# Patient Record
Sex: MALE | Race: WHITE | Employment: OTHER | URBAN - METROPOLITAN AREA
[De-identification: names, ages, dates, MRNs, and addresses within clinical notes are randomized per-mention and may not be internally consistent; named-entity substitution may affect disease eponyms.]

---

## 2017-01-03 ENCOUNTER — HOSPITAL ENCOUNTER (OUTPATIENT)
Dept: PHYSICAL THERAPY | Age: 72
Discharge: HOME | End: 2017-01-31
Admitting: FAMILY MEDICINE

## 2017-01-05 ENCOUNTER — OFFICE VISIT (OUTPATIENT)
Dept: FAMILY MEDICINE CLINIC | Age: 72
End: 2017-01-05

## 2017-01-05 VITALS
OXYGEN SATURATION: 98 % | WEIGHT: 278 LBS | BODY MASS INDEX: 39.89 KG/M2 | SYSTOLIC BLOOD PRESSURE: 132 MMHG | TEMPERATURE: 98.6 F | HEART RATE: 78 BPM | DIASTOLIC BLOOD PRESSURE: 74 MMHG

## 2017-01-05 DIAGNOSIS — L03.90 CELLULITIS, UNSPECIFIED CELLULITIS SITE: Primary | ICD-10-CM

## 2017-01-05 PROCEDURE — 99213 OFFICE O/P EST LOW 20 MIN: CPT | Performed by: FAMILY MEDICINE

## 2017-01-05 RX ORDER — CEPHALEXIN 500 MG/1
500 CAPSULE ORAL 4 TIMES DAILY
Qty: 40 CAPSULE | Refills: 0 | Status: SHIPPED | OUTPATIENT
Start: 2017-01-05 | End: 2017-02-07

## 2017-01-05 RX ORDER — AMMONIUM LACTATE 12 G/100G
LOTION TOPICAL
Qty: 500 G | Refills: 0 | Status: SHIPPED | OUTPATIENT
Start: 2017-01-05 | End: 2017-08-21

## 2017-01-06 ENCOUNTER — HOSPITAL ENCOUNTER (OUTPATIENT)
Dept: PHYSICAL THERAPY | Age: 72
Discharge: HOME | End: 2017-01-06
Admitting: FAMILY MEDICINE

## 2017-01-18 ENCOUNTER — HOSPITAL ENCOUNTER (OUTPATIENT)
Dept: PHYSICAL THERAPY | Age: 72
Discharge: HOME | End: 2017-01-18
Admitting: FAMILY MEDICINE

## 2017-01-20 ENCOUNTER — HOSPITAL ENCOUNTER (OUTPATIENT)
Dept: PHYSICAL THERAPY | Age: 72
Discharge: HOME | End: 2017-01-20
Admitting: FAMILY MEDICINE

## 2017-01-23 ENCOUNTER — HOSPITAL ENCOUNTER (OUTPATIENT)
Dept: PHYSICAL THERAPY | Age: 72
Discharge: HOME | End: 2017-01-23
Admitting: FAMILY MEDICINE

## 2017-01-24 ENCOUNTER — TELEPHONE (OUTPATIENT)
Dept: FAMILY MEDICINE CLINIC | Age: 72
End: 2017-01-24

## 2017-01-24 DIAGNOSIS — E34.9 HYPOTESTOSTERONISM: Primary | ICD-10-CM

## 2017-01-24 DIAGNOSIS — E11.9 TYPE 2 DIABETES MELLITUS WITHOUT COMPLICATION, WITHOUT LONG-TERM CURRENT USE OF INSULIN (HCC): ICD-10-CM

## 2017-01-24 DIAGNOSIS — M10.9 GOUT, UNSPECIFIED CAUSE, UNSPECIFIED CHRONICITY, UNSPECIFIED SITE: ICD-10-CM

## 2017-01-24 DIAGNOSIS — I25.10 CORONARY ARTERY DISEASE INVOLVING NATIVE CORONARY ARTERY OF NATIVE HEART WITHOUT ANGINA PECTORIS: ICD-10-CM

## 2017-01-26 ENCOUNTER — HOSPITAL ENCOUNTER (OUTPATIENT)
Dept: PHYSICAL THERAPY | Age: 72
Discharge: HOME | End: 2017-01-26
Admitting: FAMILY MEDICINE

## 2017-02-03 ENCOUNTER — NURSE ONLY (OUTPATIENT)
Dept: FAMILY MEDICINE CLINIC | Age: 72
End: 2017-02-03

## 2017-02-03 DIAGNOSIS — I25.10 CORONARY ARTERY DISEASE INVOLVING NATIVE CORONARY ARTERY OF NATIVE HEART WITHOUT ANGINA PECTORIS: ICD-10-CM

## 2017-02-03 DIAGNOSIS — M10.9 GOUT, UNSPECIFIED CAUSE, UNSPECIFIED CHRONICITY, UNSPECIFIED SITE: ICD-10-CM

## 2017-02-03 DIAGNOSIS — E11.9 TYPE 2 DIABETES MELLITUS WITHOUT COMPLICATION, WITHOUT LONG-TERM CURRENT USE OF INSULIN (HCC): Primary | ICD-10-CM

## 2017-02-03 DIAGNOSIS — E34.9 HYPOTESTOSTERONISM: ICD-10-CM

## 2017-02-03 LAB
A/G RATIO: 1.9 (ref 1.1–2.2)
ALBUMIN SERPL-MCNC: 4.1 G/DL (ref 3.4–5)
ALP BLD-CCNC: 88 U/L (ref 40–129)
ALT SERPL-CCNC: 19 U/L (ref 10–40)
ANION GAP SERPL CALCULATED.3IONS-SCNC: 12 MMOL/L (ref 3–16)
AST SERPL-CCNC: 15 U/L (ref 15–37)
BILIRUB SERPL-MCNC: 0.4 MG/DL (ref 0–1)
BUN BLDV-MCNC: 21 MG/DL (ref 7–20)
CALCIUM SERPL-MCNC: 9.1 MG/DL (ref 8.3–10.6)
CHLORIDE BLD-SCNC: 102 MMOL/L (ref 99–110)
CHOLESTEROL, TOTAL: 157 MG/DL (ref 0–199)
CO2: 27 MMOL/L (ref 21–32)
CREAT SERPL-MCNC: 1 MG/DL (ref 0.8–1.3)
GFR AFRICAN AMERICAN: >60
GFR NON-AFRICAN AMERICAN: >60
GLOBULIN: 2.2 G/DL
GLUCOSE BLD-MCNC: 197 MG/DL (ref 70–99)
HDLC SERPL-MCNC: 38 MG/DL (ref 40–60)
LDL CHOLESTEROL CALCULATED: 78 MG/DL
POTASSIUM SERPL-SCNC: 3.9 MMOL/L (ref 3.5–5.1)
SODIUM BLD-SCNC: 141 MMOL/L (ref 136–145)
TOTAL PROTEIN: 6.3 G/DL (ref 6.4–8.2)
TRIGL SERPL-MCNC: 205 MG/DL (ref 0–150)
URIC ACID, SERUM: 5.9 MG/DL (ref 3.5–7.2)
VLDLC SERPL CALC-MCNC: 41 MG/DL

## 2017-02-03 PROCEDURE — 36415 COLL VENOUS BLD VENIPUNCTURE: CPT | Performed by: FAMILY MEDICINE

## 2017-02-04 LAB
ESTIMATED AVERAGE GLUCOSE: 159.9 MG/DL
HBA1C MFR BLD: 7.2 %

## 2017-02-05 LAB
SEX HORMONE BINDING GLOBULIN: 21 NMOL/L (ref 11–80)
TESTOSTERONE FREE PERCENT: 2.3 % (ref 1.6–2.9)
TESTOSTERONE FREE, CALC: 88 PG/ML (ref 47–244)
TESTOSTERONE TOTAL-MALE: 387 NG/DL (ref 300–720)

## 2017-02-07 ENCOUNTER — OFFICE VISIT (OUTPATIENT)
Dept: FAMILY MEDICINE CLINIC | Age: 72
End: 2017-02-07

## 2017-02-07 VITALS
HEART RATE: 62 BPM | DIASTOLIC BLOOD PRESSURE: 66 MMHG | BODY MASS INDEX: 40.75 KG/M2 | SYSTOLIC BLOOD PRESSURE: 140 MMHG | WEIGHT: 284 LBS | OXYGEN SATURATION: 98 %

## 2017-02-07 DIAGNOSIS — R60.9 PERIPHERAL EDEMA: ICD-10-CM

## 2017-02-07 DIAGNOSIS — E66.01 MORBID OBESITY WITH BMI OF 40.0-44.9, ADULT (HCC): ICD-10-CM

## 2017-02-07 DIAGNOSIS — E11.9 TYPE 2 DIABETES MELLITUS WITHOUT COMPLICATION, WITHOUT LONG-TERM CURRENT USE OF INSULIN (HCC): Primary | ICD-10-CM

## 2017-02-07 DIAGNOSIS — I10 ESSENTIAL HYPERTENSION, BENIGN: ICD-10-CM

## 2017-02-07 DIAGNOSIS — M10.9 GOUT, UNSPECIFIED CAUSE, UNSPECIFIED CHRONICITY, UNSPECIFIED SITE: ICD-10-CM

## 2017-02-07 PROCEDURE — 99214 OFFICE O/P EST MOD 30 MIN: CPT | Performed by: FAMILY MEDICINE

## 2017-02-07 RX ORDER — FUROSEMIDE 40 MG/1
40 TABLET ORAL DAILY
Qty: 30 TABLET | Refills: 3 | Status: SHIPPED | OUTPATIENT
Start: 2017-02-07 | End: 2017-02-27 | Stop reason: DRUGHIGH

## 2017-02-07 RX ORDER — CLOTRIMAZOLE AND BETAMETHASONE DIPROPIONATE 10; .64 MG/G; MG/G
CREAM TOPICAL
Qty: 45 G | Refills: 3 | Status: SHIPPED | OUTPATIENT
Start: 2017-02-07 | End: 2019-10-02 | Stop reason: ALTCHOICE

## 2017-02-15 ENCOUNTER — OFFICE VISIT (OUTPATIENT)
Dept: ENDOCRINOLOGY | Age: 72
End: 2017-02-15

## 2017-02-15 ENCOUNTER — HOSPITAL ENCOUNTER (OUTPATIENT)
Dept: GENERAL RADIOLOGY | Age: 72
Discharge: HOME | End: 2017-02-15
Attending: INTERNAL MEDICINE | Admitting: INTERNAL MEDICINE

## 2017-02-15 VITALS
HEIGHT: 69 IN | DIASTOLIC BLOOD PRESSURE: 70 MMHG | HEART RATE: 70 BPM | WEIGHT: 280.8 LBS | BODY MASS INDEX: 41.59 KG/M2 | TEMPERATURE: 97.9 F | SYSTOLIC BLOOD PRESSURE: 122 MMHG | OXYGEN SATURATION: 95 % | RESPIRATION RATE: 14 BRPM

## 2017-02-15 DIAGNOSIS — E23.7 PITUITARY LESION (HCC): Primary | ICD-10-CM

## 2017-02-15 DIAGNOSIS — E23.7 PITUITARY LESION (HCC): ICD-10-CM

## 2017-02-15 DIAGNOSIS — E34.9 HYPOTESTOSTERONISM: ICD-10-CM

## 2017-02-15 LAB
CORTISOL - AM: 9.2 UG/DL (ref 4.3–22.4)
FOLLICLE STIMULATING HORMONE: <0.1 MIU/ML
LUTEINIZING HORMONE: <0.1 MIU/ML
PROLACTIN: 470 NG/ML
T4 FREE: 1 NG/DL (ref 0.9–1.8)
TSH SERPL DL<=0.05 MIU/L-ACNC: 3.31 UIU/ML (ref 0.27–4.2)

## 2017-02-15 PROCEDURE — 99204 OFFICE O/P NEW MOD 45 MIN: CPT | Performed by: INTERNAL MEDICINE

## 2017-02-16 LAB — ADRENOCORTICOTROPIC HORMONE: 28 PG/ML (ref 7–69)

## 2017-02-17 LAB — IGF-1 (INSULIN-LIKE GROWTH I): 120 NG/ML (ref 36–215)

## 2017-02-22 LAB — MISCELLANEOUS LAB TEST ORDER: NORMAL

## 2017-02-27 ENCOUNTER — OFFICE VISIT (OUTPATIENT)
Dept: ENDOCRINOLOGY | Age: 72
End: 2017-02-27

## 2017-02-27 VITALS
DIASTOLIC BLOOD PRESSURE: 104 MMHG | HEART RATE: 64 BPM | WEIGHT: 288.8 LBS | RESPIRATION RATE: 12 BRPM | OXYGEN SATURATION: 97 % | HEIGHT: 69 IN | SYSTOLIC BLOOD PRESSURE: 167 MMHG | BODY MASS INDEX: 42.78 KG/M2

## 2017-02-27 DIAGNOSIS — D35.2 PITUITARY ADENOMA (HCC): ICD-10-CM

## 2017-02-27 DIAGNOSIS — D35.2 PROLACTINOMA (HCC): Primary | ICD-10-CM

## 2017-02-27 PROCEDURE — 99214 OFFICE O/P EST MOD 30 MIN: CPT | Performed by: INTERNAL MEDICINE

## 2017-02-27 RX ORDER — CABERGOLINE 0.5 MG/1
0.25 TABLET ORAL
Qty: 4 TABLET | Refills: 3 | Status: SHIPPED | OUTPATIENT
Start: 2017-02-27 | End: 2017-04-12 | Stop reason: SDUPTHER

## 2017-02-27 RX ORDER — LISINOPRIL 20 MG/1
20 TABLET ORAL 2 TIMES DAILY
COMMUNITY
End: 2017-06-06 | Stop reason: DRUGHIGH

## 2017-02-27 ASSESSMENT — ENCOUNTER SYMPTOMS
ORTHOPNEA: 0
HEMOPTYSIS: 0
BACK PAIN: 0
COUGH: 0
DOUBLE VISION: 0
PHOTOPHOBIA: 0
BLURRED VISION: 0

## 2017-02-28 ENCOUNTER — TELEPHONE (OUTPATIENT)
Dept: ENDOCRINOLOGY | Age: 72
End: 2017-02-28

## 2017-02-28 ENCOUNTER — HOSPITAL ENCOUNTER (OUTPATIENT)
Dept: GENERAL RADIOLOGY | Age: 72
Discharge: HOME | End: 2017-02-28
Attending: INTERNAL MEDICINE | Admitting: INTERNAL MEDICINE

## 2017-02-28 DIAGNOSIS — D35.2 PROLACTINOMA (HCC): ICD-10-CM

## 2017-02-28 LAB — PROLACTIN: 562.9 NG/ML

## 2017-03-02 ENCOUNTER — HOSPITAL ENCOUNTER (OUTPATIENT)
Dept: OTHER | Age: 72
Discharge: HOME | End: 2017-03-02
Attending: INTERNAL MEDICINE | Admitting: INTERNAL MEDICINE

## 2017-03-02 DIAGNOSIS — D35.2 PITUITARY ADENOMA (HCC): ICD-10-CM

## 2017-03-05 LAB
CORTISOL (UR), FREE: 44.1 UG/D
CORTISOL URINE, FREE (/G CRT): 27.74 UG/G CRT
CORTISOL,F,UG/L,U: 14.7 UG/L
CREATININE 24 HOUR URINE: 1590 MG/D (ref 800–2100)
CREATININE URINE: 53 MG/DL
HOURS COLLECTED: 24 HR
INTERPRETATION: NORMAL
URINE TOTAL VOLUME: 3000 ML

## 2017-03-28 ENCOUNTER — TELEPHONE (OUTPATIENT)
Dept: ENDOCRINOLOGY | Age: 72
End: 2017-03-28

## 2017-03-28 DIAGNOSIS — E34.9 HYPOTESTOSTERONISM: Primary | ICD-10-CM

## 2017-03-28 DIAGNOSIS — E22.1 HYPERPROLACTINEMIA (HCC): ICD-10-CM

## 2017-04-05 ENCOUNTER — TELEPHONE (OUTPATIENT)
Dept: FAMILY MEDICINE CLINIC | Age: 72
End: 2017-04-05

## 2017-04-11 ENCOUNTER — HOSPITAL ENCOUNTER (OUTPATIENT)
Dept: GENERAL RADIOLOGY | Age: 72
Discharge: HOME | End: 2017-04-11
Attending: INTERNAL MEDICINE | Admitting: INTERNAL MEDICINE

## 2017-04-11 DIAGNOSIS — E22.1 HYPERPROLACTINEMIA (HCC): ICD-10-CM

## 2017-04-11 DIAGNOSIS — E34.9 HYPOTESTOSTERONISM: ICD-10-CM

## 2017-04-11 LAB — PROLACTIN: 154.1 NG/ML

## 2017-04-12 ENCOUNTER — OFFICE VISIT (OUTPATIENT)
Dept: ENDOCRINOLOGY | Age: 72
End: 2017-04-12

## 2017-04-12 VITALS
RESPIRATION RATE: 14 BRPM | HEART RATE: 77 BPM | SYSTOLIC BLOOD PRESSURE: 122 MMHG | WEIGHT: 275 LBS | OXYGEN SATURATION: 95 % | BODY MASS INDEX: 40.73 KG/M2 | HEIGHT: 69 IN | DIASTOLIC BLOOD PRESSURE: 61 MMHG

## 2017-04-12 DIAGNOSIS — E22.1 HYPERPROLACTINEMIA (HCC): Primary | ICD-10-CM

## 2017-04-12 DIAGNOSIS — E34.9 HYPOTESTOSTERONISM: ICD-10-CM

## 2017-04-12 LAB
SEX HORMONE BINDING GLOBULIN: 21 NMOL/L (ref 11–80)
TESTOSTERONE FREE PERCENT: 2.3 % (ref 1.6–2.9)
TESTOSTERONE FREE, CALC: 94 PG/ML (ref 47–244)
TESTOSTERONE TOTAL-MALE: 413 NG/DL (ref 300–720)

## 2017-04-12 PROCEDURE — 99214 OFFICE O/P EST MOD 30 MIN: CPT | Performed by: INTERNAL MEDICINE

## 2017-04-12 RX ORDER — CABERGOLINE 0.5 MG/1
0.5 TABLET ORAL
Qty: 8 TABLET | Refills: 3 | Status: SHIPPED | OUTPATIENT
Start: 2017-04-13 | End: 2017-08-21 | Stop reason: SDUPTHER

## 2017-05-25 ENCOUNTER — TELEPHONE (OUTPATIENT)
Dept: FAMILY MEDICINE CLINIC | Age: 72
End: 2017-05-25

## 2017-05-25 DIAGNOSIS — E11.9 TYPE 2 DIABETES MELLITUS WITHOUT COMPLICATION, WITHOUT LONG-TERM CURRENT USE OF INSULIN (HCC): Primary | ICD-10-CM

## 2017-06-02 ENCOUNTER — TELEPHONE (OUTPATIENT)
Dept: FAMILY MEDICINE CLINIC | Age: 72
End: 2017-06-02

## 2017-06-02 ENCOUNTER — NURSE ONLY (OUTPATIENT)
Dept: FAMILY MEDICINE CLINIC | Age: 72
End: 2017-06-02

## 2017-06-02 DIAGNOSIS — E11.9 TYPE 2 DIABETES MELLITUS WITHOUT COMPLICATION, WITHOUT LONG-TERM CURRENT USE OF INSULIN (HCC): ICD-10-CM

## 2017-06-02 DIAGNOSIS — E11.9 TYPE 2 DIABETES MELLITUS WITHOUT COMPLICATION, WITHOUT LONG-TERM CURRENT USE OF INSULIN (HCC): Primary | ICD-10-CM

## 2017-06-02 DIAGNOSIS — E22.1 HYPERPROLACTINEMIA (HCC): ICD-10-CM

## 2017-06-02 LAB
A/G RATIO: 1.8 (ref 1.1–2.2)
ALBUMIN SERPL-MCNC: 4.2 G/DL (ref 3.4–5)
ALP BLD-CCNC: 87 U/L (ref 40–129)
ALT SERPL-CCNC: 22 U/L (ref 10–40)
ANION GAP SERPL CALCULATED.3IONS-SCNC: 17 MMOL/L (ref 3–16)
AST SERPL-CCNC: 15 U/L (ref 15–37)
BILIRUB SERPL-MCNC: 0.5 MG/DL (ref 0–1)
BUN BLDV-MCNC: 23 MG/DL (ref 7–20)
CALCIUM SERPL-MCNC: 9.3 MG/DL (ref 8.3–10.6)
CHLORIDE BLD-SCNC: 101 MMOL/L (ref 99–110)
CHOLESTEROL, TOTAL: 156 MG/DL (ref 0–199)
CO2: 25 MMOL/L (ref 21–32)
CREAT SERPL-MCNC: 0.9 MG/DL (ref 0.8–1.3)
CREATININE URINE: 100.7 MG/DL (ref 39–259)
GFR AFRICAN AMERICAN: >60
GFR NON-AFRICAN AMERICAN: >60
GLOBULIN: 2.3 G/DL
GLUCOSE BLD-MCNC: 179 MG/DL (ref 70–99)
HDLC SERPL-MCNC: 35 MG/DL (ref 40–60)
LDL CHOLESTEROL CALCULATED: 78 MG/DL
MICROALBUMIN UR-MCNC: 1.6 MG/DL
MICROALBUMIN/CREAT UR-RTO: 15.9 MG/G (ref 0–30)
POTASSIUM SERPL-SCNC: 4.1 MMOL/L (ref 3.5–5.1)
SODIUM BLD-SCNC: 143 MMOL/L (ref 136–145)
TOTAL PROTEIN: 6.5 G/DL (ref 6.4–8.2)
TRIGL SERPL-MCNC: 213 MG/DL (ref 0–150)
VLDLC SERPL CALC-MCNC: 43 MG/DL

## 2017-06-02 PROCEDURE — 36415 COLL VENOUS BLD VENIPUNCTURE: CPT | Performed by: FAMILY MEDICINE

## 2017-06-03 LAB
ESTIMATED AVERAGE GLUCOSE: 180 MG/DL
HBA1C MFR BLD: 7.9 %

## 2017-06-06 ENCOUNTER — OFFICE VISIT (OUTPATIENT)
Dept: FAMILY MEDICINE CLINIC | Age: 72
End: 2017-06-06

## 2017-06-06 VITALS
SYSTOLIC BLOOD PRESSURE: 140 MMHG | WEIGHT: 276.6 LBS | BODY MASS INDEX: 40.74 KG/M2 | DIASTOLIC BLOOD PRESSURE: 68 MMHG | HEART RATE: 58 BPM | OXYGEN SATURATION: 98 %

## 2017-06-06 DIAGNOSIS — I10 ESSENTIAL HYPERTENSION, BENIGN: ICD-10-CM

## 2017-06-06 DIAGNOSIS — E11.9 TYPE 2 DIABETES MELLITUS WITHOUT COMPLICATION, WITHOUT LONG-TERM CURRENT USE OF INSULIN (HCC): ICD-10-CM

## 2017-06-06 LAB — PROLACTIN: 63.8 NG/ML

## 2017-06-06 PROCEDURE — 99213 OFFICE O/P EST LOW 20 MIN: CPT | Performed by: FAMILY MEDICINE

## 2017-06-06 RX ORDER — HYDROCHLOROTHIAZIDE 25 MG/1
25 TABLET ORAL
COMMUNITY
End: 2017-10-24 | Stop reason: SDUPTHER

## 2017-06-06 RX ORDER — CARVEDILOL 25 MG/1
25 TABLET ORAL 2 TIMES DAILY
Qty: 180 TABLET | Refills: 3 | Status: SHIPPED | OUTPATIENT
Start: 2017-06-06 | End: 2017-10-24 | Stop reason: SDUPTHER

## 2017-06-06 RX ORDER — FUROSEMIDE 40 MG/1
TABLET ORAL
COMMUNITY
Start: 2017-03-02 | End: 2017-08-21 | Stop reason: ALTCHOICE

## 2017-06-06 RX ORDER — METFORMIN HYDROCHLORIDE 750 MG/1
TABLET, EXTENDED RELEASE ORAL
Qty: 180 TABLET | Refills: 3 | Status: SHIPPED | OUTPATIENT
Start: 2017-06-06 | End: 2017-10-24 | Stop reason: SDUPTHER

## 2017-06-06 ASSESSMENT — PATIENT HEALTH QUESTIONNAIRE - PHQ9
SUM OF ALL RESPONSES TO PHQ9 QUESTIONS 1 & 2: 0
1. LITTLE INTEREST OR PLEASURE IN DOING THINGS: 0
SUM OF ALL RESPONSES TO PHQ QUESTIONS 1-9: 0
2. FEELING DOWN, DEPRESSED OR HOPELESS: 0

## 2017-08-15 ENCOUNTER — TELEPHONE (OUTPATIENT)
Dept: ENDOCRINOLOGY | Age: 72
End: 2017-08-15

## 2017-08-15 ENCOUNTER — HOSPITAL ENCOUNTER (OUTPATIENT)
Dept: GENERAL RADIOLOGY | Age: 72
Discharge: HOME | End: 2017-08-15
Attending: INTERNAL MEDICINE | Admitting: INTERNAL MEDICINE

## 2017-08-15 DIAGNOSIS — E22.1 HYPERPROLACTINEMIA (HCC): Primary | ICD-10-CM

## 2017-08-15 DIAGNOSIS — E34.9 HYPOTESTOSTERONISM: ICD-10-CM

## 2017-08-15 DIAGNOSIS — G47.30 SLEEP APNEA, UNSPECIFIED TYPE: ICD-10-CM

## 2017-08-15 DIAGNOSIS — E22.1 HYPERPROLACTINEMIA (HCC): ICD-10-CM

## 2017-08-15 LAB
HCT VFR BLD CALC: 42.9 % (ref 40.5–52.5)
HEMOGLOBIN: 14.4 G/DL (ref 13.5–17.5)
MCH RBC QN AUTO: 29.3 PG (ref 26–34)
MCHC RBC AUTO-ENTMCNC: 33.6 G/DL (ref 31–36)
MCV RBC AUTO: 87.3 FL (ref 80–100)
PDW BLD-RTO: 15.7 % (ref 12.4–15.4)
PLATELET # BLD: 151 K/UL (ref 135–450)
PMV BLD AUTO: 9.9 FL (ref 5–10.5)
PROLACTIN: 47.6 NG/ML
RBC # BLD: 4.92 M/UL (ref 4.2–5.9)
WBC # BLD: 7.3 K/UL (ref 4–11)

## 2017-08-18 LAB
SEX HORMONE BINDING GLOBULIN: 24 NMOL/L (ref 11–80)
TESTOSTERONE FREE-NONMALE: 15.6 PG/ML (ref 47–244)
TESTOSTERONE TOTAL: 72 NG/DL (ref 220–1000)

## 2017-08-21 ENCOUNTER — OFFICE VISIT (OUTPATIENT)
Dept: ENDOCRINOLOGY | Age: 72
End: 2017-08-21

## 2017-08-21 VITALS
WEIGHT: 269 LBS | HEIGHT: 69 IN | RESPIRATION RATE: 12 BRPM | SYSTOLIC BLOOD PRESSURE: 113 MMHG | HEART RATE: 63 BPM | DIASTOLIC BLOOD PRESSURE: 63 MMHG | OXYGEN SATURATION: 95 % | BODY MASS INDEX: 39.84 KG/M2

## 2017-08-21 DIAGNOSIS — E22.1 HYPERPROLACTINEMIA (HCC): Primary | ICD-10-CM

## 2017-08-21 DIAGNOSIS — E34.9 HYPOTESTOSTERONISM: ICD-10-CM

## 2017-08-21 DIAGNOSIS — D49.7 PITUITARY TUMOR: ICD-10-CM

## 2017-08-21 PROCEDURE — 99214 OFFICE O/P EST MOD 30 MIN: CPT | Performed by: INTERNAL MEDICINE

## 2017-08-21 RX ORDER — CABERGOLINE 0.5 MG/1
TABLET ORAL
Qty: 12 TABLET | Refills: 3 | Status: SHIPPED | OUTPATIENT
Start: 2017-08-21 | End: 2018-01-01 | Stop reason: SDUPTHER

## 2017-08-23 ENCOUNTER — TELEPHONE (OUTPATIENT)
Dept: FAMILY MEDICINE CLINIC | Age: 72
End: 2017-08-23

## 2017-08-23 DIAGNOSIS — M10.9 GOUT, UNSPECIFIED CAUSE, UNSPECIFIED CHRONICITY, UNSPECIFIED SITE: ICD-10-CM

## 2017-08-23 DIAGNOSIS — I10 ESSENTIAL HYPERTENSION, BENIGN: ICD-10-CM

## 2017-08-23 DIAGNOSIS — E11.9 TYPE 2 DIABETES MELLITUS WITHOUT COMPLICATION, WITHOUT LONG-TERM CURRENT USE OF INSULIN (HCC): Primary | ICD-10-CM

## 2017-09-29 ENCOUNTER — TELEPHONE (OUTPATIENT)
Dept: FAMILY MEDICINE CLINIC | Age: 72
End: 2017-09-29

## 2017-10-16 ENCOUNTER — TELEPHONE (OUTPATIENT)
Dept: FAMILY MEDICINE CLINIC | Age: 72
End: 2017-10-16

## 2017-10-16 DIAGNOSIS — I10 ESSENTIAL HYPERTENSION, BENIGN: ICD-10-CM

## 2017-10-16 DIAGNOSIS — E11.9 TYPE 2 DIABETES MELLITUS WITHOUT COMPLICATION, WITHOUT LONG-TERM CURRENT USE OF INSULIN (HCC): Primary | ICD-10-CM

## 2017-10-17 RX ORDER — HYDROCHLOROTHIAZIDE 25 MG/1
TABLET ORAL
Qty: 90 TABLET | Refills: 2 | Status: SHIPPED | OUTPATIENT
Start: 2017-10-17 | End: 2018-01-02 | Stop reason: ALTCHOICE

## 2017-10-20 ENCOUNTER — NURSE ONLY (OUTPATIENT)
Dept: FAMILY MEDICINE CLINIC | Age: 72
End: 2017-10-20

## 2017-10-20 DIAGNOSIS — E11.9 TYPE 2 DIABETES MELLITUS WITHOUT COMPLICATION, WITHOUT LONG-TERM CURRENT USE OF INSULIN (HCC): Primary | ICD-10-CM

## 2017-10-20 DIAGNOSIS — E11.9 TYPE 2 DIABETES MELLITUS WITHOUT COMPLICATION, WITHOUT LONG-TERM CURRENT USE OF INSULIN (HCC): ICD-10-CM

## 2017-10-20 DIAGNOSIS — M10.9 GOUT, UNSPECIFIED CAUSE, UNSPECIFIED CHRONICITY, UNSPECIFIED SITE: ICD-10-CM

## 2017-10-20 LAB
A/G RATIO: 2 (ref 1.1–2.2)
ALBUMIN SERPL-MCNC: 4.2 G/DL (ref 3.4–5)
ALP BLD-CCNC: 97 U/L (ref 40–129)
ALT SERPL-CCNC: 18 U/L (ref 10–40)
ANION GAP SERPL CALCULATED.3IONS-SCNC: 15 MMOL/L (ref 3–16)
AST SERPL-CCNC: 12 U/L (ref 15–37)
BILIRUB SERPL-MCNC: 0.5 MG/DL (ref 0–1)
BUN BLDV-MCNC: 22 MG/DL (ref 7–20)
CALCIUM SERPL-MCNC: 9.4 MG/DL (ref 8.3–10.6)
CHLORIDE BLD-SCNC: 100 MMOL/L (ref 99–110)
CO2: 25 MMOL/L (ref 21–32)
CREAT SERPL-MCNC: 1 MG/DL (ref 0.8–1.3)
GFR AFRICAN AMERICAN: >60
GFR NON-AFRICAN AMERICAN: >60
GLOBULIN: 2.1 G/DL
GLUCOSE BLD-MCNC: 230 MG/DL (ref 70–99)
POTASSIUM SERPL-SCNC: 4.2 MMOL/L (ref 3.5–5.1)
SODIUM BLD-SCNC: 140 MMOL/L (ref 136–145)
TOTAL PROTEIN: 6.3 G/DL (ref 6.4–8.2)
URIC ACID, SERUM: 5.1 MG/DL (ref 3.5–7.2)

## 2017-10-20 PROCEDURE — 36415 COLL VENOUS BLD VENIPUNCTURE: CPT | Performed by: FAMILY MEDICINE

## 2017-10-20 NOTE — PROGRESS NOTES
Patient came into the office per physician's request for the following blood test(s): Hemoglobin A1C, CMP, and Uric Acid.       Blood drawn in office by [facility]    # of tubes sent: 1 SST, 1 Lav

## 2017-10-21 LAB
ESTIMATED AVERAGE GLUCOSE: 194.4 MG/DL
HBA1C MFR BLD: 8.4 %

## 2017-10-24 ENCOUNTER — OFFICE VISIT (OUTPATIENT)
Dept: FAMILY MEDICINE CLINIC | Age: 72
End: 2017-10-24

## 2017-10-24 ENCOUNTER — TELEPHONE (OUTPATIENT)
Dept: FAMILY MEDICINE CLINIC | Age: 72
End: 2017-10-24

## 2017-10-24 VITALS
WEIGHT: 270 LBS | TEMPERATURE: 98.2 F | DIASTOLIC BLOOD PRESSURE: 76 MMHG | HEART RATE: 76 BPM | BODY MASS INDEX: 39.77 KG/M2 | SYSTOLIC BLOOD PRESSURE: 132 MMHG

## 2017-10-24 DIAGNOSIS — M10.9 GOUT, UNSPECIFIED CAUSE, UNSPECIFIED CHRONICITY, UNSPECIFIED SITE: ICD-10-CM

## 2017-10-24 DIAGNOSIS — E78.5 HYPERLIPIDEMIA, UNSPECIFIED HYPERLIPIDEMIA TYPE: ICD-10-CM

## 2017-10-24 DIAGNOSIS — E11.9 TYPE 2 DIABETES MELLITUS WITHOUT COMPLICATION, WITHOUT LONG-TERM CURRENT USE OF INSULIN (HCC): ICD-10-CM

## 2017-10-24 DIAGNOSIS — I10 ESSENTIAL HYPERTENSION, BENIGN: ICD-10-CM

## 2017-10-24 DIAGNOSIS — S76.112S STRAIN OF LEFT QUADRICEPS, SEQUELA: Primary | ICD-10-CM

## 2017-10-24 DIAGNOSIS — T56.0X1A LEAD-INDUCED ACUTE GOUT, UNSPECIFIED SITE, INITIAL ENCOUNTER: ICD-10-CM

## 2017-10-24 DIAGNOSIS — M10.10 LEAD-INDUCED ACUTE GOUT, UNSPECIFIED SITE, INITIAL ENCOUNTER: ICD-10-CM

## 2017-10-24 DIAGNOSIS — E11.9 TYPE 2 DIABETES MELLITUS WITHOUT COMPLICATION, WITHOUT LONG-TERM CURRENT USE OF INSULIN (HCC): Primary | ICD-10-CM

## 2017-10-24 PROCEDURE — 99214 OFFICE O/P EST MOD 30 MIN: CPT | Performed by: FAMILY MEDICINE

## 2017-10-24 RX ORDER — ATORVASTATIN CALCIUM 80 MG/1
80 TABLET, FILM COATED ORAL DAILY
Qty: 90 TABLET | Refills: 3 | Status: SHIPPED | OUTPATIENT
Start: 2017-10-24 | End: 2018-11-16 | Stop reason: SDUPTHER

## 2017-10-24 RX ORDER — ALLOPURINOL 300 MG/1
TABLET ORAL
Qty: 90 TABLET | Refills: 3 | Status: SHIPPED | OUTPATIENT
Start: 2017-10-24 | End: 2018-09-04 | Stop reason: SDUPTHER

## 2017-10-24 RX ORDER — METFORMIN HYDROCHLORIDE 750 MG/1
TABLET, EXTENDED RELEASE ORAL
Qty: 180 TABLET | Refills: 3 | Status: SHIPPED | OUTPATIENT
Start: 2017-10-24 | End: 2018-06-07 | Stop reason: SDUPTHER

## 2017-10-24 RX ORDER — CARVEDILOL 25 MG/1
25 TABLET ORAL 2 TIMES DAILY
Qty: 180 TABLET | Refills: 3 | Status: SHIPPED | OUTPATIENT
Start: 2017-10-24 | End: 2018-12-09 | Stop reason: SDUPTHER

## 2017-10-24 RX ORDER — LANCETS
EACH MISCELLANEOUS
Qty: 100 EACH | Refills: 3 | Status: SHIPPED | OUTPATIENT
Start: 2017-10-24

## 2017-10-24 RX ORDER — ATORVASTATIN CALCIUM 80 MG/1
TABLET, FILM COATED ORAL
COMMUNITY
Start: 2017-09-20 | End: 2017-10-24 | Stop reason: SDUPTHER

## 2017-10-24 RX ORDER — LISINOPRIL 40 MG/1
40 TABLET ORAL DAILY
Qty: 90 TABLET | Refills: 3 | Status: SHIPPED | OUTPATIENT
Start: 2017-10-24 | End: 2017-11-15 | Stop reason: SDUPTHER

## 2017-10-24 NOTE — TELEPHONE ENCOUNTER
SHANELLE  I put pt in for bw on 1/18/18 - on lab schedule. Pt will need order put in. Pt will be coming in week before his 3 month f/u apt.

## 2017-10-24 NOTE — TELEPHONE ENCOUNTER
There is a A1c and CMP that is in there to be collected is there anything else that you would want him to have done?

## 2017-11-15 DIAGNOSIS — I10 ESSENTIAL HYPERTENSION, BENIGN: ICD-10-CM

## 2017-11-15 RX ORDER — LISINOPRIL 40 MG/1
40 TABLET ORAL DAILY
Qty: 90 TABLET | Refills: 3 | Status: SHIPPED | OUTPATIENT
Start: 2017-11-15 | End: 2018-11-15 | Stop reason: SDUPTHER

## 2017-11-15 NOTE — TELEPHONE ENCOUNTER
Patient called and is requesting a refill on his prescription for Lisinopril 40 mgs. Patient states that [address] [place] [address] in [facility] was supposed to request it but it does not look like we received the request.    Please call patient when rx is sent to [address] [place] [address] in [facility].     Thanks

## 2017-12-11 ENCOUNTER — TELEPHONE (OUTPATIENT)
Dept: FAMILY MEDICINE CLINIC | Age: 72
End: 2017-12-11

## 2017-12-18 PROBLEM — A41.9 SEPSIS (HCC): Status: ACTIVE | Noted: 2017-12-18

## 2017-12-19 PROBLEM — L03.90 CELLULITIS: Status: ACTIVE | Noted: 2017-12-19

## 2017-12-19 PROBLEM — R65.20 SEVERE SEPSIS (HCC): Status: ACTIVE | Noted: 2017-12-18

## 2017-12-20 PROBLEM — I50.33 ACUTE ON CHRONIC DIASTOLIC CHF (CONGESTIVE HEART FAILURE) (HCC): Status: ACTIVE | Noted: 2017-12-20

## 2018-01-02 ENCOUNTER — OFFICE VISIT (OUTPATIENT)
Dept: FAMILY MEDICINE CLINIC | Age: 73
End: 2018-01-02

## 2018-01-02 VITALS
BODY MASS INDEX: 37.59 KG/M2 | DIASTOLIC BLOOD PRESSURE: 62 MMHG | TEMPERATURE: 98.1 F | WEIGHT: 262 LBS | HEART RATE: 74 BPM | OXYGEN SATURATION: 95 % | SYSTOLIC BLOOD PRESSURE: 126 MMHG

## 2018-01-02 DIAGNOSIS — I87.2 VENOUS INSUFFICIENCY: Primary | ICD-10-CM

## 2018-01-02 DIAGNOSIS — J10.1 INFLUENZA A: ICD-10-CM

## 2018-01-02 LAB
INFLUENZA A ANTIBODY: ABNORMAL
INFLUENZA B ANTIBODY: ABNORMAL

## 2018-01-02 PROCEDURE — 87804 INFLUENZA ASSAY W/OPTIC: CPT | Performed by: FAMILY MEDICINE

## 2018-01-02 PROCEDURE — 1111F DSCHRG MED/CURRENT MED MERGE: CPT | Performed by: FAMILY MEDICINE

## 2018-01-02 PROCEDURE — 99214 OFFICE O/P EST MOD 30 MIN: CPT | Performed by: FAMILY MEDICINE

## 2018-01-02 RX ORDER — CABERGOLINE 0.5 MG/1
TABLET ORAL
Qty: 8 TABLET | Refills: 2 | Status: SHIPPED | OUTPATIENT
Start: 2018-01-02 | End: 2018-03-04 | Stop reason: SDUPTHER

## 2018-01-02 RX ORDER — OSELTAMIVIR PHOSPHATE 75 MG/1
75 CAPSULE ORAL 2 TIMES DAILY
Qty: 10 CAPSULE | Refills: 0 | Status: SHIPPED | OUTPATIENT
Start: 2018-01-02 | End: 2018-01-07

## 2018-01-02 RX ORDER — AMMONIUM LACTATE 12 G/100G
LOTION TOPICAL
Qty: 500 G | Refills: 0 | Status: SHIPPED | OUTPATIENT
Start: 2018-01-02 | End: 2018-05-02 | Stop reason: SDUPTHER

## 2018-01-02 RX ORDER — TORSEMIDE 10 MG/1
10 TABLET ORAL DAILY
COMMUNITY
End: 2018-06-07 | Stop reason: SDUPTHER

## 2018-01-02 RX ORDER — HYDROCODONE POLISTIREX AND CHLORPHENIRAMINE POLISTIREX 10; 8 MG/5ML; MG/5ML
5 SUSPENSION, EXTENDED RELEASE ORAL EVERY 12 HOURS PRN
Qty: 120 ML | Refills: 0 | Status: SHIPPED | OUTPATIENT
Start: 2018-01-02 | End: 2018-01-09

## 2018-01-02 NOTE — PROGRESS NOTES
MG/5ML SUER; Take 5 mLs by mouth every 12 hours as needed (cough) for up to 7 days.  -     POCT Influenza A/B    Other orders  -     ammonium lactate (LAC-HYDRIN) 12 % lotion; Apply topically daily.           Medical Decision Making: moderate complexity

## 2018-01-05 ENCOUNTER — HOSPITAL ENCOUNTER (OUTPATIENT)
Dept: GENERAL RADIOLOGY | Age: 73
Discharge: HOME | End: 2018-01-05
Attending: INTERNAL MEDICINE | Admitting: INTERNAL MEDICINE

## 2018-01-05 ENCOUNTER — PATIENT MESSAGE (OUTPATIENT)
Dept: FAMILY MEDICINE CLINIC | Age: 73
End: 2018-01-05

## 2018-01-05 ENCOUNTER — OFFICE VISIT (OUTPATIENT)
Dept: ENDOCRINOLOGY | Age: 73
End: 2018-01-05

## 2018-01-05 VITALS
WEIGHT: 259.8 LBS | HEART RATE: 59 BPM | RESPIRATION RATE: 12 BRPM | DIASTOLIC BLOOD PRESSURE: 52 MMHG | OXYGEN SATURATION: 96 % | HEIGHT: 70 IN | BODY MASS INDEX: 37.19 KG/M2 | SYSTOLIC BLOOD PRESSURE: 94 MMHG

## 2018-01-05 DIAGNOSIS — E11.9 TYPE 2 DIABETES MELLITUS WITHOUT COMPLICATION, WITHOUT LONG-TERM CURRENT USE OF INSULIN (HCC): ICD-10-CM

## 2018-01-05 DIAGNOSIS — E22.1 HYPERPROLACTINEMIA (HCC): Primary | ICD-10-CM

## 2018-01-05 DIAGNOSIS — E22.1 HYPERPROLACTINEMIA (HCC): ICD-10-CM

## 2018-01-05 DIAGNOSIS — E34.9 HYPOTESTOSTERONISM: ICD-10-CM

## 2018-01-05 LAB — PROLACTIN: 28.3 NG/ML

## 2018-01-05 PROCEDURE — 99214 OFFICE O/P EST MOD 30 MIN: CPT | Performed by: INTERNAL MEDICINE

## 2018-01-05 NOTE — PROGRESS NOTES
Endocrinology    Rocío Thompson M.D. Phone: [phone]   FAX: [phone]       Angelina Bosworth   YOB: 1945    Date of Visit:  1/5/2018    No Known Allergies  Outpatient Prescriptions Marked as Taking for the 1/5/18 encounter (Office Visit) with Alli Dan MD   Medication Sig Dispense Refill    cabergoline (DOSTINEX) 0.5 MG tablet TAKE ONE TABLET BY MOUTH THREE TIMES A WEEK 8 tablet 2    torsemide (DEMADEX) 10 MG tablet Take 10 mg by mouth daily      oseltamivir (TAMIFLU) 75 MG capsule Take 1 capsule by mouth 2 times daily for 5 days 10 capsule 0    ammonium lactate (LAC-HYDRIN) 12 % lotion Apply topically daily. 500 g 0    lisinopril (PRINIVIL;ZESTRIL) 40 MG tablet Take 1 tablet by mouth daily 90 tablet 3    allopurinol (ZYLOPRIM) 300 MG tablet TAKE ONE TABLET BY MOUTH EVERY DAY 90 tablet 3    Accu-Chek Multiclix Lancets MISC Test once daily  DX  250.00 100 each 3    glucose blood VI test strips (ACCU-CHEK EVERTON PLUS) strip Test once daily  .00 100 each 3    atorvastatin (LIPITOR) 80 MG tablet Take 1 tablet by mouth daily 90 tablet 3    metFORMIN (GLUCOPHAGE-XR) 750 MG extended release tablet 1 po bid 180 tablet 3    carvedilol (COREG) 25 MG tablet Take 1 tablet by mouth 2 times daily 180 tablet 3    Omega-3 Fatty Acids (FISH OIL) 1200 MG CAPS Take 2 capsules by mouth daily      Cholecalciferol (VITAMIN D) 2000 UNITS CAPS capsule Take 1 capsule by mouth daily      testosterone cypionate (DEPOTESTOTERONE CYPIONATE) 200 MG/ML injection Inject 200 mg into the muscle Every two weeks.  aspirin 81 MG chewable tablet Take 81 mg by mouth daily. Vitals:    01/05/18 0921   BP: (!) 94/52   Site: Right Arm   Position: Sitting   Cuff Size: Large Adult   Pulse: 59   Resp: 12   SpO2: 96%   Weight: 259 lb 12.8 oz (117.8 kg)   Height: 5' 10\" (1.778 m)     Body mass index is 37.28 kg/m².      Wt Readings from Last 3 Encounters:   01/05/18 259 lb 12.8 oz (117.8 kg)   01/02/18 262 lb (118.8 kg)   12/21/17 272 lb 4.8 oz (123.5 kg)     BP Readings from Last 3 Encounters:   01/05/18 (!) 94/52   01/02/18 126/62   12/21/17 (!) 159/77        Past Medical History:   Diagnosis Date    CAD (coronary artery disease)     DJD (degenerative joint disease)     History of PTCA     Dr. Anjana Gipson Hyperlipidemia     Hypertension     Hypotestosteronism     Dr. Roland Hobbs, urology    IGT (impaired glucose tolerance)     Sleep apnea     cpap     Past Surgical History:   Procedure Laterality Date    CARDIAC SURGERY      stents    COLONOSCOPY  2001    COLONOSCOPY  12/9/11    diverticulosis    CORONARY ANGIOPLASTY WITH STENT PLACEMENT  2003    KNEE ARTHROSCOPY      KNEE SURGERY       Family History   Problem Relation Age of Onset    Heart Disease Brother     High Blood Pressure Brother     Dementia Father      History   Smoking Status    Former Smoker    Packs/day: 1.00    Years: 15.00    Types: Cigarettes    Quit date: 12/9/1982   Smokeless Tobacco    Never Used      History   Alcohol Use No       HPI      This is a 66 yrs old male who is here for management of hyperprolactinemia, pituitary adenoma. PCP Heike Christensen MD  Urologist : Dr. Roland Hobbs. Patient has a PMH of type 2 DM, HTN, Hyperlipidemia, Sleep apnea, CAD, hypogonadism    Interim hitsory; Was admitted with cellulitis and sepsis in 12/17 . Currently has flu. He was admitted to [facility] with fevers, chills and was found to have bacteremia. Was treated with antibiotics. Managed by Dr. WELLSTAR [facility]. MRI of the head was done which showed 1.4 x 1.3 cm pituitary lesion. Denies any headaches, visual problems. No previous MRI to compare. Labs showed high prolactin . On cabergoline since 02/17  Current 0.5 mg twice a week since 04/17. Tolerating well. Has h/o hypogonadism diagnosed 8-10 yrs back. On testosterone injections. Managed by urology. No significant weight changes. normal   limits for patient's age. Hinkle Ports is no midline shift or hydrocephalus. Basal   cisterns are patent.       There is no extra-axial fluid collection. Scattered, small foci of T2/FLAIR   hyperintense signal in the periventricular and subcortical white matter are   nonspecific, but may reflect mild chronic microvascular disease.  No   restricted diffusion is identified to suggest acute ischemia or infarct. There is no abnormal parenchymal gradient susceptibility.       Flow voids of the proximal intracranial arteries and dural sinuses are   unremarkable.  There is a 1.4 x 1.3 cm T2 hypointense lesion in the left   aspect of the sella.  The lesion does not demonstrate an associated flow   void, and is therefore unlikely to reflect an aneurysm.       ORBITS: The visualized portion of the orbits demonstrate no acute abnormality.       SINUSES: Mild circumferential mucosal thickening of the maxillary, sphenoid,   and ethmoid sinuses.  No fluid is seen in the mastoid air cells.       BONES/SOFT TISSUES: The bone marrow signal intensity appears normal. The   craniocervical junction is normal in appearance.           Impression   1.  No acute intracranial abnormality.       2.  Mild generalized volume loss and mild chronic microvascular ischemic   disease.       3.  Soft tissue lesion in the left aspect of the sella may reflect a   pituitary adenoma.         Assessment/Plan        1. Pituitary lesion    This 66 yrs old male was found to have a 1.4  X 1.3 cm left sellar lesion consistent with pituitary adenoma. He denies headache or visual symptoms. Lab work showed a high prolactin of 470 and 562 consistent with prolactinoma. On cabergoline since 02/17  Current dose 0.5 mg three a week since 08/17    Prolactin 562---->154---> 63.8---> 47.6    Rest of pituitary labs normal.    Will repeat MRI in 6-12 months. visual field exam normal in 02/17. Will increase dose of cabergoline to 0.5 mg 3 times a week. Repeat labs today. 2. Hypogonadism. On testosterone 200 mg IM every 2 weeks. Not taking it regularly. Levels were checked by urologist recently. Will get results. 3. DM/HTN/HLP/Obesity. As per PCP        Results via my chart.

## 2018-01-08 ENCOUNTER — PATIENT MESSAGE (OUTPATIENT)
Dept: FAMILY MEDICINE CLINIC | Age: 73
End: 2018-01-08

## 2018-01-08 DIAGNOSIS — E11.9 TYPE 2 DIABETES MELLITUS WITHOUT COMPLICATION, WITHOUT LONG-TERM CURRENT USE OF INSULIN (HCC): Primary | ICD-10-CM

## 2018-01-09 ENCOUNTER — TELEPHONE (OUTPATIENT)
Dept: INFECTIOUS DISEASES | Age: 73
End: 2018-01-09

## 2018-01-09 NOTE — TELEPHONE ENCOUNTER
Returned the call   R leg \"practically normal\"  Using the compression stockings all the time     Continue BID pcn for ppx cellulitis  RTC 3-4 months  All questions answered

## 2018-01-18 ENCOUNTER — NURSE ONLY (OUTPATIENT)
Dept: FAMILY MEDICINE CLINIC | Age: 73
End: 2018-01-18

## 2018-01-18 DIAGNOSIS — M10.10 LEAD-INDUCED ACUTE GOUT, UNSPECIFIED SITE, INITIAL ENCOUNTER: ICD-10-CM

## 2018-01-18 DIAGNOSIS — T56.0X1A LEAD-INDUCED ACUTE GOUT, UNSPECIFIED SITE, INITIAL ENCOUNTER: ICD-10-CM

## 2018-01-18 DIAGNOSIS — E11.9 TYPE 2 DIABETES MELLITUS WITHOUT COMPLICATION, WITHOUT LONG-TERM CURRENT USE OF INSULIN (HCC): ICD-10-CM

## 2018-01-18 DIAGNOSIS — M10.9 GOUT, UNSPECIFIED CAUSE, UNSPECIFIED CHRONICITY, UNSPECIFIED SITE: Primary | ICD-10-CM

## 2018-01-18 LAB
A/G RATIO: 2 (ref 1.1–2.2)
ALBUMIN SERPL-MCNC: 4.1 G/DL (ref 3.4–5)
ALP BLD-CCNC: 75 U/L (ref 40–129)
ALT SERPL-CCNC: 17 U/L (ref 10–40)
ANION GAP SERPL CALCULATED.3IONS-SCNC: 12 MMOL/L (ref 3–16)
AST SERPL-CCNC: 15 U/L (ref 15–37)
BILIRUB SERPL-MCNC: 0.3 MG/DL (ref 0–1)
BUN BLDV-MCNC: 24 MG/DL (ref 7–20)
CALCIUM SERPL-MCNC: 9.2 MG/DL (ref 8.3–10.6)
CHLORIDE BLD-SCNC: 105 MMOL/L (ref 99–110)
CHOLESTEROL, TOTAL: 148 MG/DL (ref 0–199)
CO2: 26 MMOL/L (ref 21–32)
CREAT SERPL-MCNC: 1.1 MG/DL (ref 0.8–1.3)
GFR AFRICAN AMERICAN: >60
GFR NON-AFRICAN AMERICAN: >60
GLOBULIN: 2.1 G/DL
GLUCOSE BLD-MCNC: 180 MG/DL (ref 70–99)
HDLC SERPL-MCNC: 30 MG/DL (ref 40–60)
LDL CHOLESTEROL CALCULATED: ABNORMAL MG/DL
LDL CHOLESTEROL DIRECT: 73 MG/DL
POTASSIUM SERPL-SCNC: 4.4 MMOL/L (ref 3.5–5.1)
SODIUM BLD-SCNC: 143 MMOL/L (ref 136–145)
TOTAL PROTEIN: 6.2 G/DL (ref 6.4–8.2)
TRIGL SERPL-MCNC: 309 MG/DL (ref 0–150)
URIC ACID, SERUM: 5.4 MG/DL (ref 3.5–7.2)
VLDLC SERPL CALC-MCNC: ABNORMAL MG/DL

## 2018-01-18 PROCEDURE — 36415 COLL VENOUS BLD VENIPUNCTURE: CPT | Performed by: FAMILY MEDICINE

## 2018-01-18 NOTE — PROGRESS NOTES
Patient came into the office per physician's request for the following blood test(s):Cmp, a1c, uric acid, lipid    Blood drawn in office by Shirley Mcdonough    # of tubes sent: 2 SST, 1 LAV

## 2018-01-19 LAB
ESTIMATED AVERAGE GLUCOSE: 182.9 MG/DL
HBA1C MFR BLD: 8 %

## 2018-01-23 ENCOUNTER — OFFICE VISIT (OUTPATIENT)
Dept: FAMILY MEDICINE CLINIC | Age: 73
End: 2018-01-23

## 2018-01-23 ENCOUNTER — HOSPITAL ENCOUNTER (OUTPATIENT)
Dept: DIABETES SERVICES | Age: 73
Discharge: HOME | End: 2018-01-31
Admitting: FAMILY MEDICINE

## 2018-01-23 VITALS
BODY MASS INDEX: 37.74 KG/M2 | OXYGEN SATURATION: 98 % | DIASTOLIC BLOOD PRESSURE: 60 MMHG | HEART RATE: 58 BPM | WEIGHT: 263 LBS | SYSTOLIC BLOOD PRESSURE: 124 MMHG

## 2018-01-23 DIAGNOSIS — E11.9 TYPE 2 DIABETES MELLITUS WITHOUT COMPLICATION, WITHOUT LONG-TERM CURRENT USE OF INSULIN (HCC): Primary | ICD-10-CM

## 2018-01-23 DIAGNOSIS — E11.9 TYPE 2 DIABETES MELLITUS WITHOUT COMPLICATIONS (HCC): ICD-10-CM

## 2018-01-23 PROCEDURE — 99213 OFFICE O/P EST LOW 20 MIN: CPT | Performed by: FAMILY MEDICINE

## 2018-01-23 RX ORDER — CLOTRIMAZOLE AND BETAMETHASONE DIPROPIONATE 10; .64 MG/G; MG/G
CREAM TOPICAL
Qty: 45 G | Refills: 0 | Status: SHIPPED | OUTPATIENT
Start: 2018-01-23 | End: 2018-04-11

## 2018-01-23 ASSESSMENT — PATIENT HEALTH QUESTIONNAIRE - PHQ9
1. LITTLE INTEREST OR PLEASURE IN DOING THINGS: 0
SUM OF ALL RESPONSES TO PHQ QUESTIONS 1-9: 0
SUM OF ALL RESPONSES TO PHQ9 QUESTIONS 1 & 2: 0
2. FEELING DOWN, DEPRESSED OR HOPELESS: 0

## 2018-01-23 NOTE — LETTER
Diabetes Education  [address]: Dr. Sharan Correa    RE: Americo VUONG.: 1945    An initial assessment to determine diabetes education needs was completed on 2018. Education included:      carb counting    label reading     plate guide for consistent carb intake    meter start      monitoring frequency      hypoglycemia prevention/treatment    activity/exercise      PHQ2 Depression Screen; patient scored 0. Recommend further evaluation if       score >3   other: An ongoing plan was created to include:    group classes                    follow up:        declined further education    Thank you for the opportunity to provide Diabetes Self Management Education to your patient.     Leoncio Lopez, MS, RD, LD, CDE  Dietitian, Certified Diabetes Educator

## 2018-01-23 NOTE — PROGRESS NOTES
Individual Comprehensive DSMT Assessment:  Health Literacy:   [x] adequate   [] limited  Pre-Test Score: 6/8  Sleep Screen: has HERMELINDA, wears Bi-pap  PHQ9: 0      Initial instruction included:  [x] carb counting  [x] activity/exercise  [x] label reading  [x] monitoring   [] medications  [] hypoglycemia prevention/treatment  [] insulin management  [] other:    Education and Support Plan: Patient and his wife plan to attend comp. Diabetes Education classes.     Referring Provider: Clara Mcpherson MD

## 2018-01-25 ENCOUNTER — TELEPHONE (OUTPATIENT)
Dept: INFECTIOUS DISEASES | Age: 73
End: 2018-01-25

## 2018-01-30 ENCOUNTER — OFFICE VISIT (OUTPATIENT)
Dept: INFECTIOUS DISEASES | Age: 73
End: 2018-01-30

## 2018-01-30 VITALS
DIASTOLIC BLOOD PRESSURE: 56 MMHG | BODY MASS INDEX: 37.74 KG/M2 | TEMPERATURE: 97.8 F | SYSTOLIC BLOOD PRESSURE: 120 MMHG | WEIGHT: 263 LBS

## 2018-01-30 DIAGNOSIS — L03.115 CELLULITIS OF RIGHT LOWER EXTREMITY: Primary | ICD-10-CM

## 2018-01-30 PROCEDURE — 99213 OFFICE O/P EST LOW 20 MIN: CPT | Performed by: INTERNAL MEDICINE

## 2018-01-30 NOTE — PROGRESS NOTES
Department of Internal Medicine  Infectious Diseases      Patient Name: Byron Nash      Date of visit: 1/30/2018  SUBJECTIVE:  Trista Ashley  is a 67 y.o. male who returns today for hospital follow-up. He has history of venous stasis and recurrent cellulitis  Admission in 12/2016 and again in 12/2017 with cellulitis (LLE 2016, RLE 2017), both associated with secondary bacteremia with strep species  He improved with IV abx and was discharged to complete a course of oral abx  He is now taking daily pcn for ppx of cellulitis  He has intentionally lost some weight  Genrally, he is feeling well    I received a call from his wife last week, concerns about patchy rash, for which he is seen today  He has a focal area of erythema on the lower right leg at the ankle which has persisted for several weeks  Patchy erythema on the trunk as well which they think is psoriasis   No fever, chills, night sweats        REVIEW OF SYSTEMS:    CONSTITUTIONAL:  negative for fevers, chills, diaphoresis, activity change, appetite change, fatigue, night sweats and unexpected weight change.    EYES:  negative for blurred vision, eye discharge, visual disturbance and icterus  HEENT:  negative for hearing loss, tinnitus, ear drainage, sinus pressure, nasal congestion, epistaxis and snoring  RESPIRATORY:  No cough or hemoptysis   CARDIOVASCULAR:  negative for chest pain, palpitations, exertional chest pressure/discomfort, edema, syncope  GASTROINTESTINAL:  negative for nausea, vomiting, diarrhea, constipation, blood in stool and abdominal pain  GENITOURINARY:  negative for frequency, dysuria, urinary incontinence, decreased urine volume, and hematuria  HEMATOLOGIC/LYMPHATIC:  negative for easy bruising, bleeding and lymphadenopathy  ALLERGIC/IMMUNOLOGIC:  negative for angioedema, anaphylaxis and drug reactions  ENDOCRINE:  negative for weight changes and diabetic symptoms including polyuria, polydipsia and polyphagia  MUSCULOSKELETAL:  negative for acute joint pain, joint swelling, decreased range of motion and muscle weakness  NEUROLOGICAL:  negative for headaches, slurred speech, unilateral weakness  PSYCHIATRIC/BEHAVIORAL: negative for hallucinations, behavioral problems, confusion and agitation. Medications:    Current Outpatient Prescriptions   Medication Sig Dispense Refill    clotrimazole-betamethasone (LOTRISONE) 1-0.05 % cream Apply topically 2 times daily. 45 g 0    glucose blood VI test strips (ACCU-CHEK EVERTON PLUS) strip Test once daily  .00 100 each 3    cabergoline (DOSTINEX) 0.5 MG tablet TAKE ONE TABLET BY MOUTH THREE TIMES A WEEK 8 tablet 2    torsemide (DEMADEX) 10 MG tablet Take 10 mg by mouth daily      ammonium lactate (LAC-HYDRIN) 12 % lotion Apply topically daily. 500 g 0    hydrALAZINE (APRESOLINE) 25 MG tablet Take 1 tablet by mouth 3 times daily for 7 days 21 tablet 1    lisinopril (PRINIVIL;ZESTRIL) 40 MG tablet Take 1 tablet by mouth daily 90 tablet 3    allopurinol (ZYLOPRIM) 300 MG tablet TAKE ONE TABLET BY MOUTH EVERY DAY 90 tablet 3    Accu-Chek Multiclix Lancets MISC Test once daily  DX  250.00 100 each 3    atorvastatin (LIPITOR) 80 MG tablet Take 1 tablet by mouth daily 90 tablet 3    metFORMIN (GLUCOPHAGE-XR) 750 MG extended release tablet 1 po bid 180 tablet 3    carvedilol (COREG) 25 MG tablet Take 1 tablet by mouth 2 times daily 180 tablet 3    clotrimazole-betamethasone (LOTRISONE) 1-0.05 % cream Apply topically 2 times daily. 45 g 3    Omega-3 Fatty Acids (FISH OIL) 1200 MG CAPS Take 2 capsules by mouth daily      Cholecalciferol (VITAMIN D) 2000 UNITS CAPS capsule Take 1 capsule by mouth daily      testosterone cypionate (DEPOTESTOTERONE CYPIONATE) 200 MG/ML injection Inject 200 mg into the muscle Every two weeks.  aspirin 81 MG chewable tablet Take 81 mg by mouth daily. No current facility-administered medications for this visit.

## 2018-02-01 ENCOUNTER — HOSPITAL ENCOUNTER (OUTPATIENT)
Dept: OTHER | Age: 73
Discharge: HOME | End: 2018-02-28
Attending: FAMILY MEDICINE | Admitting: FAMILY MEDICINE

## 2018-02-05 ENCOUNTER — HOSPITAL ENCOUNTER (OUTPATIENT)
Dept: DIABETES SERVICES | Age: 73
Discharge: HOME | End: 2018-02-06
Admitting: FAMILY MEDICINE

## 2018-02-05 DIAGNOSIS — E11.9 TYPE 2 DIABETES MELLITUS WITHOUT COMPLICATIONS (HCC): ICD-10-CM

## 2018-02-05 NOTE — PROGRESS NOTES
Patient attended Dietitian 2 Group Class  Reviewed and patient demonstrated understanding of the following:  Nutrition guidelines to maintain cardiovascular health  Strategies to deal with social events, restaurant meals, and emotion-based eating  Goal Setting    REFERRING PROVIDER: Thena Moritz, MD      Total participants in Group:2

## 2018-02-15 ENCOUNTER — OFFICE VISIT (OUTPATIENT)
Dept: VASCULAR SURGERY | Age: 73
End: 2018-02-15

## 2018-02-15 VITALS
WEIGHT: 264 LBS | DIASTOLIC BLOOD PRESSURE: 60 MMHG | HEIGHT: 70 IN | SYSTOLIC BLOOD PRESSURE: 140 MMHG | BODY MASS INDEX: 37.8 KG/M2

## 2018-02-15 DIAGNOSIS — R60.0 BILATERAL LEG EDEMA: Primary | ICD-10-CM

## 2018-02-15 PROCEDURE — 99203 OFFICE O/P NEW LOW 30 MIN: CPT | Performed by: SURGERY

## 2018-02-15 NOTE — PROGRESS NOTES
Subjective:      Patient ID: Holley Rice is a 67 y.o. male. HPI Referred by Filiberto Hood MD for evaluation of R leg edema that has worsened since being admitted for his second bout of cellulitis prior to [facility] 2017. Fitted in stockings by PCP but pt has not noted any great improvement. Wife reports that she believes he has had R leg edema for \"some time\" as well as some L foot swelling. Improved somewhat overnight. No h/o SVT/DVT. No ulcerations but now has scattered areas of scaly skin being treated by dermatologist with steroid cream. Wearing nonprescription TruForm stockings without benefit. Past Medical History:   Diagnosis Date    CAD (coronary artery disease)     DJD (degenerative joint disease)     History of PTCA     Dr. Encarnacion [facility] Hyperlipidemia     Hypertension     Hypotestosteronism     Dr. Talia Mckeon, urology    IGT (impaired glucose tolerance)     Sleep apnea     cpap     No Known Allergies  Current Outpatient Prescriptions   Medication Sig Dispense Refill    clotrimazole-betamethasone (LOTRISONE) 1-0.05 % cream Apply topically 2 times daily. 45 g 0    glucose blood VI test strips (ACCU-CHEK EVERTON PLUS) strip Test once daily  .00 100 each 3    cabergoline (DOSTINEX) 0.5 MG tablet TAKE ONE TABLET BY MOUTH THREE TIMES A WEEK 8 tablet 2    torsemide (DEMADEX) 10 MG tablet Take 10 mg by mouth daily      ammonium lactate (LAC-HYDRIN) 12 % lotion Apply topically daily.  500 g 0    lisinopril (PRINIVIL;ZESTRIL) 40 MG tablet Take 1 tablet by mouth daily 90 tablet 3    allopurinol (ZYLOPRIM) 300 MG tablet TAKE ONE TABLET BY MOUTH EVERY DAY 90 tablet 3    Accu-Chek Multiclix Lancets MISC Test once daily  DX  250.00 100 each 3    atorvastatin (LIPITOR) 80 MG tablet Take 1 tablet by mouth daily 90 tablet 3    metFORMIN (GLUCOPHAGE-XR) 750 MG extended release tablet 1 po bid 180 tablet 3    carvedilol (COREG) 25 MG tablet Take 1 tablet by mouth 2 times daily 180 tablet 3    Musculoskeletal: He exhibits edema (R>L (2+, 1+)). He exhibits no deformity. Lymphadenopathy:     He has no cervical adenopathy. Neurological: He is alert and oriented to person, place, and time. No cranial nerve deficit. He exhibits normal muscle tone. Coordination normal.   Skin: Skin is warm and dry. No ulceration; B dime sized skin rash   Psychiatric: He has a normal mood and affect. His behavior is normal. Judgment and thought content normal.   Nursing note and vitals reviewed. Pulses:   R bruit  L bruit   2   carotid 2    2   brachial 2    2   radial 2    2   femoral 2    2   popliteal 2    2   posterior tibial 2    2   dorsalis pedis 2    na   bypass graft 2        Assessment:      Edema B legs (R>L) - multifactorial (obesity, CVI, previous cellulitis)      Plan:      Begin compression wraps (B Unna boots) for reduction and control. Elevate. CT abd/pelvis to R/O abd/pelvic mass etc as etiology even though unlikely. F/U one week. Once edema is resolved will need compression stockings - quality 20/30 KH to replace self prescribed TruForms. Pt and wife reluctant but will agree to proceed. RTO if wraps slip.

## 2018-02-16 DIAGNOSIS — Z01.818 PRE-OP TESTING: Primary | ICD-10-CM

## 2018-02-19 ENCOUNTER — TELEPHONE (OUTPATIENT)
Dept: CARDIOTHORACIC SURGERY | Age: 73
End: 2018-02-19

## 2018-02-19 ENCOUNTER — HOSPITAL ENCOUNTER (OUTPATIENT)
Dept: DIABETES SERVICES | Age: 73
Discharge: HOME | End: 2018-02-20
Admitting: FAMILY MEDICINE

## 2018-02-19 DIAGNOSIS — E11.9 TYPE 2 DIABETES MELLITUS WITHOUT COMPLICATION, WITHOUT LONG-TERM CURRENT USE OF INSULIN (HCC): Primary | ICD-10-CM

## 2018-02-22 ENCOUNTER — OFFICE VISIT (OUTPATIENT)
Dept: VASCULAR SURGERY | Age: 73
End: 2018-02-22

## 2018-02-22 ENCOUNTER — HOSPITAL ENCOUNTER (OUTPATIENT)
Dept: CT IMAGING | Age: 73
Discharge: HOME | End: 2018-02-22
Attending: SURGERY | Admitting: SURGERY

## 2018-02-22 VITALS
HEIGHT: 70 IN | BODY MASS INDEX: 37.8 KG/M2 | SYSTOLIC BLOOD PRESSURE: 150 MMHG | WEIGHT: 264 LBS | DIASTOLIC BLOOD PRESSURE: 60 MMHG

## 2018-02-22 DIAGNOSIS — Z01.818 PRE-OP TESTING: ICD-10-CM

## 2018-02-22 DIAGNOSIS — R60.0 BILATERAL LEG EDEMA: Primary | ICD-10-CM

## 2018-02-22 DIAGNOSIS — R60.0 BILATERAL LEG EDEMA: ICD-10-CM

## 2018-02-22 DIAGNOSIS — E11.9 TYPE 2 DIABETES MELLITUS WITHOUT COMPLICATIONS (HCC): ICD-10-CM

## 2018-02-22 LAB
ANION GAP SERPL CALCULATED.3IONS-SCNC: 12 MMOL/L (ref 3–16)
BUN BLDV-MCNC: 23 MG/DL (ref 7–20)
CALCIUM SERPL-MCNC: 9.4 MG/DL (ref 8.3–10.6)
CHLORIDE BLD-SCNC: 102 MMOL/L (ref 99–110)
CO2: 28 MMOL/L (ref 21–32)
CREAT SERPL-MCNC: 1.1 MG/DL (ref 0.8–1.3)
GFR AFRICAN AMERICAN: >60
GFR NON-AFRICAN AMERICAN: >60
GLUCOSE BLD-MCNC: 150 MG/DL (ref 70–99)
POTASSIUM SERPL-SCNC: 4.5 MMOL/L (ref 3.5–5.1)
SODIUM BLD-SCNC: 142 MMOL/L (ref 136–145)

## 2018-02-22 PROCEDURE — 99212 OFFICE O/P EST SF 10 MIN: CPT | Performed by: SURGERY

## 2018-02-26 ENCOUNTER — TELEPHONE (OUTPATIENT)
Dept: FAMILY MEDICINE CLINIC | Age: 73
End: 2018-02-26

## 2018-03-01 ENCOUNTER — HOSPITAL ENCOUNTER (OUTPATIENT)
Dept: OTHER | Age: 73
Discharge: HOME | End: 2018-03-31
Attending: FAMILY MEDICINE | Admitting: FAMILY MEDICINE

## 2018-03-05 RX ORDER — CABERGOLINE 0.5 MG/1
TABLET ORAL
Qty: 8 TABLET | Refills: 1 | Status: SHIPPED | OUTPATIENT
Start: 2018-03-05 | End: 2018-04-07 | Stop reason: SDUPTHER

## 2018-03-06 ENCOUNTER — OFFICE VISIT (OUTPATIENT)
Dept: FAMILY MEDICINE CLINIC | Age: 73
End: 2018-03-06

## 2018-03-06 VITALS
SYSTOLIC BLOOD PRESSURE: 122 MMHG | BODY MASS INDEX: 37.74 KG/M2 | HEART RATE: 57 BPM | WEIGHT: 263 LBS | DIASTOLIC BLOOD PRESSURE: 62 MMHG | OXYGEN SATURATION: 98 %

## 2018-03-06 DIAGNOSIS — E11.9 TYPE 2 DIABETES MELLITUS WITHOUT COMPLICATION, WITHOUT LONG-TERM CURRENT USE OF INSULIN (HCC): Primary | ICD-10-CM

## 2018-03-06 DIAGNOSIS — I50.42 HEART FAILURE, SYSTOLIC AND DIASTOLIC, CHRONIC (HCC): ICD-10-CM

## 2018-03-06 LAB — HBA1C MFR BLD: 7 %

## 2018-03-06 PROCEDURE — 99213 OFFICE O/P EST LOW 20 MIN: CPT | Performed by: FAMILY MEDICINE

## 2018-03-06 PROCEDURE — 83036 HEMOGLOBIN GLYCOSYLATED A1C: CPT | Performed by: FAMILY MEDICINE

## 2018-03-06 NOTE — PROGRESS NOTES
reviewed. Assessment:      Encounter Diagnosis   Name Primary?  Type 2 diabetes mellitus without complication, without long-term current use of insulin ([facility] [address] Yes           Plan:      1. Type 2 diabetes mellitus without complication, without long-term current use of insulin ([address]  Fairlywell controlled at this time. Patient will increase dietary compliance and we will change his medications.     Lab Results   Component Value Date    LABA1C 7.0 03/06/2018     Lab Results   Component Value Date    .9 01/18/2018

## 2018-03-19 ENCOUNTER — HOSPITAL ENCOUNTER (OUTPATIENT)
Dept: DIABETES SERVICES | Age: 73
Discharge: HOME | End: 2018-03-20
Admitting: FAMILY MEDICINE

## 2018-03-19 DIAGNOSIS — E11.9 TYPE 2 DIABETES MELLITUS WITHOUT COMPLICATION, WITHOUT LONG-TERM CURRENT USE OF INSULIN (HCC): Primary | ICD-10-CM

## 2018-03-27 ENCOUNTER — TELEPHONE (OUTPATIENT)
Dept: ENDOCRINOLOGY | Age: 73
End: 2018-03-27

## 2018-04-01 ENCOUNTER — HOSPITAL ENCOUNTER (OUTPATIENT)
Dept: OTHER | Age: 73
Discharge: HOME | End: 2018-04-30
Attending: FAMILY MEDICINE | Admitting: FAMILY MEDICINE

## 2018-04-03 ENCOUNTER — TELEPHONE (OUTPATIENT)
Dept: ENDOCRINOLOGY | Age: 73
End: 2018-04-03

## 2018-04-03 ENCOUNTER — HOSPITAL ENCOUNTER (OUTPATIENT)
Dept: GENERAL RADIOLOGY | Age: 73
Discharge: HOME | End: 2018-04-03
Attending: INTERNAL MEDICINE | Admitting: INTERNAL MEDICINE

## 2018-04-03 DIAGNOSIS — E34.9 HYPOTESTOSTERONISM: ICD-10-CM

## 2018-04-03 DIAGNOSIS — E22.1 HYPERPROLACTINEMIA (HCC): ICD-10-CM

## 2018-04-03 DIAGNOSIS — E22.1 HYPERPROLACTINEMIA (HCC): Primary | ICD-10-CM

## 2018-04-03 LAB — PROLACTIN: 20.1 NG/ML

## 2018-04-05 LAB
SEX HORMONE BINDING GLOBULIN: 29 NMOL/L (ref 11–80)
TESTOSTERONE FREE-NONMALE: 3.6 PG/ML (ref 47–244)
TESTOSTERONE TOTAL: 19 NG/DL (ref 220–1000)

## 2018-04-09 RX ORDER — CABERGOLINE 0.5 MG/1
TABLET ORAL
Qty: 8 TABLET | Refills: 3 | Status: SHIPPED | OUTPATIENT
Start: 2018-04-09 | End: 2018-04-11 | Stop reason: SDUPTHER

## 2018-04-10 ENCOUNTER — HOSPITAL ENCOUNTER (OUTPATIENT)
Dept: DIABETES SERVICES | Age: 73
Discharge: HOME | End: 2018-04-11
Admitting: FAMILY MEDICINE

## 2018-04-10 DIAGNOSIS — E11.9 TYPE 2 DIABETES MELLITUS WITHOUT COMPLICATIONS (HCC): ICD-10-CM

## 2018-04-11 ENCOUNTER — TELEPHONE (OUTPATIENT)
Dept: ENDOCRINOLOGY | Age: 73
End: 2018-04-11

## 2018-04-11 ENCOUNTER — OFFICE VISIT (OUTPATIENT)
Dept: ENDOCRINOLOGY | Age: 73
End: 2018-04-11

## 2018-04-11 VITALS
BODY MASS INDEX: 37.05 KG/M2 | DIASTOLIC BLOOD PRESSURE: 65 MMHG | OXYGEN SATURATION: 98 % | WEIGHT: 258.8 LBS | RESPIRATION RATE: 12 BRPM | SYSTOLIC BLOOD PRESSURE: 139 MMHG | HEIGHT: 70 IN | HEART RATE: 58 BPM

## 2018-04-11 DIAGNOSIS — D49.7 PITUITARY TUMOR: ICD-10-CM

## 2018-04-11 DIAGNOSIS — E22.1 HYPERPROLACTINEMIA (HCC): Primary | ICD-10-CM

## 2018-04-11 DIAGNOSIS — E34.9 HYPOTESTOSTERONISM: ICD-10-CM

## 2018-04-11 PROCEDURE — 99214 OFFICE O/P EST MOD 30 MIN: CPT | Performed by: INTERNAL MEDICINE

## 2018-04-11 RX ORDER — CABERGOLINE 0.5 MG/1
TABLET ORAL
Qty: 12 TABLET | Refills: 3 | Status: SHIPPED | OUTPATIENT
Start: 2018-04-11 | End: 2018-08-27 | Stop reason: SDUPTHER

## 2018-04-24 ENCOUNTER — OFFICE VISIT (OUTPATIENT)
Dept: FAMILY MEDICINE CLINIC | Age: 73
End: 2018-04-24

## 2018-04-24 VITALS
BODY MASS INDEX: 37.02 KG/M2 | DIASTOLIC BLOOD PRESSURE: 56 MMHG | HEART RATE: 65 BPM | OXYGEN SATURATION: 98 % | SYSTOLIC BLOOD PRESSURE: 120 MMHG | WEIGHT: 258 LBS

## 2018-04-24 DIAGNOSIS — M79.604 RIGHT LEG PAIN: Primary | ICD-10-CM

## 2018-04-24 PROCEDURE — 99214 OFFICE O/P EST MOD 30 MIN: CPT | Performed by: FAMILY MEDICINE

## 2018-04-24 RX ORDER — MELOXICAM 15 MG/1
15 TABLET ORAL DAILY
Qty: 30 TABLET | Refills: 3 | Status: ON HOLD | OUTPATIENT
Start: 2018-04-24 | End: 2018-09-15 | Stop reason: HOSPADM

## 2018-04-30 ENCOUNTER — HOSPITAL ENCOUNTER (OUTPATIENT)
Dept: DIABETES SERVICES | Age: 73
Discharge: HOME | End: 2018-05-01
Admitting: FAMILY MEDICINE

## 2018-04-30 DIAGNOSIS — E11.9 TYPE 2 DIABETES MELLITUS WITHOUT COMPLICATIONS (HCC): ICD-10-CM

## 2018-05-01 ENCOUNTER — HOSPITAL ENCOUNTER (OUTPATIENT)
Dept: VASCULAR LAB | Age: 73
Discharge: HOME | End: 2018-05-01
Attending: FAMILY MEDICINE | Admitting: FAMILY MEDICINE

## 2018-05-01 ENCOUNTER — HOSPITAL ENCOUNTER (OUTPATIENT)
Dept: OTHER | Age: 73
Discharge: HOME | End: 2018-05-31
Attending: FAMILY MEDICINE | Admitting: FAMILY MEDICINE

## 2018-05-01 DIAGNOSIS — M79.604 PAIN OF RIGHT LEG: ICD-10-CM

## 2018-05-03 RX ORDER — AMMONIUM LACTATE 12 G/100G
LOTION TOPICAL
Qty: 400 G | Refills: 0 | Status: SHIPPED | OUTPATIENT
Start: 2018-05-03 | End: 2019-07-03 | Stop reason: SDUPTHER

## 2018-05-04 ENCOUNTER — OFFICE VISIT (OUTPATIENT)
Dept: ORTHOPEDIC SURGERY | Age: 73
End: 2018-05-04

## 2018-05-04 VITALS
HEART RATE: 65 BPM | BODY MASS INDEX: 36.65 KG/M2 | HEIGHT: 70 IN | SYSTOLIC BLOOD PRESSURE: 138 MMHG | DIASTOLIC BLOOD PRESSURE: 59 MMHG | WEIGHT: 256 LBS

## 2018-05-04 DIAGNOSIS — R52 PAIN: Primary | ICD-10-CM

## 2018-05-04 DIAGNOSIS — M54.16 LUMBAR RADICULITIS: ICD-10-CM

## 2018-05-04 PROCEDURE — 99203 OFFICE O/P NEW LOW 30 MIN: CPT | Performed by: ORTHOPAEDIC SURGERY

## 2018-05-04 RX ORDER — DICLOFENAC SODIUM 75 MG/1
75 TABLET, DELAYED RELEASE ORAL 2 TIMES DAILY
Qty: 60 TABLET | Refills: 1 | Status: SHIPPED | OUTPATIENT
Start: 2018-05-04 | End: 2019-01-16

## 2018-05-04 RX ORDER — TIZANIDINE 2 MG/1
2 TABLET ORAL NIGHTLY PRN
Qty: 30 TABLET | Refills: 0 | Status: SHIPPED | OUTPATIENT
Start: 2018-05-04 | End: 2018-06-07

## 2018-05-08 ENCOUNTER — OFFICE VISIT (OUTPATIENT)
Dept: INFECTIOUS DISEASES | Age: 73
End: 2018-05-08

## 2018-05-08 VITALS
HEIGHT: 70 IN | WEIGHT: 263 LBS | BODY MASS INDEX: 37.65 KG/M2 | SYSTOLIC BLOOD PRESSURE: 112 MMHG | DIASTOLIC BLOOD PRESSURE: 60 MMHG | TEMPERATURE: 98.6 F

## 2018-05-08 DIAGNOSIS — L03.116 CELLULITIS OF LEFT LOWER EXTREMITY: Primary | ICD-10-CM

## 2018-05-08 DIAGNOSIS — R78.81 BACTEREMIA DUE TO GROUP B STREPTOCOCCUS: ICD-10-CM

## 2018-05-08 DIAGNOSIS — B95.1 BACTEREMIA DUE TO GROUP B STREPTOCOCCUS: ICD-10-CM

## 2018-05-08 PROCEDURE — 99213 OFFICE O/P EST LOW 20 MIN: CPT | Performed by: INTERNAL MEDICINE

## 2018-05-08 RX ORDER — PENICILLIN V POTASSIUM 500 MG/1
500 TABLET ORAL 2 TIMES DAILY
Qty: 60 TABLET | Refills: 3 | Status: SHIPPED | OUTPATIENT
Start: 2018-05-08 | End: 2018-06-07

## 2018-05-09 ENCOUNTER — HOSPITAL ENCOUNTER (OUTPATIENT)
Dept: PHYSICAL THERAPY | Age: 73
Discharge: HOME | End: 2018-05-31
Admitting: ORTHOPAEDIC SURGERY

## 2018-05-14 ENCOUNTER — HOSPITAL ENCOUNTER (OUTPATIENT)
Dept: PHYSICAL THERAPY | Age: 73
Discharge: HOME | End: 2018-05-15
Admitting: ORTHOPAEDIC SURGERY

## 2018-05-17 ENCOUNTER — HOSPITAL ENCOUNTER (OUTPATIENT)
Dept: PHYSICAL THERAPY | Age: 73
Discharge: HOME | End: 2018-05-18
Admitting: ORTHOPAEDIC SURGERY

## 2018-05-22 ENCOUNTER — HOSPITAL ENCOUNTER (OUTPATIENT)
Dept: PHYSICAL THERAPY | Age: 73
Discharge: HOME | End: 2018-05-23
Admitting: ORTHOPAEDIC SURGERY

## 2018-05-24 ENCOUNTER — HOSPITAL ENCOUNTER (OUTPATIENT)
Dept: PHYSICAL THERAPY | Age: 73
Discharge: HOME | End: 2018-05-25
Admitting: ORTHOPAEDIC SURGERY

## 2018-06-01 ENCOUNTER — HOSPITAL ENCOUNTER (OUTPATIENT)
Dept: PHYSICAL THERAPY | Age: 73
Discharge: HOME | End: 2018-06-30
Attending: ORTHOPAEDIC SURGERY | Admitting: ORTHOPAEDIC SURGERY

## 2018-06-07 ENCOUNTER — OFFICE VISIT (OUTPATIENT)
Dept: FAMILY MEDICINE CLINIC | Age: 73
End: 2018-06-07

## 2018-06-07 VITALS
OXYGEN SATURATION: 98 % | SYSTOLIC BLOOD PRESSURE: 114 MMHG | DIASTOLIC BLOOD PRESSURE: 60 MMHG | WEIGHT: 257 LBS | BODY MASS INDEX: 36.88 KG/M2 | HEART RATE: 64 BPM

## 2018-06-07 DIAGNOSIS — E11.9 TYPE 2 DIABETES MELLITUS WITHOUT COMPLICATION, WITHOUT LONG-TERM CURRENT USE OF INSULIN (HCC): ICD-10-CM

## 2018-06-07 LAB
CREATININE URINE: 37.5 MG/DL (ref 39–259)
HBA1C MFR BLD: 6.8 %
MICROALBUMIN UR-MCNC: <1.2 MG/DL
MICROALBUMIN/CREAT UR-RTO: ABNORMAL MG/G (ref 0–30)

## 2018-06-07 PROCEDURE — 99213 OFFICE O/P EST LOW 20 MIN: CPT | Performed by: FAMILY MEDICINE

## 2018-06-07 PROCEDURE — 83036 HEMOGLOBIN GLYCOSYLATED A1C: CPT | Performed by: FAMILY MEDICINE

## 2018-06-07 RX ORDER — TORSEMIDE 10 MG/1
10 TABLET ORAL DAILY
Qty: 30 TABLET | Refills: 3 | Status: SHIPPED | OUTPATIENT
Start: 2018-06-07 | End: 2018-11-06

## 2018-06-07 RX ORDER — METFORMIN HYDROCHLORIDE 750 MG/1
TABLET, EXTENDED RELEASE ORAL
Qty: 180 TABLET | Refills: 3 | Status: SHIPPED | OUTPATIENT
Start: 2018-06-07 | End: 2019-07-22 | Stop reason: SDUPTHER

## 2018-08-28 RX ORDER — CABERGOLINE 0.5 MG/1
TABLET ORAL
Qty: 16 TABLET | Refills: 2 | Status: SHIPPED | OUTPATIENT
Start: 2018-08-28 | End: 2018-11-16 | Stop reason: SDUPTHER

## 2018-09-05 ENCOUNTER — TELEPHONE (OUTPATIENT)
Dept: FAMILY MEDICINE CLINIC | Age: 73
End: 2018-09-05

## 2018-09-14 ENCOUNTER — APPOINTMENT (OUTPATIENT)
Dept: CT IMAGING | Age: 73
DRG: 202 | End: 2018-09-14
Payer: MEDICARE

## 2018-09-14 ENCOUNTER — APPOINTMENT (OUTPATIENT)
Dept: GENERAL RADIOLOGY | Age: 73
DRG: 202 | End: 2018-09-14
Payer: MEDICARE

## 2018-09-14 ENCOUNTER — HOSPITAL ENCOUNTER (INPATIENT)
Age: 73
LOS: 1 days | Discharge: HOME | DRG: 202 | End: 2018-09-15
Attending: EMERGENCY MEDICINE | Admitting: INTERNAL MEDICINE
Payer: MEDICARE

## 2018-09-14 DIAGNOSIS — R41.0 ACUTE CONFUSION: ICD-10-CM

## 2018-09-14 DIAGNOSIS — R91.1 PULMONARY NODULE: ICD-10-CM

## 2018-09-14 DIAGNOSIS — J18.9 PNEUMONIA DUE TO ORGANISM: Primary | ICD-10-CM

## 2018-09-14 DIAGNOSIS — E11.9 TYPE 2 DIABETES MELLITUS WITHOUT COMPLICATION, WITHOUT LONG-TERM CURRENT USE OF INSULIN (HCC): ICD-10-CM

## 2018-09-14 PROBLEM — J16.0 CAP (COMMUNITY ACQUIRED PNEUMONIA) DUE TO CHLAMYDIA SPECIES: Status: ACTIVE | Noted: 2018-09-14

## 2018-09-14 LAB
A/G RATIO: 1.9 (ref 1.1–2.2)
ALBUMIN SERPL-MCNC: 4 G/DL (ref 3.4–5)
ALP BLD-CCNC: 73 U/L (ref 40–129)
ALT SERPL-CCNC: 15 U/L (ref 10–40)
ANION GAP SERPL CALCULATED.3IONS-SCNC: 14 MMOL/L (ref 3–16)
AST SERPL-CCNC: 12 U/L (ref 15–37)
BASOPHILS ABSOLUTE: 0.1 K/UL (ref 0–0.2)
BASOPHILS RELATIVE PERCENT: 0.8 %
BILIRUB SERPL-MCNC: 0.5 MG/DL (ref 0–1)
BILIRUBIN URINE: NEGATIVE
BLOOD, URINE: NEGATIVE
BUN BLDV-MCNC: 27 MG/DL (ref 7–20)
CALCIUM SERPL-MCNC: 9.2 MG/DL (ref 8.3–10.6)
CHLORIDE BLD-SCNC: 103 MMOL/L (ref 99–110)
CLARITY: CLEAR
CO2: 24 MMOL/L (ref 21–32)
COLOR: YELLOW
CREAT SERPL-MCNC: 1.1 MG/DL (ref 0.8–1.3)
EOSINOPHILS ABSOLUTE: 0.1 K/UL (ref 0–0.6)
EOSINOPHILS RELATIVE PERCENT: 0.9 %
GFR AFRICAN AMERICAN: >60
GFR NON-AFRICAN AMERICAN: >60
GLOBULIN: 2.1 G/DL
GLUCOSE BLD-MCNC: 134 MG/DL (ref 70–99)
GLUCOSE BLD-MCNC: 170 MG/DL (ref 70–99)
GLUCOSE URINE: NEGATIVE MG/DL
HCT VFR BLD CALC: 33.5 % (ref 40.5–52.5)
HEMOGLOBIN: 11.4 G/DL (ref 13.5–17.5)
KETONES, URINE: NEGATIVE MG/DL
LACTIC ACID: 0.9 MMOL/L (ref 0.4–2)
LEUKOCYTE ESTERASE, URINE: NEGATIVE
LYMPHOCYTES ABSOLUTE: 0.9 K/UL (ref 1–5.1)
LYMPHOCYTES RELATIVE PERCENT: 9.2 %
MCH RBC QN AUTO: 30.2 PG (ref 26–34)
MCHC RBC AUTO-ENTMCNC: 34.2 G/DL (ref 31–36)
MCV RBC AUTO: 88.2 FL (ref 80–100)
MICROSCOPIC EXAMINATION: NORMAL
MONOCYTES ABSOLUTE: 0.8 K/UL (ref 0–1.3)
MONOCYTES RELATIVE PERCENT: 8 %
NEUTROPHILS ABSOLUTE: 8.2 K/UL (ref 1.7–7.7)
NEUTROPHILS RELATIVE PERCENT: 81.1 %
NITRITE, URINE: NEGATIVE
PDW BLD-RTO: 14.2 % (ref 12.4–15.4)
PERFORMED ON: ABNORMAL
PH UA: 6
PLATELET # BLD: 120 K/UL (ref 135–450)
PMV BLD AUTO: 8.3 FL (ref 5–10.5)
POTASSIUM SERPL-SCNC: 3.8 MMOL/L (ref 3.5–5.1)
PROTEIN UA: NEGATIVE MG/DL
RAPID INFLUENZA  B AGN: NEGATIVE
RAPID INFLUENZA A AGN: NEGATIVE
RBC # BLD: 3.79 M/UL (ref 4.2–5.9)
SODIUM BLD-SCNC: 141 MMOL/L (ref 136–145)
SPECIFIC GRAVITY UA: 1.02
TOTAL PROTEIN: 6.1 G/DL (ref 6.4–8.2)
URINE TYPE: NORMAL
UROBILINOGEN, URINE: 0.2 E.U./DL
WBC # BLD: 10.2 K/UL (ref 4–11)

## 2018-09-14 PROCEDURE — 83605 ASSAY OF LACTIC ACID: CPT

## 2018-09-14 PROCEDURE — 93005 ELECTROCARDIOGRAM TRACING: CPT | Performed by: EMERGENCY MEDICINE

## 2018-09-14 PROCEDURE — 81003 URINALYSIS AUTO W/O SCOPE: CPT

## 2018-09-14 PROCEDURE — 87040 BLOOD CULTURE FOR BACTERIA: CPT

## 2018-09-14 PROCEDURE — 71046 X-RAY EXAM CHEST 2 VIEWS: CPT

## 2018-09-14 PROCEDURE — 71260 CT THORAX DX C+: CPT

## 2018-09-14 PROCEDURE — 2580000003 HC RX 258: Performed by: NURSE PRACTITIONER

## 2018-09-14 PROCEDURE — 96372 THER/PROPH/DIAG INJ SC/IM: CPT

## 2018-09-14 PROCEDURE — 6370000000 HC RX 637 (ALT 250 FOR IP): Performed by: NURSE PRACTITIONER

## 2018-09-14 PROCEDURE — 6360000002 HC RX W HCPCS: Performed by: INTERNAL MEDICINE

## 2018-09-14 PROCEDURE — 94150 VITAL CAPACITY TEST: CPT

## 2018-09-14 PROCEDURE — 99285 EMERGENCY DEPT VISIT HI MDM: CPT

## 2018-09-14 PROCEDURE — 80053 COMPREHEN METABOLIC PANEL: CPT

## 2018-09-14 PROCEDURE — 6360000004 HC RX CONTRAST MEDICATION: Performed by: EMERGENCY MEDICINE

## 2018-09-14 PROCEDURE — 6360000002 HC RX W HCPCS: Performed by: NURSE PRACTITIONER

## 2018-09-14 PROCEDURE — 85025 COMPLETE CBC W/AUTO DIFF WBC: CPT

## 2018-09-14 PROCEDURE — 94664 DEMO&/EVAL PT USE INHALER: CPT

## 2018-09-14 PROCEDURE — 6370000000 HC RX 637 (ALT 250 FOR IP): Performed by: INTERNAL MEDICINE

## 2018-09-14 PROCEDURE — 70450 CT HEAD/BRAIN W/O DYE: CPT

## 2018-09-14 PROCEDURE — 2580000003 HC RX 258: Performed by: INTERNAL MEDICINE

## 2018-09-14 PROCEDURE — 87804 INFLUENZA ASSAY W/OPTIC: CPT

## 2018-09-14 PROCEDURE — 1200000000 HC SEMI PRIVATE

## 2018-09-14 RX ORDER — HYDRALAZINE HYDROCHLORIDE 25 MG/1
25 TABLET, FILM COATED ORAL 3 TIMES DAILY
Status: DISCONTINUED | OUTPATIENT
Start: 2018-09-14 | End: 2018-09-15 | Stop reason: HOSPADM

## 2018-09-14 RX ORDER — ALBUTEROL SULFATE 2.5 MG/3ML
2.5 SOLUTION RESPIRATORY (INHALATION) EVERY 4 HOURS PRN
Status: DISCONTINUED | OUTPATIENT
Start: 2018-09-14 | End: 2018-09-15 | Stop reason: HOSPADM

## 2018-09-14 RX ORDER — ACETAMINOPHEN 500 MG
1000 TABLET ORAL ONCE
Status: COMPLETED | OUTPATIENT
Start: 2018-09-14 | End: 2018-09-14

## 2018-09-14 RX ORDER — ASPIRIN 81 MG/1
81 TABLET, CHEWABLE ORAL DAILY
Status: DISCONTINUED | OUTPATIENT
Start: 2018-09-14 | End: 2018-09-15 | Stop reason: HOSPADM

## 2018-09-14 RX ORDER — ONDANSETRON 2 MG/ML
4 INJECTION INTRAMUSCULAR; INTRAVENOUS EVERY 6 HOURS PRN
Status: DISCONTINUED | OUTPATIENT
Start: 2018-09-14 | End: 2018-09-15 | Stop reason: HOSPADM

## 2018-09-14 RX ORDER — DEXTROSE MONOHYDRATE 25 G/50ML
12.5 INJECTION, SOLUTION INTRAVENOUS PRN
Status: DISCONTINUED | OUTPATIENT
Start: 2018-09-14 | End: 2018-09-15 | Stop reason: HOSPADM

## 2018-09-14 RX ORDER — ATORVASTATIN CALCIUM 80 MG/1
80 TABLET, FILM COATED ORAL NIGHTLY
Status: DISCONTINUED | OUTPATIENT
Start: 2018-09-14 | End: 2018-09-15 | Stop reason: HOSPADM

## 2018-09-14 RX ORDER — LEVOFLOXACIN 5 MG/ML
500 INJECTION, SOLUTION INTRAVENOUS ONCE
Status: COMPLETED | OUTPATIENT
Start: 2018-09-14 | End: 2018-09-14

## 2018-09-14 RX ORDER — UREA 10 %
3 LOTION (ML) TOPICAL NIGHTLY PRN
Status: DISCONTINUED | OUTPATIENT
Start: 2018-09-15 | End: 2018-09-14

## 2018-09-14 RX ORDER — DEXTROSE MONOHYDRATE 50 MG/ML
100 INJECTION, SOLUTION INTRAVENOUS PRN
Status: DISCONTINUED | OUTPATIENT
Start: 2018-09-14 | End: 2018-09-15 | Stop reason: HOSPADM

## 2018-09-14 RX ORDER — ALLOPURINOL 300 MG/1
300 TABLET ORAL DAILY
Status: DISCONTINUED | OUTPATIENT
Start: 2018-09-14 | End: 2018-09-15 | Stop reason: HOSPADM

## 2018-09-14 RX ORDER — SODIUM CHLORIDE 0.9 % (FLUSH) 0.9 %
10 SYRINGE (ML) INJECTION EVERY 12 HOURS SCHEDULED
Status: DISCONTINUED | OUTPATIENT
Start: 2018-09-14 | End: 2018-09-15 | Stop reason: HOSPADM

## 2018-09-14 RX ORDER — PENICILLIN V POTASSIUM 250 MG/1
500 TABLET ORAL 2 TIMES DAILY
Status: DISCONTINUED | OUTPATIENT
Start: 2018-09-15 | End: 2018-09-15 | Stop reason: HOSPADM

## 2018-09-14 RX ORDER — TORSEMIDE 20 MG/1
10 TABLET ORAL DAILY
Status: DISCONTINUED | OUTPATIENT
Start: 2018-09-14 | End: 2018-09-15 | Stop reason: HOSPADM

## 2018-09-14 RX ORDER — 0.9 % SODIUM CHLORIDE 0.9 %
1000 INTRAVENOUS SOLUTION INTRAVENOUS ONCE
Status: COMPLETED | OUTPATIENT
Start: 2018-09-14 | End: 2018-09-14

## 2018-09-14 RX ORDER — CARVEDILOL 25 MG/1
25 TABLET ORAL 2 TIMES DAILY
Status: DISCONTINUED | OUTPATIENT
Start: 2018-09-14 | End: 2018-09-15 | Stop reason: HOSPADM

## 2018-09-14 RX ORDER — UREA 10 %
3 LOTION (ML) TOPICAL NIGHTLY PRN
Status: DISCONTINUED | OUTPATIENT
Start: 2018-09-14 | End: 2018-09-15 | Stop reason: HOSPADM

## 2018-09-14 RX ORDER — SODIUM CHLORIDE 0.9 % (FLUSH) 0.9 %
10 SYRINGE (ML) INJECTION PRN
Status: DISCONTINUED | OUTPATIENT
Start: 2018-09-14 | End: 2018-09-15 | Stop reason: HOSPADM

## 2018-09-14 RX ORDER — NICOTINE POLACRILEX 4 MG
15 LOZENGE BUCCAL PRN
Status: DISCONTINUED | OUTPATIENT
Start: 2018-09-14 | End: 2018-09-15 | Stop reason: HOSPADM

## 2018-09-14 RX ORDER — LEVOFLOXACIN 5 MG/ML
750 INJECTION, SOLUTION INTRAVENOUS EVERY 24 HOURS
Status: DISCONTINUED | OUTPATIENT
Start: 2018-09-15 | End: 2018-09-15 | Stop reason: HOSPADM

## 2018-09-14 RX ORDER — LISINOPRIL 20 MG/1
40 TABLET ORAL DAILY
Status: DISCONTINUED | OUTPATIENT
Start: 2018-09-14 | End: 2018-09-15 | Stop reason: HOSPADM

## 2018-09-14 RX ADMIN — PENICILLIN V POTASIUM 500 MG: 250 TABLET ORAL at 23:47

## 2018-09-14 RX ADMIN — INSULIN LISPRO 1 UNITS: 100 INJECTION, SOLUTION INTRAVENOUS; SUBCUTANEOUS at 21:38

## 2018-09-14 RX ADMIN — ATORVASTATIN CALCIUM 80 MG: 80 TABLET, FILM COATED ORAL at 21:37

## 2018-09-14 RX ADMIN — LEVOFLOXACIN 500 MG: 5 INJECTION, SOLUTION INTRAVENOUS at 18:29

## 2018-09-14 RX ADMIN — CARVEDILOL 25 MG: 25 TABLET, FILM COATED ORAL at 21:37

## 2018-09-14 RX ADMIN — HYDRALAZINE HYDROCHLORIDE 25 MG: 25 TABLET, FILM COATED ORAL at 21:37

## 2018-09-14 RX ADMIN — SODIUM CHLORIDE, PRESERVATIVE FREE 10 ML: 5 INJECTION INTRAVENOUS at 21:38

## 2018-09-14 RX ADMIN — ENOXAPARIN SODIUM 40 MG: 40 INJECTION SUBCUTANEOUS at 21:36

## 2018-09-14 RX ADMIN — Medication 3 MG: at 23:47

## 2018-09-14 RX ADMIN — IOPAMIDOL 75 ML: 755 INJECTION, SOLUTION INTRAVENOUS at 17:28

## 2018-09-14 RX ADMIN — SODIUM CHLORIDE 1000 ML: 9 INJECTION, SOLUTION INTRAVENOUS at 18:29

## 2018-09-14 RX ADMIN — ACETAMINOPHEN 1000 MG: 500 TABLET ORAL at 18:28

## 2018-09-14 ASSESSMENT — ENCOUNTER SYMPTOMS
COUGH: 1
BACK PAIN: 0
ALLERGIC/IMMUNOLOGIC NEGATIVE: 1
VOMITING: 0
SHORTNESS OF BREATH: 0
WHEEZING: 0
CONSTIPATION: 0
DIARRHEA: 0
ABDOMINAL PAIN: 0
EYES NEGATIVE: 1
ABDOMINAL DISTENTION: 0
NAUSEA: 0

## 2018-09-14 ASSESSMENT — PAIN SCALES - GENERAL: PAINLEVEL_OUTOF10: 0

## 2018-09-14 NOTE — ED PROVIDER NOTES
swelling. Gastrointestinal: Negative for abdominal distention, abdominal pain, constipation, diarrhea, nausea and vomiting. Endocrine: Negative. Genitourinary: Negative for dysuria, flank pain, genital sores and hematuria. Musculoskeletal: Negative for back pain, myalgias, neck pain and neck stiffness. Skin: Negative. Allergic/Immunologic: Negative. Neurological: Positive for weakness. Negative for dizziness, seizures, syncope, speech difficulty, light-headedness, numbness and headaches. Hematological: Negative. Psychiatric/Behavioral: Positive for confusion. Positives and Pertinent negatives as per HPI. Except as noted above in the ROS, all other systems were reviewed and negative. PAST MEDICAL HISTORY     Past Medical History:   Diagnosis Date    CAD (coronary artery disease)     DJD (degenerative joint disease)     History of PTCA     Dr. Clare Saeed Hyperlipidemia     Hypertension     Hypotestosteronism     Dr. Larissa Alanis, urology    IGT (impaired glucose tolerance)     Sleep apnea     cpap         SURGICAL HISTORY       Past Surgical History:   Procedure Laterality Date    CARDIAC SURGERY      stents    COLONOSCOPY  2001    COLONOSCOPY  12/9/11    diverticulosis    CORONARY ANGIOPLASTY WITH STENT PLACEMENT  2003    KNEE ARTHROSCOPY      KNEE SURGERY           CURRENT MEDICATIONS       Previous Medications    ACCU-CHEK MULTICLIX LANCETS MISC    Test once daily  DX  250.00    ALLOPURINOL (ZYLOPRIM) 300 MG TABLET    TAKE ONE TABLET BY MOUTH DAILY    AMMONIUM LACTATE (LAC-HYDRIN) 12 % LOTION    APPLY TOPICALLY DAILY    ASPIRIN 81 MG CHEWABLE TABLET    Take 81 mg by mouth daily.       ATORVASTATIN (LIPITOR) 80 MG TABLET    Take 1 tablet by mouth daily    CABERGOLINE (DOSTINEX) 0.5 MG TABLET    TAKE ONE TABLET BY MOUTH THREE TIMES A WEEK    CARVEDILOL (COREG) 25 MG TABLET    Take 1 tablet by mouth 2 times daily    CHOLECALCIFEROL (VITAMIN D) 2000 UNITS CAPS CAPSULE and moist. No oropharyngeal exudate. Eyes: Pupils are equal, round, and reactive to light. Conjunctivae and EOM are normal. Right eye exhibits no discharge. Left eye exhibits no discharge. No scleral icterus. Neck: Normal range of motion. Cardiovascular: Normal rate, regular rhythm, normal heart sounds and intact distal pulses. Exam reveals no gallop and no friction rub. No murmur heard. Pulmonary/Chest: Effort normal and breath sounds normal. No respiratory distress. He has no wheezes. He has no rales. He exhibits no tenderness. Abdominal: Soft. He exhibits no mass. There is no tenderness. There is no rebound and no guarding. Musculoskeletal: Normal range of motion. He exhibits no edema, tenderness or deformity. Lymphadenopathy:     He has no cervical adenopathy. Neurological: He is alert and oriented to person, place, and time. He has normal reflexes. Skin: Skin is warm and dry. He is not diaphoretic. Psychiatric: He has a normal mood and affect.  His behavior is normal. Judgment and thought content normal.       DIAGNOSTIC RESULTS   LABS:    Labs Reviewed   COMPREHENSIVE METABOLIC PANEL - Abnormal; Notable for the following:        Result Value    Glucose 134 (*)     BUN 27 (*)     Total Protein 6.1 (*)     AST 12 (*)     All other components within normal limits    Narrative:     Performed at:  [place] [address] [employer]   Phone [phone]   CBC WITH AUTO DIFFERENTIAL - Abnormal; Notable for the following:     RBC 3.79 (*)     Hemoglobin 11.4 (*)     Hematocrit 33.5 (*)     Platelets 767 (*)     Neutrophils # 8.2 (*)     Lymphocytes # 0.9 (*)     All other components within normal limits    Narrative:     Performed at:  [facility]) [place] [address] [employer]   Phone [phone]   RAPID INFLUENZA A/B ANTIGENS    Narrative:     Performed at:  [facility]

## 2018-09-15 VITALS
HEART RATE: 66 BPM | HEIGHT: 70 IN | OXYGEN SATURATION: 97 % | RESPIRATION RATE: 14 BRPM | SYSTOLIC BLOOD PRESSURE: 147 MMHG | TEMPERATURE: 98.4 F | WEIGHT: 265.5 LBS | BODY MASS INDEX: 38.01 KG/M2 | DIASTOLIC BLOOD PRESSURE: 58 MMHG

## 2018-09-15 PROBLEM — J18.9 COMMUNITY ACQUIRED PNEUMONIA: Status: ACTIVE | Noted: 2018-09-15

## 2018-09-15 PROBLEM — J21.9 BRONCHIOLITIS: Status: ACTIVE | Noted: 2018-09-15

## 2018-09-15 LAB
ANION GAP SERPL CALCULATED.3IONS-SCNC: 11 MMOL/L (ref 3–16)
BASOPHILS ABSOLUTE: 0 K/UL (ref 0–0.2)
BASOPHILS RELATIVE PERCENT: 0.3 %
BUN BLDV-MCNC: 26 MG/DL (ref 7–20)
CALCIUM SERPL-MCNC: 8.8 MG/DL (ref 8.3–10.6)
CHLORIDE BLD-SCNC: 106 MMOL/L (ref 99–110)
CO2: 25 MMOL/L (ref 21–32)
CREAT SERPL-MCNC: 1.1 MG/DL (ref 0.8–1.3)
EOSINOPHILS ABSOLUTE: 0.1 K/UL (ref 0–0.6)
EOSINOPHILS RELATIVE PERCENT: 1.5 %
FOLATE: 9.23 NG/ML (ref 4.78–24.2)
GFR AFRICAN AMERICAN: >60
GFR NON-AFRICAN AMERICAN: >60
GLUCOSE BLD-MCNC: 108 MG/DL (ref 70–99)
GLUCOSE BLD-MCNC: 143 MG/DL (ref 70–99)
GLUCOSE BLD-MCNC: 156 MG/DL (ref 70–99)
HCT VFR BLD CALC: 31 % (ref 40.5–52.5)
HEMOGLOBIN: 10.6 G/DL (ref 13.5–17.5)
IRON: 39 UG/DL (ref 59–158)
LYMPHOCYTES ABSOLUTE: 1.4 K/UL (ref 1–5.1)
LYMPHOCYTES RELATIVE PERCENT: 16.6 %
MCH RBC QN AUTO: 30.4 PG (ref 26–34)
MCHC RBC AUTO-ENTMCNC: 34.2 G/DL (ref 31–36)
MCV RBC AUTO: 88.8 FL (ref 80–100)
MONOCYTES ABSOLUTE: 0.8 K/UL (ref 0–1.3)
MONOCYTES RELATIVE PERCENT: 10.1 %
NEUTROPHILS ABSOLUTE: 6 K/UL (ref 1.7–7.7)
NEUTROPHILS RELATIVE PERCENT: 71.5 %
PDW BLD-RTO: 14.1 % (ref 12.4–15.4)
PERFORMED ON: ABNORMAL
PERFORMED ON: ABNORMAL
PLATELET # BLD: 104 K/UL (ref 135–450)
PMV BLD AUTO: 9 FL (ref 5–10.5)
POTASSIUM REFLEX MAGNESIUM: 3.9 MMOL/L (ref 3.5–5.1)
RBC # BLD: 3.49 M/UL (ref 4.2–5.9)
SODIUM BLD-SCNC: 142 MMOL/L (ref 136–145)
VITAMIN B-12: 338 PG/ML (ref 211–911)
WBC # BLD: 8.4 K/UL (ref 4–11)

## 2018-09-15 PROCEDURE — 2580000003 HC RX 258: Performed by: INTERNAL MEDICINE

## 2018-09-15 PROCEDURE — 82607 VITAMIN B-12: CPT

## 2018-09-15 PROCEDURE — 85025 COMPLETE CBC W/AUTO DIFF WBC: CPT

## 2018-09-15 PROCEDURE — 96372 THER/PROPH/DIAG INJ SC/IM: CPT

## 2018-09-15 PROCEDURE — 83036 HEMOGLOBIN GLYCOSYLATED A1C: CPT

## 2018-09-15 PROCEDURE — 6370000000 HC RX 637 (ALT 250 FOR IP): Performed by: INTERNAL MEDICINE

## 2018-09-15 PROCEDURE — 6370000000 HC RX 637 (ALT 250 FOR IP): Performed by: NURSE PRACTITIONER

## 2018-09-15 PROCEDURE — 99221 1ST HOSP IP/OBS SF/LOW 40: CPT | Performed by: INTERNAL MEDICINE

## 2018-09-15 PROCEDURE — 93010 ELECTROCARDIOGRAM REPORT: CPT | Performed by: INTERNAL MEDICINE

## 2018-09-15 PROCEDURE — 6360000002 HC RX W HCPCS: Performed by: INTERNAL MEDICINE

## 2018-09-15 PROCEDURE — 36415 COLL VENOUS BLD VENIPUNCTURE: CPT

## 2018-09-15 PROCEDURE — 80048 BASIC METABOLIC PNL TOTAL CA: CPT

## 2018-09-15 PROCEDURE — 83540 ASSAY OF IRON: CPT

## 2018-09-15 PROCEDURE — 82746 ASSAY OF FOLIC ACID SERUM: CPT

## 2018-09-15 RX ORDER — LEVOFLOXACIN 750 MG/1
750 TABLET ORAL DAILY
Qty: 4 TABLET | Refills: 0 | Status: SHIPPED | OUTPATIENT
Start: 2018-09-15 | End: 2018-09-19

## 2018-09-15 RX ORDER — PENICILLIN V POTASSIUM 500 MG/1
1000 TABLET ORAL 2 TIMES DAILY
Qty: 120 TABLET | Refills: 2 | Status: SHIPPED | OUTPATIENT
Start: 2018-09-15 | End: 2019-01-07 | Stop reason: SDUPTHER

## 2018-09-15 RX ORDER — SODIUM CHLORIDE 9 MG/ML
INJECTION, SOLUTION INTRAVENOUS
Status: DISCONTINUED
Start: 2018-09-15 | End: 2018-09-15 | Stop reason: HOSPADM

## 2018-09-15 RX ORDER — GUAIFENESIN 600 MG/1
600 TABLET, EXTENDED RELEASE ORAL 2 TIMES DAILY
Status: DISCONTINUED | OUTPATIENT
Start: 2018-09-15 | End: 2018-09-15 | Stop reason: HOSPADM

## 2018-09-15 RX ADMIN — GUAIFENESIN 600 MG: 600 TABLET, EXTENDED RELEASE ORAL at 09:13

## 2018-09-15 RX ADMIN — SODIUM CHLORIDE, PRESERVATIVE FREE 10 ML: 5 INJECTION INTRAVENOUS at 09:13

## 2018-09-15 RX ADMIN — ALLOPURINOL 300 MG: 300 TABLET ORAL at 09:13

## 2018-09-15 RX ADMIN — HYDRALAZINE HYDROCHLORIDE 25 MG: 25 TABLET, FILM COATED ORAL at 09:13

## 2018-09-15 RX ADMIN — LEVOFLOXACIN 750 MG: 5 INJECTION, SOLUTION INTRAVENOUS at 09:13

## 2018-09-15 RX ADMIN — CARVEDILOL 25 MG: 25 TABLET, FILM COATED ORAL at 09:13

## 2018-09-15 RX ADMIN — ASPIRIN 81 MG 81 MG: 81 TABLET ORAL at 09:13

## 2018-09-15 RX ADMIN — PENICILLIN V POTASIUM 500 MG: 250 TABLET ORAL at 09:20

## 2018-09-15 RX ADMIN — INSULIN LISPRO 1 UNITS: 100 INJECTION, SOLUTION INTRAVENOUS; SUBCUTANEOUS at 09:20

## 2018-09-15 RX ADMIN — TORSEMIDE 10 MG: 20 TABLET ORAL at 09:13

## 2018-09-15 RX ADMIN — ENOXAPARIN SODIUM 40 MG: 40 INJECTION SUBCUTANEOUS at 09:13

## 2018-09-15 RX ADMIN — LISINOPRIL 40 MG: 20 TABLET ORAL at 09:13

## 2018-09-15 NOTE — CONSULTS
Infectious Diseases   Consult Note      Reason for Consult: acute febrile illness   Requesting Physician: Kalyn Wong MD      Date of Admission: 9/14/2018  Subjective:   CHIEF COMPLAINT:  None given       HPI:    Barbra Patel is a 66yoM well known to me. He has history of recurrent lower extremity cellulitis and secondary bacteremia with beta hemolytic streptococci. He takes daily pcn for ppx. He developed URI symptoms on 9/13. The next day, developed fever, generalized weakness, confusion, similar to prior episodes of cellulitis. Chest CT with bronchiolitis. He is not coughing much, no SOB. Today afebrile. BC collected on admission are negative to date. He denies pain in the legs. Current abx:  levaquin 750 IV q24  penVK 500 po BID        Past Surgical History:       Diagnosis Date    CAD (coronary artery disease)     DJD (degenerative joint disease)     History of PTCA     Dr. Patti Salazar Hyperlipidemia     Hypertension     Hypotestosteronism     Dr. Gabriella Kay, urology    IGT (impaired glucose tolerance)     Sleep apnea     cpap         Procedure Laterality Date    CARDIAC SURGERY      stents    COLONOSCOPY  2001    COLONOSCOPY  12/9/11    diverticulosis    CORONARY ANGIOPLASTY WITH STENT PLACEMENT  2003    KNEE ARTHROSCOPY      KNEE SURGERY         Social History:    TOBACCO:   reports that he quit smoking about 35 years ago. His smoking use included Cigarettes. He has a 15.00 pack-year smoking history. He has never used smokeless tobacco.  ETOH:   reports that he does not drink alcohol. There is no history of illicit drug use or other significant epidemiologic exposures.     Family History:   Family History   Problem Relation Age of Onset    Heart Disease Brother     High Blood Pressure Brother     Dementia Father        Current Medications:    Current Facility-Administered Medications: guaiFENesin (MUCINEX) extended release tablet 600 mg, 600 mg, Oral,

## 2018-09-15 NOTE — H&P
Taking? Authorizing Provider   allopurinol (ZYLOPRIM) 300 MG tablet TAKE ONE TABLET BY MOUTH DAILY 9/4/18  Yes Leila Thapa MD   cabergoline (DOSTINEX) 0.5 MG tablet TAKE ONE TABLET BY MOUTH THREE TIMES A WEEK 8/28/18  Yes Bartolome Epstein MD   metFORMIN (GLUCOPHAGE-XR) 750 MG extended release tablet 1 po bid  Patient taking differently: Take 750 mg by mouth 2 times daily  6/7/18  Yes Leila Thapa MD   torsemide (DEMADEX) 10 MG tablet Take 1 tablet by mouth daily 6/7/18  Yes Leila Thapa MD   diclofenac (VOLTAREN) 75 MG EC tablet Take 1 tablet by mouth 2 times daily  Patient taking differently: Take 75 mg by mouth 2 times daily as needed  5/4/18  Yes Oren Raza MD   ammonium lactate (LAC-HYDRIN) 12 % lotion APPLY TOPICALLY DAILY 5/3/18  Yes DEONNA Mcdonald - CNP   meloxicam (MOBIC) 15 MG tablet Take 1 tablet by mouth daily  Patient taking differently: Take 15 mg by mouth daily as needed  4/24/18  Yes Leila Thapa MD   hydrALAZINE (APRESOLINE) 25 MG tablet Take 1 tablet by mouth 3 times daily for 7 days 12/21/17 9/14/18 Yes Tang Fisher MD   lisinopril (PRINIVIL;ZESTRIL) 40 MG tablet Take 1 tablet by mouth daily 11/15/17  Yes DEONNA Mcdonald CNP   atorvastatin (LIPITOR) 80 MG tablet Take 1 tablet by mouth daily 10/24/17  Yes Leila Thapa MD   carvedilol (COREG) 25 MG tablet Take 1 tablet by mouth 2 times daily 10/24/17  Yes Leila Thapa MD   clotrimazole-betamethasone (LOTRISONE) 1-0.05 % cream Apply topically 2 times daily. 2/7/17  Yes Leila Thapa MD   Omega-3 Fatty Acids (FISH OIL) 1200 MG CAPS Take 3 capsules by mouth daily    Yes Historical Provider, MD   Cholecalciferol (VITAMIN D) 2000 UNITS CAPS capsule Take 1 capsule by mouth daily   Yes Historical Provider, MD   aspirin 81 MG chewable tablet Take 81 mg by mouth daily.      Yes Historical Provider, MD   glucose blood VI test strips (ACCU-CHEK EVERTON PLUS) strip Test once daily  .00 1/8/18   Leila Thapa MD

## 2018-09-15 NOTE — PROGRESS NOTES
[facility] hosp @ 1825  RE: admit, pneumonia, acute confusion per Ruby President, NP. Dr. Antonio Aguila returned call @ 3159, spoke to Huan Cole
Verbal and written discharge instructions given. IV and telemetry monitor removed. Prescriptions called in to patient's pharmacy. Patient accompanied to car via wheelchair. Patient in stable condition, discharged home with wife.
Vitals:    09/15/18 0810   BP: (!) 166/72   Pulse: 71   Resp: 16   Temp: 98.906 °F (37.2 °C)   SpO2: 95%     Patient resting quietly in bed, alert and oriented X4. Patient denies pain or discomfort at this time. Bed locked in lowest position, call light within reach, bedside table within reach. Will continue to monitor.
Cough: Strong, productive = 1    Vital Signs   BP (!) 144/65   Pulse 81   Temp 98.2 °F (36.8 °C) (Oral)   Resp 18   Ht 5' 10\" (1.778 m)   Wt 265 lb (120.2 kg)   SpO2 97%   BMI 38.02 kg/m²   SPO2 (COPD values may differ): Greater than or equal to 92% on room air = 0    Peak Flow (asthma only): not applicable = 0    RSI: 0-4 = See once and convert to home regimen or discontinue        Plan       Goals: medication delivery    Patient/caregiver was educated on the proper method of use for Respiratory Care Devices:  Yes      Level of patient/caregiver understanding able to:   [] Verbalize understanding   [] Demonstrate understanding       [] Teach back        [] Needs reinforcement       []  No available caregiver               []  Other:     Response to education:  Excellent     Is patient being placed on Home Treatment Regimen? No     Does the patient have everything they need prior to discharge? NA     Comments: pt seen and chart reviewed    Plan of Care: albuterol prn    Electronically signed by Sofia Dumont RCP on 9/14/2018 at 9:39 PM    Respiratory Protocol Guidelines     1. Assessment and treatment by Respiratory Therapy will be initiated for medication and therapeutic interventions upon initiation of aerosolized medication. 2. Physician will be contacted for respiratory rate (RR) greater than 35 breaths per minute. Therapy will be held for heart rate (HR) greater than 140 beats per minute, pending direction from physician. 3. Bronchodilators will be administered via Metered Dose Inhaler (MDI) with spacer when the following criteria are met:  a. Alert and cooperative     b. HR < 140 bpm  c. RR < 30 bpm                d. Can demonstrate a 23 second inspiratory hold  4. Bronchodilators will be administered via Hand Held Nebulizer [facility] to patients when ANY of the following criteria are met  a. Incognizant or uncooperative          b. Patients treated with HHN at Home        c.  Unable to
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       DVT Prophylaxis: ordered  Diet: DIET CARDIAC;  Code Status: Full Code    PT/OT Eval Status: not ordered    Dispo - 2 days    Jagjit Mercedes MD

## 2018-09-15 NOTE — DISCHARGE SUMMARY
Hospital Medicine Discharge Summary    Patient ID: Nitesh Forte      Patient's PCP: Tamiko Huerta MD    Admit Date: 9/14/2018     Discharge Date:   9/15/18    Admitting Physician: Felisha Moreau MD     Discharge Physician: Karlie Crowder MD     Discharge Diagnoses: Active Hospital Problems    Diagnosis Date Noted    Bronchiolitis [J21.9] 09/15/2018    Acute febrile illness [R50.9]     Tinea pedis of right foot [B35.3]     Obesity due to excess calories with serious comorbidity [E66.09]     Essential hypertension, benign [I10] 08/28/2013    Sleep apnea [G47.30]     DM (diabetes mellitus) ([facility] [address] [E11.9]        The patient was seen and examined on day of discharge and this discharge summary is in conjunction with any daily progress note from day of discharge. Hospital Course:   Bronchiolitis - per CTPA report. Patient with h/o cough for 2 days. Non smoker  Treated with Levaquin      on top of sleep apnea  Home CPAP machine     DM (Diabetes Mellitus) - Type 2  Controlled  Last HbA1C -    %  - holding Metformin as he received contrast.   - add SSI with accu checks tidac  - Carb control diet   - HbA1C ordered, pending     Resolved cellulitis, treated by ID, with  prophylactic daily penicillin  Consulted ID. Compression stockings     Leg weakness, fever - This is the 3rd episode. Seen by Dr. Catia Johnson in the past for Sciatica. Will get MRI LS spine as out patient  Had ECHO in the past.      Anemia - Colonoscopy few yrs ago Normal. 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   Suspect that the ppx PCN he was taking has attenuated the course of this illness  Tinea pedis contributing to his susceptibility      He has defervesced and seems to be improved  [facility] are negative to date      Recs:  OK for DC from ID standpoint  Would give few more days of po levaquin and then resume ppx pcn at higher dose  Treat tinea with OTC cream  Continue to use compression stockings daily     Rx e-scribed     He will follow-up with me as scheduled     Physical Exam Performed:     BP (!) 147/58   Pulse 66   Temp 98.402 °F (36.9 °C) (Oral)   Resp 14   Ht 5' 10\" (1.778 m)   Wt 265 lb 8 oz (120.4 kg)   SpO2 97%   BMI 38.10 kg/m²       General appearance:  No apparent distress, appears stated age and cooperative. HEENT:  Normal cephalic, atraumatic without obvious deformity. Pupils equal, round, and reactive to light. Extra ocular muscles intact. Conjunctivae/corneas clear. Neck: Supple, with full range of motion. No jugular venous distention. Trachea midline. Respiratory:  Normal respiratory effort. Clear to auscultation, bilaterally without Rales/Wheezes/Rhonchi. Cardiovascular:  Regular rate and rhythm with normal S1/S2 without murmurs, rubs or gallops. Abdomen: Soft, non-tender, non-distended with normal bowel sounds. Musculoskeletal:  No clubbing, cyanosis or edema bilaterally. Compression stockings. Skin: Skin color, texture, turgor normal.  No rashes or lesions. Neurologic:  Neurovascularly intact without any focal sensory/motor deficits. Cranial nerves: II-XII intact, grossly non-focal.  Psychiatric:  Alert and oriented, thought content appropriate, normal insight  Capillary Refill: Brisk,< 3 seconds   Peripheral Pulses: +2 palpable, equal bilaterally       Labs:  For convenience and continuity at follow-up the following most recent labs are provided:      CBC:    Lab Results   Component Value Date    WBC 8.4 09/15/2018    HGB 10.6 09/15/2018    HCT 31.0 09/15/2018     09/15/2018       Renal:    Lab Results   Component Value Date     09/15/2018    K 3.9 09/15/2018     09/15/2018    CO2 25 09/15/2018 complication, without long-term current use of insulin ([facility])      torsemide (DEMADEX) 10 MG tablet Take 1 tablet by mouth daily  Qty: 30 tablet, Refills: 3      diclofenac (VOLTAREN) 75 MG EC tablet Take 1 tablet by mouth 2 times daily  Qty: 60 tablet, Refills: 1    Associated Diagnoses: Lumbar radiculitis      ammonium lactate (LAC-HYDRIN) 12 % lotion APPLY TOPICALLY DAILY  Qty: 400 g, Refills: 0      hydrALAZINE (APRESOLINE) 25 MG tablet Take 1 tablet by mouth 3 times daily for 7 days  Qty: 21 tablet, Refills: 1      lisinopril (PRINIVIL;ZESTRIL) 40 MG tablet Take 1 tablet by mouth daily  Qty: 90 tablet, Refills: 3    Associated Diagnoses: Essential hypertension, benign      atorvastatin (LIPITOR) 80 MG tablet Take 1 tablet by mouth daily  Qty: 90 tablet, Refills: 3      carvedilol (COREG) 25 MG tablet Take 1 tablet by mouth 2 times daily  Qty: 180 tablet, Refills: 3    Associated Diagnoses: Essential hypertension, benign      clotrimazole-betamethasone (LOTRISONE) 1-0.05 % cream Apply topically 2 times daily. Qty: 45 g, Refills: 3      Omega-3 Fatty Acids (FISH OIL) 1200 MG CAPS Take 3 capsules by mouth daily       Cholecalciferol (VITAMIN D) 2000 UNITS CAPS capsule Take 1 capsule by mouth daily      aspirin 81 MG chewable tablet Take 81 mg by mouth daily. glucose blood VI test strips (ACCU-CHEK EVERTON PLUS) strip Test once daily  .00  Qty: 100 each, Refills: 3    Associated Diagnoses: Type 2 diabetes mellitus without complication, without long-term current use of insulin ([facility])      Accu-Chek Multiclix Lancets MISC Test once daily  DX  250.00  Qty: 100 each, Refills: 3    Associated Diagnoses: Type 2 diabetes mellitus without complication, without long-term current use of insulin [facility]             Time Spent on discharge is more than 30 minutes in the examination, evaluation, counseling and review of medications and discharge plan.       Signed:    Damir Torres MD   9/15/2018      Thank you Marcy Huff MD for the opportunity to be involved in this patient's care. If you have any questions or concerns please feel free to contact me at [phone].

## 2018-09-16 ENCOUNTER — CARE COORDINATION (OUTPATIENT)
Dept: CASE MANAGEMENT | Age: 73
End: 2018-09-16

## 2018-09-16 DIAGNOSIS — R50.9 ACUTE FEBRILE ILLNESS: Primary | ICD-10-CM

## 2018-09-16 LAB
ESTIMATED AVERAGE GLUCOSE: 128.4 MG/DL
HBA1C MFR BLD: 6.1 %

## 2018-09-16 NOTE — CARE COORDINATION
[facility] Transitions Initial Follow Up Call    Call within 2 business days of discharge: Yes    Patient: Suleiman Jorge Patient : 89   MRN: [id number]  Reason for Admission: PNA  Discharge Date: 9/15/18 RARS: Readmission Risk Score: 11     Spoke with: patient [id number]: Francisco Espinosa    Non-face-to-face services provided:  Obtained and reviewed discharge summary and/or continuity of care documents  med review, 1111f    Care Transitions 24 Hour Call    Do you have any ongoing symptoms?:  Yes  Patient-reported symptoms:  Cough  Do you have a copy of your discharge instructions?:  Yes  Do you have all of your prescriptions and are they filled?:  (Comment: picking up today)  Have you been contacted by a [facility] Pharmacist?:  No  Have you scheduled your follow up appointment?:  No  Were you discharged with any Home Care or Post Acute Services:  No  Care Transitions Interventions     Patient reports that he is much improved. Says he's getting his strength back and he is mentally back to normal.  Still has occ dry cough, no fever. Says not quite back to 100% but close. Reviewed all meds, no concerns. 1111f entered. Is concerned about results from blood cx's-told him prelim results show no growth, explained it takes 48-72hrs for final results, he would like to be called in a couple of days with results, told him we would f/u with him with that information. Denied any other needs. Agreeable to f/u calls. Advised him to call PCP office tomorrow to schedule f/u appt for this week. Reminded patient that if they have any questions/concerns at anytime, they can always utilize CTC or call PCP/specialist as they have an MD on call .      Follow Up  Future Appointments  Date Time Provider Niko Linn   10/4/2018 9:00 AM MD leah Momin [facility]   2018 9:00 AM Roseanna Alvarado MD AND INFCT [facility]   2018 8:20 AM Andres Daley MD AND [facility] ANMOL Lang, RN

## 2018-09-18 ENCOUNTER — TELEPHONE (OUTPATIENT)
Dept: OTHER | Facility: CLINIC | Age: 73
End: 2018-09-18

## 2018-09-18 ENCOUNTER — HOSPITAL ENCOUNTER (EMERGENCY)
Age: 73
Discharge: HOME | End: 2018-09-18
Attending: EMERGENCY MEDICINE
Payer: MEDICARE

## 2018-09-18 ENCOUNTER — CARE COORDINATION (OUTPATIENT)
Dept: CASE MANAGEMENT | Age: 73
End: 2018-09-18

## 2018-09-18 VITALS
HEART RATE: 54 BPM | HEIGHT: 70 IN | DIASTOLIC BLOOD PRESSURE: 53 MMHG | OXYGEN SATURATION: 96 % | BODY MASS INDEX: 36.94 KG/M2 | RESPIRATION RATE: 14 BRPM | WEIGHT: 258 LBS | SYSTOLIC BLOOD PRESSURE: 143 MMHG | TEMPERATURE: 98.2 F

## 2018-09-18 DIAGNOSIS — R55 SYNCOPE AND COLLAPSE: Primary | ICD-10-CM

## 2018-09-18 LAB
A/G RATIO: 1.4 (ref 1.1–2.2)
ALBUMIN SERPL-MCNC: 3.7 G/DL (ref 3.4–5)
ALP BLD-CCNC: 71 U/L (ref 40–129)
ALT SERPL-CCNC: 14 U/L (ref 10–40)
ANION GAP SERPL CALCULATED.3IONS-SCNC: 12 MMOL/L (ref 3–16)
AST SERPL-CCNC: 11 U/L (ref 15–37)
BASOPHILS ABSOLUTE: 0 K/UL (ref 0–0.2)
BASOPHILS RELATIVE PERCENT: 0.4 %
BILIRUB SERPL-MCNC: 0.4 MG/DL (ref 0–1)
BILIRUBIN URINE: NEGATIVE
BLOOD, URINE: NEGATIVE
BUN BLDV-MCNC: 22 MG/DL (ref 7–20)
CALCIUM SERPL-MCNC: 8.9 MG/DL (ref 8.3–10.6)
CHLORIDE BLD-SCNC: 102 MMOL/L (ref 99–110)
CLARITY: CLEAR
CO2: 24 MMOL/L (ref 21–32)
COLOR: NORMAL
CREAT SERPL-MCNC: 1.2 MG/DL (ref 0.8–1.3)
EKG ATRIAL RATE: 79 BPM
EKG DIAGNOSIS: NORMAL
EKG P AXIS: 69 DEGREES
EKG P-R INTERVAL: 162 MS
EKG Q-T INTERVAL: 410 MS
EKG QRS DURATION: 106 MS
EKG QTC CALCULATION (BAZETT): 470 MS
EKG R AXIS: -43 DEGREES
EKG T AXIS: 34 DEGREES
EKG VENTRICULAR RATE: 79 BPM
EOSINOPHILS ABSOLUTE: 0.2 K/UL (ref 0–0.6)
EOSINOPHILS RELATIVE PERCENT: 2.9 %
GFR AFRICAN AMERICAN: >60
GFR NON-AFRICAN AMERICAN: 59
GLOBULIN: 2.7 G/DL
GLUCOSE BLD-MCNC: 168 MG/DL (ref 70–99)
GLUCOSE URINE: NEGATIVE MG/DL
HCT VFR BLD CALC: 33.2 % (ref 40.5–52.5)
HEMOGLOBIN: 11.5 G/DL (ref 13.5–17.5)
KETONES, URINE: NEGATIVE MG/DL
LACTIC ACID: 0.9 MMOL/L (ref 0.4–2)
LEUKOCYTE ESTERASE, URINE: NEGATIVE
LYMPHOCYTES ABSOLUTE: 1.3 K/UL (ref 1–5.1)
LYMPHOCYTES RELATIVE PERCENT: 18.3 %
MCH RBC QN AUTO: 30.2 PG (ref 26–34)
MCHC RBC AUTO-ENTMCNC: 34.7 G/DL (ref 31–36)
MCV RBC AUTO: 87 FL (ref 80–100)
MICROSCOPIC EXAMINATION: NORMAL
MONOCYTES ABSOLUTE: 0.7 K/UL (ref 0–1.3)
MONOCYTES RELATIVE PERCENT: 9.4 %
NEUTROPHILS ABSOLUTE: 4.8 K/UL (ref 1.7–7.7)
NEUTROPHILS RELATIVE PERCENT: 69 %
NITRITE, URINE: NEGATIVE
PDW BLD-RTO: 14 % (ref 12.4–15.4)
PH UA: 6
PLATELET # BLD: 147 K/UL (ref 135–450)
PMV BLD AUTO: 8.5 FL (ref 5–10.5)
POTASSIUM SERPL-SCNC: 4.1 MMOL/L (ref 3.5–5.1)
PROTEIN UA: NEGATIVE MG/DL
RBC # BLD: 3.81 M/UL (ref 4.2–5.9)
SODIUM BLD-SCNC: 138 MMOL/L (ref 136–145)
SPECIFIC GRAVITY UA: 1.01
TOTAL PROTEIN: 6.4 G/DL (ref 6.4–8.2)
TROPONIN: <0.01 NG/ML
URINE REFLEX TO CULTURE: NORMAL
URINE TYPE: NORMAL
UROBILINOGEN, URINE: 0.2 E.U./DL
WBC # BLD: 7 K/UL (ref 4–11)

## 2018-09-18 PROCEDURE — 80053 COMPREHEN METABOLIC PANEL: CPT

## 2018-09-18 PROCEDURE — 81003 URINALYSIS AUTO W/O SCOPE: CPT

## 2018-09-18 PROCEDURE — 83605 ASSAY OF LACTIC ACID: CPT

## 2018-09-18 PROCEDURE — 2580000003 HC RX 258: Performed by: EMERGENCY MEDICINE

## 2018-09-18 PROCEDURE — 93005 ELECTROCARDIOGRAM TRACING: CPT | Performed by: EMERGENCY MEDICINE

## 2018-09-18 PROCEDURE — 96361 HYDRATE IV INFUSION ADD-ON: CPT

## 2018-09-18 PROCEDURE — 85025 COMPLETE CBC W/AUTO DIFF WBC: CPT

## 2018-09-18 PROCEDURE — 84484 ASSAY OF TROPONIN QUANT: CPT

## 2018-09-18 PROCEDURE — 96360 HYDRATION IV INFUSION INIT: CPT

## 2018-09-18 PROCEDURE — 99284 EMERGENCY DEPT VISIT MOD MDM: CPT

## 2018-09-18 RX ORDER — 0.9 % SODIUM CHLORIDE 0.9 %
1000 INTRAVENOUS SOLUTION INTRAVENOUS ONCE
Status: COMPLETED | OUTPATIENT
Start: 2018-09-18 | End: 2018-09-18

## 2018-09-18 RX ADMIN — SODIUM CHLORIDE 1000 ML: 9 INJECTION, SOLUTION INTRAVENOUS at 13:52

## 2018-09-18 NOTE — TELEPHONE ENCOUNTER
Called Mr. Christianson Amaury and spoke with wife. Notified of ED f/u appointment tomorrow 1/19 at 1:10 pm at the [facility] office with Noelle Lane NP. Verbalized understanding.

## 2018-09-18 NOTE — CARE COORDINATION
[facility] Transitions Follow Up Call    2018    Patient: Yaya Peng  Patient : [birth year]   MRN: [id number]  Reason for Admission: PNA   Discharge Date: 9/15/18 RARS: Readmission Risk Score: 11/CMRS 6       Spoke with: [address] Transitions Subsequent and Final Call    Subsequent and Final Calls  Do you have any ongoing symptoms?:  No  Have your medications changed?:  No  Do you have any questions related to your medications?:  No  Do you currently have any active services?:  No  Do you have any needs or concerns that I can assist you with?:  No  Identified Barriers:  None  Care Transitions Interventions  Other Interventions:        Spoke with patient who states he is doing well and denied any fever/chills and stated just occasional cough. Patient reported he is regaining his strength and has apt with Dr. Shade Crisostomo for follow up on 18. Patient interested if Blood Culture results were back and informed patient that still showing preliminary results of no growth to date. Patient denied any further needs at this time and agreed to follow up calls. Reminded patient that if they have any questions/concerns at anytime, they can always utilize CTC or call PCP/specialist as they have an MD on call .        Follow Up  Future Appointments  Date Time Provider Niko Linn   2018 11:30 AM Martha Torrez MD [facility]   10/4/2018 9:00 AM Martha Torrez MD [facility]   2018 9:00 AM Mendoza You MD AND INFCT DI [facility]   2018 8:20 AM Sree Shankar MD AND ENDO [facility]       Jennifer Guzman RN

## 2018-09-18 NOTE — ED NOTES
Bed: 23  Expected date:   Expected time:   Means of arrival:   Comments:  UTP Syncope      Brittney Root RN  09/18/18 3364

## 2018-09-18 NOTE — ED PROVIDER NOTES
[facility]  ED  eMERGENCY dEPARTMENT eNCOUnter        Pt Name: Sydnie Rodas  MRN: [id number]  Erwingfkavon [birth year]  Date of evaluation: 9/18/2018  Provider: Seun Davison MD  PCP: Hiren Simpson MD      CHIEF COMPLAINT       Chief Complaint   Patient presents with    Loss of Consciousness     syncopal episode while at [address] Lead, bradycardia. Currenly on Levaquin for possible cellulitis right leg. HISTORY OF PRESENT ILLNESS   (Location/Symptom, Timing/Onset, Context/Setting, Quality, Duration, Modifying Factors, Severity)  Note limiting factors. Sydnie Rodas is a 75 y.o. male  presents to the emergency department with complaint of Syncope. Patient was at [address] and was checking out and had a syncopal episode. Patient is currently on Levaquin for cellulitis of the leg. Patient was admitted last week for cellulitis of the right leg. Patient was seen by infectious disease. Patient at the time of his ER presentation and had a CAT scan of the brain as well as a CAT scan of the chest for pulmonary embolus which showed no acute process. Patient denies any persistent leg pain. Patient denies any leg swelling. Patient denies any chest pain. Patient denies any shortness of breath. Patient has any cough. Patient feels he is significantly thirsty. Patient does have diabetes. Patient denies any symptoms this morning with regard to dizziness or lightheadedness. Patient did not drink a lot of fluids this morning however. Patient states when he was walking up to the catheter he became sweaty and nauseous and very lightheaded and awoke on the floor. Patient has no injury from the syncopal episode that he is aware of. Nursing Notes were all reviewed and agreed with or any disagreements were addressed  in the HPI.     REVIEW OF SYSTEMS    (2-9 systems for level 4, 10 or more for level 5)     Review of Systems  REVIEW OF SYSTEMS    Constitutional:  Denies 2000 UNITS CAPS CAPSULE    Take 1 capsule by mouth daily    CLOTRIMAZOLE-BETAMETHASONE (LOTRISONE) 1-0.05 % CREAM    Apply topically 2 times daily. DICLOFENAC (VOLTAREN) 75 MG EC TABLET    Take 1 tablet by mouth 2 times daily    LEVOFLOXACIN (LEVAQUIN) 750 MG TABLET    Take 1 tablet by mouth daily for 4 days    LISINOPRIL (PRINIVIL;ZESTRIL) 40 MG TABLET    Take 1 tablet by mouth daily    METFORMIN (GLUCOPHAGE-XR) 750 MG EXTENDED RELEASE TABLET    1 po bid    OMEGA-3 FATTY ACIDS (FISH OIL) 1200 MG CAPS    Take 3 capsules by mouth daily     PENICILLIN V POTASSIUM (VEETID) 500 MG TABLET    Take 2 tablets by mouth 2 times daily    TORSEMIDE (DEMADEX) 10 MG TABLET    Take 1 tablet by mouth daily       ALLERGIES     Patient has no known allergies.     FAMILY HISTORY       Family History   Problem Relation Age of Onset    Heart Disease Brother     High Blood Pressure Brother     Dementia Father           SOCIAL HISTORY       Social History     Social History    Marital status:      Spouse name: N/A    Number of children: N/A    Years of education: N/A     Social History Main Topics    Smoking status: Former Smoker     Packs/day: 1.00     Years: 15.00     Types: Cigarettes     Quit date: 12/9/1982    Smokeless tobacco: Never Used    Alcohol use No    Drug use: No    Sexual activity: Not Asked     Other Topics Concern    None     Social History Narrative    None       SCREENINGS    Coward Coma Scale  Eye Opening: Spontaneous  Best Verbal Response: Oriented  Best Motor Response: Obeys commands  Khadijah Coma Scale Score: 15        PHYSICAL EXAM    (up to 7 for level 4, 8 or more for level 5)     ED Triage Vitals [09/18/18 1251]   BP Temp Temp Source Pulse Resp SpO2 Height Weight   (!) 119/57 98.2 °F (36.8 °C) Oral 53 14 95 % 5' 10\" (1.778 m) 258 lb (117 kg)           PHYSICAL EXAM    VITAL SIGNS: BP (!) 143/53   Pulse 54   Temp 98.2 °F (36.8 °C) (Oral)   Resp 14   Ht 5' 10\" (1.778 m)   Wt 258 lb of a cardiologist.    My interpretation: Sinus bradycardia rate 52, left axis deviation, incomplete right bundle branch block, no ST elevations or depressions    RADIOLOGY:   Non-plain film images such as CT, Ultrasound and MRI are read by the radiologist. Plain radiographic images are visualized and preliminarily interpreted by the  ED Provider with the below findings:      Interpretation per the Radiologist below, if available at the time of this note:    No orders to display       Xr Chest Standard (2 Vw)    Result Date: 9/14/2018  EXAMINATION: TWO VIEWS OF THE CHEST 9/14/2018 3:36 pm COMPARISON: Chest x-ray December 20, 2017 HISTORY: ORDERING SYSTEM PROVIDED HISTORY: cough TECHNOLOGIST PROVIDED HISTORY: Reason for exam:->cough Ordering Physician Provided Reason for Exam: sob Acuity: Acute Type of Exam: Initial FINDINGS: No evidence of pneumonia, edema or other acute pulmonary process. No evidence of acute process of the cardiac or mediastinal structures. No evidence of pneumothorax or pleural effusion. Unchanged borderline mild cardiac enlargement. No evidence of acute cardiopulmonary disease. Ct Head Wo Contrast    Result Date: 9/14/2018  EXAMINATION: CT OF THE HEAD WITHOUT CONTRAST  9/14/2018 5:17 pm TECHNIQUE: CT of the head was performed without the administration of intravenous contrast. Dose modulation, iterative reconstruction, and/or weight based adjustment of the mA/kV was utilized to reduce the radiation dose to as low as reasonably achievable. COMPARISON: 12/03/2016 HISTORY: ORDERING SYSTEM PROVIDED HISTORY: CONFUSION/DELIRIUM, ALTERED LOC, UNEXPLAINED TECHNOLOGIST PROVIDED HISTORY: Has a \"code stroke\" or \"stroke alert\" been called? ->No Ordering Physician Provided Reason for Exam: confusion, fatigue, ams Acuity: Acute Type of Exam: Initial FINDINGS: BRAIN/VENTRICLES: There is no acute intracerebral hemorrhage or extra-axial fluid collection. There is mild cerebral atrophy with mild

## 2018-09-19 ENCOUNTER — OFFICE VISIT (OUTPATIENT)
Dept: FAMILY MEDICINE CLINIC | Age: 73
End: 2018-09-19

## 2018-09-19 VITALS
OXYGEN SATURATION: 98 % | HEART RATE: 57 BPM | BODY MASS INDEX: 37.74 KG/M2 | SYSTOLIC BLOOD PRESSURE: 130 MMHG | DIASTOLIC BLOOD PRESSURE: 70 MMHG | WEIGHT: 263 LBS

## 2018-09-19 DIAGNOSIS — Z09 FOLLOW-UP EXAMINATION: Primary | ICD-10-CM

## 2018-09-19 LAB
BLOOD CULTURE, ROUTINE: NORMAL
CULTURE, BLOOD 2: NORMAL
EKG ATRIAL RATE: 52 BPM
EKG DIAGNOSIS: NORMAL
EKG P AXIS: 58 DEGREES
EKG P-R INTERVAL: 152 MS
EKG Q-T INTERVAL: 460 MS
EKG QRS DURATION: 106 MS
EKG QTC CALCULATION (BAZETT): 427 MS
EKG R AXIS: -29 DEGREES
EKG T AXIS: 17 DEGREES
EKG VENTRICULAR RATE: 52 BPM

## 2018-09-19 PROCEDURE — 93010 ELECTROCARDIOGRAM REPORT: CPT | Performed by: INTERNAL MEDICINE

## 2018-09-19 PROCEDURE — 99496 TRANSJ CARE MGMT HIGH F2F 7D: CPT | Performed by: NURSE PRACTITIONER

## 2018-09-19 PROCEDURE — 1111F DSCHRG MED/CURRENT MED MERGE: CPT | Performed by: NURSE PRACTITIONER

## 2018-09-19 RX ORDER — HYDRALAZINE HYDROCHLORIDE 25 MG/1
25 TABLET, FILM COATED ORAL
Qty: 90 TABLET | Refills: 3 | Status: SHIPPED | OUTPATIENT
Start: 2018-09-19 | End: 2018-11-06

## 2018-09-19 ASSESSMENT — ENCOUNTER SYMPTOMS
WHEEZING: 0
BACK PAIN: 1
COUGH: 1
GASTROINTESTINAL NEGATIVE: 1
SHORTNESS OF BREATH: 0

## 2018-09-19 NOTE — PROGRESS NOTES
WEEK 16 tablet 2    metFORMIN (GLUCOPHAGE-XR) 750 MG extended release tablet 1 po bid (Patient taking differently: Take 750 mg by mouth 2 times daily ) 180 tablet 3    torsemide (DEMADEX) 10 MG tablet Take 1 tablet by mouth daily 30 tablet 3    diclofenac (VOLTAREN) 75 MG EC tablet Take 1 tablet by mouth 2 times daily (Patient taking differently: Take 75 mg by mouth 2 times daily as needed ) 60 tablet 1    ammonium lactate (LAC-HYDRIN) 12 % lotion APPLY TOPICALLY DAILY 400 g 0    lisinopril (PRINIVIL;ZESTRIL) 40 MG tablet Take 1 tablet by mouth daily 90 tablet 3    Accu-Chek Multiclix Lancets MISC Test once daily  DX  250.00 100 each 3    atorvastatin (LIPITOR) 80 MG tablet Take 1 tablet by mouth daily 90 tablet 3    carvedilol (COREG) 25 MG tablet Take 1 tablet by mouth 2 times daily 180 tablet 3    clotrimazole-betamethasone (LOTRISONE) 1-0.05 % cream Apply topically 2 times daily. 45 g 3    Omega-3 Fatty Acids (FISH OIL) 1200 MG CAPS Take 3 capsules by mouth daily       Cholecalciferol (VITAMIN D) 2000 UNITS CAPS capsule Take 1 capsule by mouth daily      aspirin 81 MG chewable tablet Take 81 mg by mouth daily. Medications patient taking as of now reconciled against medications ordered at time of hospital discharge: Yes    Chief Complaint   Patient presents with    Loss of Consciousness     patient is here forE/R follow up from passing out at [facility] yesterday       HPI    Inpatient course: Discharge summary reviewed- see chart. Interval history/Current status:  Patient at [facility] yesterday and became light headed and nausea. May have had a brief period of consciousness. He remembers part of it, but not all of it. They stated he was dehydrated, provided fluids and discharge on 9/18/18. On 9/14 patient was admitted for cellulitis of he right leg. He was disoriented. He was admitted and given IV antibiotics and continued PO after discharge.    History of diabetes, blood sugar was between 107-160. Was told to stop hydralazine for now, will continue to monitor blood pressure at home. Would like an A1C today; however it was taken 6.1 on 9/15/18. Review of Systems   Constitutional: Positive for activity change. Negative for chills, diaphoresis and fatigue. HENT: Negative. Respiratory: Positive for cough. Negative for shortness of breath and wheezing. Cardiovascular: Negative for chest pain, palpitations and leg swelling. Gastrointestinal: Negative. Genitourinary: Negative. Musculoskeletal: Positive for back pain (sciatica). Skin: Negative. Psychiatric/Behavioral: Negative for confusion. All other systems reviewed and are negative. Vitals:    09/19/18 1304   BP: 130/70   Pulse: 57   SpO2: 98%   Weight: 263 lb (119.3 kg)     Body mass index is 37.74 kg/m². Wt Readings from Last 3 Encounters:   09/19/18 263 lb (119.3 kg)   09/18/18 258 lb (117 kg)   09/15/18 265 lb 8 oz (120.4 kg)     BP Readings from Last 3 Encounters:   09/19/18 130/70   09/18/18 (!) 143/53   09/15/18 (!) 147/58       Physical Exam   Constitutional: He is oriented to person, place, and time. He appears well-developed and well-nourished. HENT:   Head: Normocephalic. Eyes: Pupils are equal, round, and reactive to light. Neck: Normal range of motion. Cardiovascular: Normal rate and regular rhythm. Pulmonary/Chest: Effort normal and breath sounds normal. He has no decreased breath sounds. He has no wheezes. He has no rhonchi. He has no rales. He exhibits no tenderness. Abdominal: Soft. Bowel sounds are normal.   Musculoskeletal: Normal range of motion. He exhibits no edema, tenderness or deformity. Neurological: He is alert and oriented to person, place, and time. Skin: Skin is warm and dry. No rash noted. No erythema. No pallor. Psychiatric: He has a normal mood and affect. His behavior is normal. Judgment and thought content normal.   Vitals reviewed. Assessment/Plan:  1.

## 2018-09-20 ENCOUNTER — CARE COORDINATION (OUTPATIENT)
Dept: CASE MANAGEMENT | Age: 73
End: 2018-09-20

## 2018-09-20 NOTE — CARE COORDINATION
[facility] Transitions Follow Up Call    2018    Patient: Becca Eisenberg  Patient : [id number]   MRN: [id number]  Reason for Admission: PNA  Discharge Date: 18 RARS: Readmission Risk Score: 0     Attempted to reach patient via phone for care transition, no answer. VM left on home and mobile phones stating purpose of call along with my contact information requesting a return call.         Follow Up  Future Appointments  Date Time Provider Niko Linn   2018 9:00 AM Alvaro Silva MD AND INFCT [facility] C[facility]A   2018 8:20 AM Javad Sheridan MD AND ENDO ANMOL Haas RN

## 2018-09-21 NOTE — CARE COORDINATION
[facility] Transitions Follow Up Call    2018    Patient: Leyda Godoy  Patient :    MRN: [id number]  Reason for Admission: PNA   Discharge Date: 18 RARS: Readmission Risk Score: 0/CMRS 6       2nd attempt made to reach patient for transition call, no answer. VM left stating purpose of call along with my contact information requesting a return call on home number, no answer/VM available on mobile.              Follow Up  Future Appointments  Date Time Provider Niko Linn   2018 9:00 AM Jeremy Love MD AND INFCT [facility]   2018 8:20 AM Isabelle Judd MD AND [facility]       Shani Lopez RN

## 2018-09-25 ENCOUNTER — OFFICE VISIT (OUTPATIENT)
Dept: FAMILY MEDICINE CLINIC | Age: 73
End: 2018-09-25
Payer: MEDICARE

## 2018-09-25 VITALS
BODY MASS INDEX: 37.45 KG/M2 | WEIGHT: 261 LBS | DIASTOLIC BLOOD PRESSURE: 44 MMHG | TEMPERATURE: 98.5 F | HEART RATE: 64 BPM | SYSTOLIC BLOOD PRESSURE: 90 MMHG

## 2018-09-25 DIAGNOSIS — D64.9 ANEMIA, UNSPECIFIED TYPE: Primary | ICD-10-CM

## 2018-09-25 DIAGNOSIS — R53.83 FATIGUE, UNSPECIFIED TYPE: ICD-10-CM

## 2018-09-25 PROCEDURE — 99214 OFFICE O/P EST MOD 30 MIN: CPT | Performed by: FAMILY MEDICINE

## 2018-09-26 ENCOUNTER — HOSPITAL ENCOUNTER (OUTPATIENT)
Age: 73
Discharge: HOME | End: 2018-09-26
Payer: MEDICARE

## 2018-09-26 DIAGNOSIS — D64.9 ANEMIA, UNSPECIFIED TYPE: ICD-10-CM

## 2018-09-26 DIAGNOSIS — E34.9 HYPOTESTOSTERONISM: ICD-10-CM

## 2018-09-26 DIAGNOSIS — D49.7 PITUITARY TUMOR: ICD-10-CM

## 2018-09-26 DIAGNOSIS — R53.83 FATIGUE, UNSPECIFIED TYPE: ICD-10-CM

## 2018-09-26 DIAGNOSIS — E22.1 HYPERPROLACTINEMIA (HCC): ICD-10-CM

## 2018-09-26 LAB
A/G RATIO: 1.6 (ref 1.1–2.2)
ALBUMIN SERPL-MCNC: 4.1 G/DL (ref 3.4–5)
ALP BLD-CCNC: 87 U/L (ref 40–129)
ALT SERPL-CCNC: 13 U/L (ref 10–40)
ANION GAP SERPL CALCULATED.3IONS-SCNC: 13 MMOL/L (ref 3–16)
AST SERPL-CCNC: 13 U/L (ref 15–37)
BASOPHILS ABSOLUTE: 0 K/UL (ref 0–0.2)
BASOPHILS RELATIVE PERCENT: 0.4 %
BILIRUB SERPL-MCNC: 0.5 MG/DL (ref 0–1)
BUN BLDV-MCNC: 21 MG/DL (ref 7–20)
CALCIUM SERPL-MCNC: 9.5 MG/DL (ref 8.3–10.6)
CHLORIDE BLD-SCNC: 104 MMOL/L (ref 99–110)
CO2: 25 MMOL/L (ref 21–32)
CREAT SERPL-MCNC: 1.1 MG/DL (ref 0.8–1.3)
EOSINOPHILS ABSOLUTE: 0.2 K/UL (ref 0–0.6)
EOSINOPHILS RELATIVE PERCENT: 2.9 %
FERRITIN: 127 NG/ML (ref 30–400)
GFR AFRICAN AMERICAN: >60
GFR NON-AFRICAN AMERICAN: >60
GLOBULIN: 2.6 G/DL
GLUCOSE BLD-MCNC: 146 MG/DL (ref 70–99)
HCT VFR BLD CALC: 36 % (ref 40.5–52.5)
HEMOGLOBIN: 12 G/DL (ref 13.5–17.5)
IMMATURE RETIC FRACT: 0.45 (ref 0.21–0.37)
IRON SATURATION: 30 % (ref 20–50)
IRON: 86 UG/DL (ref 59–158)
LYMPHOCYTES ABSOLUTE: 1.4 K/UL (ref 1–5.1)
LYMPHOCYTES RELATIVE PERCENT: 17.5 %
MCH RBC QN AUTO: 29.6 PG (ref 26–34)
MCHC RBC AUTO-ENTMCNC: 33.3 G/DL (ref 31–36)
MCV RBC AUTO: 88.7 FL (ref 80–100)
MONOCYTES ABSOLUTE: 0.5 K/UL (ref 0–1.3)
MONOCYTES RELATIVE PERCENT: 6.7 %
NEUTROPHILS ABSOLUTE: 5.7 K/UL (ref 1.7–7.7)
NEUTROPHILS RELATIVE PERCENT: 72.5 %
PDW BLD-RTO: 14.2 % (ref 12.4–15.4)
PLATELET # BLD: 159 K/UL (ref 135–450)
PMV BLD AUTO: 9 FL (ref 5–10.5)
POTASSIUM SERPL-SCNC: 4.8 MMOL/L (ref 3.5–5.1)
PROLACTIN: 11.6 NG/ML
RBC # BLD: 4.06 M/UL (ref 4.2–5.9)
RETICULOCYTE ABSOLUTE COUNT: 0.11 M/UL
RETICULOCYTE COUNT PCT: 2.76 % (ref 0.5–2.18)
SODIUM BLD-SCNC: 142 MMOL/L (ref 136–145)
TOTAL IRON BINDING CAPACITY: 282 UG/DL (ref 260–445)
TOTAL PROTEIN: 6.7 G/DL (ref 6.4–8.2)
TSH REFLEX: 3.22 UIU/ML (ref 0.27–4.2)
WBC # BLD: 7.9 K/UL (ref 4–11)

## 2018-09-26 PROCEDURE — 84443 ASSAY THYROID STIM HORMONE: CPT

## 2018-09-26 PROCEDURE — 84270 ASSAY OF SEX HORMONE GLOBUL: CPT

## 2018-09-26 PROCEDURE — 85025 COMPLETE CBC W/AUTO DIFF WBC: CPT

## 2018-09-26 PROCEDURE — 83540 ASSAY OF IRON: CPT

## 2018-09-26 PROCEDURE — 84403 ASSAY OF TOTAL TESTOSTERONE: CPT

## 2018-09-26 PROCEDURE — 83550 IRON BINDING TEST: CPT

## 2018-09-26 PROCEDURE — 36415 COLL VENOUS BLD VENIPUNCTURE: CPT

## 2018-09-26 PROCEDURE — 84146 ASSAY OF PROLACTIN: CPT

## 2018-09-26 PROCEDURE — 80053 COMPREHEN METABOLIC PANEL: CPT

## 2018-09-26 PROCEDURE — 82728 ASSAY OF FERRITIN: CPT

## 2018-09-26 PROCEDURE — 85045 AUTOMATED RETICULOCYTE COUNT: CPT

## 2018-09-28 ENCOUNTER — OFFICE VISIT (OUTPATIENT)
Dept: ENDOCRINOLOGY | Age: 73
End: 2018-09-28
Payer: MEDICARE

## 2018-09-28 ENCOUNTER — CARE COORDINATION (OUTPATIENT)
Dept: CASE MANAGEMENT | Age: 73
End: 2018-09-28

## 2018-09-28 VITALS
WEIGHT: 260.6 LBS | RESPIRATION RATE: 12 BRPM | DIASTOLIC BLOOD PRESSURE: 57 MMHG | HEIGHT: 70 IN | OXYGEN SATURATION: 96 % | HEART RATE: 57 BPM | BODY MASS INDEX: 37.31 KG/M2 | SYSTOLIC BLOOD PRESSURE: 96 MMHG

## 2018-09-28 DIAGNOSIS — E22.1 HYPERPROLACTINEMIA (HCC): Primary | ICD-10-CM

## 2018-09-28 DIAGNOSIS — D35.2 PITUITARY MACROADENOMA (HCC): ICD-10-CM

## 2018-09-28 DIAGNOSIS — E34.9 HYPOTESTOSTERONISM: ICD-10-CM

## 2018-09-28 LAB
SEX HORMONE BINDING GLOBULIN: 22 NMOL/L (ref 11–80)
TESTOSTERONE FREE-NONMALE: 8.3 PG/ML (ref 47–244)
TESTOSTERONE TOTAL: 37 NG/DL (ref 220–1000)

## 2018-09-28 PROCEDURE — 99214 OFFICE O/P EST MOD 30 MIN: CPT | Performed by: INTERNAL MEDICINE

## 2018-09-28 NOTE — PROGRESS NOTES
Endocrinology    Tj Fair M.D. Phone: [phone]   FAX: [phone]       Denzel You   YOB: 1945    Date of Visit:  9/28/2018    No Known Allergies  Outpatient Prescriptions Marked as Taking for the 9/28/18 encounter (Office Visit) with Patrick Ocasio MD   Medication Sig Dispense Refill    penicillin v potassium (VEETID) 500 MG tablet Take 2 tablets by mouth 2 times daily 120 tablet 2    allopurinol (ZYLOPRIM) 300 MG tablet TAKE ONE TABLET BY MOUTH DAILY 90 tablet 5    cabergoline (DOSTINEX) 0.5 MG tablet TAKE ONE TABLET BY MOUTH THREE TIMES A WEEK 16 tablet 2    metFORMIN (GLUCOPHAGE-XR) 750 MG extended release tablet 1 po bid (Patient taking differently: Take 750 mg by mouth 2 times daily ) 180 tablet 3    ammonium lactate (LAC-HYDRIN) 12 % lotion APPLY TOPICALLY DAILY 400 g 0    lisinopril (PRINIVIL;ZESTRIL) 40 MG tablet Take 1 tablet by mouth daily 90 tablet 3    Accu-Chek Multiclix Lancets MISC Test once daily  DX  250.00 100 each 3    atorvastatin (LIPITOR) 80 MG tablet Take 1 tablet by mouth daily 90 tablet 3    carvedilol (COREG) 25 MG tablet Take 1 tablet by mouth 2 times daily 180 tablet 3    clotrimazole-betamethasone (LOTRISONE) 1-0.05 % cream Apply topically 2 times daily. 45 g 3    Omega-3 Fatty Acids (FISH OIL) 1200 MG CAPS Take 3 capsules by mouth daily       Cholecalciferol (VITAMIN D) 2000 UNITS CAPS capsule Take 1 capsule by mouth daily      aspirin 81 MG chewable tablet Take 81 mg by mouth daily. Vitals:    09/28/18 1041   BP: (!) 96/57   Site: Right Upper Arm   Position: Sitting   Cuff Size: Large Adult   Pulse: 57   Resp: 12   SpO2: 96%   Weight: 260 lb 9.6 oz (118.2 kg)   Height: 5' 10\" (1.778 m)     Body mass index is 37.39 kg/m².      Wt Readings from Last 3 Encounters:   09/28/18 260 lb 9.6 oz (118.2 kg)   09/25/18 261 lb (118.4 kg)   09/19/18 263 lb (119.3 kg)     BP Readings from Last 3 Encounters:   09/28/18 (!) 96/57 malaise/fatigue. Negative for chills, diaphoresis, fever and weight loss. Eyes: Negative for blurred vision, double vision and photophobia. Respiratory: Negative for cough and hemoptysis. Cardiovascular: Negative for chest pain, palpitations and orthopnea. Genitourinary: Negative for dysuria, flank pain, frequency, hematuria and urgency. Musculoskeletal: Positive for myalgias. Negative for back pain, falls, joint pain and neck pain. Skin: Negative for itching and rash. Neurological: Positive for headaches. Negative for dizziness, tingling, tremors, sensory change, speech change, focal weakness, seizures and loss of consciousness. Endo/Heme/Allergies: Negative for environmental allergies and polydipsia. Does not bruise/bleed easily. Psychiatric/Behavioral: Positive for depression. Negative for hallucinations, memory loss, substance abuse and suicidal ideas. The patient is not nervous/anxious and does not have insomnia. Physical Exam   Constitutional: He is oriented to person, place, and time. He appears well-developed. No distress. Satish Mohr Psychiatric: His behavior is normal. Thought content normal.     EXAMINATION:   MRI OF THE BRAIN WITHOUT CONTRAST  12/4/2016 12:41 pm       TECHNIQUE:   Multiplanar multisequence MRI of the brain was performed without the   administration of intravenous contrast.       COMPARISON:   Noncontrast CT head 12/03/2016       HISTORY:   ORDERING SYSTEM PROVIDED HISTORY: NEURO DEFICIT, ACUTE SINGLE, COMPLETE   RESOLUTION   TECHNOLOGIST PROVIDED HISTORY:   Ordering Physician Provided Reason for Exam: Neuro deficit   Acuity: Acute   Type of Exam: Initial   Additional signs and symptoms: HA, memory loss   Relevant Medical/Surgical History: Diabetic, HBP, heart problems       FINDINGS:   INTRACRANIAL STRUCTURES/VENTRICLES:  Ventricles and sulci within normal   limits for patient's age. [place] is no midline shift or hydrocephalus.  Basal   cisterns are patent.       There is no extra-axial fluid collection. Scattered, small foci of T2/FLAIR   hyperintense signal in the periventricular and subcortical white matter are   nonspecific, but may reflect mild chronic microvascular disease.  No   restricted diffusion is identified to suggest acute ischemia or infarct. There is no abnormal parenchymal gradient susceptibility.       Flow voids of the proximal intracranial arteries and dural sinuses are   unremarkable.  There is a 1.4 x 1.3 cm T2 hypointense lesion in the left   aspect of the sella.  The lesion does not demonstrate an associated flow   void, and is therefore unlikely to reflect an aneurysm.       ORBITS: The visualized portion of the orbits demonstrate no acute abnormality.       SINUSES: Mild circumferential mucosal thickening of the maxillary, sphenoid,   and ethmoid sinuses.  No fluid is seen in the mastoid air cells.       BONES/SOFT TISSUES: The bone marrow signal intensity appears normal. The   craniocervical junction is normal in appearance.           Impression   1.  No acute intracranial abnormality.       2.  Mild generalized volume loss and mild chronic microvascular ischemic   disease.       3.  Soft tissue lesion in the left aspect of the sella may reflect a   pituitary adenoma.                  Assessment/Plan        1. Pituitary lesion    This 67 yrs old male was found to have a 1.4  X 1.3 cm left sellar lesion consistent with pituitary adenoma. He denies headache or visual symptoms. Lab work showed a high prolactin of 470 and 562 consistent with prolactinoma. On cabergoline since 02/17  Current dose 0.5 mg three a week since 08/17    Prolactin 562---->154---> 63.8---> 47.6---> 20.1--->  11.6     Rest of pituitary labs normal.     visual field exam normal in 02/17. Will order  repeat MRI     If tumor has reduced in size, will consider reducing dose of cabergoline to twice a week. 2. Hypogonadism. Off testosterone     Testosterone low.

## 2018-10-01 ENCOUNTER — HOSPITAL ENCOUNTER (EMERGENCY)
Age: 73
Discharge: LEFT BEFORE DISCHARGE | End: 2018-10-02
Payer: MEDICARE

## 2018-10-01 PROCEDURE — 4500000002 HC ER NO CHARGE

## 2018-10-02 ENCOUNTER — APPOINTMENT (OUTPATIENT)
Dept: GENERAL RADIOLOGY | Age: 73
End: 2018-10-02
Payer: MEDICARE

## 2018-10-02 VITALS
BODY MASS INDEX: 36.94 KG/M2 | WEIGHT: 258 LBS | SYSTOLIC BLOOD PRESSURE: 144 MMHG | HEART RATE: 60 BPM | OXYGEN SATURATION: 99 % | DIASTOLIC BLOOD PRESSURE: 67 MMHG | RESPIRATION RATE: 15 BRPM | HEIGHT: 70 IN | TEMPERATURE: 98.3 F

## 2018-10-04 ENCOUNTER — HOSPITAL ENCOUNTER (OUTPATIENT)
Age: 73
Discharge: HOME | End: 2018-10-04
Payer: MEDICARE

## 2018-10-04 ENCOUNTER — HOSPITAL ENCOUNTER (OUTPATIENT)
Dept: MRI IMAGING | Age: 73
Discharge: HOME | End: 2018-10-04
Payer: MEDICARE

## 2018-10-04 DIAGNOSIS — E34.9 HYPOTESTOSTERONISM: ICD-10-CM

## 2018-10-04 DIAGNOSIS — D35.2 PITUITARY MACROADENOMA (HCC): ICD-10-CM

## 2018-10-04 DIAGNOSIS — E22.1 HYPERPROLACTINEMIA (HCC): ICD-10-CM

## 2018-10-04 LAB — PROLACTIN: 12.5 NG/ML

## 2018-10-04 PROCEDURE — 84403 ASSAY OF TOTAL TESTOSTERONE: CPT

## 2018-10-04 PROCEDURE — 36415 COLL VENOUS BLD VENIPUNCTURE: CPT

## 2018-10-04 PROCEDURE — 6360000004 HC RX CONTRAST MEDICATION: Performed by: INTERNAL MEDICINE

## 2018-10-04 PROCEDURE — A9579 GAD-BASE MR CONTRAST NOS,1ML: HCPCS | Performed by: INTERNAL MEDICINE

## 2018-10-04 PROCEDURE — 84270 ASSAY OF SEX HORMONE GLOBUL: CPT

## 2018-10-04 PROCEDURE — 70553 MRI BRAIN STEM W/O & W/DYE: CPT

## 2018-10-04 PROCEDURE — 84146 ASSAY OF PROLACTIN: CPT

## 2018-10-04 RX ADMIN — GADOTERIDOL 23 ML: 279.3 INJECTION, SOLUTION INTRAVENOUS at 10:30

## 2018-10-06 LAB
SEX HORMONE BINDING GLOBULIN: 32 NMOL/L (ref 11–80)
TESTOSTERONE FREE-NONMALE: 5.7 PG/ML (ref 47–244)
TESTOSTERONE TOTAL: 31 NG/DL (ref 220–1000)

## 2018-10-07 ASSESSMENT — ENCOUNTER SYMPTOMS
WHEEZING: 0
GASTROINTESTINAL NEGATIVE: 1
BACK PAIN: 1
COUGH: 1
SHORTNESS OF BREATH: 0

## 2018-10-16 PROBLEM — I50.42 CHRONIC COMBINED SYSTOLIC AND DIASTOLIC HEART FAILURE (HCC): Status: ACTIVE | Noted: 2018-10-16

## 2018-10-16 PROBLEM — E11.9 TYPE 2 DIABETES MELLITUS WITHOUT COMPLICATION, WITHOUT LONG-TERM CURRENT USE OF INSULIN (HCC): Status: ACTIVE | Noted: 2018-10-16

## 2018-10-16 PROBLEM — I50.33 ACUTE ON CHRONIC DIASTOLIC CHF (CONGESTIVE HEART FAILURE) (HCC): Status: RESOLVED | Noted: 2017-12-20 | Resolved: 2018-10-16

## 2018-10-31 ENCOUNTER — TELEPHONE (OUTPATIENT)
Dept: FAMILY MEDICINE CLINIC | Age: 73
End: 2018-10-31

## 2018-10-31 RX ORDER — SPIRONOLACTONE 25 MG/1
25 TABLET ORAL PRN
COMMUNITY
End: 2019-10-08

## 2018-10-31 NOTE — TELEPHONE ENCOUNTER
Dr. Candido Zhou:    Notes: This patient has an upcoming appointment with you for Diabetes. In planning for that visit I have completed the following pre-visit planning:     Pre-Visit Planning Checklist:  Patient contacted: yes  Verified patient by name and date of birth: yes    Health Maintenance items reviewed:    No pre-visit planning health maintenance topics to review at this time    Labs and procedures pended: None  Labs and procedures discussed with patient: no  Reminded patient to check with their insurance company about coverage for lab tests and lab location: no    Preliminary Medication Reconciliation: was performed. Reminded patient to arrive early: yes    Please complete the med-reconciliation and sign the appropriate labs as soon as possible.       Tyson Mehta MA  Pre-Services Specialist

## 2018-11-06 ENCOUNTER — OFFICE VISIT (OUTPATIENT)
Dept: FAMILY MEDICINE CLINIC | Age: 73
End: 2018-11-06
Payer: MEDICARE

## 2018-11-06 VITALS
OXYGEN SATURATION: 98 % | DIASTOLIC BLOOD PRESSURE: 60 MMHG | SYSTOLIC BLOOD PRESSURE: 112 MMHG | HEART RATE: 56 BPM | BODY MASS INDEX: 38.17 KG/M2 | WEIGHT: 266 LBS

## 2018-11-06 DIAGNOSIS — E34.9 HYPOTESTOSTERONISM: ICD-10-CM

## 2018-11-06 DIAGNOSIS — E11.9 TYPE 2 DIABETES MELLITUS WITHOUT COMPLICATION, WITHOUT LONG-TERM CURRENT USE OF INSULIN (HCC): Primary | ICD-10-CM

## 2018-11-06 DIAGNOSIS — E22.1 HYPERPROLACTINEMIA (HCC): ICD-10-CM

## 2018-11-06 PROCEDURE — 99214 OFFICE O/P EST MOD 30 MIN: CPT | Performed by: FAMILY MEDICINE

## 2018-11-09 ENCOUNTER — TELEPHONE (OUTPATIENT)
Dept: ENDOCRINOLOGY | Age: 73
End: 2018-11-09

## 2018-11-12 ENCOUNTER — TELEPHONE (OUTPATIENT)
Dept: ENDOCRINOLOGY | Age: 73
End: 2018-11-12

## 2018-11-13 ENCOUNTER — OFFICE VISIT (OUTPATIENT)
Dept: INFECTIOUS DISEASES | Age: 73
End: 2018-11-13
Payer: MEDICARE

## 2018-11-13 VITALS
DIASTOLIC BLOOD PRESSURE: 62 MMHG | BODY MASS INDEX: 38.37 KG/M2 | HEIGHT: 70 IN | WEIGHT: 268 LBS | TEMPERATURE: 98.4 F | SYSTOLIC BLOOD PRESSURE: 114 MMHG

## 2018-11-13 DIAGNOSIS — L03.116 CELLULITIS OF LEFT LOWER EXTREMITY: Primary | ICD-10-CM

## 2018-11-13 PROCEDURE — 99213 OFFICE O/P EST LOW 20 MIN: CPT | Performed by: INTERNAL MEDICINE

## 2018-11-13 RX ORDER — AMLODIPINE BESYLATE 10 MG/1
1 TABLET ORAL DAILY
Status: ON HOLD | COMMUNITY
Start: 2018-11-07 | End: 2019-10-31 | Stop reason: HOSPADM

## 2018-11-13 NOTE — PROGRESS NOTES
Department of Internal Medicine  Infectious Diseases      Patient Name: Benedict Iniguez      Date of visit: 11/13/2018  SUBJECTIVE:  Keith Bruce  is a 75 y.o. male who returns today for follow-up of recurrent LE cellulitis     He was admitted briefly in September 2018. He developed URI symptoms on 9/13. The next day, developed fever, generalized weakness, confusion, similar to prior episodes of cellulitis. Chest CT with bronchiolitis. Appearance of the leg consistent with mild cellulitis. He was discharged on levofloxacin. He has been stable since then  He is taking ppx pcn 1g BID. Drug is well tolerated without significant side effects.     He feels well today without complaints     History as previously documented,  He has history of venous stasis and recurrent cellulitis  Admission in 12/2016 and again in 12/2017 with cellulitis (LLE 2016, RLE 2017), both associated with secondary bacteremia with strep species  He is now taking daily pcn for ppx of cellulitis  He has intentionally lost some weight      REVIEW OF SYSTEMS:    CONSTITUTIONAL:  negative for fevers, chills, diaphoresis, activity change, appetite change, fatigue, night sweats   EYES:  negative for blurred vision, eye discharge, visual disturbance and icterus  HEENT:  negative for hearing loss, tinnitus, ear drainage, sinus pressure, nasal congestion, epistaxis and snoring  RESPIRATORY:  No cough or hemoptysis   CARDIOVASCULAR:  negative for chest pain, palpitations, exertional chest pressure/discomfort, edema, syncope  GASTROINTESTINAL:  negative for nausea, vomiting, diarrhea, constipation, blood in stool and abdominal pain  GENITOURINARY:  negative for frequency, dysuria, urinary incontinence, decreased urine volume, and hematuria  HEMATOLOGIC/LYMPHATIC:  negative for easy bruising, bleeding and lymphadenopathy  ALLERGIC/IMMUNOLOGIC:  negative for angioedema, anaphylaxis and drug reactions  ENDOCRINE:  negative for weight changes

## 2018-11-15 DIAGNOSIS — I10 ESSENTIAL HYPERTENSION, BENIGN: ICD-10-CM

## 2018-11-15 RX ORDER — LISINOPRIL 40 MG/1
TABLET ORAL
Qty: 90 TABLET | Refills: 1 | Status: SHIPPED | OUTPATIENT
Start: 2018-11-15 | End: 2019-06-03 | Stop reason: SDUPTHER

## 2018-11-16 RX ORDER — ATORVASTATIN CALCIUM 80 MG/1
TABLET, FILM COATED ORAL
Qty: 90 TABLET | Refills: 2 | Status: SHIPPED | OUTPATIENT
Start: 2018-11-16 | End: 2019-07-22 | Stop reason: SDUPTHER

## 2018-11-19 RX ORDER — CABERGOLINE 0.5 MG/1
TABLET ORAL
Qty: 16 TABLET | Refills: 1 | Status: SHIPPED | OUTPATIENT
Start: 2018-11-19 | End: 2019-03-29 | Stop reason: SDUPTHER

## 2018-12-30 DIAGNOSIS — E11.9 TYPE 2 DIABETES MELLITUS WITHOUT COMPLICATION, WITHOUT LONG-TERM CURRENT USE OF INSULIN (HCC): ICD-10-CM

## 2018-12-31 RX ORDER — BLOOD SUGAR DIAGNOSTIC
STRIP MISCELLANEOUS
Qty: 50 STRIP | Refills: 2 | Status: SHIPPED | OUTPATIENT
Start: 2018-12-31 | End: 2019-05-28 | Stop reason: SDUPTHER

## 2019-01-07 RX ORDER — PENICILLIN V POTASSIUM 500 MG/1
TABLET ORAL
Qty: 120 TABLET | Refills: 1 | Status: SHIPPED | OUTPATIENT
Start: 2019-01-07 | End: 2019-03-17 | Stop reason: SDUPTHER

## 2019-01-16 ENCOUNTER — OFFICE VISIT (OUTPATIENT)
Dept: ORTHOPEDIC SURGERY | Age: 74
End: 2019-01-16
Payer: MEDICARE

## 2019-01-16 VITALS
DIASTOLIC BLOOD PRESSURE: 80 MMHG | WEIGHT: 268.08 LBS | HEIGHT: 70 IN | BODY MASS INDEX: 38.38 KG/M2 | HEART RATE: 67 BPM | SYSTOLIC BLOOD PRESSURE: 114 MMHG

## 2019-01-16 DIAGNOSIS — M25.561 PAIN IN BOTH KNEES, UNSPECIFIED CHRONICITY: Primary | ICD-10-CM

## 2019-01-16 DIAGNOSIS — M16.12 PRIMARY OSTEOARTHRITIS OF LEFT HIP: ICD-10-CM

## 2019-01-16 DIAGNOSIS — M25.562 PAIN IN BOTH KNEES, UNSPECIFIED CHRONICITY: Primary | ICD-10-CM

## 2019-01-16 DIAGNOSIS — M25.552 LEFT HIP PAIN: ICD-10-CM

## 2019-01-16 PROCEDURE — 99214 OFFICE O/P EST MOD 30 MIN: CPT | Performed by: ORTHOPAEDIC SURGERY

## 2019-01-16 RX ORDER — DICLOFENAC SODIUM 75 MG/1
75 TABLET, DELAYED RELEASE ORAL 2 TIMES DAILY
Qty: 60 TABLET | Refills: 3 | Status: SHIPPED | OUTPATIENT
Start: 2019-01-16 | End: 2019-09-12 | Stop reason: SDUPTHER

## 2019-01-24 ENCOUNTER — HOSPITAL ENCOUNTER (OUTPATIENT)
Dept: PHYSICAL THERAPY | Age: 74
Setting detail: THERAPIES SERIES
Discharge: HOME | End: 2019-01-24
Payer: MEDICARE

## 2019-01-28 ENCOUNTER — HOSPITAL ENCOUNTER (OUTPATIENT)
Dept: PHYSICAL THERAPY | Age: 74
Setting detail: THERAPIES SERIES
Discharge: HOME | End: 2019-01-28
Payer: MEDICARE

## 2019-01-28 PROCEDURE — G8979 MOBILITY GOAL STATUS: HCPCS

## 2019-01-28 PROCEDURE — 97110 THERAPEUTIC EXERCISES: CPT

## 2019-01-28 PROCEDURE — 97161 PT EVAL LOW COMPLEX 20 MIN: CPT

## 2019-01-28 PROCEDURE — G8978 MOBILITY CURRENT STATUS: HCPCS

## 2019-01-31 ENCOUNTER — HOSPITAL ENCOUNTER (OUTPATIENT)
Dept: PHYSICAL THERAPY | Age: 74
Setting detail: THERAPIES SERIES
Discharge: HOME | End: 2019-01-31
Payer: MEDICARE

## 2019-01-31 PROCEDURE — 97140 MANUAL THERAPY 1/> REGIONS: CPT

## 2019-01-31 PROCEDURE — 97110 THERAPEUTIC EXERCISES: CPT

## 2019-02-01 ENCOUNTER — TELEPHONE (OUTPATIENT)
Dept: FAMILY MEDICINE CLINIC | Age: 74
End: 2019-02-01

## 2019-02-04 ENCOUNTER — HOSPITAL ENCOUNTER (OUTPATIENT)
Dept: PHYSICAL THERAPY | Age: 74
Setting detail: THERAPIES SERIES
End: 2019-02-04
Payer: MEDICARE

## 2019-02-07 ENCOUNTER — HOSPITAL ENCOUNTER (OUTPATIENT)
Dept: PHYSICAL THERAPY | Age: 74
Setting detail: THERAPIES SERIES
Discharge: HOME | End: 2019-02-07
Payer: MEDICARE

## 2019-02-07 PROCEDURE — 97110 THERAPEUTIC EXERCISES: CPT | Performed by: PHYSICAL THERAPIST

## 2019-02-07 PROCEDURE — 97140 MANUAL THERAPY 1/> REGIONS: CPT | Performed by: PHYSICAL THERAPIST

## 2019-02-08 ENCOUNTER — OFFICE VISIT (OUTPATIENT)
Dept: FAMILY MEDICINE CLINIC | Age: 74
End: 2019-02-08
Payer: MEDICARE

## 2019-02-08 VITALS
DIASTOLIC BLOOD PRESSURE: 58 MMHG | OXYGEN SATURATION: 98 % | BODY MASS INDEX: 38.31 KG/M2 | SYSTOLIC BLOOD PRESSURE: 126 MMHG | HEART RATE: 55 BPM | WEIGHT: 267 LBS

## 2019-02-08 DIAGNOSIS — E11.9 TYPE 2 DIABETES MELLITUS WITHOUT COMPLICATION, WITHOUT LONG-TERM CURRENT USE OF INSULIN (HCC): Primary | ICD-10-CM

## 2019-02-08 LAB — HBA1C MFR BLD: 6.8 %

## 2019-02-08 PROCEDURE — 83036 HEMOGLOBIN GLYCOSYLATED A1C: CPT | Performed by: FAMILY MEDICINE

## 2019-02-08 PROCEDURE — G8510 SCR DEP NEG, NO PLAN REQD: HCPCS | Performed by: FAMILY MEDICINE

## 2019-02-08 PROCEDURE — 99213 OFFICE O/P EST LOW 20 MIN: CPT | Performed by: FAMILY MEDICINE

## 2019-02-08 ASSESSMENT — PATIENT HEALTH QUESTIONNAIRE - PHQ9
1. LITTLE INTEREST OR PLEASURE IN DOING THINGS: 1
SUM OF ALL RESPONSES TO PHQ QUESTIONS 1-9: 1
SUM OF ALL RESPONSES TO PHQ QUESTIONS 1-9: 1
2. FEELING DOWN, DEPRESSED OR HOPELESS: 0
SUM OF ALL RESPONSES TO PHQ9 QUESTIONS 1 & 2: 1

## 2019-02-21 ENCOUNTER — HOSPITAL ENCOUNTER (OUTPATIENT)
Dept: PHYSICAL THERAPY | Age: 74
Setting detail: THERAPIES SERIES
Discharge: HOME | End: 2019-02-21
Payer: MEDICARE

## 2019-02-21 PROCEDURE — G8980 MOBILITY D/C STATUS: HCPCS

## 2019-02-21 PROCEDURE — G8978 MOBILITY CURRENT STATUS: HCPCS

## 2019-02-21 PROCEDURE — 97110 THERAPEUTIC EXERCISES: CPT

## 2019-02-21 PROCEDURE — 97112 NEUROMUSCULAR REEDUCATION: CPT

## 2019-02-21 PROCEDURE — G8979 MOBILITY GOAL STATUS: HCPCS

## 2019-02-25 ENCOUNTER — NURSE ONLY (OUTPATIENT)
Dept: FAMILY MEDICINE CLINIC | Age: 74
End: 2019-02-25
Payer: MEDICARE

## 2019-02-25 DIAGNOSIS — E11.9 TYPE 2 DIABETES MELLITUS WITHOUT COMPLICATION, WITHOUT LONG-TERM CURRENT USE OF INSULIN (HCC): Primary | ICD-10-CM

## 2019-02-25 LAB — GLUCOSE BLD-MCNC: 182 MG/DL

## 2019-02-25 PROCEDURE — 82962 GLUCOSE BLOOD TEST: CPT | Performed by: FAMILY MEDICINE

## 2019-03-12 LAB
CATARACTS: POSITIVE
DIABETIC RETINOPATHY: NEGATIVE
GLAUCOMA: NEGATIVE
INTRAOCULAR PRESSURE EYE: NORMAL
VISUAL ACUITY DISTANCE LEFT EYE: NORMAL
VISUAL ACUITY DISTANCE RIGHT EYE: NORMAL

## 2019-03-20 ENCOUNTER — TELEPHONE (OUTPATIENT)
Dept: ENDOCRINOLOGY | Age: 74
End: 2019-03-20

## 2019-03-20 DIAGNOSIS — D35.2 PITUITARY MACROADENOMA (HCC): ICD-10-CM

## 2019-03-20 DIAGNOSIS — E22.1 HYPERPROLACTINEMIA (HCC): Primary | ICD-10-CM

## 2019-03-20 DIAGNOSIS — E34.9 HYPOTESTOSTERONISM: ICD-10-CM

## 2019-03-25 ENCOUNTER — HOSPITAL ENCOUNTER (OUTPATIENT)
Age: 74
Discharge: HOME | End: 2019-03-25
Payer: MEDICARE

## 2019-03-25 DIAGNOSIS — E22.1 HYPERPROLACTINEMIA (HCC): ICD-10-CM

## 2019-03-25 DIAGNOSIS — E34.9 HYPOTESTOSTERONISM: ICD-10-CM

## 2019-03-25 DIAGNOSIS — D35.2 PITUITARY MACROADENOMA (HCC): ICD-10-CM

## 2019-03-25 LAB — PROLACTIN: 13.8 NG/ML

## 2019-03-25 PROCEDURE — 36415 COLL VENOUS BLD VENIPUNCTURE: CPT

## 2019-03-25 PROCEDURE — 84403 ASSAY OF TOTAL TESTOSTERONE: CPT

## 2019-03-25 PROCEDURE — 84270 ASSAY OF SEX HORMONE GLOBUL: CPT

## 2019-03-25 PROCEDURE — 84146 ASSAY OF PROLACTIN: CPT

## 2019-03-28 LAB
SEX HORMONE BINDING GLOBULIN: 30 NMOL/L (ref 11–80)
TESTOSTERONE FREE-NONMALE: 10.5 PG/ML (ref 47–244)
TESTOSTERONE TOTAL: 55 NG/DL (ref 220–1000)

## 2019-03-29 ENCOUNTER — OFFICE VISIT (OUTPATIENT)
Dept: ENDOCRINOLOGY | Age: 74
End: 2019-03-29
Payer: MEDICARE

## 2019-03-29 VITALS
HEART RATE: 61 BPM | OXYGEN SATURATION: 99 % | BODY MASS INDEX: 38.31 KG/M2 | DIASTOLIC BLOOD PRESSURE: 55 MMHG | HEIGHT: 70 IN | SYSTOLIC BLOOD PRESSURE: 105 MMHG

## 2019-03-29 DIAGNOSIS — E34.9 HYPOTESTOSTERONISM: ICD-10-CM

## 2019-03-29 DIAGNOSIS — D35.2 PITUITARY MACROADENOMA (HCC): ICD-10-CM

## 2019-03-29 DIAGNOSIS — E22.1 HYPERPROLACTINEMIA (HCC): Primary | ICD-10-CM

## 2019-03-29 PROCEDURE — 99214 OFFICE O/P EST MOD 30 MIN: CPT | Performed by: INTERNAL MEDICINE

## 2019-03-29 RX ORDER — CABERGOLINE 0.5 MG/1
TABLET ORAL
Qty: 8 TABLET | Refills: 5 | Status: SHIPPED | OUTPATIENT
Start: 2019-03-29 | End: 2019-10-02 | Stop reason: SDUPTHER

## 2019-04-25 ENCOUNTER — OFFICE VISIT (OUTPATIENT)
Dept: FAMILY MEDICINE CLINIC | Age: 74
End: 2019-04-25
Payer: MEDICARE

## 2019-04-25 VITALS
DIASTOLIC BLOOD PRESSURE: 50 MMHG | SYSTOLIC BLOOD PRESSURE: 124 MMHG | OXYGEN SATURATION: 99 % | BODY MASS INDEX: 39.17 KG/M2 | HEART RATE: 59 BPM | WEIGHT: 273 LBS

## 2019-04-25 DIAGNOSIS — Z01.818 PREOP EXAMINATION: Primary | ICD-10-CM

## 2019-04-25 PROCEDURE — 99213 OFFICE O/P EST LOW 20 MIN: CPT | Performed by: FAMILY MEDICINE

## 2019-04-25 NOTE — PROGRESS NOTES
Take 750 mg by mouth 2 times daily ) 180 tablet 3    ammonium lactate (LAC-HYDRIN) 12 % lotion APPLY TOPICALLY DAILY 400 g 0    Accu-Chek Multiclix Lancets MISC Test once daily  DX  250.00 100 each 3    clotrimazole-betamethasone (LOTRISONE) 1-0.05 % cream Apply topically 2 times daily. 45 g 3    Omega-3 Fatty Acids (FISH OIL) 1200 MG CAPS Take 2 capsules by mouth daily       Cholecalciferol (VITAMIN D) 2000 UNITS CAPS capsule Take 1 capsule by mouth daily      aspirin 81 MG chewable tablet Take 81 mg by mouth daily. No current facility-administered medications for this visit. Allergies:  Patient has no known allergies. History of allergic reaction to anesthesia:  No     Social History     Tobacco Use   Smoking Status Former Smoker    Packs/day: 1.00    Years: 15.00    Pack years: 15.00    Types: Cigarettes    Last attempt to quit: 1982    Years since quittin.4   Smokeless Tobacco Never Used     The patient states he drinks very little per week.     Family History   Problem Relation Age of Onset    Heart Disease Brother     High Blood Pressure Brother     Dementia Father        REVIEW OF SYSTEMS:    CONSTITUTIONAL:  negative  EYES:  negative  HEENT:  negative  RESPIRATORY:  negative  CARDIOVASCULAR:  negative  GASTROINTESTINAL:  negative  GENITOURINARY:  negative  INTEGUMENT/BREAST:  negative  HEMATOLOGIC/LYMPHATIC:  negative  ALLERGIC/IMMUNOLOGIC:  negative  ENDOCRINE:  negative  MUSCULOSKELETAL:  negative  NEUROLOGICAL:  negative    PHYSICAL EXAM:      BP (!) 124/50   Pulse 59   Wt 273 lb (123.8 kg)   SpO2 99%   BMI 39.17 kg/m²     CONSTITUTIONAL:  awake, alert, cooperative, no apparent distress, and appears stated age    Eyes:  Lids and lashes normal, pupils equal, round and reactive to light, extra ocular muscles intact, sclera clear, conjunctiva normal    Head/ENT:  Normocephalic, without obvious abnormality, atramatic, sinuses nontender on palpation, external ears without lesions, oral pharynx with moist mucus membranes, tonsils without erythema or exudates, gums normal and good dentition. Neck:  Supple, symmetrical, trachea midline, no adenopathy, thyroid symmetric, not enlarged and no tenderness, skin normal, No carotid bruit    Heart:  Normal apical impulse, regular rate and rhythm, normal S1 and S2, no S3 or S4, and no murmur noted    Lungs:  No increased work of breathing, good air exchange, clear to auscultation bilaterally, no crackles or wheezing    Abdomen:  No scars, normal bowel sounds, soft, non-distended, non-tender, no masses palpated, no hepatosplenomegally    Extremities:  No clubbing, cyanosis, or edema    NEUROLOGIC:  Awake, alert, oriented to name, place and time. Cranial nerves II-XII are grossly intact. Motor is 5 out of 5 bilaterally. ASSESSMENT AND PLAN:    1. Patient is a 75 y.o. male with above specified procedure planned on 4/29/19 with Dr. Treasure Emerson at [facility].     2. Cleared for surgery    Elias Serrano M.D    [address] [place] [address],  [address] [place] [address], [address] [place] [address]  [phone]

## 2019-05-14 ENCOUNTER — OFFICE VISIT (OUTPATIENT)
Dept: INFECTIOUS DISEASES | Age: 74
End: 2019-05-14
Payer: MEDICARE

## 2019-05-14 VITALS
WEIGHT: 270 LBS | DIASTOLIC BLOOD PRESSURE: 60 MMHG | TEMPERATURE: 98.4 F | SYSTOLIC BLOOD PRESSURE: 112 MMHG | BODY MASS INDEX: 38.74 KG/M2

## 2019-05-14 DIAGNOSIS — L03.116 CELLULITIS OF LEFT LOWER EXTREMITY: Primary | ICD-10-CM

## 2019-05-14 PROCEDURE — 99213 OFFICE O/P EST LOW 20 MIN: CPT | Performed by: INTERNAL MEDICINE

## 2019-05-14 NOTE — PROGRESS NOTES
Department of Internal Medicine  Infectious Diseases      Patient Name: Priya Inman      Date of visit: 5/14/2019  SUBJECTIVE:  Marcia Cheng  is a 75 y.o. male who returns today for follow-up of recurrent LE cellulitis   He was last seen in this office for the same in 11/2018. Pertinent history,   He has history of venous stasis and recurrent cellulitis  Admission in 12/2016 and again in 12/2017 with cellulitis (LLE 2016, RLE 2017), both associated with secondary bacteremia with strep species  He is taking daily pcn for ppx of cellulitis (1g po BID)  He was admitted 9/2018 with suspected LE cellulitis, milder case than previous episodes    In the 6 month interval since last office visit, he has been well. Using compression stockings regularly. Applies amlactin to dry areas on feet. No episodes of cellulitis.            REVIEW OF SYSTEMS:    CONSTITUTIONAL:  negative for fevers, chills, diaphoresis, activity change, appetite change, fatigue, night sweats   EYES:  negative for blurred vision, eye discharge, visual disturbance and icterus  HEENT:  negative for hearing loss, tinnitus, ear drainage, sinus pressure, nasal congestion, epistaxis and snoring  RESPIRATORY:  No cough or hemoptysis   CARDIOVASCULAR:  negative for chest pain, palpitations, exertional chest pressure/discomfort, edema, syncope  GASTROINTESTINAL:  negative for nausea, vomiting, diarrhea, constipation, blood in stool and abdominal pain  GENITOURINARY:  negative for frequency, dysuria, urinary incontinence, decreased urine volume, and hematuria  HEMATOLOGIC/LYMPHATIC:  negative for easy bruising, bleeding and lymphadenopathy  ALLERGIC/IMMUNOLOGIC:  negative for angioedema, anaphylaxis and drug reactions  ENDOCRINE:  negative for weight changes and diabetic symptoms including polyuria, polydipsia and polyphagia  MUSCULOSKELETAL:  negative for acute joint pain, joint swelling, decreased range of motion and muscle weakness  NEUROLOGICAL:  negative for headaches, slurred speech, unilateral weakness  PSYCHIATRIC/BEHAVIORAL: negative for hallucinations, behavioral problems, confusion and agitation. Medications:    Current Outpatient Medications   Medication Sig Dispense Refill    cabergoline (DOSTINEX) 0.5 MG tablet TAKE half tablet twice a week. 8 tablet 5    penicillin v potassium (VEETID) 500 MG tablet TAKE TWO TABLETS BY MOUTH TWICE A  tablet 3    diclofenac (VOLTAREN) 75 MG EC tablet Take 1 tablet by mouth 2 times daily 60 tablet 3    ONETOUCH VERIO strip USE TO TEST ONCE DAILY 50 strip 2    carvedilol (COREG) 25 MG tablet TAKE ONE TABLET BY MOUTH TWICE A  tablet 1    atorvastatin (LIPITOR) 80 MG tablet TAKE ONE TABLET BY MOUTH DAILY 90 tablet 2    lisinopril (PRINIVIL;ZESTRIL) 40 MG tablet TAKE ONE TABLET BY MOUTH DAILY 90 tablet 1    amLODIPine (NORVASC) 10 MG tablet Take 1 tablet by mouth daily      spironolactone (ALDACTONE) 25 MG tablet Take 25 mg by mouth daily      allopurinol (ZYLOPRIM) 300 MG tablet TAKE ONE TABLET BY MOUTH DAILY 90 tablet 5    metFORMIN (GLUCOPHAGE-XR) 750 MG extended release tablet 1 po bid (Patient taking differently: Take 750 mg by mouth 2 times daily ) 180 tablet 3    ammonium lactate (LAC-HYDRIN) 12 % lotion APPLY TOPICALLY DAILY 400 g 0    Accu-Chek Multiclix Lancets MISC Test once daily  DX  250.00 100 each 3    clotrimazole-betamethasone (LOTRISONE) 1-0.05 % cream Apply topically 2 times daily. 45 g 3    Omega-3 Fatty Acids (FISH OIL) 1200 MG CAPS Take 2 capsules by mouth daily       Cholecalciferol (VITAMIN D) 2000 UNITS CAPS capsule Take 1 capsule by mouth daily      aspirin 81 MG chewable tablet Take 81 mg by mouth daily. No current facility-administered medications for this visit. Physical:  VITALS:  There were no vitals taken for this visit.     He is well appearing, fully oriented, NAD  Skin wrm, dry, no rash  1+ pitting edema jose luis lower legs  Changes of stasis dermatitis lower right leg  +Onychomycosis jose luis  Feet  No tinea pedis       Assessment / Plan:      Recurrent streptococcal cellulitis with secondary bacteremia, in the context of obesity, venous stasis dermatitis, onychomycosis   Quiescent    Continue ppx pcn as ordered for at least the next 6 months   Weight loss encouraged  Use compression to help with chronic LE edema    Previous attempts at treatment of onychomycosis with both systemic and topical therapies was not successful.   No plans to reattempt treatment at this point         RTC 6 months  Call with any concerns        Abeba Jean-Baptiste MD

## 2019-05-28 DIAGNOSIS — E11.9 TYPE 2 DIABETES MELLITUS WITHOUT COMPLICATION, WITHOUT LONG-TERM CURRENT USE OF INSULIN (HCC): ICD-10-CM

## 2019-05-28 RX ORDER — BLOOD SUGAR DIAGNOSTIC
STRIP MISCELLANEOUS
Qty: 50 STRIP | Refills: 1 | Status: SHIPPED | OUTPATIENT
Start: 2019-05-28 | End: 2019-08-14 | Stop reason: SDUPTHER

## 2019-06-06 ENCOUNTER — OFFICE VISIT (OUTPATIENT)
Dept: FAMILY MEDICINE CLINIC | Age: 74
End: 2019-06-06
Payer: MEDICARE

## 2019-06-06 VITALS
HEART RATE: 57 BPM | WEIGHT: 271 LBS | SYSTOLIC BLOOD PRESSURE: 124 MMHG | BODY MASS INDEX: 38.88 KG/M2 | DIASTOLIC BLOOD PRESSURE: 50 MMHG | OXYGEN SATURATION: 98 %

## 2019-06-06 DIAGNOSIS — I50.42 CHRONIC COMBINED SYSTOLIC AND DIASTOLIC HEART FAILURE (HCC): ICD-10-CM

## 2019-06-06 DIAGNOSIS — E11.9 TYPE 2 DIABETES MELLITUS WITHOUT COMPLICATION, WITHOUT LONG-TERM CURRENT USE OF INSULIN (HCC): Primary | ICD-10-CM

## 2019-06-06 LAB — HBA1C MFR BLD: 6.5 %

## 2019-06-06 PROCEDURE — 99213 OFFICE O/P EST LOW 20 MIN: CPT | Performed by: FAMILY MEDICINE

## 2019-06-06 PROCEDURE — 83036 HEMOGLOBIN GLYCOSYLATED A1C: CPT | Performed by: FAMILY MEDICINE

## 2019-06-16 NOTE — PROGRESS NOTES
Subjective:      Patient ID: Kymberly Alston is a 75 y.o. male. Kymberly Alston is a 67 y.o. male. Patient presents with:  Diabetes: needs foot exam         The patients PMH, surgical history, family history, medications, allergies were all reviewed and updated as appropriate today. Diabetes   He presents for his follow-up diabetic visit. He has type 2 diabetes mellitus. His disease course has been stable. There are no hypoglycemic associated symptoms. There are no diabetic associated symptoms. There are no hypoglycemic complications. Symptoms are stable. There are no diabetic complications. Risk factors for coronary artery disease include diabetes mellitus, hypertension and male sex. When asked about current treatments, none were reported. He is compliant with treatment all of the time. His weight is fluctuating minimally. He is following a diabetic diet. When asked about meal planning, he reported none. He has not had a previous visit with a dietitian. He rarely participates in exercise. His home blood glucose trend is fluctuating minimally. An ACE inhibitor/angiotensin II receptor blocker is being taken. He does not see a podiatrist.Eye exam is current. Review of Systems    Objective:   Physical Exam   Constitutional: He is oriented to person, place, and time. He appears well-developed and well-nourished. HENT:   Head: Normocephalic and atraumatic. Right Ear: External ear normal.   Left Ear: External ear normal.   Nose: Nose normal.   Mouth/Throat: Oropharynx is clear and moist.   Eyes: Pupils are equal, round, and reactive to light. Conjunctivae and EOM are normal.   Neck: Normal range of motion. Neck supple. Cardiovascular: Normal rate, regular rhythm, normal heart sounds and intact distal pulses. Exam reveals no gallop and no friction rub. No murmur heard. Pulmonary/Chest: Effort normal and breath sounds normal. No respiratory distress. He has no wheezes. Abdominal: Soft.  Bowel sounds are normal. He exhibits no distension. There is no tenderness. Musculoskeletal: Normal range of motion. He exhibits no edema or tenderness. Neurological: He is alert and oriented to person, place, and time. He has normal reflexes. Skin: Skin is warm and dry. Psychiatric: He has a normal mood and affect. His behavior is normal. Judgment and thought content normal.   Nursing note and vitals reviewed. Monofilament testing normal.  Assessment:       Diagnosis Orders   1. Type 2 diabetes mellitus without complication, without long-term current use of insulin ([facility])  POCT glycosylated hemoglobin (Hb A1C)   2. Chronic combined systolic and diastolic heart failure ([address]            Plan:      1. Type 2 diabetes mellitus without complication, without long-term current use of insulin ([facility])    - POCT glycosylated hemoglobin (Hb A1C)    2. Chronic combined systolic and diastolic heart failure (HCC)  Stable, continue current meds.

## 2019-07-03 RX ORDER — AMMONIUM LACTATE 12 G/100G
LOTION TOPICAL
Qty: 400 G | Refills: 0 | Status: SHIPPED | OUTPATIENT
Start: 2019-07-03 | End: 2020-06-05

## 2019-07-22 DIAGNOSIS — E11.9 TYPE 2 DIABETES MELLITUS WITHOUT COMPLICATION, WITHOUT LONG-TERM CURRENT USE OF INSULIN (HCC): ICD-10-CM

## 2019-07-22 RX ORDER — ATORVASTATIN CALCIUM 80 MG/1
TABLET, FILM COATED ORAL
Qty: 90 TABLET | Refills: 2 | Status: SHIPPED | OUTPATIENT
Start: 2019-07-22 | End: 2020-04-29 | Stop reason: SDUPTHER

## 2019-07-22 RX ORDER — METFORMIN HYDROCHLORIDE 750 MG/1
750 TABLET, EXTENDED RELEASE ORAL 2 TIMES DAILY
Qty: 180 TABLET | Refills: 2 | Status: SHIPPED | OUTPATIENT
Start: 2019-07-22 | End: 2020-01-21 | Stop reason: SDUPTHER

## 2019-07-22 NOTE — TELEPHONE ENCOUNTER
Identified care gap per [employer]: Metformin Tab 750mg ER, Atorvastatin Tab 80mg   Per records, appears 90-day supply last filled 2019     Per Reg[facility]: [facility]: [phone]    Medication: Metformin Tab 750mg ER  Last 3 fill dates: 10/19/2018, 2019, 2019  Day supply: 90, 90, 90  Refills remainin  Directions: Take 750 mg by mouth 2 times daily   Insurance billed: [employer]  Prescribing Provider: Kacie Guillermo MD  Pharmacy: [facility] [facility] [phone] [facility] [phone]    Medication: Atorvastatin Tab 80mg  Last 3 fill dates: 2019, 2019, 2019  Day supply: 90, 90, 90  Refills remainin  Directions: TAKE ONE TABLET BY MOUTH DAILY   Insurance billed: [facility]  Prescribing Provider: Kacie Guillermo MD  Pharmacy: [facility], [address] [place], [place] [phone] - F [phone]    No patient outreach planned at this time.   Patient does not have any refills remaining for both medications    [facility], toll free: [phone], option 7

## 2019-07-29 RX ORDER — PENICILLIN V POTASSIUM 500 MG/1
TABLET ORAL
Qty: 120 TABLET | Refills: 2 | Status: SHIPPED | OUTPATIENT
Start: 2019-07-29 | End: 2019-10-28 | Stop reason: ALTCHOICE

## 2019-08-14 DIAGNOSIS — E11.9 TYPE 2 DIABETES MELLITUS WITHOUT COMPLICATION, WITHOUT LONG-TERM CURRENT USE OF INSULIN (HCC): ICD-10-CM

## 2019-08-26 ENCOUNTER — OFFICE VISIT (OUTPATIENT)
Dept: ORTHOPEDIC SURGERY | Age: 74
End: 2019-08-26
Payer: MEDICARE

## 2019-08-26 VITALS — HEIGHT: 70 IN | BODY MASS INDEX: 39.46 KG/M2 | WEIGHT: 275.6 LBS

## 2019-08-26 DIAGNOSIS — M17.11 PRIMARY OSTEOARTHRITIS OF RIGHT KNEE: Primary | ICD-10-CM

## 2019-08-26 PROCEDURE — 99214 OFFICE O/P EST MOD 30 MIN: CPT | Performed by: PHYSICIAN ASSISTANT

## 2019-08-26 NOTE — PROGRESS NOTES
Vish Fransico  [facility]#-[id number]  LOT#- [id number]  EXP:04/2023    4C LIDOCAINE  NDC#-4966-9065-52  LOT#[id number]  EXP: 11/2020    2C DEPO  NDC#-[id number]  ADEEL#[id number]  EXP: 02/2021    SITE: RIGHT KNEE
Procedures    US ARTHR/ASP/INJ MAJOR JNT/BURSA RIGHT     Order Specific Question:   Reason for exam:     Answer:   PAIN    NH METHYLPREDNISOLONE 40 MG INJ         Assessment / Treatment Plan:     1. Right knee osteoarthritis, severe    I discussed with the patient the nature of osteoarthritis of the knee. We talked about treatment of arthritis and the various options that are involved with this. The patient understands that the treatments can vary from essentially doing nothing to a total joint replacement arthroplasty for arthritis. I then went on to describe the utilization of glucosamine and chondroitin sulfate as a joint nutrition product. We talked about the fact that this is essentially a joint vitamin with typically minimal side effects. We also talked about utilization of prescription over-the-counter anti-inflammatory medications as the next option. We also discussed the possibility of brace wear or orthotic wear if the patient has significant varus alignment. We then went on to discuss the possibility of Visco supplementation with hyaluronate products. We talked about the typical course of this type of treatment and the fact that often times in the treatment for significant arthritis, this is successful less than half the time. We also talked about the corticosteroid injections and the fact that this can give a brief window of relief, but does not cure the problem; in fact, the pain often has a rebound effect in 6-10 weeks after the steroid has worn off. We also discussed arthroscopy surgery in attempts to debride the joint, but the fact that this is relatively unreliable treatment in the face of significant arthritis. It can occasionally be used, particularly if there is significant meniscus pathology. Lastly we discussed total joint replacement arthroplasty as the final and definitive step in treatment of arthritis.  Patient realizes the magnitude of this type of treatment as well as having voiced a

## 2019-09-05 DIAGNOSIS — M10.9 GOUT, UNSPECIFIED CAUSE, UNSPECIFIED CHRONICITY, UNSPECIFIED SITE: ICD-10-CM

## 2019-09-05 RX ORDER — ALLOPURINOL 300 MG/1
TABLET ORAL
Qty: 90 TABLET | Refills: 3 | Status: SHIPPED | OUTPATIENT
Start: 2019-09-05 | End: 2020-06-29 | Stop reason: SDUPTHER

## 2019-09-12 DIAGNOSIS — M16.12 PRIMARY OSTEOARTHRITIS OF LEFT HIP: ICD-10-CM

## 2019-09-13 RX ORDER — DICLOFENAC SODIUM 75 MG/1
TABLET, DELAYED RELEASE ORAL
Qty: 60 TABLET | Refills: 2 | Status: SHIPPED | OUTPATIENT
Start: 2019-09-13 | End: 2019-09-16

## 2019-09-16 DIAGNOSIS — M16.12 PRIMARY OSTEOARTHRITIS OF LEFT HIP: ICD-10-CM

## 2019-09-16 RX ORDER — DICLOFENAC SODIUM 75 MG/1
TABLET, DELAYED RELEASE ORAL
Qty: 60 TABLET | Refills: 2 | Status: ON HOLD | OUTPATIENT
Start: 2019-09-16 | End: 2019-10-31 | Stop reason: HOSPADM

## 2019-09-25 ENCOUNTER — HOSPITAL ENCOUNTER (OUTPATIENT)
Age: 74
Discharge: HOME | End: 2019-09-25
Payer: MEDICARE

## 2019-09-25 DIAGNOSIS — E34.9 HYPOTESTOSTERONISM: ICD-10-CM

## 2019-09-25 DIAGNOSIS — D35.2 PITUITARY MACROADENOMA (HCC): ICD-10-CM

## 2019-09-25 DIAGNOSIS — E22.1 HYPERPROLACTINEMIA (HCC): ICD-10-CM

## 2019-09-25 LAB
PROLACTIN: 12.1 NG/ML
T4 FREE: 1 NG/DL (ref 0.9–1.8)
TSH SERPL DL<=0.05 MIU/L-ACNC: 4 UIU/ML (ref 0.27–4.2)

## 2019-09-25 PROCEDURE — 84403 ASSAY OF TOTAL TESTOSTERONE: CPT

## 2019-09-25 PROCEDURE — 84146 ASSAY OF PROLACTIN: CPT

## 2019-09-25 PROCEDURE — 84439 ASSAY OF FREE THYROXINE: CPT

## 2019-09-25 PROCEDURE — 36415 COLL VENOUS BLD VENIPUNCTURE: CPT

## 2019-09-25 PROCEDURE — 84443 ASSAY THYROID STIM HORMONE: CPT

## 2019-09-25 PROCEDURE — 84270 ASSAY OF SEX HORMONE GLOBUL: CPT

## 2019-09-27 LAB
SEX HORMONE BINDING GLOBULIN: 25 NMOL/L (ref 11–80)
TESTOSTERONE FREE-NONMALE: 14 PG/ML (ref 47–244)
TESTOSTERONE TOTAL: 66 NG/DL (ref 220–1000)

## 2019-10-02 ENCOUNTER — OFFICE VISIT (OUTPATIENT)
Dept: ENDOCRINOLOGY | Age: 74
End: 2019-10-02
Payer: MEDICARE

## 2019-10-02 VITALS
HEIGHT: 70 IN | WEIGHT: 271.2 LBS | OXYGEN SATURATION: 98 % | HEART RATE: 61 BPM | SYSTOLIC BLOOD PRESSURE: 101 MMHG | BODY MASS INDEX: 38.82 KG/M2 | DIASTOLIC BLOOD PRESSURE: 51 MMHG

## 2019-10-02 DIAGNOSIS — D35.2 PITUITARY MACROADENOMA (HCC): Primary | ICD-10-CM

## 2019-10-02 DIAGNOSIS — E22.1 HYPERPROLACTINEMIA (HCC): ICD-10-CM

## 2019-10-02 DIAGNOSIS — E34.9 HYPOTESTOSTERONISM: ICD-10-CM

## 2019-10-02 PROCEDURE — 99213 OFFICE O/P EST LOW 20 MIN: CPT | Performed by: INTERNAL MEDICINE

## 2019-10-02 RX ORDER — CABERGOLINE 0.5 MG/1
TABLET ORAL
Qty: 8 TABLET | Refills: 2 | Status: SHIPPED | OUTPATIENT
Start: 2019-10-02 | End: 2020-09-25

## 2019-10-08 ENCOUNTER — OFFICE VISIT (OUTPATIENT)
Dept: FAMILY MEDICINE CLINIC | Age: 74
End: 2019-10-08
Payer: MEDICARE

## 2019-10-08 VITALS
SYSTOLIC BLOOD PRESSURE: 96 MMHG | BODY MASS INDEX: 39.03 KG/M2 | DIASTOLIC BLOOD PRESSURE: 52 MMHG | HEART RATE: 55 BPM | OXYGEN SATURATION: 96 % | WEIGHT: 272 LBS

## 2019-10-08 DIAGNOSIS — Z23 NEED FOR PROPHYLACTIC VACCINATION AND INOCULATION AGAINST INFLUENZA: ICD-10-CM

## 2019-10-08 DIAGNOSIS — E11.9 TYPE 2 DIABETES MELLITUS WITHOUT COMPLICATION, WITHOUT LONG-TERM CURRENT USE OF INSULIN (HCC): Primary | ICD-10-CM

## 2019-10-08 DIAGNOSIS — I10 ESSENTIAL HYPERTENSION, BENIGN: ICD-10-CM

## 2019-10-08 DIAGNOSIS — I50.42 CHRONIC COMBINED SYSTOLIC AND DIASTOLIC HEART FAILURE (HCC): ICD-10-CM

## 2019-10-08 LAB — HBA1C MFR BLD: 6.5 %

## 2019-10-08 PROCEDURE — 99214 OFFICE O/P EST MOD 30 MIN: CPT | Performed by: FAMILY MEDICINE

## 2019-10-08 PROCEDURE — 83036 HEMOGLOBIN GLYCOSYLATED A1C: CPT | Performed by: FAMILY MEDICINE

## 2019-10-08 PROCEDURE — 90653 IIV ADJUVANT VACCINE IM: CPT | Performed by: FAMILY MEDICINE

## 2019-10-08 PROCEDURE — G0008 ADMIN INFLUENZA VIRUS VAC: HCPCS | Performed by: FAMILY MEDICINE

## 2019-10-08 ASSESSMENT — PATIENT HEALTH QUESTIONNAIRE - PHQ9
SUM OF ALL RESPONSES TO PHQ QUESTIONS 1-9: 0
SUM OF ALL RESPONSES TO PHQ QUESTIONS 1-9: 0
2. FEELING DOWN, DEPRESSED OR HOPELESS: 0
1. LITTLE INTEREST OR PLEASURE IN DOING THINGS: 0
SUM OF ALL RESPONSES TO PHQ9 QUESTIONS 1 & 2: 0

## 2019-10-11 ENCOUNTER — TELEPHONE (OUTPATIENT)
Dept: FAMILY MEDICINE CLINIC | Age: 74
End: 2019-10-11

## 2019-10-11 NOTE — TELEPHONE ENCOUNTER
Pt said that [facility] did not receive his fax to get c pap supplies. Pt was in 10/08 for ov and thought the c pap supplies was going to be faxed to them after visit. Please advise.

## 2019-10-24 ENCOUNTER — TELEPHONE (OUTPATIENT)
Dept: FAMILY MEDICINE CLINIC | Age: 74
End: 2019-10-24

## 2019-10-25 ENCOUNTER — OFFICE VISIT (OUTPATIENT)
Dept: FAMILY MEDICINE CLINIC | Age: 74
End: 2019-10-25
Payer: MEDICARE

## 2019-10-25 VITALS
BODY MASS INDEX: 40.32 KG/M2 | OXYGEN SATURATION: 100 % | SYSTOLIC BLOOD PRESSURE: 130 MMHG | WEIGHT: 281 LBS | HEART RATE: 61 BPM | DIASTOLIC BLOOD PRESSURE: 56 MMHG

## 2019-10-25 DIAGNOSIS — L03.90 CELLULITIS, UNSPECIFIED CELLULITIS SITE: Primary | ICD-10-CM

## 2019-10-25 DIAGNOSIS — E66.01 MORBID OBESITY WITH BMI OF 40.0-44.9, ADULT (HCC): ICD-10-CM

## 2019-10-25 PROCEDURE — 99214 OFFICE O/P EST MOD 30 MIN: CPT | Performed by: FAMILY MEDICINE

## 2019-10-25 RX ORDER — CEPHALEXIN 500 MG/1
500 CAPSULE ORAL 3 TIMES DAILY
Qty: 30 CAPSULE | Refills: 0 | Status: ON HOLD | OUTPATIENT
Start: 2019-10-25 | End: 2019-10-29 | Stop reason: ALTCHOICE

## 2019-10-25 RX ORDER — FUROSEMIDE 20 MG/1
20-40 TABLET ORAL DAILY
Qty: 40 TABLET | Refills: 0 | Status: ON HOLD | OUTPATIENT
Start: 2019-10-25 | End: 2019-10-29 | Stop reason: ALTCHOICE

## 2019-10-25 RX ORDER — SULFAMETHOXAZOLE AND TRIMETHOPRIM 800; 160 MG/1; MG/1
1 TABLET ORAL 2 TIMES DAILY
Qty: 20 TABLET | Refills: 0 | Status: ON HOLD | OUTPATIENT
Start: 2019-10-25 | End: 2019-10-29 | Stop reason: ALTCHOICE

## 2019-10-28 ENCOUNTER — OFFICE VISIT (OUTPATIENT)
Dept: FAMILY MEDICINE CLINIC | Age: 74
End: 2019-10-28
Payer: MEDICARE

## 2019-10-28 ENCOUNTER — HOSPITAL ENCOUNTER (INPATIENT)
Age: 74
LOS: 2 days | Discharge: HOME | DRG: 603 | End: 2019-10-31
Attending: EMERGENCY MEDICINE | Admitting: INTERNAL MEDICINE
Payer: MEDICARE

## 2019-10-28 VITALS
HEART RATE: 71 BPM | BODY MASS INDEX: 40.46 KG/M2 | OXYGEN SATURATION: 99 % | DIASTOLIC BLOOD PRESSURE: 50 MMHG | WEIGHT: 282 LBS | SYSTOLIC BLOOD PRESSURE: 130 MMHG

## 2019-10-28 DIAGNOSIS — Z78.9 FAILURE OF OUTPATIENT TREATMENT: ICD-10-CM

## 2019-10-28 DIAGNOSIS — M79.89 RIGHT LEG SWELLING: ICD-10-CM

## 2019-10-28 DIAGNOSIS — L03.90 CELLULITIS, UNSPECIFIED CELLULITIS SITE: Primary | ICD-10-CM

## 2019-10-28 DIAGNOSIS — L03.115 CELLULITIS OF RIGHT LEG: Primary | ICD-10-CM

## 2019-10-28 LAB
A/G RATIO: 1.6 (ref 1.1–2.2)
ALBUMIN SERPL-MCNC: 4.2 G/DL (ref 3.4–5)
ALP BLD-CCNC: 113 U/L (ref 40–129)
ALT SERPL-CCNC: 14 U/L (ref 10–40)
ANION GAP SERPL CALCULATED.3IONS-SCNC: 15 MMOL/L (ref 3–16)
AST SERPL-CCNC: 13 U/L (ref 15–37)
BASOPHILS ABSOLUTE: 0.1 K/UL (ref 0–0.2)
BASOPHILS RELATIVE PERCENT: 0.8 %
BILIRUB SERPL-MCNC: 0.3 MG/DL (ref 0–1)
BUN BLDV-MCNC: 34 MG/DL (ref 7–20)
CALCIUM SERPL-MCNC: 9 MG/DL (ref 8.3–10.6)
CHLORIDE BLD-SCNC: 103 MMOL/L (ref 99–110)
CO2: 24 MMOL/L (ref 21–32)
CREAT SERPL-MCNC: 1.9 MG/DL (ref 0.8–1.3)
EOSINOPHILS ABSOLUTE: 0.3 K/UL (ref 0–0.6)
EOSINOPHILS RELATIVE PERCENT: 3.3 %
GFR AFRICAN AMERICAN: 42
GFR NON-AFRICAN AMERICAN: 35
GLOBULIN: 2.7 G/DL
GLUCOSE BLD-MCNC: 152 MG/DL (ref 70–99)
HCT VFR BLD CALC: 29.6 % (ref 40.5–52.5)
HEMOGLOBIN: 10.1 G/DL (ref 13.5–17.5)
LACTIC ACID: 1 MMOL/L (ref 0.4–2)
LYMPHOCYTES ABSOLUTE: 1.7 K/UL (ref 1–5.1)
LYMPHOCYTES RELATIVE PERCENT: 22.3 %
MCH RBC QN AUTO: 31.2 PG (ref 26–34)
MCHC RBC AUTO-ENTMCNC: 34.1 G/DL (ref 31–36)
MCV RBC AUTO: 91.6 FL (ref 80–100)
MONOCYTES ABSOLUTE: 0.7 K/UL (ref 0–1.3)
MONOCYTES RELATIVE PERCENT: 9.6 %
NEUTROPHILS ABSOLUTE: 4.8 K/UL (ref 1.7–7.7)
NEUTROPHILS RELATIVE PERCENT: 64 %
PDW BLD-RTO: 14.9 % (ref 12.4–15.4)
PLATELET # BLD: 157 K/UL (ref 135–450)
PMV BLD AUTO: 8.3 FL (ref 5–10.5)
POTASSIUM SERPL-SCNC: 4.3 MMOL/L (ref 3.5–5.1)
PRO-BNP: 207 PG/ML (ref 0–449)
RBC # BLD: 3.23 M/UL (ref 4.2–5.9)
SODIUM BLD-SCNC: 142 MMOL/L (ref 136–145)
TOTAL PROTEIN: 6.9 G/DL (ref 6.4–8.2)
WBC # BLD: 7.5 K/UL (ref 4–11)

## 2019-10-28 PROCEDURE — 85025 COMPLETE CBC W/AUTO DIFF WBC: CPT

## 2019-10-28 PROCEDURE — 99214 OFFICE O/P EST MOD 30 MIN: CPT | Performed by: FAMILY MEDICINE

## 2019-10-28 PROCEDURE — 83880 ASSAY OF NATRIURETIC PEPTIDE: CPT

## 2019-10-28 PROCEDURE — 80053 COMPREHEN METABOLIC PANEL: CPT

## 2019-10-28 PROCEDURE — 83605 ASSAY OF LACTIC ACID: CPT

## 2019-10-28 PROCEDURE — 99284 EMERGENCY DEPT VISIT MOD MDM: CPT

## 2019-10-28 RX ORDER — 0.9 % SODIUM CHLORIDE 0.9 %
500 INTRAVENOUS SOLUTION INTRAVENOUS ONCE
Status: COMPLETED | OUTPATIENT
Start: 2019-10-29 | End: 2019-10-29

## 2019-10-28 ASSESSMENT — PAIN SCALES - GENERAL: PAINLEVEL_OUTOF10: 4

## 2019-10-29 ENCOUNTER — APPOINTMENT (OUTPATIENT)
Dept: GENERAL RADIOLOGY | Age: 74
DRG: 603 | End: 2019-10-29
Payer: MEDICARE

## 2019-10-29 PROBLEM — N17.9 ARF (ACUTE RENAL FAILURE) (HCC): Status: ACTIVE | Noted: 2019-10-29

## 2019-10-29 PROBLEM — R60.0 LEG EDEMA: Status: ACTIVE | Noted: 2019-10-29

## 2019-10-29 LAB
ANION GAP SERPL CALCULATED.3IONS-SCNC: 13 MMOL/L (ref 3–16)
BILIRUBIN URINE: NEGATIVE
BLOOD, URINE: NEGATIVE
BUN BLDV-MCNC: 31 MG/DL (ref 7–20)
CALCIUM SERPL-MCNC: 8.7 MG/DL (ref 8.3–10.6)
CHLORIDE BLD-SCNC: 105 MMOL/L (ref 99–110)
CLARITY: CLEAR
CO2: 23 MMOL/L (ref 21–32)
COLOR: YELLOW
CREAT SERPL-MCNC: 1.8 MG/DL (ref 0.8–1.3)
CREATININE URINE: 54 MG/DL (ref 39–259)
EKG ATRIAL RATE: 60 BPM
EKG DIAGNOSIS: NORMAL
EKG P AXIS: 50 DEGREES
EKG P-R INTERVAL: 162 MS
EKG Q-T INTERVAL: 442 MS
EKG QRS DURATION: 112 MS
EKG QTC CALCULATION (BAZETT): 442 MS
EKG R AXIS: -32 DEGREES
EKG T AXIS: 17 DEGREES
EKG VENTRICULAR RATE: 60 BPM
EOSINOPHIL,URINE: NORMAL
FERRITIN: 105 NG/ML (ref 30–400)
GFR AFRICAN AMERICAN: 45
GFR NON-AFRICAN AMERICAN: 37
GLUCOSE BLD-MCNC: 116 MG/DL (ref 70–99)
GLUCOSE BLD-MCNC: 123 MG/DL (ref 70–99)
GLUCOSE BLD-MCNC: 141 MG/DL (ref 70–99)
GLUCOSE BLD-MCNC: 143 MG/DL (ref 70–99)
GLUCOSE BLD-MCNC: 195 MG/DL (ref 70–99)
GLUCOSE BLD-MCNC: 227 MG/DL (ref 70–99)
GLUCOSE URINE: NEGATIVE MG/DL
HCT VFR BLD CALC: 27.2 % (ref 40.5–52.5)
HEMOGLOBIN: 9.1 G/DL (ref 13.5–17.5)
IMMATURE RETIC FRACT: 0.38 (ref 0.21–0.37)
IRON SATURATION: 18 % (ref 20–50)
IRON: 46 UG/DL (ref 59–158)
KETONES, URINE: NEGATIVE MG/DL
LEUKOCYTE ESTERASE, URINE: NEGATIVE
LV EF: 58 %
LVEF MODALITY: NORMAL
MCH RBC QN AUTO: 30.7 PG (ref 26–34)
MCHC RBC AUTO-ENTMCNC: 33.5 G/DL (ref 31–36)
MCV RBC AUTO: 91.7 FL (ref 80–100)
MICROSCOPIC EXAMINATION: NORMAL
MRSA SCREEN RT-PCR: NORMAL
NITRITE, URINE: NEGATIVE
PDW BLD-RTO: 15.1 % (ref 12.4–15.4)
PERFORMED ON: ABNORMAL
PH UA: 6 (ref 5–8)
PLATELET # BLD: 131 K/UL (ref 135–450)
PMV BLD AUTO: 8.9 FL (ref 5–10.5)
POTASSIUM SERPL-SCNC: 4.2 MMOL/L (ref 3.5–5.1)
PROTEIN PROTEIN: <4 MG/DL
PROTEIN UA: NEGATIVE MG/DL
PROTEIN/CREAT RATIO: NORMAL MG/DL
RBC # BLD: 2.97 M/UL (ref 4.2–5.9)
RETICULOCYTE ABSOLUTE COUNT: 0.06 M/UL
RETICULOCYTE COUNT PCT: 2.06 % (ref 0.5–2.18)
SODIUM BLD-SCNC: 141 MMOL/L (ref 136–145)
SODIUM URINE: 128 MMOL/L
SODIUM URINE: 69 MMOL/L
SPECIFIC GRAVITY UA: 1.02 (ref 1–1.03)
TOTAL IRON BINDING CAPACITY: 254 UG/DL (ref 260–445)
URIC ACID, SERUM: 6.4 MG/DL (ref 3.5–7.2)
URINE TYPE: NORMAL
UROBILINOGEN, URINE: 0.2 E.U./DL
WBC # BLD: 6.2 K/UL (ref 4–11)

## 2019-10-29 PROCEDURE — 83550 IRON BINDING TEST: CPT

## 2019-10-29 PROCEDURE — 93005 ELECTROCARDIOGRAM TRACING: CPT | Performed by: INTERNAL MEDICINE

## 2019-10-29 PROCEDURE — 83036 HEMOGLOBIN GLYCOSYLATED A1C: CPT

## 2019-10-29 PROCEDURE — 80048 BASIC METABOLIC PNL TOTAL CA: CPT

## 2019-10-29 PROCEDURE — 84300 ASSAY OF URINE SODIUM: CPT

## 2019-10-29 PROCEDURE — 1200000000 HC SEMI PRIVATE

## 2019-10-29 PROCEDURE — 6370000000 HC RX 637 (ALT 250 FOR IP): Performed by: INTERNAL MEDICINE

## 2019-10-29 PROCEDURE — 36415 COLL VENOUS BLD VENIPUNCTURE: CPT

## 2019-10-29 PROCEDURE — 2580000003 HC RX 258: Performed by: EMERGENCY MEDICINE

## 2019-10-29 PROCEDURE — 93306 TTE W/DOPPLER COMPLETE: CPT

## 2019-10-29 PROCEDURE — 85045 AUTOMATED RETICULOCYTE COUNT: CPT

## 2019-10-29 PROCEDURE — 6360000002 HC RX W HCPCS: Performed by: INTERNAL MEDICINE

## 2019-10-29 PROCEDURE — 82728 ASSAY OF FERRITIN: CPT

## 2019-10-29 PROCEDURE — 87205 SMEAR GRAM STAIN: CPT

## 2019-10-29 PROCEDURE — 84156 ASSAY OF PROTEIN URINE: CPT

## 2019-10-29 PROCEDURE — 81003 URINALYSIS AUTO W/O SCOPE: CPT

## 2019-10-29 PROCEDURE — 82570 ASSAY OF URINE CREATININE: CPT

## 2019-10-29 PROCEDURE — 93970 EXTREMITY STUDY: CPT

## 2019-10-29 PROCEDURE — 99222 1ST HOSP IP/OBS MODERATE 55: CPT | Performed by: INTERNAL MEDICINE

## 2019-10-29 PROCEDURE — 71045 X-RAY EXAM CHEST 1 VIEW: CPT

## 2019-10-29 PROCEDURE — 2580000003 HC RX 258: Performed by: INTERNAL MEDICINE

## 2019-10-29 PROCEDURE — 99221 1ST HOSP IP/OBS SF/LOW 40: CPT | Performed by: INTERNAL MEDICINE

## 2019-10-29 PROCEDURE — 85027 COMPLETE CBC AUTOMATED: CPT

## 2019-10-29 PROCEDURE — 83540 ASSAY OF IRON: CPT

## 2019-10-29 PROCEDURE — 87641 MR-STAPH DNA AMP PROBE: CPT

## 2019-10-29 PROCEDURE — 6370000000 HC RX 637 (ALT 250 FOR IP): Performed by: NURSE PRACTITIONER

## 2019-10-29 PROCEDURE — 84550 ASSAY OF BLOOD/URIC ACID: CPT

## 2019-10-29 PROCEDURE — 93010 ELECTROCARDIOGRAM REPORT: CPT | Performed by: INTERNAL MEDICINE

## 2019-10-29 PROCEDURE — 2580000003 HC RX 258

## 2019-10-29 PROCEDURE — 2500000003 HC RX 250 WO HCPCS: Performed by: EMERGENCY MEDICINE

## 2019-10-29 RX ORDER — ASPIRIN 81 MG/1
81 TABLET, CHEWABLE ORAL DAILY
Status: DISCONTINUED | OUTPATIENT
Start: 2019-10-29 | End: 2019-10-31 | Stop reason: HOSPADM

## 2019-10-29 RX ORDER — DEXTROSE MONOHYDRATE 25 G/50ML
12.5 INJECTION, SOLUTION INTRAVENOUS PRN
Status: DISCONTINUED | OUTPATIENT
Start: 2019-10-29 | End: 2019-10-31 | Stop reason: HOSPADM

## 2019-10-29 RX ORDER — DIPHENHYDRAMINE HCL 25 MG
50 TABLET ORAL NIGHTLY PRN
Status: DISCONTINUED | OUTPATIENT
Start: 2019-10-29 | End: 2019-10-31

## 2019-10-29 RX ORDER — NICOTINE POLACRILEX 4 MG
15 LOZENGE BUCCAL PRN
Status: DISCONTINUED | OUTPATIENT
Start: 2019-10-29 | End: 2019-10-31 | Stop reason: HOSPADM

## 2019-10-29 RX ORDER — LISINOPRIL 20 MG/1
40 TABLET ORAL DAILY
Status: DISCONTINUED | OUTPATIENT
Start: 2019-10-29 | End: 2019-10-29

## 2019-10-29 RX ORDER — ATORVASTATIN CALCIUM 80 MG/1
80 TABLET, FILM COATED ORAL DAILY
Status: DISCONTINUED | OUTPATIENT
Start: 2019-10-29 | End: 2019-10-31 | Stop reason: HOSPADM

## 2019-10-29 RX ORDER — SODIUM CHLORIDE 9 MG/ML
INJECTION, SOLUTION INTRAVENOUS
Status: COMPLETED
Start: 2019-10-29 | End: 2019-10-29

## 2019-10-29 RX ORDER — SODIUM CHLORIDE 0.9 % (FLUSH) 0.9 %
10 SYRINGE (ML) INJECTION PRN
Status: DISCONTINUED | OUTPATIENT
Start: 2019-10-29 | End: 2019-10-31 | Stop reason: HOSPADM

## 2019-10-29 RX ORDER — ONDANSETRON 2 MG/ML
4 INJECTION INTRAMUSCULAR; INTRAVENOUS EVERY 6 HOURS PRN
Status: DISCONTINUED | OUTPATIENT
Start: 2019-10-29 | End: 2019-10-31 | Stop reason: HOSPADM

## 2019-10-29 RX ORDER — CARVEDILOL 25 MG/1
25 TABLET ORAL 2 TIMES DAILY
Status: DISCONTINUED | OUTPATIENT
Start: 2019-10-29 | End: 2019-10-31 | Stop reason: HOSPADM

## 2019-10-29 RX ORDER — SODIUM CHLORIDE 0.9 % (FLUSH) 0.9 %
10 SYRINGE (ML) INJECTION EVERY 12 HOURS SCHEDULED
Status: DISCONTINUED | OUTPATIENT
Start: 2019-10-29 | End: 2019-10-31 | Stop reason: HOSPADM

## 2019-10-29 RX ORDER — FUROSEMIDE 10 MG/ML
40 INJECTION INTRAMUSCULAR; INTRAVENOUS 2 TIMES DAILY
Status: CANCELLED | OUTPATIENT
Start: 2019-10-29

## 2019-10-29 RX ORDER — ALLOPURINOL 300 MG/1
300 TABLET ORAL DAILY
Status: DISCONTINUED | OUTPATIENT
Start: 2019-10-29 | End: 2019-10-31 | Stop reason: HOSPADM

## 2019-10-29 RX ORDER — AMLODIPINE BESYLATE 5 MG/1
10 TABLET ORAL DAILY
Status: DISCONTINUED | OUTPATIENT
Start: 2019-10-29 | End: 2019-10-30

## 2019-10-29 RX ORDER — FUROSEMIDE 40 MG/1
40 TABLET ORAL ONCE
Status: COMPLETED | OUTPATIENT
Start: 2019-10-29 | End: 2019-10-29

## 2019-10-29 RX ORDER — DEXTROSE MONOHYDRATE 50 MG/ML
100 INJECTION, SOLUTION INTRAVENOUS PRN
Status: DISCONTINUED | OUTPATIENT
Start: 2019-10-29 | End: 2019-10-31 | Stop reason: HOSPADM

## 2019-10-29 RX ADMIN — Medication 1.5 G: at 03:01

## 2019-10-29 RX ADMIN — DEXTROSE MONOHYDRATE 600 MG: 50 INJECTION, SOLUTION INTRAVENOUS at 00:37

## 2019-10-29 RX ADMIN — FUROSEMIDE 40 MG: 40 TABLET ORAL at 16:19

## 2019-10-29 RX ADMIN — SODIUM CHLORIDE: 900 INJECTION, SOLUTION INTRAVENOUS at 05:08

## 2019-10-29 RX ADMIN — DIPHENHYDRAMINE HCL 50 MG: 25 TABLET ORAL at 21:19

## 2019-10-29 RX ADMIN — ALLOPURINOL 300 MG: 300 TABLET ORAL at 10:11

## 2019-10-29 RX ADMIN — AMLODIPINE BESYLATE 10 MG: 5 TABLET ORAL at 10:10

## 2019-10-29 RX ADMIN — INSULIN LISPRO 1 UNITS: 100 INJECTION, SOLUTION INTRAVENOUS; SUBCUTANEOUS at 11:48

## 2019-10-29 RX ADMIN — CARVEDILOL 25 MG: 25 TABLET, FILM COATED ORAL at 21:19

## 2019-10-29 RX ADMIN — SODIUM CHLORIDE 500 ML: 9 INJECTION, SOLUTION INTRAVENOUS at 00:37

## 2019-10-29 RX ADMIN — ATORVASTATIN CALCIUM 80 MG: 80 TABLET, FILM COATED ORAL at 21:19

## 2019-10-29 RX ADMIN — ENOXAPARIN SODIUM 30 MG: 30 INJECTION SUBCUTANEOUS at 10:12

## 2019-10-29 RX ADMIN — CEFAZOLIN SODIUM 3 G: 10 INJECTION, POWDER, FOR SOLUTION INTRAVENOUS at 18:53

## 2019-10-29 RX ADMIN — ASPIRIN 81 MG 81 MG: 81 TABLET ORAL at 10:10

## 2019-10-29 RX ADMIN — Medication 10 ML: at 10:12

## 2019-10-29 RX ADMIN — CARVEDILOL 25 MG: 25 TABLET, FILM COATED ORAL at 10:11

## 2019-10-29 RX ADMIN — Medication 10 ML: at 21:19

## 2019-10-29 ASSESSMENT — ENCOUNTER SYMPTOMS
SHORTNESS OF BREATH: 0
BACK PAIN: 0
ABDOMINAL PAIN: 0
CHEST TIGHTNESS: 0
RESPIRATORY NEGATIVE: 1
CONSTIPATION: 0
COLOR CHANGE: 1
VOMITING: 0
DIARRHEA: 0
NAUSEA: 0

## 2019-10-29 ASSESSMENT — PAIN SCALES - GENERAL: PAINLEVEL_OUTOF10: 0

## 2019-10-30 ENCOUNTER — APPOINTMENT (OUTPATIENT)
Dept: GENERAL RADIOLOGY | Age: 74
DRG: 603 | End: 2019-10-30
Payer: MEDICARE

## 2019-10-30 ENCOUNTER — APPOINTMENT (OUTPATIENT)
Dept: ULTRASOUND IMAGING | Age: 74
DRG: 603 | End: 2019-10-30
Payer: MEDICARE

## 2019-10-30 LAB
ANION GAP SERPL CALCULATED.3IONS-SCNC: 12 MMOL/L (ref 3–16)
BUN BLDV-MCNC: 26 MG/DL (ref 7–20)
CALCIUM SERPL-MCNC: 9.4 MG/DL (ref 8.3–10.6)
CHLORIDE BLD-SCNC: 105 MMOL/L (ref 99–110)
CO2: 24 MMOL/L (ref 21–32)
CREAT SERPL-MCNC: 1.4 MG/DL (ref 0.8–1.3)
ESTIMATED AVERAGE GLUCOSE: 139.9 MG/DL
GFR AFRICAN AMERICAN: 60
GFR NON-AFRICAN AMERICAN: 49
GLUCOSE BLD-MCNC: 114 MG/DL (ref 70–99)
GLUCOSE BLD-MCNC: 121 MG/DL (ref 70–99)
GLUCOSE BLD-MCNC: 143 MG/DL (ref 70–99)
GLUCOSE BLD-MCNC: 147 MG/DL (ref 70–99)
GLUCOSE BLD-MCNC: 197 MG/DL (ref 70–99)
HBA1C MFR BLD: 6.5 %
HCT VFR BLD CALC: 28.3 % (ref 40.5–52.5)
HEMOGLOBIN: 9.6 G/DL (ref 13.5–17.5)
MCH RBC QN AUTO: 30.8 PG (ref 26–34)
MCHC RBC AUTO-ENTMCNC: 33.8 G/DL (ref 31–36)
MCV RBC AUTO: 91.2 FL (ref 80–100)
PDW BLD-RTO: 14.8 % (ref 12.4–15.4)
PERFORMED ON: ABNORMAL
PLATELET # BLD: 131 K/UL (ref 135–450)
PMV BLD AUTO: 8.5 FL (ref 5–10.5)
POTASSIUM REFLEX MAGNESIUM: 4.3 MMOL/L (ref 3.5–5.1)
RBC # BLD: 3.1 M/UL (ref 4.2–5.9)
SODIUM BLD-SCNC: 141 MMOL/L (ref 136–145)
WBC # BLD: 5.3 K/UL (ref 4–11)

## 2019-10-30 PROCEDURE — 6370000000 HC RX 637 (ALT 250 FOR IP): Performed by: INTERNAL MEDICINE

## 2019-10-30 PROCEDURE — 71046 X-RAY EXAM CHEST 2 VIEWS: CPT

## 2019-10-30 PROCEDURE — 85027 COMPLETE CBC AUTOMATED: CPT

## 2019-10-30 PROCEDURE — 2580000003 HC RX 258: Performed by: INTERNAL MEDICINE

## 2019-10-30 PROCEDURE — 80048 BASIC METABOLIC PNL TOTAL CA: CPT

## 2019-10-30 PROCEDURE — 99232 SBSQ HOSP IP/OBS MODERATE 35: CPT | Performed by: INTERNAL MEDICINE

## 2019-10-30 PROCEDURE — 6370000000 HC RX 637 (ALT 250 FOR IP): Performed by: NURSE PRACTITIONER

## 2019-10-30 PROCEDURE — 1200000000 HC SEMI PRIVATE

## 2019-10-30 PROCEDURE — 76770 US EXAM ABDO BACK WALL COMP: CPT

## 2019-10-30 PROCEDURE — 36415 COLL VENOUS BLD VENIPUNCTURE: CPT

## 2019-10-30 PROCEDURE — 6360000002 HC RX W HCPCS: Performed by: INTERNAL MEDICINE

## 2019-10-30 PROCEDURE — 86063 ANTISTREPTOLYSIN O SCREEN: CPT

## 2019-10-30 PROCEDURE — 99233 SBSQ HOSP IP/OBS HIGH 50: CPT | Performed by: INTERNAL MEDICINE

## 2019-10-30 PROCEDURE — 86060 ANTISTREPTOLYSIN O TITER: CPT

## 2019-10-30 RX ORDER — FERROUS SULFATE 325(65) MG
325 TABLET ORAL
Status: DISCONTINUED | OUTPATIENT
Start: 2019-10-31 | End: 2019-10-31 | Stop reason: HOSPADM

## 2019-10-30 RX ORDER — HYDRALAZINE HYDROCHLORIDE 25 MG/1
25 TABLET, FILM COATED ORAL EVERY 8 HOURS SCHEDULED
Status: DISCONTINUED | OUTPATIENT
Start: 2019-10-30 | End: 2019-10-31 | Stop reason: HOSPADM

## 2019-10-30 RX ADMIN — CEFAZOLIN SODIUM 3 G: 10 INJECTION, POWDER, FOR SOLUTION INTRAVENOUS at 06:34

## 2019-10-30 RX ADMIN — Medication 10 ML: at 21:19

## 2019-10-30 RX ADMIN — ENOXAPARIN SODIUM 30 MG: 30 INJECTION SUBCUTANEOUS at 09:07

## 2019-10-30 RX ADMIN — Medication 3 G: at 17:06

## 2019-10-30 RX ADMIN — ASPIRIN 81 MG 81 MG: 81 TABLET ORAL at 09:07

## 2019-10-30 RX ADMIN — CARVEDILOL 25 MG: 25 TABLET, FILM COATED ORAL at 21:18

## 2019-10-30 RX ADMIN — DIPHENHYDRAMINE HCL 50 MG: 25 TABLET ORAL at 22:17

## 2019-10-30 RX ADMIN — HYDRALAZINE HYDROCHLORIDE 25 MG: 25 TABLET, FILM COATED ORAL at 15:54

## 2019-10-30 RX ADMIN — INSULIN LISPRO 1 UNITS: 100 INJECTION, SOLUTION INTRAVENOUS; SUBCUTANEOUS at 21:18

## 2019-10-30 RX ADMIN — ALLOPURINOL 300 MG: 300 TABLET ORAL at 09:07

## 2019-10-30 RX ADMIN — HYDRALAZINE HYDROCHLORIDE 25 MG: 25 TABLET, FILM COATED ORAL at 22:17

## 2019-10-30 RX ADMIN — CARVEDILOL 25 MG: 25 TABLET, FILM COATED ORAL at 09:07

## 2019-10-30 RX ADMIN — Medication 3 G: at 22:17

## 2019-10-30 RX ADMIN — INSULIN LISPRO 1 UNITS: 100 INJECTION, SOLUTION INTRAVENOUS; SUBCUTANEOUS at 09:09

## 2019-10-30 RX ADMIN — Medication 10 ML: at 09:07

## 2019-10-30 RX ADMIN — ATORVASTATIN CALCIUM 80 MG: 80 TABLET, FILM COATED ORAL at 09:07

## 2019-10-30 RX ADMIN — MAGNESIUM HYDROXIDE 30 ML: 400 SUSPENSION ORAL at 17:06

## 2019-10-31 VITALS
BODY MASS INDEX: 40.87 KG/M2 | OXYGEN SATURATION: 96 % | DIASTOLIC BLOOD PRESSURE: 65 MMHG | WEIGHT: 285.5 LBS | TEMPERATURE: 98.7 F | RESPIRATION RATE: 16 BRPM | HEART RATE: 69 BPM | HEIGHT: 70 IN | SYSTOLIC BLOOD PRESSURE: 127 MMHG

## 2019-10-31 LAB
ANION GAP SERPL CALCULATED.3IONS-SCNC: 12 MMOL/L (ref 3–16)
BUN BLDV-MCNC: 26 MG/DL (ref 7–20)
CALCIUM SERPL-MCNC: 9.1 MG/DL (ref 8.3–10.6)
CHLORIDE BLD-SCNC: 105 MMOL/L (ref 99–110)
CO2: 24 MMOL/L (ref 21–32)
CREAT SERPL-MCNC: 1.1 MG/DL (ref 0.8–1.3)
GFR AFRICAN AMERICAN: >60
GFR NON-AFRICAN AMERICAN: >60
GLUCOSE BLD-MCNC: 139 MG/DL (ref 70–99)
GLUCOSE BLD-MCNC: 149 MG/DL (ref 70–99)
GLUCOSE BLD-MCNC: 155 MG/DL (ref 70–99)
HCT VFR BLD CALC: 29.3 % (ref 40.5–52.5)
HEMOGLOBIN: 10.1 G/DL (ref 13.5–17.5)
MCH RBC QN AUTO: 31.1 PG (ref 26–34)
MCHC RBC AUTO-ENTMCNC: 34.4 G/DL (ref 31–36)
MCV RBC AUTO: 90.5 FL (ref 80–100)
PDW BLD-RTO: 15.1 % (ref 12.4–15.4)
PERFORMED ON: ABNORMAL
PERFORMED ON: ABNORMAL
PLATELET # BLD: 137 K/UL (ref 135–450)
PMV BLD AUTO: 8.7 FL (ref 5–10.5)
POTASSIUM REFLEX MAGNESIUM: 4 MMOL/L (ref 3.5–5.1)
RBC # BLD: 3.24 M/UL (ref 4.2–5.9)
SODIUM BLD-SCNC: 141 MMOL/L (ref 136–145)
WBC # BLD: 5.6 K/UL (ref 4–11)

## 2019-10-31 PROCEDURE — 6360000002 HC RX W HCPCS: Performed by: INTERNAL MEDICINE

## 2019-10-31 PROCEDURE — 6370000000 HC RX 637 (ALT 250 FOR IP): Performed by: INTERNAL MEDICINE

## 2019-10-31 PROCEDURE — 85027 COMPLETE CBC AUTOMATED: CPT

## 2019-10-31 PROCEDURE — 36415 COLL VENOUS BLD VENIPUNCTURE: CPT

## 2019-10-31 PROCEDURE — 99232 SBSQ HOSP IP/OBS MODERATE 35: CPT | Performed by: INTERNAL MEDICINE

## 2019-10-31 PROCEDURE — 80048 BASIC METABOLIC PNL TOTAL CA: CPT

## 2019-10-31 PROCEDURE — 2580000003 HC RX 258: Performed by: INTERNAL MEDICINE

## 2019-10-31 RX ORDER — FERROUS SULFATE 325(65) MG
325 TABLET ORAL
Qty: 30 TABLET | Refills: 3 | Status: SHIPPED | OUTPATIENT
Start: 2019-11-01

## 2019-10-31 RX ORDER — DIPHENHYDRAMINE HCL 25 MG
50 TABLET ORAL EVERY 6 HOURS PRN
Status: DISCONTINUED | OUTPATIENT
Start: 2019-10-31 | End: 2019-10-31 | Stop reason: HOSPADM

## 2019-10-31 RX ORDER — HYDRALAZINE HYDROCHLORIDE 25 MG/1
25 TABLET, FILM COATED ORAL EVERY 8 HOURS SCHEDULED
Qty: 90 TABLET | Refills: 3 | Status: SHIPPED | OUTPATIENT
Start: 2019-10-31 | End: 2020-04-13 | Stop reason: SDUPTHER

## 2019-10-31 RX ORDER — CEPHALEXIN 500 MG/1
1000 CAPSULE ORAL 3 TIMES DAILY
Qty: 42 CAPSULE | Refills: 0 | Status: SHIPPED | OUTPATIENT
Start: 2019-10-31 | End: 2019-11-07

## 2019-10-31 RX ADMIN — ATORVASTATIN CALCIUM 80 MG: 80 TABLET, FILM COATED ORAL at 09:29

## 2019-10-31 RX ADMIN — Medication 3 G: at 06:18

## 2019-10-31 RX ADMIN — DIPHENHYDRAMINE HCL 50 MG: 25 TABLET ORAL at 11:45

## 2019-10-31 RX ADMIN — ENOXAPARIN SODIUM 30 MG: 30 INJECTION SUBCUTANEOUS at 09:29

## 2019-10-31 RX ADMIN — FERROUS SULFATE TAB 325 MG (65 MG ELEMENTAL FE) 325 MG: 325 (65 FE) TAB at 09:29

## 2019-10-31 RX ADMIN — INSULIN LISPRO 1 UNITS: 100 INJECTION, SOLUTION INTRAVENOUS; SUBCUTANEOUS at 09:32

## 2019-10-31 RX ADMIN — HYDRALAZINE HYDROCHLORIDE 25 MG: 25 TABLET, FILM COATED ORAL at 06:18

## 2019-10-31 RX ADMIN — CARVEDILOL 25 MG: 25 TABLET, FILM COATED ORAL at 09:29

## 2019-10-31 RX ADMIN — ASPIRIN 81 MG 81 MG: 81 TABLET ORAL at 09:29

## 2019-10-31 RX ADMIN — ALLOPURINOL 300 MG: 300 TABLET ORAL at 09:29

## 2019-10-31 RX ADMIN — Medication 10 ML: at 11:00

## 2019-10-31 ASSESSMENT — PAIN SCALES - GENERAL: PAINLEVEL_OUTOF10: 0

## 2019-11-01 ENCOUNTER — CARE COORDINATION (OUTPATIENT)
Dept: CASE MANAGEMENT | Age: 74
End: 2019-11-01

## 2019-11-01 DIAGNOSIS — L03.116 CELLULITIS OF LEFT LOWER EXTREMITY: Primary | ICD-10-CM

## 2019-11-01 PROCEDURE — 1111F DSCHRG MED/CURRENT MED MERGE: CPT | Performed by: FAMILY MEDICINE

## 2019-11-05 ENCOUNTER — CARE COORDINATION (OUTPATIENT)
Dept: CASE MANAGEMENT | Age: 74
End: 2019-11-05

## 2019-11-05 LAB
ANTISTREPTOLYSIN-O: 34 IU/ML (ref 0–200)
STREPTOZYME: NEGATIVE

## 2019-11-07 ENCOUNTER — OFFICE VISIT (OUTPATIENT)
Dept: FAMILY MEDICINE CLINIC | Age: 74
End: 2019-11-07
Payer: MEDICARE

## 2019-11-07 VITALS
DIASTOLIC BLOOD PRESSURE: 60 MMHG | BODY MASS INDEX: 39.75 KG/M2 | OXYGEN SATURATION: 99 % | WEIGHT: 277 LBS | HEART RATE: 69 BPM | SYSTOLIC BLOOD PRESSURE: 140 MMHG

## 2019-11-07 DIAGNOSIS — I10 HYPERTENSION, UNSPECIFIED TYPE: ICD-10-CM

## 2019-11-07 DIAGNOSIS — L03.90 CELLULITIS, UNSPECIFIED CELLULITIS SITE: ICD-10-CM

## 2019-11-07 PROCEDURE — 99495 TRANSJ CARE MGMT MOD F2F 14D: CPT | Performed by: FAMILY MEDICINE

## 2019-11-07 PROCEDURE — 1111F DSCHRG MED/CURRENT MED MERGE: CPT | Performed by: FAMILY MEDICINE

## 2019-11-08 ENCOUNTER — CARE COORDINATION (OUTPATIENT)
Dept: CASE MANAGEMENT | Age: 74
End: 2019-11-08

## 2019-11-12 ENCOUNTER — NURSE ONLY (OUTPATIENT)
Dept: FAMILY MEDICINE CLINIC | Age: 74
End: 2019-11-12
Payer: MEDICARE

## 2019-11-12 DIAGNOSIS — I10 HYPERTENSION, UNSPECIFIED TYPE: ICD-10-CM

## 2019-11-12 LAB
ANION GAP SERPL CALCULATED.3IONS-SCNC: 16 MMOL/L (ref 3–16)
BUN BLDV-MCNC: 37 MG/DL (ref 7–20)
CALCIUM SERPL-MCNC: 9.5 MG/DL (ref 8.3–10.6)
CHLORIDE BLD-SCNC: 108 MMOL/L (ref 99–110)
CO2: 20 MMOL/L (ref 21–32)
CREAT SERPL-MCNC: 1.3 MG/DL (ref 0.8–1.3)
GFR AFRICAN AMERICAN: >60
GFR NON-AFRICAN AMERICAN: 54
GLUCOSE BLD-MCNC: 117 MG/DL (ref 70–99)
POTASSIUM SERPL-SCNC: 4.6 MMOL/L (ref 3.5–5.1)
SODIUM BLD-SCNC: 144 MMOL/L (ref 136–145)

## 2019-11-12 PROCEDURE — 36415 COLL VENOUS BLD VENIPUNCTURE: CPT | Performed by: FAMILY MEDICINE

## 2019-11-13 ENCOUNTER — CARE COORDINATION (OUTPATIENT)
Dept: CASE MANAGEMENT | Age: 74
End: 2019-11-13

## 2019-11-21 ENCOUNTER — OFFICE VISIT (OUTPATIENT)
Dept: ORTHOPEDIC SURGERY | Age: 74
End: 2019-11-21
Payer: MEDICARE

## 2019-11-21 VITALS — HEIGHT: 70 IN | WEIGHT: 276.9 LBS | BODY MASS INDEX: 39.64 KG/M2

## 2019-11-21 DIAGNOSIS — M25.561 RIGHT KNEE PAIN, UNSPECIFIED CHRONICITY: ICD-10-CM

## 2019-11-21 DIAGNOSIS — M17.11 PRIMARY OSTEOARTHRITIS OF RIGHT KNEE: Primary | ICD-10-CM

## 2019-11-21 PROCEDURE — L1830 KO IMMOB CANVAS LONG PRE OTS: HCPCS | Performed by: ORTHOPAEDIC SURGERY

## 2019-11-21 PROCEDURE — 99214 OFFICE O/P EST MOD 30 MIN: CPT | Performed by: ORTHOPAEDIC SURGERY

## 2019-11-25 RX ORDER — PENICILLIN V POTASSIUM 500 MG/1
TABLET ORAL
Qty: 120 TABLET | Refills: 1 | Status: SHIPPED | OUTPATIENT
Start: 2019-11-25 | End: 2020-02-11

## 2019-12-02 ENCOUNTER — HOSPITAL ENCOUNTER (OUTPATIENT)
Dept: PHYSICAL THERAPY | Age: 74
Setting detail: THERAPIES SERIES
Discharge: HOME | End: 2019-12-02
Payer: MEDICARE

## 2019-12-02 PROCEDURE — 97161 PT EVAL LOW COMPLEX 20 MIN: CPT | Performed by: PHYSICAL THERAPIST

## 2019-12-02 PROCEDURE — 97110 THERAPEUTIC EXERCISES: CPT | Performed by: PHYSICAL THERAPIST

## 2019-12-02 PROCEDURE — 97112 NEUROMUSCULAR REEDUCATION: CPT | Performed by: PHYSICAL THERAPIST

## 2019-12-05 ENCOUNTER — TELEPHONE (OUTPATIENT)
Dept: ORTHOPEDIC SURGERY | Age: 74
End: 2019-12-05

## 2019-12-10 ENCOUNTER — OFFICE VISIT (OUTPATIENT)
Dept: FAMILY MEDICINE CLINIC | Age: 74
End: 2019-12-10
Payer: MEDICARE

## 2019-12-10 VITALS
HEART RATE: 65 BPM | BODY MASS INDEX: 37.45 KG/M2 | DIASTOLIC BLOOD PRESSURE: 50 MMHG | WEIGHT: 261 LBS | OXYGEN SATURATION: 98 % | SYSTOLIC BLOOD PRESSURE: 132 MMHG

## 2019-12-10 DIAGNOSIS — J40 BRONCHITIS: ICD-10-CM

## 2019-12-10 DIAGNOSIS — E11.9 TYPE 2 DIABETES MELLITUS WITHOUT COMPLICATION, WITHOUT LONG-TERM CURRENT USE OF INSULIN (HCC): Primary | ICD-10-CM

## 2019-12-10 LAB — HBA1C MFR BLD: 6.2 %

## 2019-12-10 PROCEDURE — 83036 HEMOGLOBIN GLYCOSYLATED A1C: CPT | Performed by: FAMILY MEDICINE

## 2019-12-10 PROCEDURE — 99214 OFFICE O/P EST MOD 30 MIN: CPT | Performed by: FAMILY MEDICINE

## 2019-12-10 RX ORDER — HYDROCODONE POLISTIREX AND CHLORPHENIRAMINE POLISTIREX 10; 8 MG/5ML; MG/5ML
5 SUSPENSION, EXTENDED RELEASE ORAL EVERY 12 HOURS PRN
Qty: 180 ML | Refills: 0 | Status: SHIPPED | OUTPATIENT
Start: 2019-12-10 | End: 2019-12-17

## 2019-12-10 RX ORDER — AZITHROMYCIN 250 MG/1
250 TABLET, FILM COATED ORAL SEE ADMIN INSTRUCTIONS
Qty: 6 TABLET | Refills: 0 | Status: SHIPPED | OUTPATIENT
Start: 2019-12-10 | End: 2019-12-15

## 2019-12-10 ASSESSMENT — ENCOUNTER SYMPTOMS
SHORTNESS OF BREATH: 1
WHEEZING: 1
COUGH: 1

## 2020-01-09 ENCOUNTER — OFFICE VISIT (OUTPATIENT)
Dept: FAMILY MEDICINE CLINIC | Age: 75
End: 2020-01-09
Payer: MEDICARE

## 2020-01-09 VITALS
SYSTOLIC BLOOD PRESSURE: 104 MMHG | BODY MASS INDEX: 38.17 KG/M2 | DIASTOLIC BLOOD PRESSURE: 56 MMHG | OXYGEN SATURATION: 97 % | WEIGHT: 266 LBS | HEART RATE: 63 BPM

## 2020-01-09 PROCEDURE — 99213 OFFICE O/P EST LOW 20 MIN: CPT | Performed by: FAMILY MEDICINE

## 2020-01-09 ASSESSMENT — PATIENT HEALTH QUESTIONNAIRE - PHQ9
SUM OF ALL RESPONSES TO PHQ QUESTIONS 1-9: 0
1. LITTLE INTEREST OR PLEASURE IN DOING THINGS: 0
SUM OF ALL RESPONSES TO PHQ QUESTIONS 1-9: 0
SUM OF ALL RESPONSES TO PHQ9 QUESTIONS 1 & 2: 0
2. FEELING DOWN, DEPRESSED OR HOPELESS: 0

## 2020-01-09 NOTE — PATIENT INSTRUCTIONS
No Aspirin or NSAIDS for 1 week before surgery. Stop metformin day before surgery. Take morning BP pills with sip of water on day of surgery. Also Penicillin. Nothing to eat past midnight.

## 2020-01-09 NOTE — PROGRESS NOTES
metFORMIN (GLUCOPHAGE-XR) 750 MG extended release tablet Take 1 tablet by mouth 2 times daily 180 tablet 2    ammonium lactate (LAC-HYDRIN) 12 % lotion APPLY TOPICALLY DAILY 400 g 0    lisinopril (PRINIVIL;ZESTRIL) 40 MG tablet TAKE ONE TABLET BY MOUTH DAILY (Patient taking differently: Take 20 mg by mouth 2 times daily ) 90 tablet 3    carvedilol (COREG) 25 MG tablet TAKE ONE TABLET BY MOUTH TWICE A  tablet 3    Accu-Chek Multiclix Lancets MISC Test once daily  DX  250.00 100 each 3    Omega-3 Fatty Acids (FISH OIL) 1200 MG CAPS Take 2 capsules by mouth daily       Cholecalciferol (VITAMIN D) 2000 UNITS CAPS capsule Take 1 capsule by mouth daily      aspirin 81 MG chewable tablet Take 81 mg by mouth daily. No current facility-administered medications for this visit. Allergies:  Bactrim [sulfamethoxazole-trimethoprim]  History of allergic reaction to anesthesia:  No     Social History     Tobacco Use   Smoking Status Former Smoker    Packs/day: 1.00    Years: 15.00    Pack years: 15.00    Types: Cigarettes    Last attempt to quit: 1982    Years since quittin.1   Smokeless Tobacco Never Used     The patient states he drinks zero per week.     Family History   Problem Relation Age of Onset    Heart Disease Brother     High Blood Pressure Brother     Dementia Mother         alzheimers       REVIEW OF SYSTEMS:    CONSTITUTIONAL:  negative  EYES:  negative  HEENT:  negative  RESPIRATORY:  negative  CARDIOVASCULAR:  negative  GASTROINTESTINAL:  negative  GENITOURINARY:  negative  INTEGUMENT/BREAST:  negative  HEMATOLOGIC/LYMPHATIC:  negative  ALLERGIC/IMMUNOLOGIC:  negative  ENDOCRINE:  negative  MUSCULOSKELETAL:  negative  NEUROLOGICAL:  negative    PHYSICAL EXAM:      BP (!) 104/56   Pulse 63   Wt 266 lb (120.7 kg)   SpO2 97%   BMI 38.17 kg/m²     CONSTITUTIONAL:  awake, alert, cooperative, no apparent distress, and appears stated age    Eyes:  Lids and lashes normal, pupils equal, round and reactive to light, extra ocular muscles intact, sclera clear, conjunctiva normal    Head/ENT:  Normocephalic, without obvious abnormality, atramatic, sinuses nontender on palpation, external ears without lesions, oral pharynx with moist mucus membranes, tonsils without erythema or exudates, gums normal and good dentition. Neck:  Supple, symmetrical, trachea midline, no adenopathy, thyroid symmetric, not enlarged and no tenderness, skin normal, No carotid bruit    Heart:  Normal apical impulse, regular rate and rhythm, normal S1 and S2, no S3 or S4, and no murmur noted    Lungs:  No increased work of breathing, good air exchange, clear to auscultation bilaterally, no crackles or wheezing    Abdomen:  No scars, normal bowel sounds, soft, non-distended, non-tender, no masses palpated, no hepatosplenomegally    Extremities:  No clubbing, cyanosis, or edema    NEUROLOGIC:  Awake, alert, oriented to name, place and time. Cranial nerves II-XII are grossly intact. Motor is 5 out of 5 bilaterally. DATA:  EKG:  Date:  10/28/20  I have reviewed EKG with the following interpretation:  Impression:  Normal    Lab Results   Component Value Date    LABA1C 6.2 12/10/2019     Lab Results   Component Value Date    .9 10/29/2019             ASSESSMENT AND PLAN:    1. Patient is a 75 y.o. male with above specified procedure planned on 1/23/20 with Dr. Oscar Campbell at [facility]. 2. Stop ASA/NSAIDS medications 7-10 days prior to procedure. 3.  Cleared for surgery. 4.  On day of surgery take morning PCN prophylaxis for cellulitis and morning bp meds  5.   Copy of not being sent to Dr. Camilla Pastor M.D    [address] [place] [address], [address], [address] [place] [address]  [phone]

## 2020-01-10 ENCOUNTER — HOSPITAL ENCOUNTER (OUTPATIENT)
Age: 75
Discharge: HOME | End: 2020-01-10
Payer: MEDICARE

## 2020-01-10 LAB
ABO/RH: NORMAL
ALBUMIN SERPL-MCNC: 4.2 G/DL (ref 3.4–5)
ANION GAP SERPL CALCULATED.3IONS-SCNC: 14 MMOL/L (ref 3–16)
ANTIBODY SCREEN: NORMAL
APTT: 31.7 SEC (ref 24.2–36.2)
BASOPHILS ABSOLUTE: 0.1 K/UL (ref 0–0.2)
BASOPHILS RELATIVE PERCENT: 0.8 %
BILIRUBIN URINE: NEGATIVE
BLOOD, URINE: NEGATIVE
BUN BLDV-MCNC: 39 MG/DL (ref 7–20)
CALCIUM SERPL-MCNC: 9.6 MG/DL (ref 8.3–10.6)
CHLORIDE BLD-SCNC: 104 MMOL/L (ref 99–110)
CLARITY: CLEAR
CO2: 24 MMOL/L (ref 21–32)
COLOR: YELLOW
CREAT SERPL-MCNC: 1.4 MG/DL (ref 0.8–1.3)
EOSINOPHILS ABSOLUTE: 0.2 K/UL (ref 0–0.6)
EOSINOPHILS RELATIVE PERCENT: 2.4 %
GFR AFRICAN AMERICAN: 60
GFR NON-AFRICAN AMERICAN: 49
GLUCOSE BLD-MCNC: 117 MG/DL (ref 70–99)
GLUCOSE URINE: NEGATIVE MG/DL
HCT VFR BLD CALC: 35 % (ref 40.5–52.5)
HEMOGLOBIN: 11.5 G/DL (ref 13.5–17.5)
INR BLD: 0.95 (ref 0.86–1.14)
KETONES, URINE: NEGATIVE MG/DL
LEUKOCYTE ESTERASE, URINE: NEGATIVE
LYMPHOCYTES ABSOLUTE: 1.7 K/UL (ref 1–5.1)
LYMPHOCYTES RELATIVE PERCENT: 23.8 %
MCH RBC QN AUTO: 29 PG (ref 26–34)
MCHC RBC AUTO-ENTMCNC: 32.8 G/DL (ref 31–36)
MCV RBC AUTO: 88.3 FL (ref 80–100)
MICROSCOPIC EXAMINATION: NORMAL
MONOCYTES ABSOLUTE: 0.6 K/UL (ref 0–1.3)
MONOCYTES RELATIVE PERCENT: 8.4 %
NEUTROPHILS ABSOLUTE: 4.6 K/UL (ref 1.7–7.7)
NEUTROPHILS RELATIVE PERCENT: 64.6 %
NITRITE, URINE: NEGATIVE
PDW BLD-RTO: 16.5 % (ref 12.4–15.4)
PH UA: 5.5 (ref 5–8)
PLATELET # BLD: 155 K/UL (ref 135–450)
PMV BLD AUTO: 9.8 FL (ref 5–10.5)
POTASSIUM SERPL-SCNC: 4.5 MMOL/L (ref 3.5–5.1)
PROTEIN UA: NEGATIVE MG/DL
PROTHROMBIN TIME: 11 SEC (ref 10–13.2)
RBC # BLD: 3.96 M/UL (ref 4.2–5.9)
SODIUM BLD-SCNC: 142 MMOL/L (ref 136–145)
SPECIFIC GRAVITY UA: 1.02 (ref 1–1.03)
URINE TYPE: NORMAL
UROBILINOGEN, URINE: 0.2 E.U./DL
WBC # BLD: 7.2 K/UL (ref 4–11)

## 2020-01-10 PROCEDURE — 83036 HEMOGLOBIN GLYCOSYLATED A1C: CPT

## 2020-01-10 PROCEDURE — 86900 BLOOD TYPING SEROLOGIC ABO: CPT

## 2020-01-10 PROCEDURE — 84466 ASSAY OF TRANSFERRIN: CPT

## 2020-01-10 PROCEDURE — 81003 URINALYSIS AUTO W/O SCOPE: CPT

## 2020-01-10 PROCEDURE — 87086 URINE CULTURE/COLONY COUNT: CPT

## 2020-01-10 PROCEDURE — 80048 BASIC METABOLIC PNL TOTAL CA: CPT

## 2020-01-10 PROCEDURE — 85025 COMPLETE CBC W/AUTO DIFF WBC: CPT

## 2020-01-10 PROCEDURE — 86850 RBC ANTIBODY SCREEN: CPT

## 2020-01-10 PROCEDURE — 82040 ASSAY OF SERUM ALBUMIN: CPT

## 2020-01-10 PROCEDURE — 85730 THROMBOPLASTIN TIME PARTIAL: CPT

## 2020-01-10 PROCEDURE — 86901 BLOOD TYPING SEROLOGIC RH(D): CPT

## 2020-01-10 PROCEDURE — 36415 COLL VENOUS BLD VENIPUNCTURE: CPT

## 2020-01-10 PROCEDURE — 85610 PROTHROMBIN TIME: CPT

## 2020-01-11 LAB
ESTIMATED AVERAGE GLUCOSE: 128.4 MG/DL
HBA1C MFR BLD: 6.1 %
TRANSFERRIN: 244 MG/DL (ref 200–360)

## 2020-01-12 LAB — URINE CULTURE, ROUTINE: NORMAL

## 2020-01-13 ENCOUNTER — TELEPHONE (OUTPATIENT)
Dept: ORTHOPEDIC SURGERY | Age: 75
End: 2020-01-13

## 2020-01-13 NOTE — TELEPHONE ENCOUNTER
DENIED  Date: 01/23/20  Type of SX: ip  Location: [facility]  CPT: 32023  SX area: R KNEE  REASON: NO CONSERVATIVE [address] 3 MONTH  Peer to peer: [phone] OPTION 4  Reference # [id number]

## 2020-01-14 ENCOUNTER — OFFICE VISIT (OUTPATIENT)
Dept: INFECTIOUS DISEASES | Age: 75
End: 2020-01-14
Payer: MEDICARE

## 2020-01-14 VITALS
WEIGHT: 268 LBS | SYSTOLIC BLOOD PRESSURE: 118 MMHG | DIASTOLIC BLOOD PRESSURE: 54 MMHG | TEMPERATURE: 98 F | BODY MASS INDEX: 38.45 KG/M2

## 2020-01-14 PROCEDURE — 99213 OFFICE O/P EST LOW 20 MIN: CPT | Performed by: INTERNAL MEDICINE

## 2020-01-15 ENCOUNTER — TELEPHONE (OUTPATIENT)
Dept: ORTHOPEDIC SURGERY | Age: 75
End: 2020-01-15

## 2020-01-15 ENCOUNTER — OFFICE VISIT (OUTPATIENT)
Dept: CARDIOLOGY CLINIC | Age: 75
End: 2020-01-15
Payer: MEDICARE

## 2020-01-15 VITALS
HEIGHT: 70 IN | SYSTOLIC BLOOD PRESSURE: 120 MMHG | HEART RATE: 71 BPM | WEIGHT: 277.4 LBS | OXYGEN SATURATION: 95 % | BODY MASS INDEX: 39.71 KG/M2 | DIASTOLIC BLOOD PRESSURE: 60 MMHG

## 2020-01-15 PROBLEM — Z01.810 PRE-OPERATIVE CARDIOVASCULAR EXAMINATION: Status: ACTIVE | Noted: 2020-01-15

## 2020-01-15 PROCEDURE — 99214 OFFICE O/P EST MOD 30 MIN: CPT | Performed by: INTERNAL MEDICINE

## 2020-01-15 NOTE — LETTER
[address] Cardiology - [address] [facility] [address] [facility]  Phone: [phone]  Fax: [phone]    Roselind Hamman, MD        January 15, 2020     Rocío Simons  [facility]  [place] [address]      Dr. Ranjit Phillips 0/96/0036 The patient's cardiac condition is not prohibitory to undergo surgery. The patient's cardiac risk is moderate based upon my evaluation and testing. Stable to proceed. If you have any questions or concerns, please don't hesitate to call.     Sincerely,        Roselind Hamman, MD

## 2020-01-15 NOTE — PATIENT INSTRUCTIONS
Plan:  1. Based on how you are feeling well and based on your recently echocardiogram and stress test from 2018, I feel that you are safe to proceed with orthopedic surgery without further testing. 2. I recommend that the patient continue their currently prescribed medications. Their drug modifiable risk factors appear to be well controlled. I will continue to address the need/dosing of medications in future visits. 3. Follow up with me in 6 months.

## 2020-01-15 NOTE — PROGRESS NOTES
[address] [facility]   Cardiovascular Evaluation    PATIENT: Rocío Simons  DATE: 1/15/2020  MRN: [id number]  CSN: [id number]  : 1945    Primary Care Doctor/Referring provider: Raul Coello MD    Reason for evaluation/Chief complaint:   Follow-up; Cardiac Clearance; and Coronary Artery Disease      Subjective:    History of present illness on initial date of evaluation:   Rocío Simons is a 75 y.o. patient who presents for pre-operative risk assessment for a knee replacement. He has a history of coronary artery disease with stent placement in  with drug eluting stents to his right coronary artery and left anterior descending artery. His most recent echocardiogram from 10/2019 showed an ejection fraction of 55-60%. Today he reports that he has been feeling well from a cardiac standpoint. He plans to undergo right total knee replacement on 2020. He does not feel that he has any cardiac limitations with his daily activities. He has been taking his medications as prescribed and is tolerating them well. He plans to increase his activity level once he has recovered from his knee surgery.        Patient Active Problem List   Diagnosis    Hypotestosteronism    CAD (coronary artery disease) PCI to RCA and LAD     History of PTCA    Sleep apnea    DJD (degenerative joint disease)    Essential hypertension, benign    Gout    Skin cancer    Anal fissure    Vitamin D deficiency    SIRS (systemic inflammatory response syndrome) (HCC)    Viral syndrome    Non-intractable vomiting without nausea    Syncope    Bacteremia due to group B Streptococcus    Cellulitis of left lower extremity    Hyperprolactinemia (HCC)    Severe sepsis (HCC)    Cellulitis of right leg    Group B streptococcal bacteriuria    Venous insufficiency of both lower extremities    Obesity due to excess calories with serious comorbidity    Cellulitis    Community acquired pneumonia    Bronchiolitis    Acute febrile illness    Tinea pedis of right foot    Chronic combined systolic and diastolic heart failure (HCC)    Type 2 diabetes mellitus without complication, without long-term current use of insulin (HCC)    Leg edema    ARF (acute renal failure) ([facility])    Pre-operative cardiovascular examination         Cardiac Testing: I have reviewed the findings below. EKG:  ECHO:   STRESS TEST:  CATH:  BYPASS:  VASCULAR:    Past Medical History:   has a past medical history of CAD (coronary artery disease), Cataract, Diabetes mellitus ([facility] DJD (degenerative joint disease), History of PTCA, Hyperlipidemia, Hypertension, Hypotestosteronism, IGT (impaired glucose tolerance), Pituitary abnormality ([address] and Sleep apnea. Surgical History:   has a past surgical history that includes Cardiac surgery; Coronary angioplasty with stent (2003); Knee arthroscopy; knee surgery; Colonoscopy (2001); Colonoscopy (12/9/11); and eye surgery. Social History:   reports that he quit smoking about 37 years ago. His smoking use included cigarettes. He has a 15.00 pack-year smoking history. He has never used smokeless tobacco. He reports that he does not drink alcohol or use drugs. Family History:  No evidence for sudden cardiac death or premature CAD    Medications:  Reviewed and are listed in nursing record. and/or listed below  Outpatient Medications:  Prior to Admission medications    Medication Sig Start Date End Date Taking?  Authorizing Provider   penicillin v potassium (VEETID) 500 MG tablet TAKE TWO TABLETS BY MOUTH TWICE A DAY 11/25/19  Yes Sergey Jordan MD   blood glucose test strips (ONETOUCH VERIO) strip USE TO TEST BLOOD SUGAR ONCE DAILY 11/11/19  Yes Muriel Driver MD   hydrALAZINE (APRESOLINE) 25 MG tablet Take 1 tablet by mouth every 8 hours 10/31/19  Yes Ace Lane MD   ferrous sulfate 325 (65 Fe) MG tablet Take 1 tablet by mouth daily (with breakfast) 11/1/19  Yes Ace Lane MD Probiotic Product (PROBIOTIC DAILY PO) Take by mouth   Yes Historical Provider, MD   cabergoline (DOSTINEX) 0.5 MG tablet TAKE half tablet once a week. 10/2/19  Yes Anita Orona MD   allopurinol (ZYLOPRIM) 300 MG tablet TAKE ONE TABLET BY MOUTH DAILY 9/5/19  Yes Radha Adame MD   atorvastatin (LIPITOR) 80 MG tablet TAKE ONE TABLET BY MOUTH DAILY 7/22/19  Yes Radha Adame MD   metFORMIN (GLUCOPHAGE-XR) 750 MG extended release tablet Take 1 tablet by mouth 2 times daily 7/22/19  Yes Radha Adame MD   ammonium lactate (LAC-HYDRIN) 12 % lotion APPLY TOPICALLY DAILY 7/3/19  Yes DEONNA Ramirez CNP   lisinopril (PRINIVIL;ZESTRIL) 40 MG tablet TAKE ONE TABLET BY MOUTH DAILY  Patient taking differently: Take 20 mg by mouth 2 times daily  6/3/19  Yes Radha Adame MD   carvedilol (COREG) 25 MG tablet TAKE ONE TABLET BY MOUTH TWICE A DAY 5/23/19  Yes Radha Adame MD   Accu-Chek Multiclix Lancets MISC Test once daily  DX  250.00 10/24/17  Yes Radha Adame MD   Cholecalciferol (VITAMIN D) 2000 UNITS CAPS capsule Take 1 capsule by mouth daily   Yes Historical Provider, MD   aspirin 81 MG chewable tablet Take 81 mg by mouth daily. Yes Historical Provider, MD   Omega-3 Fatty Acids (FISH OIL) 1200 MG CAPS Take 2 capsules by mouth daily     Historical Provider, MD       In-patient schedule medications:        Infusion Medications: Allergies:  Bactrim [sulfamethoxazole-trimethoprim]     Review of Systems:   All 14 point review of symptoms completed. Pertinent positives identified in the HPI, all other review of symptoms findings as below.      Review of Systems - History obtained from the patient  General ROS: negative for - chills, fever or night sweats  Psychological ROS: negative for - disorientation or hallucinations  Ophthalmic ROS: negative for - dry eyes, eye pain or loss of vision  ENT ROS: negative for - nasal discharge or sore throat  Allergy and Immunology ROS: negative for - hives or itchy/watery eyes  Hematological and Lymphatic ROS: negative for - jaundice or night sweats  Endocrine ROS: negative for - mood swings or temperature intolerance  Breast ROS: deferred  Respiratory ROS: negative for - hemoptysis or stridor  Gastrointestinal ROS: no abdominal pain, change in bowel habits, or black or bloody stools  Genito-Urinary ROS: no dysuria, trouble voiding, or hematuria  Musculoskeletal ROS: negative for - gait disturbance, joint pain or joint stiffness  Neurological ROS: negative for - seizures or speech problems  Dermatological ROS: negative for - rash or skin lesion changes      Physical Examination:    /60   Pulse 71   Ht 5' 10\" (1.778 m)   Wt 277 lb 6.4 oz (125.8 kg)   SpO2 95%   BMI 39.80 kg/m²   /60   Pulse 71   Ht 5' 10\" (1.778 m)   Wt 277 lb 6.4 oz (125.8 kg)   SpO2 95%   BMI 39.80 kg/m²    Weight: 277 lb 6.4 oz (125.8 kg)     Wt Readings from Last 3 Encounters:   01/15/20 277 lb 6.4 oz (125.8 kg)   01/14/20 268 lb (121.6 kg)   01/09/20 266 lb (120.7 kg)     No intake or output data in the 24 hours ending 01/15/20 1532    General Appearance:  Alert, cooperative, no distress, appears stated age   Head:  Normocephalic, without obvious abnormality, atraumatic   Eyes:  PERRL, conjunctiva/corneas clear       Nose: Nares normal, no drainage or sinus tenderness   Throat: Lips, mucosa, and tongue normal   Neck: Supple, symmetrical, trachea midline, no adenopathy, thyroid: not enlarged, symmetric, no tenderness/mass/nodules, no carotid bruit or JVD       Lungs:   Clear to auscultation bilaterally, respirations unlabored   Chest Wall:  No tenderness or deformity   Heart:  Regular rhythm and normal rate; S1, S2 are normal; no murmur noted; no rub or gallop   Abdomen:   Soft, non-tender, bowel sounds active all four quadrants,  no masses, no organomegaly           Extremities: Extremities normal, atraumatic, no cyanosis or edema   Pulses: 2+ and symmetric   Skin: Skin color,

## 2020-01-18 NOTE — PROGRESS NOTES
Department of Internal Medicine  Infectious Diseases      Patient Name: Brian Bustamante      Date of visit: 1/17/2020  SUBJECTIVE:  Kathe Mckeon  is a 75 y.o. male who returns today for follow-up of recurrent LE cellulitis   He was last seen in this office for the same in 9/2019     He is to have R knee arthroplasty next week and wants to discuss ID plan re recurrent cellulitis and ppx abx. Pertinent history,   He has history of venous stasis and recurrent cellulitis  Admission in 12/2016 and again in 12/2017 with cellulitis (LLE 2016, RLE 2017), both associated with secondary bacteremia with strep species  He is taking daily pcn for ppx of cellulitis (1g po BID) since 12/2017  He was admitted 9/2018 with suspected LE cellulitis, milder case than previous episodes  Another admission 10/2019 with mild cellulitis RLE     He is still taking pcn 1g po BID  It is well tolerated    He has had intentional weight loss.   He faithfully uses compression stockings        REVIEW OF SYSTEMS:    CONSTITUTIONAL:  negative for fevers, chills, diaphoresis, activity change, appetite change, fatigue, night sweats   EYES:  negative for blurred vision, eye discharge, visual disturbance and icterus  HEENT:  negative for hearing loss, tinnitus, ear drainage, sinus pressure, nasal congestion, epistaxis and snoring  RESPIRATORY:  No cough or hemoptysis   CARDIOVASCULAR:  negative for chest pain, palpitations, exertional chest pressure/discomfort, edema, syncope  GASTROINTESTINAL:  negative for nausea, vomiting, diarrhea, constipation, blood in stool and abdominal pain  GENITOURINARY:  negative for frequency, dysuria, urinary incontinence, decreased urine volume, and hematuria  HEMATOLOGIC/LYMPHATIC:  negative for easy bruising, bleeding and lymphadenopathy  ALLERGIC/IMMUNOLOGIC:  negative for angioedema, anaphylaxis and drug reactions  ENDOCRINE:  negative for weight changes and diabetic symptoms including polyuria, polydipsia medications for this visit. Physical:  VITALS:  BP (!) 118/54   Temp 98 °F (36.7 °C) (Oral)   Wt 268 lb (121.6 kg)   BMI 38.45 kg/m²     He is well appearing, fully oriented, NAD  Abd soft, NT, +BS   Skin warm, dry, no rash  Changes of stasis dermatitis lower right leg  Trace LE edema jose luis   No tinea pedis       Assessment / Plan:      Recurrent streptococcal cellulitis with secondary bacteremia, in the context of obesity, venous stasis dermatitis, onychomycosis   Quiescent  He has been taking ppx pcn for about 2 years now  The frequency and severity of these episodes have been positively impacted with the chronic pcn    Plan for R TKA next week     His risk of late infection of the R TKA relative to general population is probably higher given his history of recurrent SSTI that has been associated with bacteremia    Since starting ppx pcn, he has not had demonstrated bacteremia with these episodes and the local inflammatory changes of legs have been milder. Therefore, it will appropriate to continue long term pcn ppx both now and post-TKA. I think his operative infection risk is acceptably low.   He will complete routine pre-op decolonization with CHG wash and bactroban nares      Continued weight loss encouraged  Use compression to help with chronic LE edema      RTC 6 months  Call with any concerns        Tammy Mason MD

## 2020-01-21 RX ORDER — METFORMIN HYDROCHLORIDE 750 MG/1
750 TABLET, EXTENDED RELEASE ORAL 2 TIMES DAILY
Qty: 180 TABLET | Refills: 2 | Status: SHIPPED | OUTPATIENT
Start: 2020-01-21 | End: 2020-09-28 | Stop reason: SDUPTHER

## 2020-01-23 ENCOUNTER — ANESTHESIA EVENT (OUTPATIENT)
Dept: OPERATING ROOM | Age: 75
DRG: 470 | End: 2020-01-23
Payer: MEDICARE

## 2020-01-23 ENCOUNTER — APPOINTMENT (OUTPATIENT)
Dept: GENERAL RADIOLOGY | Age: 75
DRG: 470 | End: 2020-01-23
Attending: ORTHOPAEDIC SURGERY
Payer: MEDICARE

## 2020-01-23 ENCOUNTER — HOSPITAL ENCOUNTER (INPATIENT)
Age: 75
LOS: 5 days | Discharge: HOME HEALTH | DRG: 470 | End: 2020-01-28
Attending: ORTHOPAEDIC SURGERY | Admitting: ORTHOPAEDIC SURGERY
Payer: MEDICARE

## 2020-01-23 ENCOUNTER — ANESTHESIA (OUTPATIENT)
Dept: OPERATING ROOM | Age: 75
DRG: 470 | End: 2020-01-23
Payer: MEDICARE

## 2020-01-23 VITALS
DIASTOLIC BLOOD PRESSURE: 52 MMHG | RESPIRATION RATE: 15 BRPM | OXYGEN SATURATION: 95 % | SYSTOLIC BLOOD PRESSURE: 97 MMHG

## 2020-01-23 PROBLEM — Z96.651 STATUS POST TOTAL KNEE REPLACEMENT, RIGHT: Status: ACTIVE | Noted: 2020-01-23

## 2020-01-23 PROBLEM — Z96.651 STATUS POST TOTAL RIGHT KNEE REPLACEMENT: Status: ACTIVE | Noted: 2020-01-23

## 2020-01-23 LAB
ABO/RH: NORMAL
ANTIBODY SCREEN: NORMAL
GLUCOSE BLD-MCNC: 124 MG/DL (ref 70–99)
GLUCOSE BLD-MCNC: 152 MG/DL (ref 70–99)
GLUCOSE BLD-MCNC: 156 MG/DL (ref 70–99)
PERFORMED ON: ABNORMAL

## 2020-01-23 PROCEDURE — C1776 JOINT DEVICE (IMPLANTABLE): HCPCS | Performed by: ORTHOPAEDIC SURGERY

## 2020-01-23 PROCEDURE — 76942 ECHO GUIDE FOR BIOPSY: CPT | Performed by: ANESTHESIOLOGY

## 2020-01-23 PROCEDURE — 73560 X-RAY EXAM OF KNEE 1 OR 2: CPT

## 2020-01-23 PROCEDURE — 36415 COLL VENOUS BLD VENIPUNCTURE: CPT

## 2020-01-23 PROCEDURE — 2580000003 HC RX 258: Performed by: ORTHOPAEDIC SURGERY

## 2020-01-23 PROCEDURE — 2580000003 HC RX 258: Performed by: PHYSICIAN ASSISTANT

## 2020-01-23 PROCEDURE — 2500000003 HC RX 250 WO HCPCS: Performed by: ANESTHESIOLOGY

## 2020-01-23 PROCEDURE — 64447 NJX AA&/STRD FEMORAL NRV IMG: CPT | Performed by: ANESTHESIOLOGY

## 2020-01-23 PROCEDURE — 2500000003 HC RX 250 WO HCPCS: Performed by: ORTHOPAEDIC SURGERY

## 2020-01-23 PROCEDURE — 86850 RBC ANTIBODY SCREEN: CPT

## 2020-01-23 PROCEDURE — C1713 ANCHOR/SCREW BN/BN,TIS/BN: HCPCS | Performed by: ORTHOPAEDIC SURGERY

## 2020-01-23 PROCEDURE — 6360000002 HC RX W HCPCS: Performed by: ORTHOPAEDIC SURGERY

## 2020-01-23 PROCEDURE — 3600000015 HC SURGERY LEVEL 5 ADDTL 15MIN: Performed by: ORTHOPAEDIC SURGERY

## 2020-01-23 PROCEDURE — 88305 TISSUE EXAM BY PATHOLOGIST: CPT

## 2020-01-23 PROCEDURE — 7100000000 HC PACU RECOVERY - FIRST 15 MIN: Performed by: ORTHOPAEDIC SURGERY

## 2020-01-23 PROCEDURE — 3600000005 HC SURGERY LEVEL 5 BASE: Performed by: ORTHOPAEDIC SURGERY

## 2020-01-23 PROCEDURE — 2500000003 HC RX 250 WO HCPCS: Performed by: NURSE ANESTHETIST, CERTIFIED REGISTERED

## 2020-01-23 PROCEDURE — 6360000002 HC RX W HCPCS: Performed by: NURSE ANESTHETIST, CERTIFIED REGISTERED

## 2020-01-23 PROCEDURE — 6370000000 HC RX 637 (ALT 250 FOR IP): Performed by: PHYSICIAN ASSISTANT

## 2020-01-23 PROCEDURE — 88311 DECALCIFY TISSUE: CPT

## 2020-01-23 PROCEDURE — 3700000001 HC ADD 15 MINUTES (ANESTHESIA): Performed by: ORTHOPAEDIC SURGERY

## 2020-01-23 PROCEDURE — 3700000000 HC ANESTHESIA ATTENDED CARE: Performed by: ORTHOPAEDIC SURGERY

## 2020-01-23 PROCEDURE — 2709999900 HC NON-CHARGEABLE SUPPLY: Performed by: ORTHOPAEDIC SURGERY

## 2020-01-23 PROCEDURE — C9290 INJ, BUPIVACAINE LIPOSOME: HCPCS | Performed by: ORTHOPAEDIC SURGERY

## 2020-01-23 PROCEDURE — 2700000000 HC OXYGEN THERAPY PER DAY

## 2020-01-23 PROCEDURE — 1200000000 HC SEMI PRIVATE

## 2020-01-23 PROCEDURE — 6360000002 HC RX W HCPCS: Performed by: ANESTHESIOLOGY

## 2020-01-23 PROCEDURE — 2580000003 HC RX 258: Performed by: NURSE ANESTHETIST, CERTIFIED REGISTERED

## 2020-01-23 PROCEDURE — 94761 N-INVAS EAR/PLS OXIMETRY MLT: CPT

## 2020-01-23 PROCEDURE — 86900 BLOOD TYPING SEROLOGIC ABO: CPT

## 2020-01-23 PROCEDURE — 86901 BLOOD TYPING SEROLOGIC RH(D): CPT

## 2020-01-23 PROCEDURE — 7100000001 HC PACU RECOVERY - ADDTL 15 MIN: Performed by: ORTHOPAEDIC SURGERY

## 2020-01-23 PROCEDURE — 6370000000 HC RX 637 (ALT 250 FOR IP): Performed by: ANESTHESIOLOGY

## 2020-01-23 DEVICE — LEGION POSTERIOR STABILIZED                                    NONPOROUS FEMORAL SIZE 7 RIGHT
Type: IMPLANTABLE DEVICE | Site: KNEE | Status: FUNCTIONAL
Brand: LEGION

## 2020-01-23 DEVICE — GENESIS II POSTERIOR STABILIZED                                    HIGH FLEXION INSERT SIZE 7-8 9MM
Type: IMPLANTABLE DEVICE | Site: KNEE | Status: FUNCTIONAL
Brand: GENESIS II

## 2020-01-23 DEVICE — GENESIS II RESURFACING PATELLAR                                    PROSTHESIS  32MM
Type: IMPLANTABLE DEVICE | Site: KNEE | Status: FUNCTIONAL
Brand: GENESIS II

## 2020-01-23 DEVICE — RALLY HV BONE CEMENT 40 GRAMS
Type: IMPLANTABLE DEVICE | Site: KNEE | Status: FUNCTIONAL
Brand: RALLY

## 2020-01-23 DEVICE — GENESIS II NON-POROUS TIBIAL                                    BASEPLATE SIZE 7 RIGHT
Type: IMPLANTABLE DEVICE | Site: KNEE | Status: FUNCTIONAL
Brand: GENESIS II

## 2020-01-23 RX ORDER — HYDROCHLOROTHIAZIDE 25 MG/1
25 TABLET ORAL DAILY
Status: DISCONTINUED | OUTPATIENT
Start: 2020-01-23 | End: 2020-01-28 | Stop reason: HOSPADM

## 2020-01-23 RX ORDER — ONDANSETRON 2 MG/ML
INJECTION INTRAMUSCULAR; INTRAVENOUS PRN
Status: DISCONTINUED | OUTPATIENT
Start: 2020-01-23 | End: 2020-01-23 | Stop reason: SDUPTHER

## 2020-01-23 RX ORDER — DEXTROSE MONOHYDRATE 25 G/50ML
12.5 INJECTION, SOLUTION INTRAVENOUS PRN
Status: DISCONTINUED | OUTPATIENT
Start: 2020-01-23 | End: 2020-01-28 | Stop reason: HOSPADM

## 2020-01-23 RX ORDER — ALLOPURINOL 300 MG/1
300 TABLET ORAL DAILY
Status: DISCONTINUED | OUTPATIENT
Start: 2020-01-23 | End: 2020-01-28 | Stop reason: HOSPADM

## 2020-01-23 RX ORDER — PROPOFOL 10 MG/ML
INJECTION, EMULSION INTRAVENOUS CONTINUOUS PRN
Status: DISCONTINUED | OUTPATIENT
Start: 2020-01-23 | End: 2020-01-23 | Stop reason: SDUPTHER

## 2020-01-23 RX ORDER — OXYCODONE HYDROCHLORIDE AND ACETAMINOPHEN 5; 325 MG/1; MG/1
2 TABLET ORAL PRN
Status: DISCONTINUED | OUTPATIENT
Start: 2020-01-23 | End: 2020-01-23 | Stop reason: HOSPADM

## 2020-01-23 RX ORDER — ACETAMINOPHEN 325 MG/1
650 TABLET ORAL EVERY 6 HOURS
Status: DISCONTINUED | OUTPATIENT
Start: 2020-01-23 | End: 2020-01-25

## 2020-01-23 RX ORDER — DEXAMETHASONE SODIUM PHOSPHATE 4 MG/ML
INJECTION, SOLUTION INTRA-ARTICULAR; INTRALESIONAL; INTRAMUSCULAR; INTRAVENOUS; SOFT TISSUE PRN
Status: DISCONTINUED | OUTPATIENT
Start: 2020-01-23 | End: 2020-01-23 | Stop reason: SDUPTHER

## 2020-01-23 RX ORDER — ACETAMINOPHEN 500 MG
1000 TABLET ORAL ONCE
Status: COMPLETED | OUTPATIENT
Start: 2020-01-23 | End: 2020-01-23

## 2020-01-23 RX ORDER — ATORVASTATIN CALCIUM 80 MG/1
80 TABLET, FILM COATED ORAL NIGHTLY
Status: DISCONTINUED | OUTPATIENT
Start: 2020-01-23 | End: 2020-01-28 | Stop reason: HOSPADM

## 2020-01-23 RX ORDER — HYDRALAZINE HYDROCHLORIDE 20 MG/ML
5 INJECTION INTRAMUSCULAR; INTRAVENOUS EVERY 10 MIN PRN
Status: DISCONTINUED | OUTPATIENT
Start: 2020-01-23 | End: 2020-01-23 | Stop reason: HOSPADM

## 2020-01-23 RX ORDER — CELECOXIB 100 MG/1
100 CAPSULE ORAL ONCE
Status: COMPLETED | OUTPATIENT
Start: 2020-01-23 | End: 2020-01-23

## 2020-01-23 RX ORDER — FENTANYL CITRATE 50 UG/ML
INJECTION, SOLUTION INTRAMUSCULAR; INTRAVENOUS PRN
Status: DISCONTINUED | OUTPATIENT
Start: 2020-01-23 | End: 2020-01-23 | Stop reason: SDUPTHER

## 2020-01-23 RX ORDER — BUPIVACAINE HYDROCHLORIDE 7.5 MG/ML
INJECTION, SOLUTION INTRASPINAL PRN
Status: DISCONTINUED | OUTPATIENT
Start: 2020-01-23 | End: 2020-01-23 | Stop reason: SDUPTHER

## 2020-01-23 RX ORDER — DOCUSATE SODIUM 100 MG/1
100 CAPSULE, LIQUID FILLED ORAL 2 TIMES DAILY
Status: DISCONTINUED | OUTPATIENT
Start: 2020-01-23 | End: 2020-01-28 | Stop reason: HOSPADM

## 2020-01-23 RX ORDER — GLYCOPYRROLATE 0.2 MG/ML
0.2 INJECTION INTRAMUSCULAR; INTRAVENOUS ONCE
Status: COMPLETED | OUTPATIENT
Start: 2020-01-23 | End: 2020-01-23

## 2020-01-23 RX ORDER — ONDANSETRON 2 MG/ML
4 INJECTION INTRAMUSCULAR; INTRAVENOUS
Status: DISCONTINUED | OUTPATIENT
Start: 2020-01-23 | End: 2020-01-23 | Stop reason: HOSPADM

## 2020-01-23 RX ORDER — OXYCODONE HYDROCHLORIDE 5 MG/1
5 TABLET ORAL EVERY 4 HOURS PRN
Status: DISCONTINUED | OUTPATIENT
Start: 2020-01-23 | End: 2020-01-25

## 2020-01-23 RX ORDER — OXYCODONE HYDROCHLORIDE 5 MG/1
10 TABLET ORAL EVERY 4 HOURS PRN
Status: DISCONTINUED | OUTPATIENT
Start: 2020-01-23 | End: 2020-01-25

## 2020-01-23 RX ORDER — SODIUM CHLORIDE, SODIUM LACTATE, POTASSIUM CHLORIDE, CALCIUM CHLORIDE 600; 310; 30; 20 MG/100ML; MG/100ML; MG/100ML; MG/100ML
INJECTION, SOLUTION INTRAVENOUS CONTINUOUS PRN
Status: DISCONTINUED | OUTPATIENT
Start: 2020-01-23 | End: 2020-01-23 | Stop reason: SDUPTHER

## 2020-01-23 RX ORDER — MIDAZOLAM HYDROCHLORIDE 1 MG/ML
INJECTION INTRAMUSCULAR; INTRAVENOUS PRN
Status: DISCONTINUED | OUTPATIENT
Start: 2020-01-23 | End: 2020-01-23 | Stop reason: SDUPTHER

## 2020-01-23 RX ORDER — SODIUM CHLORIDE 0.9 % (FLUSH) 0.9 %
10 SYRINGE (ML) INJECTION EVERY 12 HOURS SCHEDULED
Status: DISCONTINUED | OUTPATIENT
Start: 2020-01-23 | End: 2020-01-28 | Stop reason: HOSPADM

## 2020-01-23 RX ORDER — GABAPENTIN 300 MG/1
300 CAPSULE ORAL ONCE
Status: COMPLETED | OUTPATIENT
Start: 2020-01-23 | End: 2020-01-23

## 2020-01-23 RX ORDER — PENICILLIN V POTASSIUM 250 MG/1
1000 TABLET ORAL 2 TIMES DAILY
Status: DISCONTINUED | OUTPATIENT
Start: 2020-01-23 | End: 2020-01-28 | Stop reason: HOSPADM

## 2020-01-23 RX ORDER — ONDANSETRON 2 MG/ML
4 INJECTION INTRAMUSCULAR; INTRAVENOUS EVERY 6 HOURS PRN
Status: DISCONTINUED | OUTPATIENT
Start: 2020-01-23 | End: 2020-01-28 | Stop reason: HOSPADM

## 2020-01-23 RX ORDER — LISINOPRIL 20 MG/1
20 TABLET ORAL 2 TIMES DAILY
Status: DISCONTINUED | OUTPATIENT
Start: 2020-01-23 | End: 2020-01-28 | Stop reason: HOSPADM

## 2020-01-23 RX ORDER — FENTANYL CITRATE 50 UG/ML
25 INJECTION, SOLUTION INTRAMUSCULAR; INTRAVENOUS EVERY 5 MIN PRN
Status: DISCONTINUED | OUTPATIENT
Start: 2020-01-23 | End: 2020-01-23 | Stop reason: HOSPADM

## 2020-01-23 RX ORDER — MORPHINE SULFATE 4 MG/ML
4 INJECTION, SOLUTION INTRAMUSCULAR; INTRAVENOUS
Status: DISCONTINUED | OUTPATIENT
Start: 2020-01-23 | End: 2020-01-25

## 2020-01-23 RX ORDER — NICOTINE POLACRILEX 4 MG
15 LOZENGE BUCCAL PRN
Status: DISCONTINUED | OUTPATIENT
Start: 2020-01-23 | End: 2020-01-28 | Stop reason: HOSPADM

## 2020-01-23 RX ORDER — BUPIVACAINE HYDROCHLORIDE AND EPINEPHRINE 2.5; 5 MG/ML; UG/ML
INJECTION, SOLUTION EPIDURAL; INFILTRATION; INTRACAUDAL; PERINEURAL PRN
Status: DISCONTINUED | OUTPATIENT
Start: 2020-01-23 | End: 2020-01-23 | Stop reason: SDUPTHER

## 2020-01-23 RX ORDER — MORPHINE SULFATE 2 MG/ML
2 INJECTION, SOLUTION INTRAMUSCULAR; INTRAVENOUS
Status: DISCONTINUED | OUTPATIENT
Start: 2020-01-23 | End: 2020-01-25

## 2020-01-23 RX ORDER — GLYCOPYRROLATE 0.2 MG/ML
INJECTION INTRAMUSCULAR; INTRAVENOUS
Status: DISPENSED
Start: 2020-01-23 | End: 2020-01-24

## 2020-01-23 RX ORDER — HYDRALAZINE HYDROCHLORIDE 25 MG/1
25 TABLET, FILM COATED ORAL EVERY 8 HOURS SCHEDULED
Status: DISCONTINUED | OUTPATIENT
Start: 2020-01-23 | End: 2020-01-28 | Stop reason: HOSPADM

## 2020-01-23 RX ORDER — LIDOCAINE HYDROCHLORIDE 20 MG/ML
INJECTION, SOLUTION EPIDURAL; INFILTRATION; INTRACAUDAL; PERINEURAL PRN
Status: DISCONTINUED | OUTPATIENT
Start: 2020-01-23 | End: 2020-01-23 | Stop reason: SDUPTHER

## 2020-01-23 RX ORDER — PROMETHAZINE HYDROCHLORIDE 25 MG/ML
6.25 INJECTION, SOLUTION INTRAMUSCULAR; INTRAVENOUS
Status: DISCONTINUED | OUTPATIENT
Start: 2020-01-23 | End: 2020-01-23 | Stop reason: HOSPADM

## 2020-01-23 RX ORDER — SODIUM CHLORIDE 9 MG/ML
INJECTION, SOLUTION INTRAVENOUS CONTINUOUS
Status: DISCONTINUED | OUTPATIENT
Start: 2020-01-23 | End: 2020-01-28 | Stop reason: HOSPADM

## 2020-01-23 RX ORDER — LIDOCAINE HYDROCHLORIDE 10 MG/ML
INJECTION, SOLUTION INFILTRATION; PERINEURAL PRN
Status: DISCONTINUED | OUTPATIENT
Start: 2020-01-23 | End: 2020-01-23 | Stop reason: SDUPTHER

## 2020-01-23 RX ORDER — CARVEDILOL 25 MG/1
25 TABLET ORAL 2 TIMES DAILY
Status: DISCONTINUED | OUTPATIENT
Start: 2020-01-23 | End: 2020-01-28 | Stop reason: HOSPADM

## 2020-01-23 RX ORDER — PHENYLEPHRINE HCL IN 0.9% NACL 1 MG/10 ML
SYRINGE (ML) INTRAVENOUS PRN
Status: DISCONTINUED | OUTPATIENT
Start: 2020-01-23 | End: 2020-01-23 | Stop reason: SDUPTHER

## 2020-01-23 RX ORDER — HYDROCHLOROTHIAZIDE 25 MG/1
25 TABLET ORAL DAILY
COMMUNITY

## 2020-01-23 RX ORDER — SODIUM CHLORIDE 0.9 % (FLUSH) 0.9 %
10 SYRINGE (ML) INJECTION PRN
Status: DISCONTINUED | OUTPATIENT
Start: 2020-01-23 | End: 2020-01-28 | Stop reason: HOSPADM

## 2020-01-23 RX ORDER — INSULIN GLARGINE 100 [IU]/ML
0.25 INJECTION, SOLUTION SUBCUTANEOUS NIGHTLY
Status: DISCONTINUED | OUTPATIENT
Start: 2020-01-23 | End: 2020-01-23

## 2020-01-23 RX ORDER — METFORMIN HYDROCHLORIDE 750 MG/1
750 TABLET, EXTENDED RELEASE ORAL 2 TIMES DAILY
Status: DISCONTINUED | OUTPATIENT
Start: 2020-01-23 | End: 2020-01-28 | Stop reason: HOSPADM

## 2020-01-23 RX ORDER — SENNA AND DOCUSATE SODIUM 50; 8.6 MG/1; MG/1
1 TABLET, FILM COATED ORAL 2 TIMES DAILY
Status: DISCONTINUED | OUTPATIENT
Start: 2020-01-23 | End: 2020-01-28 | Stop reason: HOSPADM

## 2020-01-23 RX ORDER — OXYCODONE HYDROCHLORIDE AND ACETAMINOPHEN 5; 325 MG/1; MG/1
1 TABLET ORAL PRN
Status: DISCONTINUED | OUTPATIENT
Start: 2020-01-23 | End: 2020-01-23 | Stop reason: HOSPADM

## 2020-01-23 RX ORDER — DEXTROSE MONOHYDRATE 50 MG/ML
100 INJECTION, SOLUTION INTRAVENOUS PRN
Status: DISCONTINUED | OUTPATIENT
Start: 2020-01-23 | End: 2020-01-28 | Stop reason: HOSPADM

## 2020-01-23 RX ADMIN — DOCUSATE SODIUM 100 MG: 100 CAPSULE, LIQUID FILLED ORAL at 20:39

## 2020-01-23 RX ADMIN — Medication 100 MCG: at 17:45

## 2020-01-23 RX ADMIN — FENTANYL CITRATE 50 MCG: 50 INJECTION INTRAMUSCULAR; INTRAVENOUS at 16:30

## 2020-01-23 RX ADMIN — GLYCOPYRROLATE 0.2 MG: 0.2 INJECTION, SOLUTION INTRAMUSCULAR; INTRAVENOUS at 18:50

## 2020-01-23 RX ADMIN — SODIUM CHLORIDE: 9 INJECTION, SOLUTION INTRAVENOUS at 20:39

## 2020-01-23 RX ADMIN — SODIUM CHLORIDE, POTASSIUM CHLORIDE, SODIUM LACTATE AND CALCIUM CHLORIDE: 600; 310; 30; 20 INJECTION, SOLUTION INTRAVENOUS at 15:08

## 2020-01-23 RX ADMIN — MIDAZOLAM HYDROCHLORIDE 2 MG: 2 INJECTION, SOLUTION INTRAMUSCULAR; INTRAVENOUS at 16:27

## 2020-01-23 RX ADMIN — ATORVASTATIN CALCIUM 80 MG: 80 TABLET, FILM COATED ORAL at 20:39

## 2020-01-23 RX ADMIN — Medication 1.5 G: at 16:36

## 2020-01-23 RX ADMIN — FENTANYL CITRATE 100 MCG: 50 INJECTION INTRAMUSCULAR; INTRAVENOUS at 15:39

## 2020-01-23 RX ADMIN — Medication 50 MCG: at 17:41

## 2020-01-23 RX ADMIN — ACETAMINOPHEN 1000 MG: 500 TABLET ORAL at 15:15

## 2020-01-23 RX ADMIN — HYDRALAZINE HYDROCHLORIDE 25 MG: 25 TABLET, FILM COATED ORAL at 21:02

## 2020-01-23 RX ADMIN — FENTANYL CITRATE 25 MCG: 50 INJECTION INTRAMUSCULAR; INTRAVENOUS at 16:53

## 2020-01-23 RX ADMIN — BUPIVACAINE HYDROCHLORIDE AND EPINEPHRINE BITARTRATE 30 ML: 2.5; .005 INJECTION, SOLUTION EPIDURAL; INFILTRATION; INTRACAUDAL; PERINEURAL at 15:44

## 2020-01-23 RX ADMIN — Medication 50 MCG: at 17:43

## 2020-01-23 RX ADMIN — BUPIVACAINE HYDROCHLORIDE AND EPINEPHRINE BITARTRATE 20 ML: 2.5; .005 INJECTION, SOLUTION EPIDURAL; INFILTRATION; INTRACAUDAL; PERINEURAL at 15:41

## 2020-01-23 RX ADMIN — LIDOCAINE HYDROCHLORIDE 40 MG: 20 INJECTION, SOLUTION EPIDURAL; INFILTRATION; INTRACAUDAL; PERINEURAL at 16:52

## 2020-01-23 RX ADMIN — SENNOSIDES AND DOCUSATE SODIUM 1 TABLET: 8.6; 5 TABLET ORAL at 20:39

## 2020-01-23 RX ADMIN — BUPIVACAINE HYDROCHLORIDE 2 ML: 7.5 INJECTION, SOLUTION SUBARACHNOID at 16:42

## 2020-01-23 RX ADMIN — FENTANYL CITRATE 25 MCG: 50 INJECTION INTRAMUSCULAR; INTRAVENOUS at 17:06

## 2020-01-23 RX ADMIN — CELECOXIB 100 MG: 100 CAPSULE ORAL at 15:16

## 2020-01-23 RX ADMIN — LISINOPRIL 20 MG: 20 TABLET ORAL at 20:39

## 2020-01-23 RX ADMIN — MIDAZOLAM HYDROCHLORIDE 2 MG: 2 INJECTION, SOLUTION INTRAMUSCULAR; INTRAVENOUS at 15:39

## 2020-01-23 RX ADMIN — GABAPENTIN 300 MG: 300 CAPSULE ORAL at 15:15

## 2020-01-23 RX ADMIN — CARVEDILOL 25 MG: 25 TABLET, FILM COATED ORAL at 20:48

## 2020-01-23 RX ADMIN — DEXAMETHASONE SODIUM PHOSPHATE 4 MG: 4 INJECTION, SOLUTION INTRAMUSCULAR; INTRAVENOUS at 16:54

## 2020-01-23 RX ADMIN — INSULIN LISPRO 1 UNITS: 100 INJECTION, SOLUTION INTRAVENOUS; SUBCUTANEOUS at 21:06

## 2020-01-23 RX ADMIN — Medication 100 MCG: at 17:58

## 2020-01-23 RX ADMIN — ACETAMINOPHEN 650 MG: 325 TABLET ORAL at 21:01

## 2020-01-23 RX ADMIN — ONDANSETRON 4 MG: 2 INJECTION INTRAMUSCULAR; INTRAVENOUS at 16:54

## 2020-01-23 RX ADMIN — PROPOFOL 100 MCG/KG/MIN: 10 INJECTION, EMULSION INTRAVENOUS at 16:48

## 2020-01-23 RX ADMIN — Medication 10 ML: at 20:41

## 2020-01-23 RX ADMIN — LIDOCAINE HYDROCHLORIDE 5 ML: 10 INJECTION, SOLUTION INFILTRATION; PERINEURAL at 16:41

## 2020-01-23 RX ADMIN — Medication 100 MCG: at 18:03

## 2020-01-23 ASSESSMENT — PULMONARY FUNCTION TESTS
PIF_VALUE: 0
PIF_VALUE: 1
PIF_VALUE: 1
PIF_VALUE: 0
PIF_VALUE: 1
PIF_VALUE: 0
PIF_VALUE: 1
PIF_VALUE: 0

## 2020-01-23 ASSESSMENT — PAIN SCALES - GENERAL
PAINLEVEL_OUTOF10: 0
PAINLEVEL_OUTOF10: 0
PAINLEVEL_OUTOF10: 2

## 2020-01-23 ASSESSMENT — LIFESTYLE VARIABLES: SMOKING_STATUS: 0

## 2020-01-23 ASSESSMENT — PAIN - FUNCTIONAL ASSESSMENT: PAIN_FUNCTIONAL_ASSESSMENT: 0-10

## 2020-01-23 NOTE — ANESTHESIA PROCEDURE NOTES
Peripheral Block    Patient location during procedure: pre-op  Start time: 1/23/2020 3:38 PM  End time: 1/23/2020 3:45 PM  Staffing  Anesthesiologist: Esvin Tucker MD  Performed: anesthesiologist   Preanesthetic Checklist  Completed: patient identified, site marked, surgical consent, pre-op evaluation, timeout performed, IV checked, risks and benefits discussed, monitors and equipment checked, anesthesia consent given, oxygen available and patient being monitored  Peripheral Block  Patient position: supine  Prep: ChloraPrep  Patient monitoring: cardiac monitor, continuous pulse ox, frequent blood pressure checks and IV access  Block type: Femoral  Laterality: right  Injection technique: single-shot  Procedures: ultrasound guided  Adductor canal  Provider prep: sterile gloves  Needle  Needle gauge: 21 G  Needle length: 10 cm  Needle localization: ultrasound guidance  Test dose: negative  Assessment  Injection assessment: negative aspiration for heme, no paresthesia on injection and local visualized surrounding nerve on ultrasound  Slow fractionated injection: yes  Hemodynamics: stable  Additional Notes  Pt. agrees to risks, benefits and alternatives to block  Block performed at the request of the surgeon for post-operative pain management   Immediately prior to procedure a \"time out\" was called to verify the correct patient, procedure, equipment, support staff. Site/side marked as required  Side: right  Site/Approach: anteromedial thigh approximately 10-15 cm from the inguinal crease. Position: supine  Sedation: Midazolam 2 mg  +  Fentanyl  100  mcg  IV  Local Anesthetic Dose:  1% Lido  3 ml  Aseptic technique: prepped with chlorhexidine  Ultrasound:   yes, see attached image  Local Anesthetic: 0.25% Bupivacaine with Epi 1:200K  Amount: 20 ml  in 5 ml increments after negative aspiration each time. Easy injection w/o resistance and w/o pain/paresthesias. Pt tolerated procedure well.     No complications.   Reason for block: post-op pain management and at surgeon's request

## 2020-01-23 NOTE — H&P
I have reviewed the history and physical and examined the patient and find no relevant changes. I have reviewed with the patient and/or family the risks, benefits, and alternatives to the procedure. Controlled Substance Monitoring:    Acute and Chronic Pain Monitoring:   RX Monitoring 1/23/2020   Acute Pain Prescriptions Severe pain not adequately treated with lower dose. Periodic Controlled Substance Monitoring No signs of potential drug abuse or diversion identified. Patient being given increased dosage/quantity of opoid pain medication in excess of OSMB guidelines which noted a 30 MED daily of opioids due to the fact that he/she has undergone major orthopaedic surgery as outlined in rule 4731-11-13. Dosages and further duration of the pain medication will be re-evaluated at her post op visit in 2 weeks. Patient was educated on dosing expectations and limits of prescribing as a result of the new [facility] Board rules enacted August 31, 2017. Please also note that this is not the initial opoid prescription issued to this patient but a continuation of medication utilized during the hospital admission as noted in the medical record. [place]ARRS report has also been utilized to screen for any abuse history or suspicious activity as outlined in [place]. All efforts have been taken to prevent abuse potential and misuse of opioid medications including education, screening, and close clinical follow up.

## 2020-01-23 NOTE — BRIEF OP NOTE
Brief Postoperative Note  ______________________________________________________________    Patient: Moe Fernandes  YOB: 1945  MRN: [id number]  Date of Procedure: 1/23/2020    Pre-Op Diagnosis: RIGHT KNEE OSTEOARTHRITIS    Post-Op Diagnosis: Same       Procedure(s):  RIGHT TOTAL KNEE REPLACEMENT      [facility]OMA [facility] NATALIO    Anesthesia: Spinal    Surgeon(s):  Praveena Sprague MD    Assistant: Shalom ARMSTRONG    Estimated Blood Loss (mL): 767     Complications: None    Specimens:   ID Type Source Tests Collected by Time Destination   A : RIGHT KNEE BONE Specimen Joint, Knee SURGICAL PATHOLOGY Praveena Sprague MD 1/23/2020 1659        Implants:  Implant Name Type Inv.  Item Serial No.  Lot No. LRB No. Used   CEMENT BONE RALLY HV Cement CEMENT BONE RALLY HV  [place] [id number] Right 1   IMPL KNEE PATELLA COMP RESURF 32MM Knee IMPL KNEE PATELLA COMP RESURF 32MM  [place] [id number] Right 1   IMPL KNEE TIB ARTICULAR SZ 7TO8 9MM Knee IMPL KNEE TIB ARTICULAR SZ 7TO8 9MM  [place] [id number] Right 1   IMPL KNEE FEM COMP NON PORS POSTR Knee IMPL KNEE FEM COMP NON PORS POSTR  [employer] [id number] Right 1   IMPL KNEE TIB BASE NON PORS SZ 7 Knee IMPL KNEE TIB BASE NON PORS SZ 7  [place] [id number] Right 1   CEMENT BONE RALLY HV Cement CEMENT BONE RALLY HV  [place] [id number] Right 1         Drains: * No LDAs found *    Findings: right TKA    PATTI Gaffney  Date: 1/23/2020  Time: 6:10 PM

## 2020-01-23 NOTE — ANESTHESIA PROCEDURE NOTES
Peripheral Block    Patient location during procedure: pre-op  Start time: 1/23/2020 3:38 PM  End time: 1/23/2020 3:45 PM  Staffing  Anesthesiologist: Cory Carvajal MD  Performed: anesthesiologist   Preanesthetic Checklist  Completed: patient identified, site marked, surgical consent, pre-op evaluation, timeout performed, IV checked, risks and benefits discussed, monitors and equipment checked, anesthesia consent given, oxygen available and patient being monitored  Peripheral Block  Patient position: supine  Prep: ChloraPrep  Patient monitoring: cardiac monitor, continuous pulse ox, frequent blood pressure checks and IV access  Block type: iPacks  Laterality: right  Injection technique: single-shot  Procedures: ultrasound guided  Provider prep: sterile gloves  Needle  Needle gauge: 21 G  Needle length: 10 cm  Needle localization: ultrasound guidance  Test dose: negative  Assessment  Injection assessment: negative aspiration for heme and no paresthesia on injection  Slow fractionated injection: yes  Hemodynamics: stable  Additional Notes  Interspace between the Popliteal Artery and the Capsule of the posterior Knee (IPACK) Blocks  Pt. agrees to risks, benefits and alternatives to block  Block performed at the request of the surgeon for post-operative pain management   Immediately prior to procedure a \"time out\" was called to verify the correct patient, procedure, equipment, support staff. Site/side marked as required  Side: right  Site/Approach:  lateral thigh 2-3 cm superior to the Popliteal Fossa  crease  Position: supine with leg elevated on blankets  Sedation: Midazolam 2 mg  +  Fentanyl  100  mcg  IV  Local Anesthetic Dose:  Lido 1%  3 ml sc prior to each block  Aseptic technique: prepped with chlorhexidine  Ultrasound:  see attached image  Local Anesthetic:   0.25% Bupivacaine with Epi 1:200K Amount:  30 ml in 5 ml increments after negative aspiration each time.   Easy injection w/o resistance and w/o

## 2020-01-23 NOTE — ANESTHESIA PRE PROCEDURE
Department of Anesthesiology  Preprocedure Note       Name:  Gilford Sober   Age:  75 y.o.  :  1945                                          MRN:  [id number]         Date:  2020      Surgeon:  Bhavana Shields MD    Procedure: RIGHT TOTAL KNEE REPLACEMENT    HPI:  The patient is a 75 y.o. male with known end-stage bone-on-bone osteoarthritis of the right knee. He has had corticosteroid injections. He has been struggling with some cellulitis. He was seen in the hospital and treated for cellulitis as well as followed up with Dr. Delbert Balderas. He has been taking penicillin regularly for 2 weeks to help prevent flareups of his cellulitis. He states he usually gets about 1 flareup per year. his always affects the right lower leg only. He has a history of coronary artery disease with stent placement in  with drug eluting stents to his right coronary artery and left anterior descending artery. His most recent echocardiogram from 10/2019 showed an ejection fraction of 55-60%. He denies CP/CARRION. Medications prior to admission:    hydrochlorothiazide (HYDRODIURIL) 25 MG tablet Take 25 mg by mouth daily   metFORMIN (GLUCOPHAGE-XR) 750 MG extended release tablet Take 1 tablet by mouth 2 times daily   penicillin v potassium (VEETID) 500 MG tablet TAKE TWO TABLETS BY MOUTH TWICE A DAY   hydrALAZINE (APRESOLINE) 25 MG tablet Take 1 tablet by mouth every 8 hours   ferrous sulfate 325 (65 Fe) MG tablet Take 1 tablet by mouth daily (with breakfast)   Probiotic Product (PROBIOTIC DAILY PO) Take by mouth   cabergoline (DOSTINEX) 0.5 MG tablet TAKE half tablet once a week. Patient taking differently: TAKE half tablet once a week.  MONDAY   allopurinol (ZYLOPRIM) 300 MG tablet TAKE ONE TABLET BY MOUTH DAILY   atorvastatin (LIPITOR) 80 MG tablet TAKE ONE TABLET BY MOUTH DAILY  Patient taking differently: nightly TAKE ONE TABLET BY MOUTH DAILY   ammonium lactate (LAC-HYDRIN) 12 % lotion APPLY TOPICALLY DAILY   lisinopril

## 2020-01-24 LAB
ANION GAP SERPL CALCULATED.3IONS-SCNC: 11 MMOL/L (ref 3–16)
BUN BLDV-MCNC: 25 MG/DL (ref 7–20)
CALCIUM SERPL-MCNC: 8.8 MG/DL (ref 8.3–10.6)
CHLORIDE BLD-SCNC: 106 MMOL/L (ref 99–110)
CO2: 23 MMOL/L (ref 21–32)
CREAT SERPL-MCNC: 1.3 MG/DL (ref 0.8–1.3)
ESTIMATED AVERAGE GLUCOSE: 137 MG/DL
GFR AFRICAN AMERICAN: >60
GFR NON-AFRICAN AMERICAN: 54
GLUCOSE BLD-MCNC: 151 MG/DL (ref 70–99)
GLUCOSE BLD-MCNC: 166 MG/DL (ref 70–99)
GLUCOSE BLD-MCNC: 195 MG/DL (ref 70–99)
GLUCOSE BLD-MCNC: 239 MG/DL (ref 70–99)
GLUCOSE BLD-MCNC: 248 MG/DL (ref 70–99)
HBA1C MFR BLD: 6.4 %
HCT VFR BLD CALC: 29.8 % (ref 40.5–52.5)
HEMOGLOBIN: 9.8 G/DL (ref 13.5–17.5)
MCH RBC QN AUTO: 28.8 PG (ref 26–34)
MCHC RBC AUTO-ENTMCNC: 33 G/DL (ref 31–36)
MCV RBC AUTO: 87.2 FL (ref 80–100)
PDW BLD-RTO: 16.3 % (ref 12.4–15.4)
PERFORMED ON: ABNORMAL
PLATELET # BLD: 130 K/UL (ref 135–450)
PMV BLD AUTO: 8.8 FL (ref 5–10.5)
POTASSIUM REFLEX MAGNESIUM: 4.7 MMOL/L (ref 3.5–5.1)
RBC # BLD: 3.41 M/UL (ref 4.2–5.9)
SODIUM BLD-SCNC: 140 MMOL/L (ref 136–145)
WBC # BLD: 8.8 K/UL (ref 4–11)

## 2020-01-24 PROCEDURE — 2580000003 HC RX 258: Performed by: PHYSICIAN ASSISTANT

## 2020-01-24 PROCEDURE — 97166 OT EVAL MOD COMPLEX 45 MIN: CPT

## 2020-01-24 PROCEDURE — 6370000000 HC RX 637 (ALT 250 FOR IP): Performed by: PHYSICIAN ASSISTANT

## 2020-01-24 PROCEDURE — 97116 GAIT TRAINING THERAPY: CPT

## 2020-01-24 PROCEDURE — 94761 N-INVAS EAR/PLS OXIMETRY MLT: CPT

## 2020-01-24 PROCEDURE — 97535 SELF CARE MNGMENT TRAINING: CPT

## 2020-01-24 PROCEDURE — 83036 HEMOGLOBIN GLYCOSYLATED A1C: CPT

## 2020-01-24 PROCEDURE — 36415 COLL VENOUS BLD VENIPUNCTURE: CPT

## 2020-01-24 PROCEDURE — 1200000000 HC SEMI PRIVATE

## 2020-01-24 PROCEDURE — 80048 BASIC METABOLIC PNL TOTAL CA: CPT

## 2020-01-24 PROCEDURE — 97530 THERAPEUTIC ACTIVITIES: CPT

## 2020-01-24 PROCEDURE — 0SRC0J9 REPLACEMENT OF RIGHT KNEE JOINT WITH SYNTHETIC SUBSTITUTE, CEMENTED, OPEN APPROACH: ICD-10-PCS | Performed by: ORTHOPAEDIC SURGERY

## 2020-01-24 PROCEDURE — 97110 THERAPEUTIC EXERCISES: CPT

## 2020-01-24 PROCEDURE — APPNB30 APP NON BILLABLE TIME 0-30 MINS: Performed by: PHYSICIAN ASSISTANT

## 2020-01-24 PROCEDURE — 6370000000 HC RX 637 (ALT 250 FOR IP): Performed by: NURSE PRACTITIONER

## 2020-01-24 PROCEDURE — 85027 COMPLETE CBC AUTOMATED: CPT

## 2020-01-24 PROCEDURE — 2700000000 HC OXYGEN THERAPY PER DAY

## 2020-01-24 PROCEDURE — 97162 PT EVAL MOD COMPLEX 30 MIN: CPT

## 2020-01-24 PROCEDURE — 6360000002 HC RX W HCPCS: Performed by: PHYSICIAN ASSISTANT

## 2020-01-24 RX ORDER — ACETAMINOPHEN 325 MG/1
650 TABLET ORAL EVERY 6 HOURS PRN
Qty: 120 TABLET | Refills: 0
Start: 2020-01-24 | End: 2021-06-03

## 2020-01-24 RX ORDER — SACCHAROMYCES BOULARDII 250 MG
250 CAPSULE ORAL 2 TIMES DAILY
Status: DISCONTINUED | OUTPATIENT
Start: 2020-01-24 | End: 2020-01-28 | Stop reason: HOSPADM

## 2020-01-24 RX ORDER — OXYCODONE HYDROCHLORIDE 5 MG/1
5-10 TABLET ORAL EVERY 4 HOURS PRN
Qty: 40 TABLET | Refills: 0 | Status: SHIPPED | OUTPATIENT
Start: 2020-01-24 | End: 2020-01-25 | Stop reason: HOSPADM

## 2020-01-24 RX ORDER — DIPHENHYDRAMINE HCL 25 MG
25 TABLET ORAL NIGHTLY PRN
Status: DISCONTINUED | OUTPATIENT
Start: 2020-01-24 | End: 2020-01-28 | Stop reason: HOSPADM

## 2020-01-24 RX ADMIN — APIXABAN 2.5 MG: 2.5 TABLET, FILM COATED ORAL at 21:04

## 2020-01-24 RX ADMIN — Medication 250 MG: at 21:03

## 2020-01-24 RX ADMIN — ONDANSETRON 4 MG: 2 INJECTION INTRAMUSCULAR; INTRAVENOUS at 08:35

## 2020-01-24 RX ADMIN — INSULIN LISPRO 2 UNITS: 100 INJECTION, SOLUTION INTRAVENOUS; SUBCUTANEOUS at 11:53

## 2020-01-24 RX ADMIN — OXYCODONE 5 MG: 5 TABLET ORAL at 11:50

## 2020-01-24 RX ADMIN — INSULIN LISPRO 2 UNITS: 100 INJECTION, SOLUTION INTRAVENOUS; SUBCUTANEOUS at 17:37

## 2020-01-24 RX ADMIN — DOCUSATE SODIUM 100 MG: 100 CAPSULE, LIQUID FILLED ORAL at 21:03

## 2020-01-24 RX ADMIN — METFORMIN HYDROCHLORIDE 750 MG: 750 TABLET, EXTENDED RELEASE ORAL at 10:10

## 2020-01-24 RX ADMIN — SENNOSIDES AND DOCUSATE SODIUM 1 TABLET: 8.6; 5 TABLET ORAL at 21:04

## 2020-01-24 RX ADMIN — APIXABAN 2.5 MG: 2.5 TABLET, FILM COATED ORAL at 08:53

## 2020-01-24 RX ADMIN — Medication 250 MG: at 14:16

## 2020-01-24 RX ADMIN — INSULIN LISPRO 1 UNITS: 100 INJECTION, SOLUTION INTRAVENOUS; SUBCUTANEOUS at 22:13

## 2020-01-24 RX ADMIN — METFORMIN HYDROCHLORIDE 750 MG: 750 TABLET, EXTENDED RELEASE ORAL at 21:04

## 2020-01-24 RX ADMIN — VANCOMYCIN HYDROCHLORIDE 2000 MG: 1 INJECTION, POWDER, LYOPHILIZED, FOR SOLUTION INTRAVENOUS at 04:19

## 2020-01-24 RX ADMIN — HYDRALAZINE HYDROCHLORIDE 25 MG: 25 TABLET, FILM COATED ORAL at 22:12

## 2020-01-24 RX ADMIN — HYDROCHLOROTHIAZIDE 25 MG: 25 TABLET ORAL at 08:52

## 2020-01-24 RX ADMIN — CARVEDILOL 25 MG: 25 TABLET, FILM COATED ORAL at 21:04

## 2020-01-24 RX ADMIN — ACETAMINOPHEN 650 MG: 325 TABLET ORAL at 08:53

## 2020-01-24 RX ADMIN — Medication 1 LOZENGE: at 11:50

## 2020-01-24 RX ADMIN — CARVEDILOL 25 MG: 25 TABLET, FILM COATED ORAL at 08:53

## 2020-01-24 RX ADMIN — ATORVASTATIN CALCIUM 80 MG: 80 TABLET, FILM COATED ORAL at 21:03

## 2020-01-24 RX ADMIN — ALLOPURINOL 300 MG: 300 TABLET ORAL at 08:53

## 2020-01-24 RX ADMIN — PENICILLIN V POTASIUM 1000 MG: 250 TABLET ORAL at 22:37

## 2020-01-24 RX ADMIN — LISINOPRIL 20 MG: 20 TABLET ORAL at 08:53

## 2020-01-24 RX ADMIN — INSULIN LISPRO 4 UNITS: 100 INJECTION, SOLUTION INTRAVENOUS; SUBCUTANEOUS at 08:38

## 2020-01-24 RX ADMIN — PENICILLIN V POTASIUM 1000 MG: 250 TABLET ORAL at 10:10

## 2020-01-24 RX ADMIN — OXYCODONE 10 MG: 5 TABLET ORAL at 19:56

## 2020-01-24 RX ADMIN — SENNOSIDES AND DOCUSATE SODIUM 1 TABLET: 8.6; 5 TABLET ORAL at 08:53

## 2020-01-24 RX ADMIN — DOCUSATE SODIUM 100 MG: 100 CAPSULE, LIQUID FILLED ORAL at 08:52

## 2020-01-24 RX ADMIN — Medication 10 ML: at 21:07

## 2020-01-24 RX ADMIN — ACETAMINOPHEN 650 MG: 325 TABLET ORAL at 14:16

## 2020-01-24 RX ADMIN — LISINOPRIL 20 MG: 20 TABLET ORAL at 21:09

## 2020-01-24 RX ADMIN — MORPHINE SULFATE 2 MG: 2 INJECTION, SOLUTION INTRAMUSCULAR; INTRAVENOUS at 14:16

## 2020-01-24 RX ADMIN — ACETAMINOPHEN 650 MG: 325 TABLET ORAL at 21:03

## 2020-01-24 RX ADMIN — OXYCODONE 10 MG: 5 TABLET ORAL at 05:45

## 2020-01-24 ASSESSMENT — PAIN SCALES - GENERAL
PAINLEVEL_OUTOF10: 4
PAINLEVEL_OUTOF10: 5
PAINLEVEL_OUTOF10: 6
PAINLEVEL_OUTOF10: 5
PAINLEVEL_OUTOF10: 4
PAINLEVEL_OUTOF10: 6
PAINLEVEL_OUTOF10: 4
PAINLEVEL_OUTOF10: 6
PAINLEVEL_OUTOF10: 4

## 2020-01-24 ASSESSMENT — PAIN DESCRIPTION - PAIN TYPE
TYPE: ACUTE PAIN

## 2020-01-24 ASSESSMENT — PAIN DESCRIPTION - ORIENTATION
ORIENTATION: RIGHT
ORIENTATION: RIGHT

## 2020-01-24 ASSESSMENT — PAIN DESCRIPTION - LOCATION
LOCATION: KNEE

## 2020-01-24 NOTE — DISCHARGE INSTR - COC
Continuity of Care Form    Patient Name: [place] Cowart   :    MRN:  [id number]    Admit date:  2020  Discharge date:  ***    Code Status Order: Full Code   Advance Directives:   Advance Care Flowsheet Documentation     Date/Time Healthcare Directive Type of Healthcare Directive Copy in [address] Agent's Name Healthcare Agent's Phone Number    [phone]  Yes, patient has an advance directive for healthcare treatment  --  --  --  --  --          Admitting Physician:  Neha Newman MD  PCP: Arpan Dyer MD    Discharging Nurse: [facility] Unit/Room#: 0205/8684-02  Discharging Unit Phone Number: ***    Emergency Contact:   Extended Emergency Contact Information  Primary Emergency Contact: [facility]  Address: [address] [place] [address] of [address]  Phone: [phone]  Mobile Phone: [phone]  Relation: Spouse  Secondary Emergency Contact: [place] of [address] [facility] Phone: [phone]  Relation: Child    Past Surgical History:  Past Surgical History:   Procedure Laterality Date    CARDIAC SURGERY  2009    stents    COLONOSCOPY  2001    COLONOSCOPY  11    diverticulosis    CORONARY ANGIOPLASTY WITH STENT PLACEMENT  2003    EYE SURGERY      cataracts    KNEE ARTHROSCOPY Right 2015    TOTAL KNEE ARTHROPLASTY Right 2020    RIGHT TOTAL KNEE REPLACEMENT      [facility] performed by [facility] MD at [facility]       Immunization History:   Immunization History   Administered Date(s) Administered    Influenza Vaccine, unspecified formulation 10/28/2014, 2016    Influenza Virus Vaccine 2010, 2011, 2012    Influenza, High Dose (Fluzone 65 yrs and older) 10/14/2015, 2016, 10/02/2017, 10/17/2018    Influenza, Triv, inactivated, subunit, adjuvanted, IM (Fluad 65 yrs and older) 10/08/2019    Pneumococcal Conjugate 13-valent

## 2020-01-24 NOTE — PLAN OF CARE
Problem: Falls - Risk of:  Goal: Will remain free from falls  Description  Will remain free from falls  Outcome: Ongoing     Bed is locked and in the lowest position. 2/4 side rails are up. Call light within patients reach. Bedside table within the patients reach. Patient educated to call before attempting to get up. Will continue to monitor patient.

## 2020-01-24 NOTE — PROGRESS NOTES
Report given to GAYLE [facility], RN.     4 Eyes Skin Assessment     The patient is being assess for   {Blank single:17736::\"Admission\",\"Transfer to New Unit\",\"Post-Op Surgical\",\"Cath Lab Post-Op\",\"Shift Handoff\"}    I agree that 2 RN's have performed a thorough Head to Toe Skin Assessment on the patient. ALL assessment sites listed below have been assessed. Areas assessed by both nurses:   []   Head, Face, and Ears   []   Shoulders, Back, and Chest, Abdomen  []   Arms, Elbows, and Hands   [x]   Coccyx, Sacrum, and Ischium  []   Legs, Feet, and Heels        No skin issues found. Co-signer eSignature: Electronically signed by Lake Hu RN on 1/23/20 at 8:14 PM    Does the Patient have Skin Breakdown?   No          Campbell Prevention initiated:  No   Wound Care Orders initiated:  No      WOC nurse consulted for Pressure Injury (Stage 3,4, Unstageable, DTI, NWPT, Complex wounds)and New or Established Ostomies:  No      Primary Nurse eSignature: {Esignature:[id number]}

## 2020-01-24 NOTE — OP NOTE
At this  point, careful attention was made to removal of osteophytes and soft  tissue balancing, with removal of any adhesions, particularly around the  lateral peripatellar region. Utilizing the [facility] and [employer] protocol for the above-mentioned  prosthesis, the course of femoral and tibial cuts were made. Femoral  cuts were addressed first.  The distal cut first protocol was used with  the patient ultimately being sized for the aforementioned trial.  This  gave excellent fixation both in the anteroposterior and mediolateral  dimensions. Attention was then drawn toward the tibia. The preliminary cut was made  in the tibia, removing approximately 2 mm of bone based upon the  patients overall tibial anatomy. The trial tibia was sized and placed  in the aforementioned thickness and size as mentioned above. This gave  excellent position and range of motion after trial components were  utilized. Any further tissue balancing that was necessary was performed  at this juncture. The patella was addressed. Approximately 10 mm of bone was removed from  the patella using an oscillating saw. The onlay patellar component was  sized and appropriately positioned. The knee was then taken through a  trial range of motion with excellent patellar tracking. At this point, copious irrigation was performed with the pulsatile  lavage, and the bone was prepped for cement. The permanent components  were then cemented into position. After the cement hardened, the knee  was again taken through a range of motion and gave the same excellent  alignment and stability. The permanent polyethylene prosthesis was applied to the tibia. The  pneumatic tourniquet was deflated at this point, and hemostasis was  obtained with Bovie electrocauterization. The wound was then closed in  a layered fashion. A Hemovac drain had been placed into the lateral  gutter.     The patient was stable and taken to the recovery room with a knee  immobilization device in place.         Ryne Velarde MD    D: 01/24/2020 12:20:13       T: 01/24/2020 13:15:41     [id number]  Job#: [id number]     Doc#: [id number]    CC:

## 2020-01-24 NOTE — PROGRESS NOTES
Department of Orthopedic Surgery  Physician Assistant   Progress Note    Subjective:     Post-Operative Day: 1 Status Post right Total Knee Arthroplasty  Systemic or Specific Complaints:  Diaphoretic this am when sitting at the side of the bed. Wife at bedside today. Feeling better after lying in bed.       Objective:     Patient Vitals for the past 24 hrs:   BP Temp Temp src Pulse Resp SpO2 Height Weight   01/24/20 1010 114/64 -- -- -- -- -- -- --   01/24/20 1006 -- -- -- -- -- 93 % -- --   01/24/20 0842 (!) 102/57 98.1 °F (36.7 °C) Oral 65 16 93 % -- --   01/23/20 2329 132/67 98.3 °F (36.8 °C) Oral 58 20 94 % -- --   01/23/20 2211 (!) 114/51 98.1 °F (36.7 °C) Oral 57 18 95 % -- --   01/23/20 2047 (!) 167/71 97.6 °F (36.4 °C) Oral 72 19 95 % -- --   01/23/20 2007 -- -- -- -- 23 96 % -- --   01/23/20 1946 (!) 146/54 -- -- 62 20 97 % -- --   01/23/20 1941 (!) 144/59 -- -- 64 16 98 % -- --   01/23/20 1936 (!) 141/56 -- -- 54 16 100 % -- --   01/23/20 1931 (!) 142/63 -- -- 55 18 100 % -- --   01/23/20 1922 (!) 136/55 -- -- 51 15 100 % -- --   01/23/20 1916 (!) 138/58 -- -- 54 13 100 % -- --   01/23/20 1914 -- 96.8 °F (36 °C) Temporal 53 11 100 % -- --   01/23/20 1911 (!) 131/53 -- -- (!) 49 20 100 % -- --   01/23/20 1910 -- -- -- (!) 47 -- -- -- --   01/23/20 1901 (!) 133/52 -- -- (!) 46 15 100 % -- --   01/23/20 1856 (!) 135/53 -- -- (!) 47 15 100 % -- --   01/23/20 1851 (!) 126/51 -- -- (!) 46 18 100 % -- --   01/23/20 1847 (!) 127/56 -- -- (!) 46 15 100 % -- --   01/23/20 1841 (!) 122/50 -- -- (!) 46 14 100 % -- --   01/23/20 1836 (!) 127/49 -- -- (!) 46 15 100 % -- --   01/23/20 1831 (!) 123/52 -- -- (!) 47 14 99 % -- --   01/23/20 1830 -- -- -- (!) 48 -- -- -- --   01/23/20 1825 (!) 117/51 96.8 °F (36 °C) Temporal 53 18 99 % -- --   01/23/20 1821 (!) 99/52 -- -- 52 13 99 % -- --   01/23/20 1817 (!) 104/48 -- -- 53 15 98 % -- --   01/23/20 1531 -- -- -- 55 16 100 % -- --   01/23/20 1530 -- -- -- 59 16 100 % -- --   01/23/20 1423 (!) 152/63 97.4 °F (36.3 °C) Temporal 64 16 100 % 5' 10\" (1.778 m) 270 lb 14.4 oz (122.9 kg)       General: alert, appears stated age, cooperative and no distress   Wound: Wound clean and dry no evidence of infection. Motion: Painful range of Motion   DVT Exam: No evidence of DVT seen on physical exam.       Knee swollen but thigh soft to palpation. Moving foot and ankle. Good distal pulses. Data Review  CBC:   Lab Results   Component Value Date    WBC 8.8 01/24/2020    RBC 3.41 01/24/2020    HGB 9.8 01/24/2020    HCT 29.8 01/24/2020     01/24/2020       Assessment:     Status Post right Total Knee Arthroplasty. Doing well postoperatively. Plan:      1: Continues current post-op course, IM following. Orthostatic this am with staff. Medications held. Continue to monitor vitals. 2:  Continue Deep venous thrombosis prophylaxis- Eliquis  3:  Continue physical therapy and OT, WBAT  4:  Continue Pain Control PRN  5:  D/c planning for home with [facility] when stable, pt needs RW. DCP updated. 6:  Pt with recurrent LE cellulitis, on chronic abx continue.       Jolly Vu PA-C

## 2020-01-24 NOTE — CARE COORDINATION
DCP met with Raiza ARMSTRONG Re: d/c planning. CM met with pt at bedside to discuss [facility]. Referral sent to [facility]. Crystal Rashid with [facility] spoke to [place] with [facility] [phone]. Orders placed. RW delivered at bedside. No further needs identified by DCP. Please contact should further needs arise.  Marie Null RN      [facility]..  X:[phone]  F: [phone]

## 2020-01-24 NOTE — CARE COORDINATION
[facility]    Referral received from  to follow for home care services.    [facility] out of network  Referral facesheet, H&P, Op note faxed to Jerrica Lujan @ [facility]  RN, BSN CTN  [facility] [phone]

## 2020-01-24 NOTE — PROGRESS NOTES
Physical Therapy  Facility/Department: [facility] C5 - MED SURG/ORTHO  Daily Treatment Note  NAME: [place] Cowart  :   MRN: [id number]    Date of Service: 2020    Discharge Recommendations:  24 hour supervision or assist, Home with Home health PT   PT Equipment Recommendations  Equipment Needed: Yes  Mobility Devices: Patrisha Lindsey: Rolling    Assessment   Body structures, Functions, Activity limitations: Decreased functional mobility ; Decreased endurance;Decreased ROM; Decreased coordination;Decreased strength;Decreased balance; Increased pain  Assessment: Pt tolerated second therapy session well. Denies dizziness throughout session. Session focused on bed mobility technique with HOB flat, gait training, and improving indepedence with TKA protocol HEP. Pt requiring min A for bed mobility with HOB flat with increased time. Continues to require mod A for standing. CGA for gait training and improving to 65 ft with RW this date. Reports increase in pain with gait training. Pt able to give pain medicine during session. Improved pain with rest and ice. Pt would benefit from further PT for continued transfer, gait, and stair training prior to d/c home. Treatment Diagnosis: decreased (I) with mobility  Specific instructions for Next Treatment: progress mobility as tolerated  Prognosis: Good  Decision Making: Medium Complexity  PT Education: Goals; Energy Conservation;Weight-bearing Education;PT Role;General Safety;Plan of Care;Gait Training;Home Exercise Program;Transfer Training;Functional Mobility Training  Patient Education: pt  verbalized understanding  Barriers to Learning: none  REQUIRES PT FOLLOW UP: Yes  Activity Tolerance  Activity Tolerance: Patient Tolerated treatment well;Patient limited by fatigue;Patient limited by endurance  Activity Tolerance: Semi Fowlers /68, Seated /70;  Seated in chair post gait training 122/65, After 5 minutes seated in chair 120/65     Patient Diagnosis(es):

## 2020-01-24 NOTE — PROGRESS NOTES
Occupational Therapy   Occupational Therapy Initial Assessment/Treatment   Date: 2020   Patient Name: Karel Solomon  MRN: [id number]     : 1945    Date of Service: 2020    Discharge Recommendations:  24 hour supervision or assist  OT Equipment Recommendations  Other: CTA shower chair/toilet raiser      Assessment   Performance deficits / Impairments: Decreased functional mobility ; Decreased balance;Decreased ADL status; Decreased posture;Decreased high-level IADLs    Assessment: Pt 74 yo male functioning with deficits in the areas listed above following right knee replacement. Pt reports IND PLOF and lives at home with spouse. Pt is currently at mod A x1-2 for sit<>Stand and min A for ambulation. Pt required increased assist for LB dressing and will have spouse to provide assist. Pt would benefit from education on durable medical equipment for home set up to improve functional levels. Skilled OT services needed prior to d/c. Prognosis: Good  Decision Making: Medium Complexity  OT Education: OT Role;Plan of Care;Transfer Training;Family Education; ADL Adaptive Strategies;Precautions  REQUIRES OT FOLLOW UP: Yes  Activity Tolerance  Activity Tolerance: Patient Tolerated treatment well  Activity Tolerance: /68 at rest, 120/70 seated EOB, 122/65 standing EOB, 120/65 after ambulation  Safety Devices  Safety Devices in place: Yes  Type of devices: Call light within reach; Chair alarm in place; Left in chair;Gait belt;Nurse notified           Patient Diagnosis(es): The encounter diagnosis was Primary osteoarthritis of right knee. has a past medical history of CAD (coronary artery disease), Cataract, Diabetes mellitus ([facility] [address] DJD (degenerative joint disease), History of PTCA, Hyperlipidemia, Hypertension, Hypotestosteronism, IGT (impaired glucose tolerance), Pituitary abnormality ([address] and Sleep apnea. has a past surgical history that includes Coronary angioplasty with stent ();  Knee Minutes 42         Timed Code Treatment Minutes: OBEY Burton/L

## 2020-01-24 NOTE — CONSULTS
Hospital Medicine  Consult History & Physical        Chief Complaint:  Right knee pain     Date of Service: Pt seen/examined in consultation on 1/24/2020    History Of Present Illness:      75 y.o. male who we are asked to see/evaluate by Lena Allen MD for medical management. PT has PMH of HTN, HLD, DM, CAD, chronic  lower extremity cellulitis on PPX PCN for mgt. The patient reports a several year hx of progressive Right knee pain 2nd to OA that is rated 10/10, worse with ambulation and relieved to some degree by rest and analgesic medication. This has progressed to the point that the analgesic meds are minimally effective and the patient decided to undergo an elective TKR. The patient reports good post-operative pain control w/out associated fevers/chills or other signs/sxs of systemic illness. No chest pain or SOB, did have \"vagel episode\" this am when ambulated but resolved quickly     Past Medical History:        Diagnosis Date    CAD (coronary artery disease)     Cataract     Diabetes mellitus ([address]     DJD (degenerative joint disease)     History of PTCA     Dr. Patrick Barraza Hyperlipidemia     Hypertension     Hypotestosteronism     Dr. Amilcar Yang, urology    IGT (impaired glucose tolerance)     Pituitary abnormality ([address]     growth    Sleep apnea     cpap       Past Surgical History:        Procedure Laterality Date    CARDIAC SURGERY  2009    stents    COLONOSCOPY  2001    COLONOSCOPY  12/9/11    diverticulosis    CORONARY ANGIOPLASTY WITH STENT PLACEMENT  2003    EYE SURGERY      cataracts    KNEE ARTHROSCOPY Right 2015       Medications Prior to Admission:    Prior to Admission medications    Medication Sig Start Date End Date Taking?  Authorizing Provider   hydrochlorothiazide (HYDRODIURIL) 25 MG tablet Take 25 mg by mouth daily   Yes Historical Provider, MD   metFORMIN (GLUCOPHAGE-XR) 750 MG extended release tablet Take 1 tablet by mouth 2 times daily 1/21/20  Yes Dana Cease history. He has never used smokeless tobacco.  ETOH:   reports no history of alcohol use. Family History:     Reviewed in detail and negative for DM, CAD, Cancer, CVA. Positive as follows:        Problem Relation Age of Onset    Heart Disease Brother     High Blood Pressure Brother     Dementia Mother         alzheimers       REVIEW OF SYSTEMS:   Pertinent positives as noted in the HPI. All other systems reviewed and negative. PHYSICAL EXAM PERFORMED:  /67   Pulse 58   Temp 98.3 °F (36.8 °C) (Oral)   Resp 20   Ht 5' 10\" (1.778 m)   Wt 270 lb 14.4 oz (122.9 kg)   SpO2 94%   BMI 38.87 kg/m²   General appearance: No apparent distress, appears stated age and cooperative. HEENT: Normal cephalic, atraumatic without obvious deformity. Pupils equal, round, and reactive to light. Extra ocular muscles intact. Conjunctivae/corneas clear. Neck: Supple, with full range of motion. No jugular venous distention. Trachea midline. Respiratory:  Normal respiratory effort. Clear to auscultation, bilaterally without Rales/Wheezes/Rhonchi. Cardiovascular: Regular rate and rhythm with normal S1/S2 without murmurs, rubs or gallops. Abdomen: Soft, non-tender, non-distended with normal bowel sounds. Musculoskeletal:Right knee incision CDI. Edema noted to LE  Skin: Skin color, texture, turgor normal.  No rashes or lesions. Neurologic:  Neurovascularly intact without any focal sensory/motor deficits. Cranial nerves: II-XII intact, grossly non-focal.  Psychiatric: Alert and oriented, thought content appropriate, normal insight  Capillary Refill: Brisk,< 3 seconds   Peripheral Pulses: +2 palpable, equal bilaterally     Labs:     Recent Labs     01/24/20  0613   WBC 8.8   HGB 9.8*   HCT 29.8*   *     Recent Labs     01/24/20  0613      K 4.7      CO2 23   BUN 25*   CREATININE 1.3   CALCIUM 8.8     No results for input(s): AST, ALT, BILIDIR, BILITOT, ALKPHOS in the last 72 hours.   No results for

## 2020-01-24 NOTE — PROGRESS NOTES
/70; Seated in chair post gait training 122/65, After 5 minutes seated in chair 120/65       Patient Diagnosis(es): The encounter diagnosis was Primary osteoarthritis of right knee. has a past medical history of CAD (coronary artery disease), Cataract, Diabetes mellitus [address] [address] DJD (degenerative joint disease), History of PTCA, Hyperlipidemia, Hypertension, Hypotestosteronism, IGT (impaired glucose tolerance), Pituitary abnormality ([facility] [address] and Sleep apnea. has a past surgical history that includes Coronary angioplasty with stent (2003); Knee arthroscopy (Right, 2015); Colonoscopy (2001); Colonoscopy (12/9/11); eye surgery; and Cardiac surgery (2009). Restrictions  Restrictions/Precautions  Restrictions/Precautions: General Precautions, Fall Risk, Weight Bearing  Required Braces or Orthoses?: Yes  Lower Extremity Weight Bearing Restrictions  Right Lower Extremity Weight Bearing: Weight Bearing As Tolerated  Required Braces or Orthoses  Right Lower Extremity Brace: Knee Immobilizer(May remove once patient can do a straight leg raise in bed)  Position Activity Restriction  Other position/activity restrictions: 1)  Ambulate in room progressing to hallway with assistive device four times daily (including PT) when PT advises. 2)  Bathroom privileges with assistance. 3) Up in bedside chair at least TID as tolerated.   Vision/Hearing  Vision: Impaired  Vision Exceptions: Wears glasses at all times  Hearing: Within functional limits     Subjective  General  Chart Reviewed: Yes  Patient assessed for rehabilitation services?: Yes  Response To Previous Treatment: Not applicable  Family / Caregiver Present: Yes(wife)  Referring Practitioner: PATTI Martin  Referral Date : 01/24/20  Diagnosis: s/p right TKA 1/22/19  Follows Commands: Within Functional Limits  General Comment  Comments: Pt resting in bed on approach; RN cleared pt for therapy  Subjective  Subjective: pt agreeable to therapy  Pain

## 2020-01-25 LAB
GLUCOSE BLD-MCNC: 166 MG/DL (ref 70–99)
GLUCOSE BLD-MCNC: 173 MG/DL (ref 70–99)
GLUCOSE BLD-MCNC: 174 MG/DL (ref 70–99)
GLUCOSE BLD-MCNC: 180 MG/DL (ref 70–99)
HCT VFR BLD CALC: 25.1 % (ref 40.5–52.5)
HEMOGLOBIN: 8.5 G/DL (ref 13.5–17.5)
MCH RBC QN AUTO: 29.3 PG (ref 26–34)
MCHC RBC AUTO-ENTMCNC: 33.8 G/DL (ref 31–36)
MCV RBC AUTO: 86.7 FL (ref 80–100)
PDW BLD-RTO: 16.5 % (ref 12.4–15.4)
PERFORMED ON: ABNORMAL
PLATELET # BLD: 105 K/UL (ref 135–450)
PMV BLD AUTO: 8.9 FL (ref 5–10.5)
RBC # BLD: 2.89 M/UL (ref 4.2–5.9)
WBC # BLD: 7.5 K/UL (ref 4–11)

## 2020-01-25 PROCEDURE — 6370000000 HC RX 637 (ALT 250 FOR IP): Performed by: PHYSICIAN ASSISTANT

## 2020-01-25 PROCEDURE — 85027 COMPLETE CBC AUTOMATED: CPT

## 2020-01-25 PROCEDURE — APPNB30 APP NON BILLABLE TIME 0-30 MINS: Performed by: PHYSICIAN ASSISTANT

## 2020-01-25 PROCEDURE — 97110 THERAPEUTIC EXERCISES: CPT

## 2020-01-25 PROCEDURE — 2580000003 HC RX 258: Performed by: PHYSICIAN ASSISTANT

## 2020-01-25 PROCEDURE — 1200000000 HC SEMI PRIVATE

## 2020-01-25 PROCEDURE — 97116 GAIT TRAINING THERAPY: CPT

## 2020-01-25 PROCEDURE — 36415 COLL VENOUS BLD VENIPUNCTURE: CPT

## 2020-01-25 PROCEDURE — 97530 THERAPEUTIC ACTIVITIES: CPT

## 2020-01-25 PROCEDURE — 97535 SELF CARE MNGMENT TRAINING: CPT

## 2020-01-25 PROCEDURE — 6370000000 HC RX 637 (ALT 250 FOR IP): Performed by: NURSE PRACTITIONER

## 2020-01-25 RX ORDER — HYDROCODONE BITARTRATE AND ACETAMINOPHEN 5; 325 MG/1; MG/1
1 TABLET ORAL EVERY 6 HOURS PRN
Qty: 28 TABLET | Refills: 0 | Status: SHIPPED | OUTPATIENT
Start: 2020-01-25 | End: 2020-01-26 | Stop reason: HOSPADM

## 2020-01-25 RX ORDER — ACETAMINOPHEN 325 MG/1
650 TABLET ORAL EVERY 6 HOURS PRN
Status: DISCONTINUED | OUTPATIENT
Start: 2020-01-25 | End: 2020-01-28 | Stop reason: HOSPADM

## 2020-01-25 RX ORDER — HYDROCODONE BITARTRATE AND ACETAMINOPHEN 5; 325 MG/1; MG/1
1 TABLET ORAL EVERY 4 HOURS PRN
Status: DISCONTINUED | OUTPATIENT
Start: 2020-01-25 | End: 2020-01-26

## 2020-01-25 RX ORDER — HYDROCODONE BITARTRATE AND ACETAMINOPHEN 5; 325 MG/1; MG/1
2 TABLET ORAL EVERY 4 HOURS PRN
Status: DISCONTINUED | OUTPATIENT
Start: 2020-01-25 | End: 2020-01-26

## 2020-01-25 RX ADMIN — INSULIN LISPRO 1 UNITS: 100 INJECTION, SOLUTION INTRAVENOUS; SUBCUTANEOUS at 10:35

## 2020-01-25 RX ADMIN — ATORVASTATIN CALCIUM 80 MG: 80 TABLET, FILM COATED ORAL at 20:21

## 2020-01-25 RX ADMIN — HYDROCHLOROTHIAZIDE 25 MG: 25 TABLET ORAL at 10:34

## 2020-01-25 RX ADMIN — HYDRALAZINE HYDROCHLORIDE 25 MG: 25 TABLET, FILM COATED ORAL at 14:50

## 2020-01-25 RX ADMIN — LISINOPRIL 20 MG: 20 TABLET ORAL at 20:21

## 2020-01-25 RX ADMIN — SENNOSIDES AND DOCUSATE SODIUM 1 TABLET: 8.6; 5 TABLET ORAL at 10:34

## 2020-01-25 RX ADMIN — Medication 250 MG: at 10:34

## 2020-01-25 RX ADMIN — METFORMIN HYDROCHLORIDE 750 MG: 750 TABLET, EXTENDED RELEASE ORAL at 20:20

## 2020-01-25 RX ADMIN — PENICILLIN V POTASIUM 1000 MG: 250 TABLET ORAL at 20:21

## 2020-01-25 RX ADMIN — INSULIN LISPRO 2 UNITS: 100 INJECTION, SOLUTION INTRAVENOUS; SUBCUTANEOUS at 12:45

## 2020-01-25 RX ADMIN — ACETAMINOPHEN 650 MG: 325 TABLET ORAL at 21:54

## 2020-01-25 RX ADMIN — ALLOPURINOL 300 MG: 300 TABLET ORAL at 10:34

## 2020-01-25 RX ADMIN — ACETAMINOPHEN 650 MG: 325 TABLET ORAL at 10:34

## 2020-01-25 RX ADMIN — DOCUSATE SODIUM 100 MG: 100 CAPSULE, LIQUID FILLED ORAL at 10:34

## 2020-01-25 RX ADMIN — LISINOPRIL 20 MG: 20 TABLET ORAL at 10:34

## 2020-01-25 RX ADMIN — CARVEDILOL 25 MG: 25 TABLET, FILM COATED ORAL at 10:34

## 2020-01-25 RX ADMIN — Medication 250 MG: at 20:20

## 2020-01-25 RX ADMIN — Medication 10 ML: at 10:35

## 2020-01-25 RX ADMIN — HYDRALAZINE HYDROCHLORIDE 25 MG: 25 TABLET, FILM COATED ORAL at 10:41

## 2020-01-25 RX ADMIN — APIXABAN 2.5 MG: 2.5 TABLET, FILM COATED ORAL at 20:20

## 2020-01-25 RX ADMIN — CARVEDILOL 25 MG: 25 TABLET, FILM COATED ORAL at 20:20

## 2020-01-25 RX ADMIN — HYDRALAZINE HYDROCHLORIDE 25 MG: 25 TABLET, FILM COATED ORAL at 20:33

## 2020-01-25 RX ADMIN — APIXABAN 2.5 MG: 2.5 TABLET, FILM COATED ORAL at 10:34

## 2020-01-25 RX ADMIN — Medication 10 ML: at 20:24

## 2020-01-25 RX ADMIN — DOCUSATE SODIUM 100 MG: 100 CAPSULE, LIQUID FILLED ORAL at 20:20

## 2020-01-25 RX ADMIN — METFORMIN HYDROCHLORIDE 750 MG: 750 TABLET, EXTENDED RELEASE ORAL at 10:33

## 2020-01-25 RX ADMIN — HYDROCODONE BITARTRATE AND ACETAMINOPHEN 1 TABLET: 5; 325 TABLET ORAL at 14:50

## 2020-01-25 RX ADMIN — INSULIN LISPRO 1 UNITS: 100 INJECTION, SOLUTION INTRAVENOUS; SUBCUTANEOUS at 20:22

## 2020-01-25 RX ADMIN — OXYCODONE 10 MG: 5 TABLET ORAL at 03:43

## 2020-01-25 RX ADMIN — PENICILLIN V POTASIUM 1000 MG: 250 TABLET ORAL at 10:33

## 2020-01-25 RX ADMIN — SENNOSIDES AND DOCUSATE SODIUM 1 TABLET: 8.6; 5 TABLET ORAL at 20:21

## 2020-01-25 ASSESSMENT — PAIN SCALES - GENERAL
PAINLEVEL_OUTOF10: 5
PAINLEVEL_OUTOF10: 6
PAINLEVEL_OUTOF10: 3
PAINLEVEL_OUTOF10: 5
PAINLEVEL_OUTOF10: 5
PAINLEVEL_OUTOF10: 6

## 2020-01-25 ASSESSMENT — PAIN DESCRIPTION - ORIENTATION
ORIENTATION: RIGHT
ORIENTATION: RIGHT

## 2020-01-25 ASSESSMENT — PAIN DESCRIPTION - LOCATION
LOCATION: KNEE
LOCATION: KNEE

## 2020-01-25 ASSESSMENT — PAIN DESCRIPTION - PAIN TYPE
TYPE: ACUTE PAIN;SURGICAL PAIN
TYPE: ACUTE PAIN;SURGICAL PAIN

## 2020-01-25 NOTE — PROGRESS NOTES
Treatment: progress mobility as tolerated  Current Treatment Recommendations: Strengthening, Gait Training, ROM, Stair training, Equipment Evaluation, Education, & procurement, Balance Training, Neuromuscular Re-education, Endurance Training, Functional Mobility Training, Safety Education & Training, Home Exercise Program, Transfer Training  Safety Devices  Type of devices:  All fall risk precautions in place, Call light within reach, Gait belt, Patient at risk for falls, Nurse notified, Left in chair  Restraints  Initially in place: No     Therapy Time   Individual Concurrent Group Co-treatment   Time In 1548         Time Out 1619         Minutes 31         Timed Code Treatment Minutes: [facility], PT

## 2020-01-25 NOTE — PROGRESS NOTES
Department of Orthopedic Surgery  Physician Assistant   Progress Note    Subjective:     Post-Operative Day: 2 Status Post right Total Knee Arthroplasty  Systemic or Specific Complaints:  Per patient and wife today, pt feeling somewhat \"foggy\". Able to work with therapy but requiring cues for mobility. Currently sitting up in the chair. Objective:     Patient Vitals for the past 24 hrs:   BP Temp Temp src Pulse Resp SpO2   01/24/20 1945 137/64 98.3 °F (36.8 °C) Oral 87 16 95 %   01/24/20 1626 114/64 98.7 °F (37.1 °C) Oral 80 16 97 %   01/24/20 1010 114/64 -- -- -- -- --   01/24/20 1006 -- -- -- -- -- 93 %       General: alert, appears stated age, cooperative and no distress   Wound: Wound clean and dry no evidence of infection. Motion: Painful range of Motion   DVT Exam: No evidence of DVT seen on physical exam.       Knee swollen but thigh soft to palpation. Moving foot and ankle. Good distal pulses. Data Review  CBC:   Lab Results   Component Value Date    WBC 7.5 01/25/2020    RBC 2.89 01/25/2020    HGB 8.5 01/25/2020    HCT 25.1 01/25/2020     01/25/2020       Assessment:     Status Post right Total Knee Arthroplasty. Doing well postoperatively. Plan:      1: Continues current post-op course, IM following. BP improved today. Mild confusion this am, discussed medications with pt and wife. Will de-escalate narcotics today. Encourage tylenol if comfortable. 2:  Continue Deep venous thrombosis prophylaxis- Eliquis  3:  Continue physical therapy and OT, WBAT  4:  Continue Pain Control PRN, norco and tylenol PRN  5:  D/c planning for home with [facility] when stable, RW delivered. DCP updated. 6:  Pt with recurrent LE cellulitis, on chronic abx continue. 7:  Unsure if patient safe to d/c home today, most likely will require another day to ensure safety with therapy and normal mentation after medication change.      Hardy Rodriguez PA-C

## 2020-01-25 NOTE — PROGRESS NOTES
dextrose      Intake/Output Summary (Last 24 hours) at 1/25/2020 1044  Last data filed at 1/25/2020 0944  Gross per 24 hour   Intake 480 ml   Output 525 ml   Net -45 ml       Physical Exam Performed:    BP (!) 149/68   Pulse 83   Temp 98.8 °F (37.1 °C) (Oral)   Resp 16   Ht 5' 10\" (1.778 m)   Wt 270 lb 14.4 oz (122.9 kg)   SpO2 91%   BMI 38.87 kg/m²     General appearance: No apparent distress, appears stated age and cooperative. HEENT: Pupils equal, round, and reactive to light. Conjunctivae/corneas clear. Neck: Supple, with full range of motion. No jugular venous distention. Trachea midline. Respiratory:  Normal respiratory effort. Clear to auscultation, bilaterally without Rales/Wheezes/Rhonchi. Cardiovascular: Regular rate and rhythm with normal S1/S2 without murmurs, rubs or gallops. Abdomen: Soft, non-tender, non-distended with normal bowel sounds. Musculoskeletal: Operative extremity is edematous skin is taut and tight has limited range of motion in this extremity. Good circulation sensation and motion    Skin: Skin color, texture, turgor normal.  No rashes or lesions. Neurologic:  Neurovascularly intact without any focal sensory/motor deficits. Cranial nerves: II-XII intact, grossly non-focal.  Psychiatric: Alert and oriented, thought content appropriate, normal insight  Capillary Refill: Brisk,< 3 seconds   Peripheral Pulses: +2 palpable, equal bilaterally       Labs:   Recent Labs     01/24/20  0613 01/25/20  0553   WBC 8.8 7.5   HGB 9.8* 8.5*   HCT 29.8* 25.1*   * 105*     Recent Labs     01/24/20  0613      K 4.7      CO2 23   BUN 25*   CREATININE 1.3   CALCIUM 8.8     No results for input(s): AST, ALT, BILIDIR, BILITOT, ALKPHOS in the last 72 hours. No results for input(s): INR in the last 72 hours. No results for input(s): Zoe Ales in the last 72 hours.     Urinalysis:      Lab Results   Component Value Date    NITRU Negative 01/10/2020    WBCUA 0-2 12/18/2017    BACTERIA Rare 12/18/2017    RBCUA None seen 12/18/2017    BLOODU Negative 01/10/2020    SPECGRAV 1.020 01/10/2020    GLUCOSEU Negative 01/10/2020       Radiology:  XR KNEE RIGHT (1-2 VIEWS)   Final Result   Right knee arthroplasty placed as described above. Assessment/Plan:    Active Hospital Problems    Diagnosis    Status post total right knee replacement [Z96.651]    Status post total knee replacement, right [Z96.651]     Right knee osteoarthritis   - s/p RTK, PT/OT, pain mgt and DVT PPX per primary team      Chronic cellulitis - followed per ID  - continue PCN treatment      CAD- controlled, no chest pain or sob, continue current meds     HTN- controlled, continue current meds     DM- controlled, continue current meds and monitor FSBS ACHS with SSI      HLD- controlled continue statin      HERMELINDA- complaints with CPAP - continue     Morbid Obesity - BMI 39 Complicating assessment and treatment. Placing patient at risk for multiple co-morbidities as well as early death and contributing to the patient's presentation. Counseled on weight loss. Anemia secondary acute blood loss from surgery.   Serial labs  -Transfuse if hemoglobin less than 7 or symptomatic       DVT Prophylaxis:NOAC  Diet: DIET GENERAL;  Code Status: Full Code    PT/OT Eval Status: active and ongoing     Dispo - per primary team     DEONNA Escobar - CNP

## 2020-01-25 NOTE — PROGRESS NOTES
Occupational Therapy  Facility/Department: [facility] - MED SURG/ORTHO  Daily Treatment Note  NAME: Waldemar Carrel  : [id number]  MRN: [id number]    Date of Service: 2020    Discharge Recommendations:  CTA Subacute/Skilled Nursing Facility vs Home with 24 hour assist  OT Equipment Recommendations  Other: defer    Assessment   Performance deficits / Impairments: Decreased functional mobility ; Decreased balance;Decreased ADL status; Decreased posture;Decreased high-level IADLs    Assessment: Pt completing stand step transfer with 2 pcas upon arrival from bed to chair. Pt required max verbal and tactile cues from therapy to go from standing to sitting. Pt required max A for repositioning of RLE. Pt demo'd decreased awareness with functional tasks requiring assist with breakfast. RN notified of pt reporting \"groggy feeling\". Pt unable to complete sit<>Stand with max A at this time. If pt continues to require this level of assist for functional mobility and transfers, anticipate will need SNF level of care to return to baseline functioning. Barriers to home discharge:   [x] Steps to access home entry or bed/bath:   [x] No rail with steps to access home entry or bed/bath   [] Reported available assist at home upon discharge limited   [x] Patient or family requests DC to other than home    [] Patient reports expectation of post acute Discharge disposition to other than home   [x] Other: PT will need increased physical assist for all functional transfers and activities of daily living. Prognosis: Good  OT Education: OT Role;Plan of Care;Transfer Training;Family Education; ADL Adaptive Strategies;Precautions  REQUIRES OT FOLLOW UP: Yes  Activity Tolerance  Activity Tolerance: Patient Tolerated treatment well  Safety Devices  Safety Devices in place: Yes  Type of devices: Call light within reach; Chair alarm in place; Left in chair;Gait belt;Nurse notified         Patient Diagnosis(es): The primary encounter brace)  Additional Comments: pt attempting to put salt packets on top of lid in coffee. Pt reported he did not know what he was doing. RN aware        Balance  Sitting Balance: Stand by assistance  Standing Balance: Maximum assistance(SW, unable to get to full standing position)  Functional Mobility  Functional Mobility Comments: deferred due to increased reaction time and \"groggy feeling\"  Bed mobility  Supine to Sit: Unable to assess  Sit to Supine: Unable to assess  Comment: up in chair at start/edn of session  Transfers  Stand Step Transfers: Moderate assistance(increased timee, cues)  Sit to stand: Maximum assistance  Stand to sit: Maximum assistance  Transfer Comments: pt required max A for sit<>stand at this time and unable to get to full standing position                       Cognition  Overall Cognitive Status: [facility]  Cognition Comment: delayed responses to questions and increased time needed for all functional tasks. Pt reported \"foggy\" feeling, RN aware                                         Plan   Plan  Times per week: 4-6x/wk  Current Treatment Recommendations: Balance Training, Functional Mobility Training, Safety Education & Training, Self-Care / ADL           AM-PAC Score        AM-[facility] Inpatient Daily Activity Raw Score: 15 (01/25/20 1213)  AM-PAC Inpatient ADL T-Scale Score : 34.69 (01/25/20 1213)  ADL Inpatient CMS 0-100% Score: 56.46 (01/25/20 1213)  ADL Inpatient CMS G-Code Modifier : CK (01/25/20 1213)    Goals  Short term goals  Time Frame for Short term goals: 1 week (1/31/20)  Short term goal 1: Pt will complete toilet transfer with S.   Short term goal 2: Pt will complete bathroom mobility with S.   Short term goal 3: Pt will verbalize understanding of car transfer. (1/27/20)  Short term goal 4: Pt will complete ~5 minutes of standing for light adls with SBA.         Therapy Time   Individual Concurrent Group Co-treatment   Time In 0823         Time Out 0855         Minutes 32         Timed

## 2020-01-26 LAB
ANION GAP SERPL CALCULATED.3IONS-SCNC: 12 MMOL/L (ref 3–16)
BILIRUBIN URINE: NEGATIVE
BLOOD, URINE: NEGATIVE
BUN BLDV-MCNC: 32 MG/DL (ref 7–20)
CALCIUM SERPL-MCNC: 8.7 MG/DL (ref 8.3–10.6)
CHLORIDE BLD-SCNC: 101 MMOL/L (ref 99–110)
CLARITY: CLEAR
CO2: 21 MMOL/L (ref 21–32)
COLOR: YELLOW
CREAT SERPL-MCNC: 1.2 MG/DL (ref 0.8–1.3)
GFR AFRICAN AMERICAN: >60
GFR NON-AFRICAN AMERICAN: 59
GLUCOSE BLD-MCNC: 145 MG/DL (ref 70–99)
GLUCOSE BLD-MCNC: 159 MG/DL (ref 70–99)
GLUCOSE BLD-MCNC: 165 MG/DL (ref 70–99)
GLUCOSE BLD-MCNC: 171 MG/DL (ref 70–99)
GLUCOSE BLD-MCNC: 175 MG/DL (ref 70–99)
GLUCOSE URINE: NEGATIVE MG/DL
HCT VFR BLD CALC: 25.3 % (ref 40.5–52.5)
HEMOGLOBIN: 8.5 G/DL (ref 13.5–17.5)
KETONES, URINE: NEGATIVE MG/DL
LEUKOCYTE ESTERASE, URINE: NEGATIVE
MCH RBC QN AUTO: 29.1 PG (ref 26–34)
MCHC RBC AUTO-ENTMCNC: 33.5 G/DL (ref 31–36)
MCV RBC AUTO: 86.7 FL (ref 80–100)
MICROSCOPIC EXAMINATION: NORMAL
NITRITE, URINE: NEGATIVE
PDW BLD-RTO: 16.5 % (ref 12.4–15.4)
PERFORMED ON: ABNORMAL
PH UA: 5.5 (ref 5–8)
PLATELET # BLD: 111 K/UL (ref 135–450)
PMV BLD AUTO: 9.1 FL (ref 5–10.5)
POTASSIUM SERPL-SCNC: 3.6 MMOL/L (ref 3.5–5.1)
PROTEIN UA: NEGATIVE MG/DL
RBC # BLD: 2.92 M/UL (ref 4.2–5.9)
SODIUM BLD-SCNC: 134 MMOL/L (ref 136–145)
SPECIFIC GRAVITY UA: 1.02 (ref 1–1.03)
URINE TYPE: NORMAL
UROBILINOGEN, URINE: 0.2 E.U./DL
WBC # BLD: 7.4 K/UL (ref 4–11)

## 2020-01-26 PROCEDURE — 6370000000 HC RX 637 (ALT 250 FOR IP): Performed by: PHYSICIAN ASSISTANT

## 2020-01-26 PROCEDURE — 97110 THERAPEUTIC EXERCISES: CPT

## 2020-01-26 PROCEDURE — 36415 COLL VENOUS BLD VENIPUNCTURE: CPT

## 2020-01-26 PROCEDURE — APPNB30 APP NON BILLABLE TIME 0-30 MINS: Performed by: PHYSICIAN ASSISTANT

## 2020-01-26 PROCEDURE — 80048 BASIC METABOLIC PNL TOTAL CA: CPT

## 2020-01-26 PROCEDURE — 2580000003 HC RX 258: Performed by: PHYSICIAN ASSISTANT

## 2020-01-26 PROCEDURE — 81003 URINALYSIS AUTO W/O SCOPE: CPT

## 2020-01-26 PROCEDURE — 1200000000 HC SEMI PRIVATE

## 2020-01-26 PROCEDURE — 97116 GAIT TRAINING THERAPY: CPT

## 2020-01-26 PROCEDURE — 85027 COMPLETE CBC AUTOMATED: CPT

## 2020-01-26 PROCEDURE — 6370000000 HC RX 637 (ALT 250 FOR IP): Performed by: NURSE PRACTITIONER

## 2020-01-26 RX ADMIN — SENNOSIDES AND DOCUSATE SODIUM 1 TABLET: 8.6; 5 TABLET ORAL at 10:02

## 2020-01-26 RX ADMIN — Medication 250 MG: at 10:02

## 2020-01-26 RX ADMIN — ATORVASTATIN CALCIUM 80 MG: 80 TABLET, FILM COATED ORAL at 21:03

## 2020-01-26 RX ADMIN — METFORMIN HYDROCHLORIDE 750 MG: 750 TABLET, EXTENDED RELEASE ORAL at 10:02

## 2020-01-26 RX ADMIN — Medication 10 ML: at 21:07

## 2020-01-26 RX ADMIN — HYDROCHLOROTHIAZIDE 25 MG: 25 TABLET ORAL at 10:06

## 2020-01-26 RX ADMIN — CARVEDILOL 25 MG: 25 TABLET, FILM COATED ORAL at 21:00

## 2020-01-26 RX ADMIN — APIXABAN 2.5 MG: 2.5 TABLET, FILM COATED ORAL at 20:59

## 2020-01-26 RX ADMIN — HYDRALAZINE HYDROCHLORIDE 25 MG: 25 TABLET, FILM COATED ORAL at 21:00

## 2020-01-26 RX ADMIN — PENICILLIN V POTASIUM 1000 MG: 250 TABLET ORAL at 10:02

## 2020-01-26 RX ADMIN — LISINOPRIL 20 MG: 20 TABLET ORAL at 10:06

## 2020-01-26 RX ADMIN — ACETAMINOPHEN 650 MG: 325 TABLET ORAL at 10:01

## 2020-01-26 RX ADMIN — DOCUSATE SODIUM 100 MG: 100 CAPSULE, LIQUID FILLED ORAL at 10:03

## 2020-01-26 RX ADMIN — ALLOPURINOL 300 MG: 300 TABLET ORAL at 10:02

## 2020-01-26 RX ADMIN — Medication 250 MG: at 20:59

## 2020-01-26 RX ADMIN — SENNOSIDES AND DOCUSATE SODIUM 1 TABLET: 8.6; 5 TABLET ORAL at 20:59

## 2020-01-26 RX ADMIN — PENICILLIN V POTASIUM 1000 MG: 250 TABLET ORAL at 20:59

## 2020-01-26 RX ADMIN — Medication 10 ML: at 10:03

## 2020-01-26 RX ADMIN — INSULIN LISPRO 2 UNITS: 100 INJECTION, SOLUTION INTRAVENOUS; SUBCUTANEOUS at 10:06

## 2020-01-26 RX ADMIN — LISINOPRIL 20 MG: 20 TABLET ORAL at 21:00

## 2020-01-26 RX ADMIN — DOCUSATE SODIUM 100 MG: 100 CAPSULE, LIQUID FILLED ORAL at 21:00

## 2020-01-26 RX ADMIN — HYDRALAZINE HYDROCHLORIDE 25 MG: 25 TABLET, FILM COATED ORAL at 14:52

## 2020-01-26 RX ADMIN — INSULIN LISPRO 1 UNITS: 100 INJECTION, SOLUTION INTRAVENOUS; SUBCUTANEOUS at 21:04

## 2020-01-26 RX ADMIN — CARVEDILOL 25 MG: 25 TABLET, FILM COATED ORAL at 10:06

## 2020-01-26 RX ADMIN — HYDRALAZINE HYDROCHLORIDE 25 MG: 25 TABLET, FILM COATED ORAL at 10:57

## 2020-01-26 RX ADMIN — ACETAMINOPHEN 650 MG: 325 TABLET ORAL at 17:18

## 2020-01-26 RX ADMIN — METFORMIN HYDROCHLORIDE 750 MG: 750 TABLET, EXTENDED RELEASE ORAL at 20:58

## 2020-01-26 RX ADMIN — APIXABAN 2.5 MG: 2.5 TABLET, FILM COATED ORAL at 10:03

## 2020-01-26 ASSESSMENT — PAIN DESCRIPTION - PAIN TYPE
TYPE: SURGICAL PAIN
TYPE: SURGICAL PAIN
TYPE: ACUTE PAIN;SURGICAL PAIN

## 2020-01-26 ASSESSMENT — PAIN SCALES - GENERAL
PAINLEVEL_OUTOF10: 5
PAINLEVEL_OUTOF10: 3
PAINLEVEL_OUTOF10: 5
PAINLEVEL_OUTOF10: 5

## 2020-01-26 ASSESSMENT — PAIN DESCRIPTION - ORIENTATION
ORIENTATION: RIGHT

## 2020-01-26 ASSESSMENT — PAIN DESCRIPTION - DESCRIPTORS
DESCRIPTORS: ACHING
DESCRIPTORS: ACHING

## 2020-01-26 ASSESSMENT — PAIN DESCRIPTION - LOCATION
LOCATION: KNEE

## 2020-01-26 ASSESSMENT — PAIN DESCRIPTION - FREQUENCY: FREQUENCY: INTERMITTENT

## 2020-01-26 ASSESSMENT — PAIN DESCRIPTION - PROGRESSION: CLINICAL_PROGRESSION: GRADUALLY IMPROVING

## 2020-01-26 ASSESSMENT — PAIN DESCRIPTION - ONSET: ONSET: ON-GOING

## 2020-01-26 NOTE — PROGRESS NOTES
Hospitalist Progress Note      PCP: Jolanta Chow MD    Date of Admission: 1/23/2020    Chief Complaint: Right knee pain     Hospital Course: 75 y.o. male who we are asked to see/evaluate by Cecile Diallo MD for medical management. PT has PMH of HTN, HLD, DM, CAD, chronic  lower extremity cellulitis on PPX PCN for mgt. The patient reports a several year hx of progressive Right knee pain 2nd to OA that is rated 10/10, worse with ambulation and relieved to some degree by rest and analgesic medication. This has progressed to the point that the analgesic meds are minimally effective and the patient decided to undergo an elective TKR. Subjective: EMR notes reviewed patient seen and examined. Pain is tolerable. PO intake is good.  Working with therapy     Medications:  Reviewed    Infusion Medications    sodium chloride 75 mL/hr at 01/23/20 2039    dextrose       Scheduled Medications    saccharomyces boulardii  250 mg Oral BID    allopurinol  300 mg Oral Daily    atorvastatin  80 mg Oral Nightly    carvedilol  25 mg Oral BID    hydrALAZINE  25 mg Oral 3 times per day    hydrochlorothiazide  25 mg Oral Daily    lisinopril  20 mg Oral BID    metFORMIN  750 mg Oral BID    penicillin v potassium  1,000 mg Oral BID    sodium chloride flush  10 mL Intravenous 2 times per day    docusate sodium  100 mg Oral BID    sennosides-docusate sodium  1 tablet Oral BID    apixaban  2.5 mg Oral BID    insulin lispro  0-12 Units Subcutaneous TID WC    insulin lispro  0-6 Units Subcutaneous Nightly     PRN Meds: acetaminophen, benzocaine-menthol, diphenhydrAMINE, sodium chloride flush, magnesium hydroxide, ondansetron, glucose, dextrose, glucagon (rDNA), dextrose      Intake/Output Summary (Last 24 hours) at 1/26/2020 1010  Last data filed at 1/26/2020 0848  Gross per 24 hour   Intake --   Output 1475 ml   Net -1475 ml       Physical Exam Performed:    /65   Pulse 91   Temp 98.5 °F (36.9 °C) (Oral) Resp 16   Ht 5' 10\" (1.778 m)   Wt 270 lb 14.4 oz (122.9 kg)   SpO2 93%   BMI 38.87 kg/m²     General appearance: No apparent distress, appears stated age and cooperative. HEENT: Pupils equal, round, and reactive to light. Conjunctivae/corneas clear. Neck: Supple, with full range of motion. No jugular venous distention. Trachea midline. Respiratory:  Normal respiratory effort. Clear to auscultation, bilaterally without Rales/Wheezes/Rhonchi. Cardiovascular: Regular rate and rhythm with normal S1/S2 without murmurs, rubs or gallops. Abdomen: Soft, non-tender, non-distended with normal bowel sounds. Musculoskeletal: Operative extremity is edematous skin is taut and tight has limited range of motion in this extremity. Good circulation sensation and motion    Skin: Skin color, texture, turgor normal.  No rashes or lesions. Neurologic:  Neurovascularly intact without any focal sensory/motor deficits. Cranial nerves: II-XII intact, grossly non-focal.  Psychiatric: Alert and oriented, thought content appropriate, normal insight  Capillary Refill: Brisk,< 3 seconds   Peripheral Pulses: +2 palpable, equal bilaterally       Labs:   Recent Labs     01/24/20  0613 01/25/20  0553 01/26/20  0552   WBC 8.8 7.5 7.4   HGB 9.8* 8.5* 8.5*   HCT 29.8* 25.1* 25.3*   * 105* 111*     Recent Labs     01/24/20  0613 01/26/20  0552    134*   K 4.7 3.6    101   CO2 23 21   BUN 25* 32*   CREATININE 1.3 1.2   CALCIUM 8.8 8.7     No results for input(s): AST, ALT, BILIDIR, BILITOT, ALKPHOS in the last 72 hours. No results for input(s): INR in the last 72 hours. No results for input(s): Srini Hairston in the last 72 hours.     Urinalysis:      Lab Results   Component Value Date    NITRU Negative 01/26/2020    WBCUA 0-2 12/18/2017    BACTERIA Rare 12/18/2017    RBCUA None seen 12/18/2017    BLOODU Negative 01/26/2020    SPECGRAV 1.025 01/26/2020    GLUCOSEU Negative 01/26/2020       Radiology:  XR KNEE RIGHT

## 2020-01-26 NOTE — PROGRESS NOTES
History of PTCA, Hyperlipidemia, Hypertension, Hypotestosteronism, IGT (impaired glucose tolerance), Pituitary abnormality ([address] and Sleep apnea. has a past surgical history that includes Coronary angioplasty with stent (2003); Knee arthroscopy (Right, 2015); Colonoscopy (2001); Colonoscopy (12/9/11); eye surgery; Cardiac surgery (2009); and Total knee arthroplasty (Right, 1/23/2020). Restrictions  Restrictions/Precautions  Restrictions/Precautions: General Precautions, Fall Risk, Weight Bearing  Required Braces or Orthoses?: Yes  Lower Extremity Weight Bearing Restrictions  Right Lower Extremity Weight Bearing: Weight Bearing As Tolerated  Required Braces or Orthoses  Right Lower Extremity Brace: Knee Immobilizer  Position Activity Restriction  Other position/activity restrictions: 1)  Ambulate in room progressing to hallway with assistive device four times daily (including PT) when PT advises. 2)  Bathroom privileges with assistance. 3) Up in bedside chair at least TID as tolerated. Subjective   General  Chart Reviewed: Yes  Response To Previous Treatment: Patient with no complaints from previous session. Family / Caregiver Present: Yes(daughter)  Referring Practitioner: PATTI Schmid  Subjective  Subjective: pt in bed, agreeable to therapy  Pain Screening  Patient Currently in Pain: Yes  Pain Assessment  Pain Assessment: 0-10  Pain Level: 5  Pain Type: Surgical pain  Pain Location: Knee  Pain Orientation: Right  Pain Descriptors: Aching  Non-Pharmaceutical Pain Intervention(s): Repositioned; Ambulation/Increased Activity;Cold applied  Vital Signs  Patient Currently in Pain: Yes       Orientation  Orientation  Overall Orientation Status: Within Functional Limits  Cognition      Objective   Bed mobility  Supine to Sit: Minimal assistance(HOB fully elevated)  Sit to Supine: Minimal assistance(for RLE)  Scooting: Stand by assistance(toward middle of bed)  Transfers  Sit to Stand:  Moderate detailed exam

## 2020-01-27 LAB
GLUCOSE BLD-MCNC: 140 MG/DL (ref 70–99)
GLUCOSE BLD-MCNC: 167 MG/DL (ref 70–99)
GLUCOSE BLD-MCNC: 171 MG/DL (ref 70–99)
GLUCOSE BLD-MCNC: 194 MG/DL (ref 70–99)
HCT VFR BLD CALC: 23.6 % (ref 40.5–52.5)
HEMOGLOBIN: 8 G/DL (ref 13.5–17.5)
MCH RBC QN AUTO: 29.2 PG (ref 26–34)
MCHC RBC AUTO-ENTMCNC: 33.6 G/DL (ref 31–36)
MCV RBC AUTO: 86.9 FL (ref 80–100)
PDW BLD-RTO: 16.3 % (ref 12.4–15.4)
PERFORMED ON: ABNORMAL
PLATELET # BLD: 141 K/UL (ref 135–450)
PMV BLD AUTO: 9 FL (ref 5–10.5)
RBC # BLD: 2.72 M/UL (ref 4.2–5.9)
WBC # BLD: 7.7 K/UL (ref 4–11)

## 2020-01-27 PROCEDURE — 97535 SELF CARE MNGMENT TRAINING: CPT

## 2020-01-27 PROCEDURE — 36415 COLL VENOUS BLD VENIPUNCTURE: CPT

## 2020-01-27 PROCEDURE — APPNB30 APP NON BILLABLE TIME 0-30 MINS: Performed by: PHYSICIAN ASSISTANT

## 2020-01-27 PROCEDURE — 97530 THERAPEUTIC ACTIVITIES: CPT

## 2020-01-27 PROCEDURE — 6370000000 HC RX 637 (ALT 250 FOR IP): Performed by: PHYSICIAN ASSISTANT

## 2020-01-27 PROCEDURE — 97116 GAIT TRAINING THERAPY: CPT

## 2020-01-27 PROCEDURE — 97110 THERAPEUTIC EXERCISES: CPT

## 2020-01-27 PROCEDURE — 6370000000 HC RX 637 (ALT 250 FOR IP): Performed by: NURSE PRACTITIONER

## 2020-01-27 PROCEDURE — 1200000000 HC SEMI PRIVATE

## 2020-01-27 PROCEDURE — 85027 COMPLETE CBC AUTOMATED: CPT

## 2020-01-27 PROCEDURE — 2580000003 HC RX 258: Performed by: PHYSICIAN ASSISTANT

## 2020-01-27 RX ADMIN — INSULIN LISPRO 1 UNITS: 100 INJECTION, SOLUTION INTRAVENOUS; SUBCUTANEOUS at 22:05

## 2020-01-27 RX ADMIN — APIXABAN 2.5 MG: 2.5 TABLET, FILM COATED ORAL at 22:03

## 2020-01-27 RX ADMIN — CARVEDILOL 25 MG: 25 TABLET, FILM COATED ORAL at 09:52

## 2020-01-27 RX ADMIN — HYDRALAZINE HYDROCHLORIDE 25 MG: 25 TABLET, FILM COATED ORAL at 22:03

## 2020-01-27 RX ADMIN — LISINOPRIL 20 MG: 20 TABLET ORAL at 09:52

## 2020-01-27 RX ADMIN — METFORMIN HYDROCHLORIDE 750 MG: 750 TABLET, EXTENDED RELEASE ORAL at 09:51

## 2020-01-27 RX ADMIN — PENICILLIN V POTASIUM 1000 MG: 250 TABLET ORAL at 22:18

## 2020-01-27 RX ADMIN — ACETAMINOPHEN 650 MG: 325 TABLET ORAL at 07:23

## 2020-01-27 RX ADMIN — APIXABAN 2.5 MG: 2.5 TABLET, FILM COATED ORAL at 09:52

## 2020-01-27 RX ADMIN — METFORMIN HYDROCHLORIDE 750 MG: 750 TABLET, EXTENDED RELEASE ORAL at 22:01

## 2020-01-27 RX ADMIN — ALLOPURINOL 300 MG: 300 TABLET ORAL at 09:52

## 2020-01-27 RX ADMIN — SENNOSIDES AND DOCUSATE SODIUM 1 TABLET: 8.6; 5 TABLET ORAL at 22:03

## 2020-01-27 RX ADMIN — LISINOPRIL 20 MG: 20 TABLET ORAL at 22:02

## 2020-01-27 RX ADMIN — Medication 250 MG: at 22:01

## 2020-01-27 RX ADMIN — HYDRALAZINE HYDROCHLORIDE 25 MG: 25 TABLET, FILM COATED ORAL at 14:49

## 2020-01-27 RX ADMIN — ACETAMINOPHEN 650 MG: 325 TABLET ORAL at 01:09

## 2020-01-27 RX ADMIN — INSULIN LISPRO 2 UNITS: 100 INJECTION, SOLUTION INTRAVENOUS; SUBCUTANEOUS at 09:56

## 2020-01-27 RX ADMIN — ATORVASTATIN CALCIUM 80 MG: 80 TABLET, FILM COATED ORAL at 22:02

## 2020-01-27 RX ADMIN — SENNOSIDES AND DOCUSATE SODIUM 1 TABLET: 8.6; 5 TABLET ORAL at 09:52

## 2020-01-27 RX ADMIN — HYDROCHLOROTHIAZIDE 25 MG: 25 TABLET ORAL at 09:52

## 2020-01-27 RX ADMIN — Medication 10 ML: at 09:52

## 2020-01-27 RX ADMIN — CARVEDILOL 25 MG: 25 TABLET, FILM COATED ORAL at 22:02

## 2020-01-27 RX ADMIN — ACETAMINOPHEN 650 MG: 325 TABLET ORAL at 22:02

## 2020-01-27 RX ADMIN — PENICILLIN V POTASIUM 1000 MG: 250 TABLET ORAL at 09:52

## 2020-01-27 RX ADMIN — ACETAMINOPHEN 650 MG: 325 TABLET ORAL at 14:49

## 2020-01-27 RX ADMIN — DOCUSATE SODIUM 100 MG: 100 CAPSULE, LIQUID FILLED ORAL at 09:52

## 2020-01-27 RX ADMIN — HYDRALAZINE HYDROCHLORIDE 25 MG: 25 TABLET, FILM COATED ORAL at 09:51

## 2020-01-27 RX ADMIN — Medication 250 MG: at 09:51

## 2020-01-27 RX ADMIN — DOCUSATE SODIUM 100 MG: 100 CAPSULE, LIQUID FILLED ORAL at 22:02

## 2020-01-27 RX ADMIN — INSULIN LISPRO 2 UNITS: 100 INJECTION, SOLUTION INTRAVENOUS; SUBCUTANEOUS at 18:01

## 2020-01-27 RX ADMIN — Medication 10 ML: at 22:05

## 2020-01-27 ASSESSMENT — PAIN SCALES - GENERAL
PAINLEVEL_OUTOF10: 3
PAINLEVEL_OUTOF10: 5
PAINLEVEL_OUTOF10: 2
PAINLEVEL_OUTOF10: 2
PAINLEVEL_OUTOF10: 3

## 2020-01-27 ASSESSMENT — PAIN DESCRIPTION - ORIENTATION: ORIENTATION: RIGHT

## 2020-01-27 ASSESSMENT — PAIN DESCRIPTION - PAIN TYPE: TYPE: SURGICAL PAIN

## 2020-01-27 ASSESSMENT — PAIN DESCRIPTION - LOCATION: LOCATION: KNEE

## 2020-01-27 ASSESSMENT — PAIN SCALES - WONG BAKER: WONGBAKER_NUMERICALRESPONSE: 4

## 2020-01-27 NOTE — PROGRESS NOTES
Zandra;Decreased step length;Decreased step height;Decreased head and trunk rotation  Distance: 80'        Exercises  Straight Leg Raise: x 10 mod/max A RLE  Quad Sets: x 15 BLE  Heelslides: x 10 RLE seated with pillow case under foot  Gluteal Sets: x 15 BLE  Knee Short Arc Quad: x 15 RLE min/ mod A  Ankle Pumps: x 15 BLE  Comments: ROM 14 - 86*         AM-PAC Score  AM-PAC Inpatient Mobility Raw Score : 17 (01/27/20 1135)  AM-PAC Inpatient T-Scale Score : 42.13 (01/27/20 1135)  Mobility Inpatient CMS 0-100% Score: 50.57 (01/27/20 1135)  Mobility Inpatient CMS G-Code Modifier : CK (01/27/20 1135)          Goals  Short term goals  Time Frame for Short term goals: 5 days (1/27) unless othwerwise specified  Short term goal 1: Pt will be indep with bed mobility. Short term goal 2: Pt will be supervision for transfers with RW. Short term goal 3: Pt will ambulate 50 ft with RW and supervision. Short term goal 4: Pt will negotiate curb step x 2 reps with RW and SBA. Short term goal 5: 1/25; Pt will be indep with TKA protocol HEP. Patient Goals   Patient goals : \"to go home\"    Plan    Plan  Times per week: BID 7 days/wk  Times per day: Twice a day  Specific instructions for Next Treatment: progress mobility as tolerated  Current Treatment Recommendations: Strengthening, Gait Training, ROM, Stair training, Equipment Evaluation, Education, & procurement, Balance Training, Neuromuscular Re-education, Endurance Training, Functional Mobility Training, Safety Education & Training, Home Exercise Program, Transfer Training  Safety Devices  Type of devices:  All fall risk precautions in place, Call light within reach, Chair alarm in place, Gait belt, Left in chair, Nurse notified  Restraints  Initially in place: No     Therapy Time   Individual Concurrent Group Co-treatment   Time In 1530         Time Out 1618         Minutes [address], [place] [id number]

## 2020-01-27 NOTE — PROGRESS NOTES
2015); Colonoscopy (2001); Colonoscopy (12/9/11); eye surgery; Cardiac surgery (2009); and Total knee arthroplasty (Right, 1/23/2020). Restrictions  Restrictions/Precautions  Restrictions/Precautions: General Precautions, Fall Risk, Weight Bearing  Required Braces or Orthoses?: Yes  Lower Extremity Weight Bearing Restrictions  Right Lower Extremity Weight Bearing: Weight Bearing As Tolerated  Required Braces or Orthoses  Right Lower Extremity Brace: Knee Immobilizer  Position Activity Restriction  Other position/activity restrictions: 1)  Ambulate in room progressing to hallway with assistive device four times daily (including PT) when PT advises. 2)  Bathroom privileges with assistance. 3) Up in bedside chair at least TID as tolerated. Subjective   General  Chart Reviewed: Yes  Response To Previous Treatment: Patient with no complaints from previous session. Family / Caregiver Present: Yes(wife)  Referring Practitioner: PATTI Aguilar  Subjective  Subjective: pt in bed, agreeable to therapy  General Comment  Comments: Pt resting in bed on approach; RN cleared pt for therapy  Pain Screening  Patient Currently in Pain: Yes  Pain Assessment  Pain Assessment: 0-10  Pain Level: 2(increased to 5 with acitivty)  Vital Signs  Patient Currently in Pain: Yes       Orientation  Orientation  Overall Orientation Status: Within Functional Limits     Objective   Bed mobility  Supine to Sit: Stand by assistance;Contact guard assistance  Sit to Supine: Unable to assess  Transfers  Sit to Stand: Contact guard assistance;Minimal Assistance  Stand to sit: Contact guard assistance  Ambulation  Ambulation?: Yes  WB Status: WBAT RLE  Ambulation 1  Surface: level tile  Device: Rolling Walker  Other Apparatus: Knee Immobilizer;Right  Assistance: Contact guard assistance;Stand by assistance  Quality of Gait: Pt continues to ambulate with decreased stance time on RLE vs. LLE.  Pt demonstrates improved upright posture and continues to progress, taking larger steps for improved gait. Gait Deviations: Slow Zandra;Decreased step length;Decreased step height;Decreased head and trunk rotation  Distance: 80'        Exercises  Straight Leg Raise: x 10 mod/max A RLE  Quad Sets: x 15 BLE  Heelslides: x 10 RLE seated with pillow case under foot  Gluteal Sets: x 15 BLE  Knee Short Arc Quad: x 15 RLE min/ mod A  Ankle Pumps: x 15 BLE  Comments: ROM 14 - 86*         AM-PAC Score  AM-PAC Inpatient Mobility Raw Score : 17 (01/27/20 1135)  AM-PAC Inpatient T-Scale Score : 42.13 (01/27/20 1135)  Mobility Inpatient CMS 0-100% Score: 50.57 (01/27/20 1135)  Mobility Inpatient CMS G-Code Modifier : CK (01/27/20 1135)          Goals  Short term goals  Time Frame for Short term goals: 5 days (1/27) unless othwerwise specified  Short term goal 1: Pt will be indep with bed mobility. ; 1/27 progressing  Short term goal 2: Pt will be supervision for transfers with RW.; 1/27 progressing  Short term goal 3: Pt will ambulate 50 ft with RW and supervision. ; 1/27 progressing  Short term goal 4: Pt will negotiate curb step x 2 reps with RW and SBA.; 1/27 not assessed  Short term goal 5: 1/25; Pt will be indep with TKA protocol HEP.; 1/27 progressing  Patient Goals   Patient goals : \"to go home\"    Plan    Plan  Times per week: BID 7 days/wk  Times per day: Twice a day  Specific instructions for Next Treatment: progress mobility as tolerated  Current Treatment Recommendations: Strengthening, Gait Training, ROM, Stair training, Equipment Evaluation, Education, & procurement, Balance Training, Neuromuscular Re-education, Endurance Training, Functional Mobility Training, Safety Education & Training, Home Exercise Program, Transfer Training  Safety Devices  Type of devices:  All fall risk precautions in place, Call light within reach, Chair alarm in place, Gait belt, Left in chair, Nurse notified  Restraints  Initially in place: No     Therapy Time   Individual Concurrent Group Co-treatment   Time In [address]         Time Out 0923         Minutes [address] [employer], [facility]

## 2020-01-27 NOTE — PROGRESS NOTES
1/23/2020). Restrictions  Restrictions/Precautions  Restrictions/Precautions: General Precautions, Fall Risk, Weight Bearing  Required Braces or Orthoses?: Yes  Lower Extremity Weight Bearing Restrictions  Right Lower Extremity Weight Bearing: Weight Bearing As Tolerated  Required Braces or Orthoses  Right Lower Extremity Brace: Knee Immobilizer  Position Activity Restriction  Other position/activity restrictions: 1)  Ambulate in room progressing to hallway with assistive device four times daily (including PT) when PT advises. 2)  Bathroom privileges with assistance. 3) Up in bedside chair at least TID as tolerated. Subjective   General  Chart Reviewed: Yes  Patient assessed for rehabilitation services?: Yes  Response to previous treatment: Patient with no complaints from previous session  Family / Caregiver Present: Yes(spouse)  Referring Practitioner: PATTI Young    Subjective  Subjective: Pt up using toilet, agreeable to OT treatment.      Pain Assessment  Pain Assessment: Faces  Garner-Baker Pain Rating: Hurts little more  Pain Type: Surgical pain  Pain Location: Knee  Pain Orientation: Right  Non-Pharmaceutical Pain Intervention(s): Ambulation/Increased Activity;Repositioned  Pre Treatment Pain Screening  Intervention List: Patient able to continue with treatment  Vital Signs  Patient Currently in Pain: Yes     Orientation  Orientation  Overall Orientation Status: Within Functional Limits     Objective    ADL  Grooming: Stand by assistance(in stance at sink for handwashing)  LE Dressing: Maximum assistance(for KI management)  Toileting: Contact guard assistance(pt unable to complete hygiene seated, educated on safety with standing to wipe )     Balance  Sitting Balance: Stand by assistance  Standing Balance: Contact guard assistance(with RW)  Standing Balance  Activity: functional mobility, standing for bowel hygiene 6 mins    Functional Mobility  Functional - Mobility Device: Rolling

## 2020-01-28 VITALS
SYSTOLIC BLOOD PRESSURE: 151 MMHG | WEIGHT: 270.9 LBS | OXYGEN SATURATION: 97 % | HEIGHT: 70 IN | TEMPERATURE: 98.6 F | DIASTOLIC BLOOD PRESSURE: 71 MMHG | RESPIRATION RATE: 16 BRPM | BODY MASS INDEX: 38.78 KG/M2 | HEART RATE: 78 BPM

## 2020-01-28 LAB
GLUCOSE BLD-MCNC: 145 MG/DL (ref 70–99)
GLUCOSE BLD-MCNC: 170 MG/DL (ref 70–99)
PERFORMED ON: ABNORMAL
PERFORMED ON: ABNORMAL

## 2020-01-28 PROCEDURE — 6370000000 HC RX 637 (ALT 250 FOR IP): Performed by: PHYSICIAN ASSISTANT

## 2020-01-28 PROCEDURE — 2580000003 HC RX 258: Performed by: PHYSICIAN ASSISTANT

## 2020-01-28 PROCEDURE — 97110 THERAPEUTIC EXERCISES: CPT

## 2020-01-28 PROCEDURE — APPNB30 APP NON BILLABLE TIME 0-30 MINS: Performed by: PHYSICIAN ASSISTANT

## 2020-01-28 PROCEDURE — 6370000000 HC RX 637 (ALT 250 FOR IP): Performed by: NURSE PRACTITIONER

## 2020-01-28 PROCEDURE — 97116 GAIT TRAINING THERAPY: CPT

## 2020-01-28 RX ORDER — IBUPROFEN 200 MG
400 TABLET ORAL EVERY 8 HOURS PRN
Qty: 120 TABLET | Refills: 0 | COMMUNITY
Start: 2020-01-28

## 2020-01-28 RX ADMIN — DOCUSATE SODIUM 100 MG: 100 CAPSULE, LIQUID FILLED ORAL at 08:40

## 2020-01-28 RX ADMIN — CARVEDILOL 25 MG: 25 TABLET, FILM COATED ORAL at 08:41

## 2020-01-28 RX ADMIN — Medication 250 MG: at 08:39

## 2020-01-28 RX ADMIN — METFORMIN HYDROCHLORIDE 750 MG: 750 TABLET, EXTENDED RELEASE ORAL at 08:40

## 2020-01-28 RX ADMIN — Medication 10 ML: at 08:42

## 2020-01-28 RX ADMIN — ACETAMINOPHEN 650 MG: 325 TABLET ORAL at 04:08

## 2020-01-28 RX ADMIN — INSULIN LISPRO 2 UNITS: 100 INJECTION, SOLUTION INTRAVENOUS; SUBCUTANEOUS at 11:38

## 2020-01-28 RX ADMIN — PENICILLIN V POTASIUM 1000 MG: 250 TABLET ORAL at 08:40

## 2020-01-28 RX ADMIN — HYDROCHLOROTHIAZIDE 25 MG: 25 TABLET ORAL at 08:41

## 2020-01-28 RX ADMIN — ALLOPURINOL 300 MG: 300 TABLET ORAL at 08:40

## 2020-01-28 RX ADMIN — APIXABAN 2.5 MG: 2.5 TABLET, FILM COATED ORAL at 08:40

## 2020-01-28 RX ADMIN — LISINOPRIL 20 MG: 20 TABLET ORAL at 08:41

## 2020-01-28 RX ADMIN — INSULIN LISPRO 2 UNITS: 100 INJECTION, SOLUTION INTRAVENOUS; SUBCUTANEOUS at 08:39

## 2020-01-28 RX ADMIN — ACETAMINOPHEN 650 MG: 325 TABLET ORAL at 11:37

## 2020-01-28 ASSESSMENT — PAIN DESCRIPTION - LOCATION: LOCATION: LEG;KNEE

## 2020-01-28 ASSESSMENT — PAIN DESCRIPTION - PAIN TYPE: TYPE: ACUTE PAIN;SURGICAL PAIN

## 2020-01-28 ASSESSMENT — PAIN SCALES - GENERAL
PAINLEVEL_OUTOF10: 5
PAINLEVEL_OUTOF10: 2
PAINLEVEL_OUTOF10: 5

## 2020-01-28 ASSESSMENT — PAIN DESCRIPTION - ORIENTATION: ORIENTATION: RIGHT

## 2020-01-28 NOTE — CARE COORDINATION
CASE MANAGEMENT DISCHARGE SUMMARY      Discharge to: Home with [facility], [facility].. New Durable Medical Equipment ordered/agency: SW  Transportation: Family  Notified:    Family: Met with family at bedside.    Facility/Agency: [facility] faxed to [facility] [address]: [facility]

## 2020-01-28 NOTE — PROGRESS NOTES
Patient and family given discharge instructions. All questions and concerns were addressed. Patient dressing was changed. Wound clean dry and intact. Patient IV removed and dressing placed. Patient wheeled to car with all patient belongings.

## 2020-01-28 NOTE — DISCHARGE SUMMARY
Department of Orthopedic Surgery  Physician Assistant   Discharge Summary    The Eladia Riggins is a 75 y.o. male underwent total knee replacement procedure without complication. Eladia Riggins was admitted to the floor following His recovery in the PACU.      Discharge Diagnosis  right Knee Replacement    Current Inpatient Medications    Current Facility-Administered Medications: acetaminophen (TYLENOL) tablet 650 mg, 650 mg, Oral, Q6H PRN  benzocaine-menthol (CEPACOL SORE THROAT) lozenge 1 lozenge, 1 lozenge, Oral, Q2H PRN  saccharomyces boulardii (FLORASTOR) capsule 250 mg, 250 mg, Oral, BID  diphenhydrAMINE (BENADRYL) tablet 25 mg, 25 mg, Oral, Nightly PRN  allopurinol (ZYLOPRIM) tablet 300 mg, 300 mg, Oral, Daily  atorvastatin (LIPITOR) tablet 80 mg, 80 mg, Oral, Nightly  carvedilol (COREG) tablet 25 mg, 25 mg, Oral, BID  hydrALAZINE (APRESOLINE) tablet 25 mg, 25 mg, Oral, 3 times per day  hydrochlorothiazide (HYDRODIURIL) tablet 25 mg, 25 mg, Oral, Daily  lisinopril (PRINIVIL;ZESTRIL) tablet 20 mg, 20 mg, Oral, BID  metFORMIN (GLUCOPHAGE-XR) extended release tablet 750 mg, 750 mg, Oral, BID  penicillin v potassium (VEETID) tablet 1,000 mg, 1,000 mg, Oral, BID  0.9 % sodium chloride infusion, , Intravenous, Continuous  sodium chloride flush 0.9 % injection 10 mL, 10 mL, Intravenous, 2 times per day  sodium chloride flush 0.9 % injection 10 mL, 10 mL, Intravenous, PRN  docusate sodium (COLACE) capsule 100 mg, 100 mg, Oral, BID  sennosides-docusate sodium (SENOKOT-S) 8.6-50 MG tablet 1 tablet, 1 tablet, Oral, BID  magnesium hydroxide (MILK OF MAGNESIA) 400 MG/5ML suspension 30 mL, 30 mL, Oral, Daily PRN  ondansetron (ZOFRAN) injection 4 mg, 4 mg, Intravenous, Q6H PRN  apixaban (ELIQUIS) tablet 2.5 mg, 2.5 mg, Oral, BID  glucose (GLUTOSE) 40 % oral gel 15 g, 15 g, Oral, PRN  dextrose 50 % IV solution, 12.5 g, Intravenous, PRN  glucagon (rDNA) injection 1 mg, 1 mg, Intramuscular, PRN  dextrose 5 % solution, 100 mL/hr, Intravenous, PRN  insulin lispro (HUMALOG) injection vial 0-12 Units, 0-12 Units, Subcutaneous, TID WC  insulin lispro (HUMALOG) injection vial 0-6 Units, 0-6 Units, Subcutaneous, Nightly    Post-operatively the patients diet was advanced as tolerated and their incision was checked on POD #1. The incision is dressing in place, clean, dry and intact with no signs of infection. The patient remained neurovascularly intact in the lower extremity and had intact pulses distally. Patients calf remained soft and showed no evidence of DVT. The patient was able to move their leg and ankle/foot without any problems post-operatively. Physical therapy and occupational therapy were consulted and began working with the patient post-operatively. The patient progressed with PT/OT as would be expected and continued to improve through their stay. The patients pain was initially controlled with IV medications but we were able to transition to oral pain medications soon after arrival to the floor and their pain remained under good control through their hospital stay. From a medical standpoint the patient remained stable and continued to have the medicine team follow throughout their stay. The patients dressing was changed/incison was checked on day of d/c. The patient will be discharged at this time to Home  with their current diet restrictions and will continue to follow the total knee precautions outlined to them by us and PT/OT. Condition on Discharge: Stable    Plan  Return visit in 2 weeks. .  Patient was instructed on the use of pain medications, the signs and symptoms of infection, and was given our number to call should they have any questions or concerns following discharge. For opioid prescriptions given at discharge the following statement is provided for compliance with [employer]SM[employer] rules.   Patient being given increased dosage/quantity of opoid pain medication in excess of OSMB guidelines which noted a 30 MED daily of opioids due to the fact that he/she has undergone major orthopaedic surgery as outlined in rule 4731-11-13. Dosages and further duration of the pain medication will be re-evaluated at her post op visit in 2 weeks. Patient was educated on dosing expectations and limits of prescribing as a result of the new [facility] Board rules enacted August 31, 2017. Please also note that this is not the initial opoid prescription issued to this patient but a continuation of medication utilized during the hospital admission as noted in the medical record. OARRS report has also been utilized to screen for any abuse history or suspicious activity as outlined in [place]. All efforts have been taken to prevent abuse potential and misuse of opioid medications including education, screening, and close clinical follow up.

## 2020-01-28 NOTE — PROGRESS NOTES
Physical Therapy  Facility/Department: [facility] C5 - MED SURG/ORTHO  Daily Treatment Note  NAME: Karel Solomon  :   MRN: [id number]    Date of Service: 2020    Discharge Recommendations:  24 hour supervision or assist, Home with Home health PT   PT Equipment Recommendations  Equipment Needed: No    Assessment   Body structures, Functions, Activity limitations: Decreased functional mobility ; Decreased endurance;Decreased ROM; Decreased coordination;Decreased strength;Decreased balance; Increased pain  Assessment: Pt porgressing with bed mobility, transfers and ambulation. Pt ambulate 100 with CGA/SBA taking small steps. , able to go up and down single step with  walker and CGA/Oliverio  Pt and wife educated in brace, ex and transfers. Recommend home with home PT  Treatment Diagnosis: decreased (I) with mobility  Specific instructions for Next Treatment: progress mobility as tolerated  Prognosis: Good  Decision Making: Medium Complexity  PT Education: Goals; Energy Conservation;Weight-bearing Education;PT Role;General Safety;Plan of Care;Gait Training;Home Exercise Program;Transfer Training;Functional Mobility Training;Family Education  Patient Education: pt  verbalized understanding  Barriers to Learning: none  REQUIRES PT FOLLOW UP: Yes  Activity Tolerance  Activity Tolerance: Patient Tolerated treatment well;Patient limited by pain; Patient limited by endurance     Patient Diagnosis(es): The primary encounter diagnosis was Status post total knee replacement, right. A diagnosis of Primary osteoarthritis of right knee was also pertinent to this visit. has a past medical history of CAD (coronary artery disease), Cataract, Diabetes mellitus ([facility] [address] [address] DJD (degenerative joint disease), History of PTCA, Hyperlipidemia, Hypertension, Hypotestosteronism, IGT (impaired glucose tolerance), Pituitary abnormality ([facility] [address] and Sleep apnea.    has a past surgical history that includes Coronary angioplasty with stent length;Decreased step height;Decreased head and trunk rotation  Distance: 110'  Up and down curb step with CGA/Oliverio and RW X 2                   AM-PAC Score  AM-PAC Inpatient Mobility Raw Score : 17 (01/28/20 1611)  AM-PAC Inpatient T-Scale Score : 42.13 (01/28/20 1611)  Mobility Inpatient CMS 0-100% Score: 50.57 (01/28/20 1611)  Mobility Inpatient CMS G-Code Modifier : CK (01/28/20 1611)          Goals  Short term goals  Time Frame for Short term goals: 5 days (1/27) unless othwerwise specified  Short term goal 1: Pt will be indep with bed mobility.; 1.28 progressing  Short term goal 2: Pt will be supervision for transfers with RW.; 1/28 progressing  Short term goal 3: Pt will ambulate 50 ft with RW and supervision. ; 1/28 progressing  Short term goal 4: Pt will negotiate curb step x 2 reps with RW and SBA.; 1/28 progressing  Short term goal 5: 1/25; Pt will be indep with TKA protocol HEP.; 1/28 progressing  Patient Goals   Patient goals : \"to go home\"    Plan    Plan  Times per week: BID 7 days/wk  Times per day: Twice a day  Specific instructions for Next Treatment: progress mobility as tolerated  Current Treatment Recommendations: Strengthening, Gait Training, ROM, Stair training, Equipment Evaluation, Education, & procurement, Balance Training, Neuromuscular Re-education, Endurance Training, Functional Mobility Training, Safety Education & Training, Home Exercise Program, Transfer Training  Safety Devices  Type of devices:  All fall risk precautions in place, Bed alarm in place, Call light within reach, Chair alarm in place, Gait belt, Left in chair, Nurse notified  Restraints  Initially in place: No     Therapy Time   Individual Concurrent Group Co-treatment   Time In 1516         Time Out 1403         Minutes 305 [facility], [address] [place] [address]

## 2020-01-30 ENCOUNTER — TELEPHONE (OUTPATIENT)
Dept: ORTHOPEDIC SURGERY | Age: 75
End: 2020-01-30

## 2020-02-05 ENCOUNTER — TELEPHONE (OUTPATIENT)
Dept: ORTHOPEDIC SURGERY | Age: 75
End: 2020-02-05

## 2020-02-05 NOTE — TELEPHONE ENCOUNTER
Attempted to contact pt, left voicemail with purpose and call back number.     Ed Freddie  Orthopedic Nurse Navigator  Phone number: [phone]    Follow up appointments:    Future Appointments   Date Time Provider Niko Linn   2/12/2020 10:00 AM Fatmata Balderas MD [facility]   3/12/2020 10:30 AM Obey Burnett MD [facility]   4/15/2020 11:00 AM Cole Estrada MD AND ENDO [facility]   6/22/2020 10:30 AM Elwin Levee Viann Sandhoff, MD [email or web address] [facility]

## 2020-02-11 RX ORDER — PENICILLIN V POTASSIUM 500 MG/1
TABLET ORAL
Qty: 120 TABLET | Refills: 0 | Status: SHIPPED | OUTPATIENT
Start: 2020-02-11 | End: 2020-03-16 | Stop reason: SDUPTHER

## 2020-02-12 ENCOUNTER — OFFICE VISIT (OUTPATIENT)
Dept: ORTHOPEDIC SURGERY | Age: 75
End: 2020-02-12

## 2020-02-12 VITALS — WEIGHT: 270.95 LBS | BODY MASS INDEX: 38.79 KG/M2 | HEIGHT: 70 IN

## 2020-02-12 PROCEDURE — 99024 POSTOP FOLLOW-UP VISIT: CPT | Performed by: ORTHOPAEDIC SURGERY

## 2020-02-12 NOTE — PROGRESS NOTES
That is post his knee replacement on the right side 1/23/2020. Is been doing well and really voices no complaints. Incision looks excellent. Is got a tiny little blister along the inferior aspect off the incision line. Calf is soft and nontender. 1+ pretibial edema. Her motion is 2 to 90 degrees    3 x-ray views of the right knee demonstrates a well-positioned knee replacement. Transition to outpatient physical therapy. Follow-up in a month. No x-rays necessary. He is just taking Tylenol for pain. I have personally performed and/or participated in the history, exam and medical decision making and agree with all pertinent clinical information. I have also reviewed and agree with the past medical, family and social history unless otherwise noted. This dictation was performed with a verbal recognition program (DRAGON) and it was checked for errors. It is possible that there are still dictated errors within this office note. If so, please bring any errors to my attention for an addendum. All efforts were made to ensure that this office note is accurate.           Dariana Brooks MD

## 2020-02-13 ENCOUNTER — HOSPITAL ENCOUNTER (OUTPATIENT)
Dept: PHYSICAL THERAPY | Age: 75
Setting detail: THERAPIES SERIES
Discharge: HOME | End: 2020-02-13
Payer: MEDICARE

## 2020-02-13 PROCEDURE — 97110 THERAPEUTIC EXERCISES: CPT | Performed by: PHYSICAL THERAPIST

## 2020-02-13 PROCEDURE — 97161 PT EVAL LOW COMPLEX 20 MIN: CPT | Performed by: PHYSICAL THERAPIST

## 2020-02-13 PROCEDURE — 97140 MANUAL THERAPY 1/> REGIONS: CPT | Performed by: PHYSICAL THERAPIST

## 2020-02-13 NOTE — FLOWSHEET NOTE
[place] [address] [facility], [address]  Phone: [phone]  Fax [phone]    Physical Therapy Treatment Note/ Progress Report:           Date:  2020    Patient Name:  Akilah Santacruz   \"Davonte\" :    MRN: [id number]  Restrictions/Precautions:    Medical/Treatment Diagnosis Information:  · Diagnosis: Z18.194 (ICD-10-CM) - Status post total right knee replacement DOS 20  · Treatment Diagnosis: R knee pain M25.561, difficulty walking S54.6  Insurance/Certification information:  PT Insurance Information: 2020 MONETKylah Miller   - $40CP-100%-PT/OT MED NEC  Physician Information:  Referring Practitioner: Rayna Bethea  Has the plan of care been signed (Y/N):        []  Yes  [x]  No     Date of Patient follow up with Physician: 3/11/20      Is this a Progress Report:     []  Yes  [x]  No        If Yes:  Date Range for reporting period:  Beginnin20  Ending:     Progress report will be due (10 Rx or 30 days whichever is less): 09       Recertification will be due (POC Duration  / 90 days whichever is less): 12 weeks       Visit # Insurance Allowable Requires auth   1 BMN    [x]no        []yes:     Functional Scale: LEFS 31/80 61%    Date assessed:  20      Therapy Diagnosis/Practice Pattern:H      Number of Comorbidities:  []0     []1-2    [x]3+    Latex Allergy:  [x]NO      []YES  Preferred Language for Healthcare:   [x]English       []other:      Pain level:  0-4/10     SUBJECTIVE:  See eval    OBJECTIVE: See eval   Observation:    Test measurements:      RESTRICTIONS/PRECAUTIONS: DM2, pre-op ROM 7-125    Exercises/Interventions:     Therapeutic Ex (92815) Sets/sec/reps Notes/CUES   Ext prop 2x1' HEP   Seated gastroc S, HS S 3x30\"  ea HEP   Heel slide w/ strap 10x10\" HEP   Ankle pumps, seated flex in chair, SAQ, SLR, SL hip abd Verbal review from home PT         Standing mini squat x10 HEP, form cues, hands on proximal hip and LE for self care, mobility, lifting, and ambulation/stair navigation      Manual Treatments:  PROM / STM / Oscillations-Mobs:  G-I, II, III, IV (PA's, Inf., Post.)  [x] (71966) Provided manual therapy to mobilize LE, proximal hip and/or LS spine soft tissue/joints for the purpose of modulating pain, promoting relaxation,  increasing ROM, reducing/eliminating soft tissue swelling/inflammation/restriction, improving soft tissue extensibility and allowing for proper ROM for normal function with self care, mobility, lifting and ambulation. Modalities:  Pt declined   [] GAME READY (VASO)- for significant edema, swelling, pain control. Charges:  Timed Code Treatment Minutes: 35   Total Treatment Minutes: 60     BWC time in/time out:   (and requires time in and out for each CPT code)    [x] EVAL (LOW) 63294 (typically 20 minutes face-to-face)  [] EVAL (MOD) 04274 (typically 30 minutes face-to-face)  [] EVAL (HIGH) 25246 (typically 45 minutes face-to-face)  [] RE-EVAL     [x] IZ(74684) x 1    [] IONTO  [] NMR (33771) x     [] VASO  [x] Manual (66959) x   1   [] Other:  [] TA x      [] Mech Traction (67439)  [] ES(attended) (20258)      [] ES (un) (06300):       GOALS:   Patient stated goal: full pain free mobility  [] Progressing: [] Met: [] Not Met: [] Adjusted    Therapist goals for Patient:   Short Term Goals: To be achieved in: 2 weeks  1. Independent in HEP and progression per patient tolerance, in order to prevent re-injury. [] Progressing: [] Met: [] Not Met: [] Adjusted  2. Patient will have a decrease in pain to facilitate improvement in movement, function, and ADLs as indicated by Functional Deficits. [] Progressing: [] Met: [] Not Met: [] Adjusted    Long Term Goals: To be achieved in: 12 weeks  1. Disability index score of 30% or less for the LEFS to assist with reaching prior level of function. [] Progressing: [] Met: [] Not Met: [] Adjusted  2.  Patient will demonstrate increased AROM to 0-120 R knee to allow for proper joint functioning as indicated by patients Functional Deficits. [] Progressing: [] Met: [] Not Met: [] Adjusted  3. Patient will demonstrate an increase in Strength to good proximal hip strength and control, within 5lb HHD in LE to allow for proper functional mobility as indicated by patients Functional Deficits. [] Progressing: [] Met: [] Not Met: [] Adjusted  4. Patient will return to reciprocal stairs without increased symptoms or restriction. [] Progressing: [] Met: [] Not Met: [] Adjusted  5. Pt will demo normal gait mechanics for community ambulation w/o AD. [] Progressing: [] Met: [] Not Met: [] Adjusted    Progression Towards Functional goals:  [] Patient is progressing as expected towards functional goals listed. [] Progression is slowed due to complexities listed. [] Progression has been slowed due to co-morbidities. [x] Plan just implemented, too soon to assess goals progression  [] Other:     Overall Progression Towards Functional goals/ Treatment Progress Update:  [] Patient is progressing as expected towards functional goals listed. [] Progression is slowed due to complexities/Impairments listed. [] Progression has been slowed due to co-morbidities.   [x] Plan just implemented, too soon to assess goals progression <30days   [] Goals require adjustment due to lack of progress  [] Patient is not progressing as expected and requires additional follow up with physician  [] Other    Prognosis for POC: [x] Good [] Fair  [] Poor      Patient requires continued skilled intervention: [x] Yes  [] No    Treatment/Activity Tolerance:  [x] Patient able to complete treatment  [] Patient limited by fatigue  [] Patient limited by pain    [] Patient limited by other medical complications  [] Other:     ASSESSMENT: see eval    Return to Play: (if applicable)   []  Stage 1: Intro to Strength   []  Stage 2: Return to Run and Strength   []  Stage 3: Return to Jump and Strength   []  Stage 4: Dynamic Strength and Agility   []  Stage 5: Sport Specific Training     []  Ready to Return to Play, Meets All Above Stages   []  Not Ready for Return to Sports   Comments:                         PLAN: See eval  [] Continue per plan of care [] Alter current plan (see comments above)  [x] Plan of care initiated [] Hold pending MD visit [] Discharge      Electronically signed by:  Sailaja Pitt PT    Note: If patient does not return for scheduled/ recommended follow up visits, this note will serve as a discharge from care along with most recent update on progress.

## 2020-02-13 NOTE — PLAN OF CARE
[facility], [address]  Phone: [phone]  Fax [phone]     Physical Therapy Certification    Dear Referring Practitioner: Jona Vargas,    We had the pleasure of evaluating the following patient for physical therapy services at [address] [place] [address]. A summary of our findings can be found in the initial assessment below. This includes our plan of care. If you have any questions or concerns regarding these findings, please do not hesitate to contact me at the office phone number checked above. Thank you for the referral.       Physician Signature:_______________________________Date:__________________  By signing above (or electronic signature), therapists plan is approved by physician    Patient: Moe Fernandes   :    MRN: [id number]  Referring Physician: Referring Practitioner: Jona Vargas      Evaluation Date: 2020      Medical Diagnosis Information:  Diagnosis: U17.619 (ICD-10-CM) - Status post total right knee replacement DOS 20   Treatment Diagnosis: R knee pain M25.561, difficulty walking R26.2                                         Insurance information: PT Insurance Information:  [facility]  - $40CP-100%-PT/OT MED NEC       Precautions/ Contra-indications: DM  Latex Allergy:  [x]NO      []YES  Preferred Language for Healthcare:   [x]English       []other:    SUBJECTIVE: Patient stated complaint:Pt reports doing well s/p TKR on 20. Home from hospital and then home PT, which he finished this week.   HEP as below:  QS, ankle pumps, SLR, SL hip abd, seated knee flexion    Relevant Medical History:OA, CAD, DM, htn,hyperlipidemia  Functional Disability Index: LEFS 61%  31/    Height: 5'10\"  Weight: 264 lb  Pain Scale: 0-4/10  Easing factors: rest, ice, tylenol  Provocative factors: sleeping, walking, driving, standing, kneeling, squatting, position changes     Type: []Constant   [x]Intermittent  []Radiating []Localized []other:     Numbness/Tingling: around incision    Occupation/School: retired pharmacist    Living Status/Prior Level of Function: Independent with ADLs and IADLs, Belongs to [employer]. Walking for exercise. OBJECTIVE:     ROM LEFT RIGHT   HIP Flex     HIP Abd     HIP Ext     HIP IR     HIP ER     Knee ext 0 Lacking 5   Knee Flex 135 90 PROM   Ankle PF     Ankle DF     Ankle In     Ankle Ev     Strength  LEFT RIGHT   HIP Flexors 4 3   HIP Abductors     HIP Ext     Hip ER     Knee EXT (quad) 4+ 4-   Knee Flex (HS) 5 4+   Ankle DF     Ankle PF     Ankle Inv     Ankle EV          Circumference  Mid apex  7 cm prox             Reflexes/Sensation: NT   []Dermatomes/Myotomes intact    []Reflexes equal and normal bilaterally   []Other:    Joint mobility:    []Normal    [x]Hypo   []Hyper    Palpation: tenderness medial HS and medial knee    Functional Mobility/Transfers: indep with increased time    Posture: decreased WBing R LE w/ incr R toe out and knee flexed    Bandages/Dressings/Incisions: good healing of incision w/o s/s infection, scabbing mostly off    Gait: (include devices/WB status) with ww, R knee stiffness and decreased TKE    Orthopedic Special Tests: (-) vangie's                       [x] Patient history, allergies, meds reviewed. Medical chart reviewed. See intake form. Review Of Systems (ROS):  [x]Performed Review of systems (Integumentary, CardioPulmonary, Neurological) by intake and observation. Intake form has been scanned into medical record. Patient has been instructed to contact their primary care physician regarding ROS issues if not already being addressed at this time.       Co-morbidities/Complexities (which will affect course of rehabilitation):   []None           Arthritic conditions   []Rheumatoid arthritis (M05.9)  [x]Osteoarthritis (M19.91)   Cardiovascular conditions   [x]Hypertension (I10)  [x]Hyperlipidemia (E78.5)  []Angina lifting   [x]Reduced ability to sleep   [x] Reduced ability or tolerance with driving and/or computer work   [x]Reduced ability to perform lifting, carrying tasks   [x]Reduced ability to squat   []Reduced ability to forward bend   [x]Reduced ability to ambulate prolonged functional periods/distances/surfaces   [x]Reduced ability to ascend/descend stairs   [x]Reduced ability to run, hop, cut or jump   []other:    Participation Restrictions   []Reduced participation in self care activities   [x]Reduced participation in home management activities   []Reduced participation in work activities   [x]Reduced participation in social activities. []Reduced participation in sport/recreation activities. Classification :    [x]Signs/symptoms consistent with post-surgical status including decreased ROM, strength and function.    []Signs/symptoms consistent with joint sprain/strain   []Signs/symptoms consistent with patella-femoral syndrome   []Signs/symptoms consistent with knee OA/hip OA   []Signs/symptoms consistent with internal derangement of knee/Hip   []Signs/symptoms consistent with functional hip weakness/NMR control      []Signs/symptoms consistent with tendinitis/tendinosis    []signs/symptoms consistent with pathology which may benefit from Dry needling      []other:      Prognosis/Rehab Potential:      []Excellent   [x]Good    []Fair   []Poor    Tolerance of evaluation/treatment:    []Excellent   [x]Good    []Fair   []Poor  Physical Therapy Evaluation Complexity Justification  [x] A history of present problem with:  [] no personal factors and/or comorbidities that impact the plan of care;  []1-2 personal factors and/or comorbidities that impact the plan of care  [x]3 personal factors and/or comorbidities that impact the plan of care  [x] An examination of body systems using standardized tests and measures addressing any of the following: body structures and functions (impairments), activity limitations, and/or participation restrictions;:  [x] a total of 1-2 or more elements   [] a total of 3 or more elements   [] a total of 4 or more elements   [x] A clinical presentation with:  [x] stable and/or uncomplicated characteristics   [] evolving clinical presentation with changing characteristics  [] unstable and unpredictable characteristics;   [x] Clinical decision making of [x] low, [] moderate, [] high complexity using standardized patient assessment instrument and/or measurable assessment of functional outcome. [x] EVAL (LOW) 85496 (typically 20 minutes face-to-face)  [] EVAL (MOD) 91013 (typically 30 minutes face-to-face)  [] EVAL (HIGH) 89914 (typically 45 minutes face-to-face)  [] RE-EVAL       PLAN:   Frequency/Duration:  3 days per week for 12 Weeks:  Interventions:  [x]  Therapeutic exercise including: strength training, ROM, for Lower extremity and core   [x]  NMR activation and proprioception for LE, Glutes and Core   [x]  Manual therapy as indicated for LE, Hip and spine to include: Dry Needling/IASTM, STM, PROM, Gr I-IV mobilizations, manipulation. [x] Modalities as needed that may include: thermal agents, E-stim, Biofeedback, US, iontophoresis as indicated  [x] Patient education on joint protection, postural re-education, activity modification, progression of HEP. HEP instruction: (see scanned forms)    GOALS:  Patient stated goal: full pain free mobility  [] Progressing: [] Met: [] Not Met: [] Adjusted    Therapist goals for Patient:   Short Term Goals: To be achieved in: 2 weeks  1. Independent in HEP and progression per patient tolerance, in order to prevent re-injury. [] Progressing: [] Met: [] Not Met: [] Adjusted  2. Patient will have a decrease in pain to facilitate improvement in movement, function, and ADLs as indicated by Functional Deficits. [] Progressing: [] Met: [] Not Met: [] Adjusted    Long Term Goals: To be achieved in: 12 weeks  1.  Disability index score of 30% or less for the LEFS

## 2020-02-14 PROBLEM — Z01.810 PRE-OPERATIVE CARDIOVASCULAR EXAMINATION: Status: RESOLVED | Noted: 2020-01-15 | Resolved: 2020-02-14

## 2020-02-17 ENCOUNTER — HOSPITAL ENCOUNTER (OUTPATIENT)
Age: 75
Discharge: HOME | End: 2020-02-17
Payer: MEDICARE

## 2020-02-17 LAB
ALBUMIN SERPL-MCNC: 4.1 G/DL (ref 3.4–5)
ANION GAP SERPL CALCULATED.3IONS-SCNC: 14 MMOL/L (ref 3–16)
BUN BLDV-MCNC: 33 MG/DL (ref 7–20)
CALCIUM SERPL-MCNC: 9.5 MG/DL (ref 8.3–10.6)
CHLORIDE BLD-SCNC: 106 MMOL/L (ref 99–110)
CO2: 25 MMOL/L (ref 21–32)
CREAT SERPL-MCNC: 1.1 MG/DL (ref 0.8–1.3)
CREATININE URINE: 96.9 MG/DL (ref 39–259)
GFR AFRICAN AMERICAN: >60
GFR NON-AFRICAN AMERICAN: >60
GLUCOSE BLD-MCNC: 149 MG/DL (ref 70–99)
PHOSPHORUS: 3.7 MG/DL (ref 2.5–4.9)
POTASSIUM SERPL-SCNC: 4.3 MMOL/L (ref 3.5–5.1)
PROTEIN PROTEIN: 8 MG/DL
PROTEIN/CREAT RATIO: 0.1 MG/DL
SODIUM BLD-SCNC: 145 MMOL/L (ref 136–145)

## 2020-02-17 PROCEDURE — 84156 ASSAY OF PROTEIN URINE: CPT

## 2020-02-17 PROCEDURE — 36415 COLL VENOUS BLD VENIPUNCTURE: CPT

## 2020-02-17 PROCEDURE — 80069 RENAL FUNCTION PANEL: CPT

## 2020-02-17 PROCEDURE — 82570 ASSAY OF URINE CREATININE: CPT

## 2020-02-18 ENCOUNTER — HOSPITAL ENCOUNTER (OUTPATIENT)
Dept: PHYSICAL THERAPY | Age: 75
Setting detail: THERAPIES SERIES
Discharge: HOME | End: 2020-02-18
Payer: MEDICARE

## 2020-02-18 PROCEDURE — 97110 THERAPEUTIC EXERCISES: CPT | Performed by: PHYSICAL THERAPIST

## 2020-02-18 PROCEDURE — 97140 MANUAL THERAPY 1/> REGIONS: CPT | Performed by: PHYSICAL THERAPIST

## 2020-02-18 PROCEDURE — 97112 NEUROMUSCULAR REEDUCATION: CPT | Performed by: PHYSICAL THERAPIST

## 2020-02-18 NOTE — FLOWSHEET NOTE
including up and down stairs     Home Exercise Program:    [x] (46802) Reviewed/Progressed HEP activities related to strengthening, flexibility, endurance, ROM of core, proximal hip and LE for functional self-care, mobility, lifting and ambulation/stair navigation   [] (15771)Reviewed/Progressed HEP activities related to improving balance, coordination, kinesthetic sense, posture, motor skill, proprioception of core, proximal hip and LE for self care, mobility, lifting, and ambulation/stair navigation      Manual Treatments:  PROM / STM / Oscillations-Mobs:  G-I, II, III, IV (PA's, Inf., Post.)  [x] (01063) Provided manual therapy to mobilize LE, proximal hip and/or LS spine soft tissue/joints for the purpose of modulating pain, promoting relaxation,  increasing ROM, reducing/eliminating soft tissue swelling/inflammation/restriction, improving soft tissue extensibility and allowing for proper ROM for normal function with self care, mobility, lifting and ambulation. Modalities:  Pt declined   [] GAME READY (VASO)- for significant edema, swelling, pain control. Charges:  Timed Code Treatment Minutes: 45   Total Treatment Minutes: 55 (indep ex)     [facility] time in/time out:   (and requires time in and out for each CPT code)    [] EVAL (LOW) 78963 (typically 20 minutes face-to-face)  [] EVAL (MOD) 16264 (typically 30 minutes face-to-face)  [] EVAL (HIGH) 22177 (typically 45 minutes face-to-face)  [] RE-EVAL     [x] WH(38838) x 1    [] IONTO  [x] NMR (31426) x 1    [] VASO  [x] Manual (58695) x   1   [] Other:  [] TA x      [] Mech Traction (85736)  [] ES(attended) (21963)      [] ES (un) (18411):       GOALS:   Patient stated goal: full pain free mobility  [] Progressing: [] Met: [] Not Met: [] Adjusted    Therapist goals for Patient:   Short Term Goals: To be achieved in: 2 weeks  1. Independent in HEP and progression per patient tolerance, in order to prevent re-injury.    [] Progressing: [] Met: [] Not Met: [] Adjusted  2. Patient will have a decrease in pain to facilitate improvement in movement, function, and ADLs as indicated by Functional Deficits. [] Progressing: [] Met: [] Not Met: [] Adjusted    Long Term Goals: To be achieved in: 12 weeks  1. Disability index score of 30% or less for the LEFS to assist with reaching prior level of function. [] Progressing: [] Met: [] Not Met: [] Adjusted  2. Patient will demonstrate increased AROM to 0-120 R knee to allow for proper joint functioning as indicated by patients Functional Deficits. [] Progressing: [] Met: [] Not Met: [] Adjusted  3. Patient will demonstrate an increase in Strength to good proximal hip strength and control, within 5lb HHD in LE to allow for proper functional mobility as indicated by patients Functional Deficits. [] Progressing: [] Met: [] Not Met: [] Adjusted  4. Patient will return to reciprocal stairs without increased symptoms or restriction. [] Progressing: [] Met: [] Not Met: [] Adjusted  5. Pt will demo normal gait mechanics for community ambulation w/o AD. [] Progressing: [] Met: [] Not Met: [] Adjusted    Progression Towards Functional goals:  [] Patient is progressing as expected towards functional goals listed. [] Progression is slowed due to complexities listed. [] Progression has been slowed due to co-morbidities. [x] Plan just implemented, too soon to assess goals progression  [] Other:     Overall Progression Towards Functional goals/ Treatment Progress Update:  [] Patient is progressing as expected towards functional goals listed. [] Progression is slowed due to complexities/Impairments listed. [] Progression has been slowed due to co-morbidities.   [x] Plan just implemented, too soon to assess goals progression <30days   [] Goals require adjustment due to lack of progress  [] Patient is not progressing as expected and requires additional follow up with physician  [] Other    Prognosis for POC: [x] Good [] Fair  []

## 2020-02-19 ENCOUNTER — HOSPITAL ENCOUNTER (OUTPATIENT)
Dept: PHYSICAL THERAPY | Age: 75
Setting detail: THERAPIES SERIES
Discharge: HOME | End: 2020-02-19
Payer: MEDICARE

## 2020-02-19 PROCEDURE — 97110 THERAPEUTIC EXERCISES: CPT | Performed by: PHYSICAL THERAPIST

## 2020-02-19 PROCEDURE — 97140 MANUAL THERAPY 1/> REGIONS: CPT | Performed by: PHYSICAL THERAPIST

## 2020-02-19 PROCEDURE — 97112 NEUROMUSCULAR REEDUCATION: CPT | Performed by: PHYSICAL THERAPIST

## 2020-02-19 NOTE — FLOWSHEET NOTE
strap 2x30\" HEP 2/19/20   Supine bridge w/ incr flex 3x5    SAQ from 1/2 roll x15         Standing mini squat HEP, form cues, hands on wall   Side stepping along 1/2 wall HEP, green TB 2/18   Standing adductor S 5x15\" HEP 2/19/20   TKE  Blue 5\" x30                   bike x5' Full revolutions, seat 6   Pt/wife Cherelle Simmons) ed: , HEP, icing, , , gait progression and AD use, guarding pt on stairs x10'    Manual Intervention (19474)     Pat mobs sup/inf, PROM flex ext, prox scar massage, STM med HS/adductor/quad, HS S, add S, mod sandra S x20'                             NMR re-education (45575)  [address] 4\" 2x10    Gait train w/ SPC x3'    stairs x3 Step to pattern, 1x w/ HR, 2x w/o HR due to no railing @ home SBA w/o railing, review NV                                 Therapeutic Activity (81883)                                         Therapeutic Exercise and NMR EXR  [x] (62478) Provided verbal/tactile cueing for activities related to strengthening, flexibility, endurance, ROM for improvements in LE, proximal hip, and core control with self care, mobility, lifting, ambulation. [x] (25946) Provided verbal/tactile cueing for activities related to improving balance, coordination, kinesthetic sense, posture, motor skill, proprioception  to assist with LE, proximal hip, and core control in self care, mobility, lifting, ambulation and eccentric single leg control.      NMR and Therapeutic Activities:    [] (24058 or 70309) Provided verbal/tactile cueing for activities related to improving balance, coordination, kinesthetic sense, posture, motor skill, proprioception and motor activation to allow for proper function of core, proximal hip and LE with self care and ADLs  [] (08339) Gait Re-education- Provided training and instruction to the patient for proper LE, core and proximal hip recruitment and positioning and eccentric body weight control with ambulation re-education including up and down stairs     Home Exercise facilitate improvement in movement, function, and ADLs as indicated by Functional Deficits. [] Progressing: [] Met: [] Not Met: [] Adjusted    Long Term Goals: To be achieved in: 12 weeks  1. Disability index score of 30% or less for the LEFS to assist with reaching prior level of function. [] Progressing: [] Met: [] Not Met: [] Adjusted  2. Patient will demonstrate increased AROM to 0-120 R knee to allow for proper joint functioning as indicated by patients Functional Deficits. [] Progressing: [] Met: [] Not Met: [] Adjusted  3. Patient will demonstrate an increase in Strength to good proximal hip strength and control, within 5lb HHD in LE to allow for proper functional mobility as indicated by patients Functional Deficits. [] Progressing: [] Met: [] Not Met: [] Adjusted  4. Patient will return to reciprocal stairs without increased symptoms or restriction. [] Progressing: [] Met: [] Not Met: [] Adjusted  5. Pt will demo normal gait mechanics for community ambulation w/o AD. [] Progressing: [] Met: [] Not Met: [] Adjusted    Progression Towards Functional goals:  [] Patient is progressing as expected towards functional goals listed. [] Progression is slowed due to complexities listed. [] Progression has been slowed due to co-morbidities. [x] Plan just implemented, too soon to assess goals progression  [] Other:     Overall Progression Towards Functional goals/ Treatment Progress Update:  [] Patient is progressing as expected towards functional goals listed. [] Progression is slowed due to complexities/Impairments listed. [] Progression has been slowed due to co-morbidities.   [x] Plan just implemented, too soon to assess goals progression <30days   [] Goals require adjustment due to lack of progress  [] Patient is not progressing as expected and requires additional follow up with physician  [] Other    Prognosis for POC: [x] Good [] Fair  [] Poor      Patient requires continued skilled intervention: [x] Yes  [] No    Treatment/Activity Tolerance:  [x] Patient able to complete treatment  [] Patient limited by fatigue  [] Patient limited by pain    [] Patient limited by other medical complications  [] Other:     ASSESSMENT: improved control with SPC with gait over level floor. SBA with stair practice w/ SPC w/o HR use and pt would do well to practice again NV. Cont walker for use with stairs at home. Return to Play: (if applicable)   []  Stage 1: Intro to Strength   []  Stage 2: Return to Run and Strength   []  Stage 3: Return to Jump and Strength   []  Stage 4: Dynamic Strength and Agility   []  Stage 5: Sport Specific Training     []  Ready to Return to Play, Meets All Above Stages   []  Not Ready for Return to Sports   Comments:                         PLAN: wean to 2x/week either next week or week after if good progression of ROM  [x] Continue per plan of care [] Alter current plan (see comments above)  [] Plan of care initiated [] Hold pending MD visit [] Discharge      Electronically signed by:  Germán Rebolledo PT    Note: If patient does not return for scheduled/ recommended follow up visits, this note will serve as a discharge from care along with most recent update on progress.

## 2020-02-21 ENCOUNTER — HOSPITAL ENCOUNTER (OUTPATIENT)
Dept: PHYSICAL THERAPY | Age: 75
Setting detail: THERAPIES SERIES
Discharge: HOME | End: 2020-02-21
Payer: MEDICARE

## 2020-02-21 ENCOUNTER — TELEPHONE (OUTPATIENT)
Dept: ORTHOPEDIC SURGERY | Age: 75
End: 2020-02-21

## 2020-02-21 PROCEDURE — 97112 NEUROMUSCULAR REEDUCATION: CPT

## 2020-02-21 PROCEDURE — 97110 THERAPEUTIC EXERCISES: CPT

## 2020-02-21 PROCEDURE — 97140 MANUAL THERAPY 1/> REGIONS: CPT

## 2020-02-21 PROCEDURE — 97016 VASOPNEUMATIC DEVICE THERAPY: CPT

## 2020-02-21 NOTE — FLOWSHEET NOTE
pain free mobility  [] Progressing: [] Met: [] Not Met: [] Adjusted    Therapist goals for Patient:   Short Term Goals: To be achieved in: 2 weeks  1. Independent in HEP and progression per patient tolerance, in order to prevent re-injury. [] Progressing: [] Met: [] Not Met: [] Adjusted  2. Patient will have a decrease in pain to facilitate improvement in movement, function, and ADLs as indicated by Functional Deficits. [] Progressing: [] Met: [] Not Met: [] Adjusted    Long Term Goals: To be achieved in: 12 weeks  1. Disability index score of 30% or less for the LEFS to assist with reaching prior level of function. [] Progressing: [] Met: [] Not Met: [] Adjusted  2. Patient will demonstrate increased AROM to 0-120 R knee to allow for proper joint functioning as indicated by patients Functional Deficits. [] Progressing: [] Met: [] Not Met: [] Adjusted  3. Patient will demonstrate an increase in Strength to good proximal hip strength and control, within 5lb HHD in LE to allow for proper functional mobility as indicated by patients Functional Deficits. [] Progressing: [] Met: [] Not Met: [] Adjusted  4. Patient will return to reciprocal stairs without increased symptoms or restriction. [] Progressing: [] Met: [] Not Met: [] Adjusted  5. Pt will demo normal gait mechanics for community ambulation w/o AD. [] Progressing: [] Met: [] Not Met: [] Adjusted    Progression Towards Functional goals:  [] Patient is progressing as expected towards functional goals listed. [] Progression is slowed due to complexities listed. [] Progression has been slowed due to co-morbidities. [x] Plan just implemented, too soon to assess goals progression  [] Other:     Overall Progression Towards Functional goals/ Treatment Progress Update:  [] Patient is progressing as expected towards functional goals listed. [] Progression is slowed due to complexities/Impairments listed.   [] Progression has been slowed due to co-morbidities. [x] Plan just implemented, too soon to assess goals progression <30days   [] Goals require adjustment due to lack of progress  [] Patient is not progressing as expected and requires additional follow up with physician  [] Other    Prognosis for POC: [x] Good [] Fair  [] Poor      Patient requires continued skilled intervention: [x] Yes  [] No    Treatment/Activity Tolerance:  [x] Patient able to complete treatment  [] Patient limited by fatigue  [] Patient limited by pain    [] Patient limited by other medical complications  [] Other:     ASSESSMENT: improved control with SPC with gait over level floor. Did well with first set of steps, but had some imbalance descending the last two steps using his SPC and no railing. AAROM improved since LV. Asked pt and wife to discontinue placing a pillow underneath his knee for comfort as it is causing him to be stiff into knee extension still. Return to Play: (if applicable)   []  Stage 1: Intro to Strength   []  Stage 2: Return to Run and Strength   []  Stage 3: Return to Jump and Strength   []  Stage 4: Dynamic Strength and Agility   []  Stage 5: Sport Specific Training     []  Ready to Return to Play, Meets All Above Stages   []  Not Ready for Return to Sports   Comments:                         PLAN: wean to 2x/week either next week or week after if good progression of ROM  [x] Continue per plan of care [] Alter current plan (see comments above)  [] Plan of care initiated [] Hold pending MD visit [] Discharge      Electronically signed by:  Eliana Ortiz PT, DPT    Note: If patient does not return for scheduled/ recommended follow up visits, this note will serve as a discharge from care along with most recent update on progress.

## 2020-02-24 ENCOUNTER — HOSPITAL ENCOUNTER (OUTPATIENT)
Dept: PHYSICAL THERAPY | Age: 75
Setting detail: THERAPIES SERIES
Discharge: HOME | End: 2020-02-24
Payer: MEDICARE

## 2020-02-24 PROCEDURE — 97016 VASOPNEUMATIC DEVICE THERAPY: CPT | Performed by: PHYSICAL THERAPIST

## 2020-02-24 PROCEDURE — 97140 MANUAL THERAPY 1/> REGIONS: CPT | Performed by: PHYSICAL THERAPIST

## 2020-02-24 PROCEDURE — 97110 THERAPEUTIC EXERCISES: CPT | Performed by: PHYSICAL THERAPIST

## 2020-02-24 PROCEDURE — 97112 NEUROMUSCULAR REEDUCATION: CPT | Performed by: PHYSICAL THERAPIST

## 2020-02-24 NOTE — FLOWSHEET NOTE
[facility],  [address], [address] [place] [address]  Phone: [phone]  Fax [phone]    Physical Therapy Treatment Note/ Progress Report:           Date:  2020    Patient Name:  Nicolas Baldwin   \"Davonte\" :  1945  MRN: [id number]  Restrictions/Precautions:    Medical/Treatment Diagnosis Information:  · Diagnosis: L81.369 (ICD-10-CM) - Status post total right knee replacement DOS 20  · Treatment Diagnosis: R knee pain M25.561, difficulty walking N32.5  Insurance/Certification information:  [facility]  [facility]100%-PT/OT MED NEC  Physician Information:  Referring Practitioner: Ayan Burger  Has the plan of care been signed (Y/N):        [x]  Yes  []  No     Date of Patient follow up with Physician: 3/11/20      Is this a Progress Report:     [x]  Yes  []  No        If Yes:  Date Range for reporting period:  Beginnin20  Ending:     Progress report will be due (10 Rx or 30 days whichever is less): 64       Recertification will be due (POC Duration  / 90 days whichever is less): 12 weeks       Visit # Insurance Allowable Requires auth   5 BMN    [x]no        []yes:     Functional Scale: LEFS 31/80 61%    Date assessed:  20      Therapy Diagnosis/Practice Pattern:H      Number of Comorbidities:  []0     []1-2    [x]3+    Latex Allergy:  [x]NO      []YES  Preferred Language for Healthcare:   [x]English       []other:      Pain level:  2/10     SUBJECTIVE:  Pt reports he is driving now. Still has soreness in his knee and wonders how long that will continue. OBJECTIVE:    Observation: pt enters clinic using a SPC and reciprocal gait.     Test measurements:     AROM 119   Dynamometer testing NV    RESTRICTIONS/PRECAUTIONS: DM2, pre-op ROM 7-125    Exercises/Interventions:     Therapeutic Ex (63532) Sets/sec/reps Notes/CUES   Ext prop- bone foam with 2# on thigh 3'HEP   Seated gastroc

## 2020-02-24 NOTE — FLOWSHEET NOTE
[facility],  [address] [place]  Phone: [phone]  Fax [phone]      ATHLETIC TRAINING [address]  Date:  2020    Patient Name:  Mai Oquendo   \"Davonte\" :    MRN: [id number]  Restrictions/Precautions:    Medical/Treatment Diagnosis Information:  ·   Diagnosis: F59.903 (ICD-10-CM) - Status post total right knee replacement DOS 20  · Treatment Diagnosis: R knee pain M25.561, difficulty walking R26.2  ·    Physician Information:    Referring Practitioner: Yvonne Grayson Post-op  8 wks  12 wks 16 wks 20 wks   24 wks                            Activity Log                                                  DOS/DOI:                                                    Date: 2020    ATC communication Flex to 120, lacks ext before SX  IH WO reg not scheduled   Bike    Elliptical    Treadmill    Airdyne        Gastroc stretch    Soleus stretch    Hamstring stretch    ITB stretch    Hip Flexor stretch    Quad stretch    Adductor stretch        Weight Shifting sp                              fp                              tp    Lateral walking (with/w/o TB)        Balance: PEP/Rena board                   SLS          Star excursion load/explode          Extremity reach UE/LE        Leg Press Joshua. 60# 3x10                     Ecc.                      Inv. Calf Press Joshua. 60# 3x10                      Ecc.                        Inv.        VIRI   Flex               ABd               ADd              TKE 45# 3x10              Ext        Steps Up               Up and Over               Down               Lateral               Rotation        Squats  mini                  wall                 BOSU         Lunges:  Lunge to Balance                   Balance to Lunge                   Walking        Knee Extension Bilat.                                                Ecc. Inv.        Hamstring Curls Bilat. Ecc.                               Inv.        Soleus Press Bilat. Ecc.                           Inv.                             Ladders                Square               Jump/Hop  Low                      Med.                      High                                                            Modality GR 15'   Initials                             DB/JW   Time spent one on one (workers comp)    Time spent with PT assistant

## 2020-02-26 ENCOUNTER — HOSPITAL ENCOUNTER (OUTPATIENT)
Dept: PHYSICAL THERAPY | Age: 75
Setting detail: THERAPIES SERIES
Discharge: HOME | End: 2020-02-26
Payer: MEDICARE

## 2020-02-26 PROCEDURE — 97112 NEUROMUSCULAR REEDUCATION: CPT | Performed by: PHYSICAL THERAPIST

## 2020-02-26 PROCEDURE — 97140 MANUAL THERAPY 1/> REGIONS: CPT | Performed by: PHYSICAL THERAPIST

## 2020-02-26 PROCEDURE — 97110 THERAPEUTIC EXERCISES: CPT | Performed by: PHYSICAL THERAPIST

## 2020-02-28 ENCOUNTER — HOSPITAL ENCOUNTER (OUTPATIENT)
Dept: PHYSICAL THERAPY | Age: 75
Setting detail: THERAPIES SERIES
Discharge: HOME | End: 2020-02-28
Payer: MEDICARE

## 2020-02-28 PROCEDURE — G0283 ELEC STIM OTHER THAN WOUND: HCPCS

## 2020-02-28 PROCEDURE — 97112 NEUROMUSCULAR REEDUCATION: CPT

## 2020-02-28 PROCEDURE — 97140 MANUAL THERAPY 1/> REGIONS: CPT

## 2020-02-28 PROCEDURE — 97110 THERAPEUTIC EXERCISES: CPT

## 2020-02-28 NOTE — FLOWSHEET NOTE
[facility],  [address] [place]  Phone: [phone]  Fax [phone]    Physical Therapy Treatment Note/ Progress Report:           Date:  2020    Patient Name:  Tyrese Chavez   \"Davonte\" :  1945  MRN: [id number]  Restrictions/Precautions:    Medical/Treatment Diagnosis Information:  · Diagnosis: U75.623 (ICD-10-CM) - Status post total right knee replacement DOS 20  · Treatment Diagnosis: R knee pain M25.561, difficulty walking D84.5  Insurance/Certification information:  [facility]  [facility]-100%-PT/OT MED NEC  Physician Information:  Referring Practitioner: Kiera Newby  Has the plan of care been signed (Y/N):        [x]  Yes  []  No     Date of Patient follow up with Physician: 3/11/20      Is this a Progress Report:     [x]  Yes  []  No        If Yes:  Date Range for reporting period:  Beginnin20  Ending:     Progress report will be due (10 Rx or 30 days whichever is less): 75       Recertification will be due (POC Duration  / 90 days whichever is less): 12 weeks       Visit # Insurance Allowable Requires auth   6 BMN    [x]no        []yes:     Functional Scale: LEFS 31/80 61%    Date assessed:  20      Therapy Diagnosis/Practice Pattern:H      Number of Comorbidities:  []0     []1-2    [x]3+    Latex Allergy:  [x]NO      []YES  Preferred Language for Healthcare:   [x]English       []other:      Pain level:  2/10     SUBJECTIVE:  Pt reports improved motion in his knee, but is discouraged that it is sensitive on the inner part of his knee and that he is sore on the outside. Has pain straightening his knee from a bent position. OBJECTIVE:    Observation: pt enters clinic using a SPC and reciprocal gait.     Test measurements:  AAROM lacking 3   AROM 0-122 after stretching    Dynamometer testing hip abd R 19.5, L 23.2, knee ext 37.5 R/58.5 L    RESTRICTIONS/PRECAUTIONS: DM2, pre-op ROM 7-125    Exercises/Interventions:     Therapeutic Ex (95442) Sets/sec/reps Notes/CUES   Ext prop- bone foam with 3# on thigh 3'HEP   gastroc stretch incline board 3x30\"  eaHEP   Heel slide w/ strap on SWB  Heel slide with strap on table 10x10\"5\"x10HEP   Ankle pumps, seated flex in chair, SAQ, SLR, SL hip abd    Supine quad S w/ strap  HEP 2/19/20   Supine bridge w/ incr flex     SAQ from bolster    SL hip abd [id number] for TKE        Standing mini squat HEP, form cues, hands on wall   Side stepping along 1/2 wall HEP, green TB 2/18   Standing adductor S HEP 2/19/20   TKE               ATC log  NV due to fatigued   bike x5' Full revolutions, seat 6   Pt: , HEP, icing, , , gait progression and AD use, , expected time frame for soreness and to full recovery x8'    Manual Intervention (29546)     Pat mobs sup/inf, PROM flex, ext, prox scar massage, STM med HS/, HS S, add S, mod sandra S x20'                             NMR re-education (31666)  [address] and over 4\" x15  4\" x15  4\"x15 Pain w/ FSO   Gait train w/ SPC     stairs  No railing, c SPC. Unsteady downward on second set for second 2 steps   Tandem balance R,L  SLB   Floor 5x10\" Intermittent UE support for both   Glide disc post, post-lat x15 ea                        Therapeutic Activity (52142)                                         Therapeutic Exercise and NMR EXR  [x] (02857) Provided verbal/tactile cueing for activities related to strengthening, flexibility, endurance, ROM for improvements in LE, proximal hip, and core control with self care, mobility, lifting, ambulation. [x] (29082) Provided verbal/tactile cueing for activities related to improving balance, coordination, kinesthetic sense, posture, motor skill, proprioception  to assist with LE, proximal hip, and core control in self care, mobility, lifting, ambulation and eccentric single leg control.      NMR and Therapeutic Activities: TL(12316) x 1    [] IONTO  [x] NMR (56572) x  1  [x] VASO  [x] Manual (09780) x   1   [] Other:  [] TA x      [] Mech Traction (90047)  [] ES(attended) (47091)      [x] ES (un) (92925):       GOALS:   Patient stated goal: full pain free mobility  [] Progressing: [] Met: [] Not Met: [] Adjusted    Therapist goals for Patient:   Short Term Goals: To be achieved in: 2 weeks  1. Independent in HEP and progression per patient tolerance, in order to prevent re-injury. [] Progressing: [] Met: [] Not Met: [] Adjusted  2. Patient will have a decrease in pain to facilitate improvement in movement, function, and ADLs as indicated by Functional Deficits. [] Progressing: [] Met: [] Not Met: [] Adjusted    Long Term Goals: To be achieved in: 12 weeks  1. Disability index score of 30% or less for the LEFS to assist with reaching prior level of function. [] Progressing: [] Met: [] Not Met: [] Adjusted  2. Patient will demonstrate increased AROM to 0-120 R knee to allow for proper joint functioning as indicated by patients Functional Deficits. [x] Progressing: [x] Met for flex: [] Not Met: [] Adjusted  3. Patient will demonstrate an increase in Strength to good proximal hip strength and control, within 5lb HHD in LE to allow for proper functional mobility as indicated by patients Functional Deficits. [x] Progressing: [] Met: [] Not Met: [] Adjusted  4. Patient will return to reciprocal stairs without increased symptoms or restriction. [] Progressing: [] Met: [] Not Met: [] Adjusted  5. Pt will demo normal gait mechanics for community ambulation w/o AD. [] Progressing: [] Met: [] Not Met: [] Adjusted    Progression Towards Functional goals:  [] Patient is progressing as expected towards functional goals listed. [] Progression is slowed due to complexities listed. [] Progression has been slowed due to co-morbidities.   [x] Plan just implemented, too soon to assess goals progression  [] Other:     Overall Progression Towards Functional goals/ Treatment Progress Update:  [] Patient is progressing as expected towards functional goals listed. [] Progression is slowed due to complexities/Impairments listed. [] Progression has been slowed due to co-morbidities. [x] Plan just implemented, too soon to assess goals progression <30days   [] Goals require adjustment due to lack of progress  [] Patient is not progressing as expected and requires additional follow up with physician  [] Other    Prognosis for POC: [x] Good [] Fair  [] Poor      Patient requires continued skilled intervention: [x] Yes  [] No    Treatment/Activity Tolerance:  [x] Patient able to complete treatment  [] Patient limited by fatigue  [] Patient limited by pain    [] Patient limited by other medical complications  [] Other:     ASSESSMENT: knee flexion goal met, continued slight ext lacking AROM. Strength progressing as expected. Fatigued after session. Requested ice for pain control. Return to Play: (if applicable)   []  Stage 1: Intro to Strength   []  Stage 2: Return to Run and Strength   []  Stage 3: Return to Jump and Strength   []  Stage 4: Dynamic Strength and Agility   []  Stage 5: Sport Specific Training     []  Ready to Return to Play, Meets All Above Stages   []  Not Ready for Return to Sports   Comments:                         PLAN: wean to 2x/week next week  [x] Continue per plan of care [] Alter current plan (see comments above)  [] Plan of care initiated [] Hold pending MD visit [] Discharge      Electronically signed by:  Marlee Gayle PT, DPT    Note: If patient does not return for scheduled/ recommended follow up visits, this note will serve as a discharge from care along with most recent update on progress.

## 2020-03-02 ENCOUNTER — HOSPITAL ENCOUNTER (OUTPATIENT)
Dept: PHYSICAL THERAPY | Age: 75
Setting detail: THERAPIES SERIES
Discharge: HOME | End: 2020-03-02
Payer: MEDICARE

## 2020-03-02 PROCEDURE — 97112 NEUROMUSCULAR REEDUCATION: CPT | Performed by: PHYSICAL THERAPIST

## 2020-03-02 PROCEDURE — 97016 VASOPNEUMATIC DEVICE THERAPY: CPT | Performed by: PHYSICAL THERAPIST

## 2020-03-02 PROCEDURE — 97110 THERAPEUTIC EXERCISES: CPT | Performed by: PHYSICAL THERAPIST

## 2020-03-02 PROCEDURE — 97140 MANUAL THERAPY 1/> REGIONS: CPT | Performed by: PHYSICAL THERAPIST

## 2020-03-02 NOTE — FLOWSHEET NOTE
Lunge                    Walking          Knee Extension Bilat. Ecc.                                Inv. Hamstring Curls Bilat. NV                              Ecc.                                Inv.          Soleus Press Bilat. NV                          Ecc.                            Inv.                                Ladders                 Square                Jump/Hop  Low                       Med.                       High                                                               Modality GR 15' GR 15'   Initials                             DB/JW DB   Time spent one on one (workers comp)     Time spent with PT assistant

## 2020-03-02 NOTE — FLOWSHEET NOTE
[facility],  [address] [place]  Phone: [phone]  Fax [phone]    Physical Therapy Treatment Note/ Progress Report:           Date:  3/2/2020    Patient Name:  Yoel Duarte   \"Davonte\" :  1945  MRN: [id number]  Restrictions/Precautions:    Medical/Treatment Diagnosis Information:  · Diagnosis: Z00.411 (ICD-10-CM) - Status post total right knee replacement DOS 20  · Treatment Diagnosis: R knee pain M25.561, difficulty walking O73.1  Insurance/Certification information:  PT Insurance Information:  [employer]   - $40CP-100%-PT/OT MED NEC  Physician Information:  Referring Practitioner: Kathy Romero  Has the plan of care been signed (Y/N):        [x]  Yes  []  No     Date of Patient follow up with Physician: 3/11/20      Is this a Progress Report:     [x]  Yes  []  No        If Yes:  Date Range for reporting period:  Beginnin20  Ending:     Progress report will be due (10 Rx or 30 days whichever is less):        Recertification will be due (POC Duration  / 90 days whichever is less): 12 weeks       Visit # Insurance Allowable Requires auth   8 BMN    [x]no        []yes:     Functional Scale: LEFS  61%    Date assessed:  20      Therapy Diagnosis/Practice Pattern:H      Number of Comorbidities:  []0     []1-2    [x]3+    Latex Allergy:  [x]NO      []YES  Preferred Language for Healthcare:   [x]English       []other:      Pain level:  2/10     SUBJECTIVE:  Pt reports feeling more sore this morning. Overall, pt reports he can tell he is doing more and starting to walk some without the [facility] at home. OBJECTIVE:    Observation: pt enters clinic using a SPC and reciprocal gait.     Test measurements:     AROM 0-122 after stretching        RESTRICTIONS/PRECAUTIONS: DM2, pre-op ROM 7-125    Exercises/Interventions:     Therapeutic Ex (41276) Sets/sec/reps Notes/CUES   Ext prop- bone foam with hip and LE with self care and ADLs  [] (00339) Gait Re-education- Provided training and instruction to the patient for proper LE, core and proximal hip recruitment and positioning and eccentric body weight control with ambulation re-education including up and down stairs     Home Exercise Program:    [x] (48670) Reviewed/Progressed HEP activities related to strengthening, flexibility, endurance, ROM of core, proximal hip and LE for functional self-care, mobility, lifting and ambulation/stair navigation   [] (10946)Reviewed/Progressed HEP activities related to improving balance, coordination, kinesthetic sense, posture, motor skill, proprioception of core, proximal hip and LE for self care, mobility, lifting, and ambulation/stair navigation      Manual Treatments:  PROM / STM / Oscillations-Mobs:  G-I, II, III, IV (PA's, Inf., Post.)  [x] (48701) Provided manual therapy to mobilize LE, proximal hip and/or LS spine soft tissue/joints for the purpose of modulating pain, promoting relaxation,  increasing ROM, reducing/eliminating soft tissue swelling/inflammation/restriction, improving soft tissue extensibility and allowing for proper ROM for normal function with self care, mobility, lifting and ambulation. Modalities:   [x] GAME READY (VASO)  x15'- for significant edema, swelling, pain control.      Charges:  Timed Code Treatment Minutes: 40   Total Treatment Minutes: 60 (indep ex/CP, rest breaks)     [facility] time in/time out:   (and requires time in and out for each CPT code)    [] EVAL (LOW) 54003 (typically 20 minutes face-to-face)  [] EVAL (MOD) 29728 (typically 30 minutes face-to-face)  [] EVAL (HIGH) 73744 (typically 45 minutes face-to-face)  [] RE-EVAL     [x] QR(24125) x 1    [] IONTO  [x] NMR (22457) x  1  [x] VASO  [x] Manual (79915) x   1   [] Other:  [] TA x      [] Mech Traction (53214)  [] ES(attended) (83353)      [] ES (un) (65611):       GOALS:   Patient stated goal: full pain free mobility  [] Progressing: [] Met: [] Not Met: [] Adjusted    Therapist goals for Patient:   Short Term Goals: To be achieved in: 2 weeks  1. Independent in HEP and progression per patient tolerance, in order to prevent re-injury. [] Progressing: [] Met: [] Not Met: [] Adjusted  2. Patient will have a decrease in pain to facilitate improvement in movement, function, and ADLs as indicated by Functional Deficits. [] Progressing: [] Met: [] Not Met: [] Adjusted    Long Term Goals: To be achieved in: 12 weeks  1. Disability index score of 30% or less for the LEFS to assist with reaching prior level of function. [] Progressing: [] Met: [] Not Met: [] Adjusted  2. Patient will demonstrate increased AROM to 0-120 R knee to allow for proper joint functioning as indicated by patients Functional Deficits. [x] Progressing: [x] Met for flex: [] Not Met: [] Adjusted  3. Patient will demonstrate an increase in Strength to good proximal hip strength and control, within 5lb HHD in LE to allow for proper functional mobility as indicated by patients Functional Deficits. [x] Progressing: [] Met: [] Not Met: [] Adjusted  4. Patient will return to reciprocal stairs without increased symptoms or restriction. [] Progressing: [] Met: [] Not Met: [] Adjusted  5. Pt will demo normal gait mechanics for community ambulation w/o AD. [] Progressing: [] Met: [] Not Met: [] Adjusted    Progression Towards Functional goals:  [] Patient is progressing as expected towards functional goals listed. [] Progression is slowed due to complexities listed. [] Progression has been slowed due to co-morbidities. [x] Plan just implemented, too soon to assess goals progression  [] Other:     Overall Progression Towards Functional goals/ Treatment Progress Update:  [] Patient is progressing as expected towards functional goals listed. [] Progression is slowed due to complexities/Impairments listed. [] Progression has been slowed due to co-morbidities.   [x]

## 2020-03-04 ENCOUNTER — APPOINTMENT (OUTPATIENT)
Dept: PHYSICAL THERAPY | Age: 75
End: 2020-03-04
Payer: MEDICARE

## 2020-03-05 ENCOUNTER — HOSPITAL ENCOUNTER (OUTPATIENT)
Dept: PHYSICAL THERAPY | Age: 75
Setting detail: THERAPIES SERIES
Discharge: HOME | End: 2020-03-05
Payer: MEDICARE

## 2020-03-05 PROCEDURE — 97112 NEUROMUSCULAR REEDUCATION: CPT | Performed by: PHYSICAL THERAPIST

## 2020-03-05 PROCEDURE — 97140 MANUAL THERAPY 1/> REGIONS: CPT | Performed by: PHYSICAL THERAPIST

## 2020-03-05 PROCEDURE — 97110 THERAPEUTIC EXERCISES: CPT | Performed by: PHYSICAL THERAPIST

## 2020-03-05 NOTE — FLOWSHEET NOTE
for proper function of core, proximal hip and LE with self care and ADLs  [] (42466) Gait Re-education- Provided training and instruction to the patient for proper LE, core and proximal hip recruitment and positioning and eccentric body weight control with ambulation re-education including up and down stairs     Home Exercise Program:    [x] (28831) Reviewed/Progressed HEP activities related to strengthening, flexibility, endurance, ROM of core, proximal hip and LE for functional self-care, mobility, lifting and ambulation/stair navigation   [] (53715)Reviewed/Progressed HEP activities related to improving balance, coordination, kinesthetic sense, posture, motor skill, proprioception of core, proximal hip and LE for self care, mobility, lifting, and ambulation/stair navigation      Manual Treatments:  PROM / STM / Oscillations-Mobs:  G-I, II, III, IV (PA's, Inf., Post.)  [x] (84607) Provided manual therapy to mobilize LE, proximal hip and/or LS spine soft tissue/joints for the purpose of modulating pain, promoting relaxation,  increasing ROM, reducing/eliminating soft tissue swelling/inflammation/restriction, improving soft tissue extensibility and allowing for proper ROM for normal function with self care, mobility, lifting and ambulation. Modalities:  CP x10' d/t time constraints   [] GAME READY (VASO)  x15'- for significant edema, swelling, pain control.      Charges:  Timed Code Treatment Minutes: 40   Total Treatment Minutes: 60 (indep ex/CP, rest breaks)     [facility] time in/time out:   (and requires time in and out for each CPT code)    [] EVAL (LOW) 05330 (typically 20 minutes face-to-face)  [] EVAL (MOD) 18759 (typically 30 minutes face-to-face)  [] EVAL (HIGH) 79122 (typically 45 minutes face-to-face)  [] RE-EVAL     [x] VX(20128) x 1    [] IONTO  [x] NMR (95002) x  1  [] VASO  [x] Manual (58877) x   1   [] Other:  [] TA x      [] [facility] (86153)  [] ES(attended) (12911)      [] ES (un) (35416): GOALS:   Patient stated goal: full pain free mobility  [] Progressing: [] Met: [] Not Met: [] Adjusted    Therapist goals for Patient:   Short Term Goals: To be achieved in: 2 weeks  1. Independent in HEP and progression per patient tolerance, in order to prevent re-injury. [] Progressing: [] Met: [] Not Met: [] Adjusted  2. Patient will have a decrease in pain to facilitate improvement in movement, function, and ADLs as indicated by Functional Deficits. [] Progressing: [] Met: [] Not Met: [] Adjusted    Long Term Goals: To be achieved in: 12 weeks  1. Disability index score of 30% or less for the LEFS to assist with reaching prior level of function. [] Progressing: [] Met: [] Not Met: [] Adjusted  2. Patient will demonstrate increased AROM to 0-120 R knee to allow for proper joint functioning as indicated by patients Functional Deficits. [x] Progressing: [x] Met for flex: [] Not Met: [] Adjusted  3. Patient will demonstrate an increase in Strength to good proximal hip strength and control, within 5lb HHD in LE to allow for proper functional mobility as indicated by patients Functional Deficits. [x] Progressing: [] Met: [] Not Met: [] Adjusted  4. Patient will return to reciprocal stairs without increased symptoms or restriction. [] Progressing: [] Met: [] Not Met: [] Adjusted  5. Pt will demo normal gait mechanics for community ambulation w/o AD. [] Progressing: [] Met: [] Not Met: [] Adjusted    Progression Towards Functional goals:  [] Patient is progressing as expected towards functional goals listed. [] Progression is slowed due to complexities listed. [] Progression has been slowed due to co-morbidities. [x] Plan just implemented, too soon to assess goals progression  [] Other:     Overall Progression Towards Functional goals/ Treatment Progress Update:  [] Patient is progressing as expected towards functional goals listed.     [] Progression is slowed due to complexities/Impairments

## 2020-03-06 ENCOUNTER — APPOINTMENT (OUTPATIENT)
Dept: PHYSICAL THERAPY | Age: 75
End: 2020-03-06
Payer: MEDICARE

## 2020-03-09 ENCOUNTER — HOSPITAL ENCOUNTER (OUTPATIENT)
Dept: PHYSICAL THERAPY | Age: 75
Setting detail: THERAPIES SERIES
Discharge: HOME | End: 2020-03-09
Payer: MEDICARE

## 2020-03-09 PROCEDURE — 97112 NEUROMUSCULAR REEDUCATION: CPT | Performed by: PHYSICAL THERAPIST

## 2020-03-09 PROCEDURE — 97110 THERAPEUTIC EXERCISES: CPT | Performed by: PHYSICAL THERAPIST

## 2020-03-09 PROCEDURE — 97140 MANUAL THERAPY 1/> REGIONS: CPT | Performed by: PHYSICAL THERAPIST

## 2020-03-09 NOTE — FLOWSHEET NOTE
[facility],  [address] [place]  Phone: [phone]  Fax [phone]      ATHLETIC TRAINING [address]  Date:  3/9/2020    Patient Name:  Peter Harris   \"Davonte\" :  [birth year]  MRN: [id number]  Restrictions/Precautions:    Medical/Treatment Diagnosis Information:  ·   Diagnosis: A49.808 (ICD-10-CM) - Status post total right knee replacement DOS 20  · Treatment Diagnosis: R knee pain M25.561, difficulty walking R26.2     Physician Information:    Referring Practitioner: Antony De La Cruz Post-op  8 wks  12 wks 16 wks 20 wks   24 wks                            Activity Log                                                  DOS/DOI:                                                    Date: 2020  3/2/2020 03/09/20   ATC communication Flex to 120, lacks ext before SX  IH WO reg not scheduled 1 deg from straight lacks TKE strength & glute    Bike      Elliptical      Treadmill      Airdyne            Gastroc stretch      Soleus stretch      Hamstring stretch      ITB stretch      Hip Flexor stretch      Quad stretch      Adductor stretch            Weight Shifting sp   Static Step Throu 12x                             fp   Static Step Throu  12x                             tp   Static Step Throu  12x   Lateral walking (with/w/o TB)            Balance: PEP/Rena board                     SLS            Star excursion load/explode            Extremity reach UE/LE            Leg Press Joshua. 60# 3x10 IH 60# 3x10 80# 30x                     Ecc. 60# 30x                     Inv. Calf Press Joshua.  60# 3x10 IH 60# x30                       Ecc.                          Inv.            VIRI   Flex                 ABd                 ADd                TKE 45# 3x10 45# 30x5\"               Ext            Steps Up                 Up and Over                 Down                 Lateral                 Rotation

## 2020-03-09 NOTE — PROGRESS NOTES
[address] (typically 45 minutes face-to-face)  [] RE-EVAL     [x] TI(68095) x 1    [] IONTO  [x] NMR (70817) x  1  [] VASO  [x] Manual (70727) x   1   [] Other:  [] TA x      [] Mech Traction (62226)  [] ES(attended) (73253)      [] ES (un) (07394):       GOALS:   Patient stated goal: full pain free mobility  [] Progressing: [] Met: [] Not Met: [] Adjusted    Therapist goals for Patient:   Short Term Goals: To be achieved in: 2 weeks  1. Independent in HEP and progression per patient tolerance, in order to prevent re-injury. [] Progressing: [] Met: [] Not Met: [] Adjusted  2. Patient will have a decrease in pain to facilitate improvement in movement, function, and ADLs as indicated by Functional Deficits. [] Progressing: [] Met: [] Not Met: [] Adjusted    Long Term Goals: To be achieved in: 12 weeks  1. Disability index score of 30% or less for the LEFS to assist with reaching prior level of function. [] Progressing: [] Met: [] Not Met: [] Adjusted  2. Patient will demonstrate increased AROM to 0-120 R knee to allow for proper joint functioning as indicated by patients Functional Deficits. [x] Progressing: [x] Met for flex: [] Not Met: [] Adjusted  3. Patient will demonstrate an increase in Strength to good proximal hip strength and control, within 5lb HHD in LE to allow for proper functional mobility as indicated by patients Functional Deficits. [x] Progressing: [] Met: [] Not Met: [] Adjusted  4. Patient will return to reciprocal stairs without increased symptoms or restriction. [x] Progressing: [] Met: [] Not Met: [] Adjusted  5. Pt will demo normal gait mechanics for community ambulation w/o AD. [x] Progressing: [] Met: [] Not Met: [] Adjusted    Progression Towards Functional goals:  [] Patient is progressing as expected towards functional goals listed. [] Progression is slowed due to complexities listed. [] Progression has been slowed due to co-morbidities.   [x] Plan just implemented, too soon to

## 2020-03-10 ENCOUNTER — APPOINTMENT (OUTPATIENT)
Dept: PHYSICAL THERAPY | Age: 75
End: 2020-03-10
Payer: MEDICARE

## 2020-03-11 ENCOUNTER — OFFICE VISIT (OUTPATIENT)
Dept: ORTHOPEDIC SURGERY | Age: 75
End: 2020-03-11

## 2020-03-11 VITALS — HEIGHT: 70 IN | BODY MASS INDEX: 38.79 KG/M2 | WEIGHT: 270.95 LBS

## 2020-03-11 PROCEDURE — 99024 POSTOP FOLLOW-UP VISIT: CPT | Performed by: ORTHOPAEDIC SURGERY

## 2020-03-12 ENCOUNTER — OFFICE VISIT (OUTPATIENT)
Dept: FAMILY MEDICINE CLINIC | Age: 75
End: 2020-03-12
Payer: MEDICARE

## 2020-03-12 ENCOUNTER — HOSPITAL ENCOUNTER (OUTPATIENT)
Dept: PHYSICAL THERAPY | Age: 75
Setting detail: THERAPIES SERIES
Discharge: HOME | End: 2020-03-12
Payer: MEDICARE

## 2020-03-12 VITALS
BODY MASS INDEX: 37.45 KG/M2 | DIASTOLIC BLOOD PRESSURE: 52 MMHG | WEIGHT: 261 LBS | SYSTOLIC BLOOD PRESSURE: 118 MMHG | HEART RATE: 61 BPM | OXYGEN SATURATION: 98 %

## 2020-03-12 PROCEDURE — 97110 THERAPEUTIC EXERCISES: CPT | Performed by: PHYSICAL THERAPIST

## 2020-03-12 PROCEDURE — 97112 NEUROMUSCULAR REEDUCATION: CPT | Performed by: PHYSICAL THERAPIST

## 2020-03-12 PROCEDURE — 97140 MANUAL THERAPY 1/> REGIONS: CPT | Performed by: PHYSICAL THERAPIST

## 2020-03-12 PROCEDURE — 99213 OFFICE O/P EST LOW 20 MIN: CPT | Performed by: FAMILY MEDICINE

## 2020-03-12 NOTE — FLOWSHEET NOTE
[facility],  [address]  Phone: [phone]  Fax [phone]    Physical Therapy Treatment Note/ Progress Report:           Date:  3/12/2020    Patient Name:  Garret Daly   \"Davonte\" :  1945  MRN: [id number]  Restrictions/Precautions:    Medical/Treatment Diagnosis Information:  · Diagnosis: R75.479 (ICD-10-CM) - Status post total right knee replacement DOS 20  · Treatment Diagnosis: R knee pain M25.561, difficulty walking E20.7  Insurance/Certification information:  PT Insurance Information:  [employer]ET[employer]   - $40CP-100%-PT/OT MED NEC  Physician Information:  Referring Practitioner: Alejandrina Vences  Has the plan of care been signed (Y/N):        [x]  Yes  []  No     Date of Patient follow up with Physician: 3/11/20      Is this a Progress Report:     [x]  Yes  []  No        If Yes:  Date Range for reporting period:  Beginnin20  Ending: 3/9/20    Progress report will be due (10 Rx or 30 days whichever is less):        Recertification will be due (POC Duration  / 90 days whichever is less): 12 weeks       Visit # Insurance Allowable Requires auth   11 BMN    [x]no        []yes:     Functional Scale: LEFS 31/80 61%    Date assessed:  20      Therapy Diagnosis/Practice Pattern:H      Number of Comorbidities:  []0     []1-2    [x]3+    Latex Allergy:  [x]NO      []YES  Preferred Language for Healthcare:   [x]English       []other:      Pain level:  eval /10 Current pain /10    SUBJECTIVE:  Pt reports good follow up with MD this week. Soreness ant knee today.     OBJECTIVE:    Observation: pitting edema distal ITB and lateral joint   Test measurements:     AROM 2-122 after stretching, PROM 0-124   MMT hip abd 4-, knee ext 4, knee flex 4+       RESTRICTIONS/PRECAUTIONS: DM2, pre-op ROM 7-125    Exercises/Interventions:     Therapeutic Ex (59026) Sets/sec/reps Notes/CUES   Ext prop- bone ([id number]) x   1   [] Other:  [] TA x      [] Mech Traction (68374)  [] ES(attended) (27169)      [] ES (un) (90875):       GOALS:   Patient stated goal: full pain free mobility  [] Progressing: [] Met: [] Not Met: [] Adjusted    Therapist goals for Patient:   Short Term Goals: To be achieved in: 2 weeks  1. Independent in HEP and progression per patient tolerance, in order to prevent re-injury. [] Progressing: [] Met: [] Not Met: [] Adjusted  2. Patient will have a decrease in pain to facilitate improvement in movement, function, and ADLs as indicated by Functional Deficits. [] Progressing: [] Met: [] Not Met: [] Adjusted    Long Term Goals: To be achieved in: 12 weeks  1. Disability index score of 30% or less for the LEFS to assist with reaching prior level of function. [] Progressing: [] Met: [] Not Met: [] Adjusted  2. Patient will demonstrate increased AROM to 0-120 R knee to allow for proper joint functioning as indicated by patients Functional Deficits. [x] Progressing: [x] Met for flex: [] Not Met: [] Adjusted  3. Patient will demonstrate an increase in Strength to good proximal hip strength and control, within 5lb HHD in LE to allow for proper functional mobility as indicated by patients Functional Deficits. [x] Progressing: [] Met: [] Not Met: [] Adjusted  4. Patient will return to reciprocal stairs without increased symptoms or restriction. [x] Progressing: [] Met: [] Not Met: [] Adjusted  5. Pt will demo normal gait mechanics for community ambulation w/o AD. [x] Progressing: [] Met: [] Not Met: [] Adjusted    Progression Towards Functional goals:  [] Patient is progressing as expected towards functional goals listed. [] Progression is slowed due to complexities listed. [] Progression has been slowed due to co-morbidities.   [x] Plan just implemented, too soon to assess goals progression  [] Other:     Overall Progression Towards Functional goals/ Treatment Progress Update:  [] Patient is

## 2020-03-16 ENCOUNTER — HOSPITAL ENCOUNTER (OUTPATIENT)
Dept: PHYSICAL THERAPY | Age: 75
Setting detail: THERAPIES SERIES
Discharge: HOME | End: 2020-03-16
Payer: MEDICARE

## 2020-03-16 NOTE — FLOWSHEET NOTE
[facility], [address]        Physical Therapy  Cancellation/No-show Note  Patient Name:  Eladia Riggins  :  89   Date:  3/16/2020  Cancelled visits to date: 1  No-shows to date: 0    For today's appointment patient:  ?  Cancelled  ? Rescheduled appointment  ? No-show     Reason given by patient:  ?  Patient ill  ? Conflicting appointment  ? No transportation    ? Conflict with work  ? No reason given  ?   Other:  COVID-19 concerns   Comments:      Electronically signed by:  Gladys Garcia PT

## 2020-03-17 ENCOUNTER — APPOINTMENT (OUTPATIENT)
Dept: PHYSICAL THERAPY | Age: 75
End: 2020-03-17
Payer: MEDICARE

## 2020-03-17 RX ORDER — PENICILLIN V POTASSIUM 500 MG/1
TABLET ORAL
Qty: 120 TABLET | Refills: 0 | Status: SHIPPED | OUTPATIENT
Start: 2020-03-17 | End: 2020-04-10

## 2020-03-19 ENCOUNTER — APPOINTMENT (OUTPATIENT)
Dept: PHYSICAL THERAPY | Age: 75
End: 2020-03-19
Payer: MEDICARE

## 2020-03-20 NOTE — PROGRESS NOTES
3/12/2020     Karel Solomon (:  1945) is a 75 y.o. male, here for evaluation of the following medical concerns:    Karel Solomon is a 75 y.o. male. Patient presents with: The patients PMH, surgical history, family history, medications, allergies were all reviewed and updated as appropriate today. Hypertension   This is a chronic problem. The current episode started more than 1 month ago. The problem is unchanged. The problem is controlled. Pertinent negatives include no anxiety. There are no associated agents to hypertension. Risk factors for coronary artery disease include diabetes mellitus, male gender and obesity. The current treatment provides significant improvement. There are no compliance problems. There is no history of angina. There is no history of chronic renal disease. Diabetes   He presents for his follow-up diabetic visit. He has type 2 diabetes mellitus. His disease course has been stable. There are no diabetic associated symptoms. There are no hypoglycemic complications. Symptoms are stable. There are no diabetic complications. Risk factors for coronary artery disease include diabetes mellitus, hypertension and male sex. When asked about current treatments, none were reported. He is compliant with treatment all of the time. His weight is fluctuating minimally. He is following a diabetic diet. He rarely participates in exercise. His home blood glucose trend is fluctuating minimally. An ACE inhibitor/angiotensin II receptor blocker is being taken. He does not see a podiatrist.Eye exam is current. Review of Systems    Prior to Visit Medications    Medication Sig Taking?  Authorizing Provider   lisinopril (PRINIVIL;ZESTRIL) 40 MG tablet Take 0.5 tablets by mouth 2 times daily Yes Yang Varela MD   ibuprofen (ADVIL) 200 MG tablet Take 2 tablets by mouth every 8 hours as needed for Pain Yes PATTI Enciso   acetaminophen (TYLENOL) 325 MG tablet Take 2 Substance Use Topics    Alcohol use: No        Vitals:    03/12/20 1030   BP: (!) 118/52   Pulse: 61   SpO2: 98%   Weight: 261 lb (118.4 kg)     Estimated body mass index is 37.45 kg/m² as calculated from the following:    Height as of 3/11/20: 5' 10\" (1.778 m). Weight as of this encounter: 261 lb (118.4 kg). Physical Exam  Vitals signs and nursing note reviewed. Constitutional:       Appearance: He is well-developed. HENT:      Head: Normocephalic and atraumatic. Right Ear: External ear normal.      Left Ear: External ear normal.      Nose: Nose normal.   Eyes:      Conjunctiva/sclera: Conjunctivae normal.      Pupils: Pupils are equal, round, and reactive to light. Neck:      Musculoskeletal: Normal range of motion and neck supple. Cardiovascular:      Rate and Rhythm: Normal rate and regular rhythm. Heart sounds: Normal heart sounds. No murmur. No friction rub. No gallop. Pulmonary:      Effort: Pulmonary effort is normal. No respiratory distress. Breath sounds: Normal breath sounds. No wheezing. Abdominal:      General: Bowel sounds are normal. There is no distension. Palpations: Abdomen is soft. Tenderness: There is no abdominal tenderness. Musculoskeletal: Normal range of motion. General: No tenderness. Skin:     General: Skin is warm and dry. Neurological:      Mental Status: He is alert and oriented to person, place, and time. Deep Tendon Reflexes: Reflexes are normal and symmetric. Psychiatric:         Behavior: Behavior normal.         Thought Content: Thought content normal.         Judgment: Judgment normal.         ASSESSMENT/PLAN:  1. Essential hypertension, benign  Controlled, continue current meds    2.  Type 2 diabetes mellitus without complication, without long-term current use of insulin [facility])  Lab Results   Component Value Date    LABA1C 6.4 01/24/2020     Lab Results   Component Value Date    .0 01/24/2020     Controlled, continue current meds.

## 2020-03-23 ENCOUNTER — APPOINTMENT (OUTPATIENT)
Dept: PHYSICAL THERAPY | Age: 75
End: 2020-03-23
Payer: MEDICARE

## 2020-03-25 ENCOUNTER — APPOINTMENT (OUTPATIENT)
Dept: PHYSICAL THERAPY | Age: 75
End: 2020-03-25
Payer: MEDICARE

## 2020-03-30 ENCOUNTER — TELEPHONE (OUTPATIENT)
Dept: PHYSICAL THERAPY | Age: 75
End: 2020-03-30

## 2020-03-30 RX ORDER — CARVEDILOL 25 MG/1
TABLET ORAL
Qty: 180 TABLET | Refills: 1 | Status: SHIPPED | OUTPATIENT
Start: 2020-03-30 | End: 2020-09-28 | Stop reason: SDUPTHER

## 2020-03-30 NOTE — TELEPHONE ENCOUNTER
Pharmacy called req refill on Carvedilol 25mg      Last visit 3/12/20  Future 5/12/20    [facility]

## 2020-04-13 RX ORDER — HYDRALAZINE HYDROCHLORIDE 25 MG/1
25 TABLET, FILM COATED ORAL EVERY 8 HOURS SCHEDULED
Qty: 90 TABLET | Refills: 3 | Status: SHIPPED | OUTPATIENT
Start: 2020-04-13 | End: 2020-07-28

## 2020-04-29 ENCOUNTER — TELEPHONE (OUTPATIENT)
Dept: FAMILY MEDICINE CLINIC | Age: 75
End: 2020-04-29

## 2020-04-29 RX ORDER — ATORVASTATIN CALCIUM 80 MG/1
TABLET, FILM COATED ORAL
Qty: 90 TABLET | Refills: 1 | Status: SHIPPED | OUTPATIENT
Start: 2020-04-29 | End: 2020-09-28

## 2020-05-06 ENCOUNTER — TELEPHONE (OUTPATIENT)
Dept: FAMILY MEDICINE CLINIC | Age: 75
End: 2020-05-06

## 2020-06-05 RX ORDER — AMMONIUM LACTATE 12 G/100G
LOTION TOPICAL
Qty: 500 G | Refills: 5 | Status: SHIPPED | OUTPATIENT
Start: 2020-06-05 | End: 2021-08-09

## 2020-06-11 ENCOUNTER — TELEPHONE (OUTPATIENT)
Dept: CARDIOLOGY CLINIC | Age: 75
End: 2020-06-11

## 2020-06-11 NOTE — TELEPHONE ENCOUNTER
----- Message from Magalis Dominguez MD sent at 6/10/2020  4:43 PM EDT -----  This patient is scheduled to see us next week. Can you have them get a fasting lipid and CBC prior to the visit.

## 2020-06-17 DIAGNOSIS — I25.10 CORONARY ARTERY DISEASE INVOLVING NATIVE CORONARY ARTERY OF NATIVE HEART WITHOUT ANGINA PECTORIS: ICD-10-CM

## 2020-06-17 LAB
BASOPHILS ABSOLUTE: 0 K/UL (ref 0–0.2)
BASOPHILS RELATIVE PERCENT: 0.7 %
CHOLESTEROL, TOTAL: 175 MG/DL (ref 0–199)
EOSINOPHILS ABSOLUTE: 0.2 K/UL (ref 0–0.6)
EOSINOPHILS RELATIVE PERCENT: 2.9 %
HCT VFR BLD CALC: 35.2 % (ref 40.5–52.5)
HDLC SERPL-MCNC: 39 MG/DL (ref 40–60)
HEMOGLOBIN: 11.5 G/DL (ref 13.5–17.5)
LDL CHOLESTEROL CALCULATED: 96 MG/DL
LYMPHOCYTES ABSOLUTE: 1.4 K/UL (ref 1–5.1)
LYMPHOCYTES RELATIVE PERCENT: 22.9 %
MCH RBC QN AUTO: 28.7 PG (ref 26–34)
MCHC RBC AUTO-ENTMCNC: 32.7 G/DL (ref 31–36)
MCV RBC AUTO: 87.8 FL (ref 80–100)
MONOCYTES ABSOLUTE: 0.5 K/UL (ref 0–1.3)
MONOCYTES RELATIVE PERCENT: 7.9 %
NEUTROPHILS ABSOLUTE: 4.1 K/UL (ref 1.7–7.7)
NEUTROPHILS RELATIVE PERCENT: 65.6 %
PDW BLD-RTO: 16.7 % (ref 12.4–15.4)
PLATELET # BLD: 133 K/UL (ref 135–450)
PMV BLD AUTO: 9.4 FL (ref 5–10.5)
RBC # BLD: 4.01 M/UL (ref 4.2–5.9)
TRIGL SERPL-MCNC: 201 MG/DL (ref 0–150)
VLDLC SERPL CALC-MCNC: 40 MG/DL
WBC # BLD: 6.3 K/UL (ref 4–11)

## 2020-06-22 ENCOUNTER — OFFICE VISIT (OUTPATIENT)
Dept: CARDIOLOGY CLINIC | Age: 75
End: 2020-06-22
Payer: MEDICARE

## 2020-06-22 VITALS
DIASTOLIC BLOOD PRESSURE: 64 MMHG | SYSTOLIC BLOOD PRESSURE: 110 MMHG | OXYGEN SATURATION: 97 % | WEIGHT: 257 LBS | BODY MASS INDEX: 36.79 KG/M2 | HEIGHT: 70 IN | HEART RATE: 56 BPM

## 2020-06-22 PROCEDURE — 99214 OFFICE O/P EST MOD 30 MIN: CPT | Performed by: INTERNAL MEDICINE

## 2020-06-22 NOTE — LETTER
[address] [facility]   Cardiovascular Evaluation    PATIENT: Will Reyes  DATE: 2020  MRN: [id number]  CSN: [id number]  : 1945    Primary Care Doctor/Referring provider: Leslie Bills MD    Reason for evaluation/Chief complaint:   Follow-up and Coronary Artery Disease      Subjective:    History of present illness on initial date of evaluation:   Will Reyes is a 75 y.o. male who is here today for follow up for coronary artery disease. He has a history of coronary artery disease with stent placement in  with drug eluting stents to his right coronary artery and left anterior descending artery. His most recent echocardiogram from 10/2019 showed an ejection fraction of 55-60%. He underwent right total hip replacement on 2020. Today he states he has been feeling well. His only complaint is being bored. He plans to start camping again now that the campgrounds are open. He continues to have some hip pain since his surgery. He states his blood sugar has been more elevated since his hip replacement. He has been doing exercises recommended by physical therapy a few times daily. He has been taking his medications as prescribed and is tolerating them well. He has lost 27 lbs with diet control and cooking at home.         Patient Active Problem List   Diagnosis    Hypotestosteronism    CAD (coronary artery disease) PCI to RCA and LAD     History of PTCA    Sleep apnea    DJD (degenerative joint disease)    Essential hypertension, benign    Gout    Skin cancer    Anal fissure    Vitamin D deficiency    SIRS (systemic inflammatory response syndrome) (HCC)    Viral syndrome    Non-intractable vomiting without nausea    Syncope    Bacteremia due to group B Streptococcus    Cellulitis of left lower extremity    Hyperprolactinemia (HCC)    Severe sepsis ([facility])    Cellulitis of right leg    Group B streptococcal bacteriuria hydrALAZINE (APRESOLINE) 25 MG tablet Take 1 tablet by mouth every 8 hours 4/13/20  Yes Stoney Mustafa MD   penicillin v potassium (VEETID) 500 MG tablet TAKE TWO TABLETS BY MOUTH TWICE A DAY 4/10/20  Yes Barbara Serra MD   carvedilol (COREG) 25 MG tablet TAKE ONE TABLET BY MOUTH TWICE A DAY 3/30/20  Yes Stoney Mustafa MD   lisinopril (PRINIVIL;ZESTRIL) 40 MG tablet Take 0.5 tablets by mouth 2 times daily 3/2/20  Yes Stoney Mustafa MD   ibuprofen (ADVIL) 200 MG tablet Take 2 tablets by mouth every 8 hours as needed for Pain 1/28/20  Yes PATTI Murphy   acetaminophen (TYLENOL) 325 MG tablet Take 2 tablets by mouth every 6 hours as needed for Pain 1/24/20  Yes PATTI Murphy   hydrochlorothiazide (HYDRODIURIL) 25 MG tablet Take 25 mg by mouth daily   Yes Historical Provider, MD   metFORMIN (GLUCOPHAGE-XR) 750 MG extended release tablet Take 1 tablet by mouth 2 times daily 1/21/20  Yes Stoney Mustafa MD   blood glucose test strips (ONETOUCH VERIO) strip USE TO TEST BLOOD SUGAR ONCE DAILY 11/11/19  Yes Stoney Mustafa MD   ferrous sulfate 325 (65 Fe) MG tablet Take 1 tablet by mouth daily (with breakfast) 11/1/19  Yes Prince Mau MD   Probiotic Product (PROBIOTIC DAILY PO) Take by mouth   Yes Historical Provider, MD   cabergoline (DOSTINEX) 0.5 MG tablet TAKE half tablet once a week. Patient taking differently: TAKE half tablet once a week. MONDAY 10/2/19  Yes Tahira Alvarez MD   allopurinol (ZYLOPRIM) 300 MG tablet TAKE ONE TABLET BY MOUTH DAILY 9/5/19  Yes Stoney Mustafa MD   Accu-Chek Multiclix Lancets MISC Test once daily  DX  250.00 10/24/17  Yes Stoney Mustafa MD   Cholecalciferol (VITAMIN D) 2000 UNITS CAPS capsule Take 1 capsule by mouth daily   Yes Historical Provider, MD   aspirin 81 MG chewable tablet Take 81 mg by mouth daily. Yes Historical Provider, MD       In-patient schedule medications:        Infusion Medications:         Allergies:  Bactrim [sulfamethoxazole-trimethoprim] verbal instructions regarding risk factor modification. (avoid simple simple sugars to help manage your diabetes). Recommend 30 minutes of exercise daily. 3. Recommend a heart rate monitor to use during exercise. 4. Follow up with me in 1 year   5. Follow up with Rosa Mcgrath MD as scheduled       Scribe's attestation: This note was scribed in the presence of Dr. Jose Guerrero M.D. By Jasper Fierro, . Jose Guerrero, personally performed the services described in this documentation, as scribed by the above signed scribe in my presence. It is both accurate and complete to my knowledge. I agree with the details independently gathered by the clinical support staff, while the remaining scribed note accurately describes my personal service to the patient. All questions and concerns were addressed to the patient/family. Alternatives to my treatment were discussed. The note was completed using EMR. Every effort was made to ensure accuracy; however, inadvertent computerized transcription errors may be present.     Jose Guerrero MD, [place], [place]  [phone] [facility]  [phone] [facility]  6/22/2020  11:05 AM

## 2020-06-22 NOTE — PATIENT INSTRUCTIONS
Plan:  1. I recommend that the patient continue their currently prescribed medications. Their drug modifiable risk factors appear to be well controlled. I will continue to address the need/dosing of medications in future visits. 2. The patient was seen for >25 minutes. >50% of the time was devoted to giving the patient detailed instructions instructions on addressing diet, regular exercise, weight control, smoking abstention, medication compliance, and stress minimization. The patient was provided written and verbal instructions regarding risk factor modification. (avoid simple simple sugars to help manage your diabetes). Recommend 30 minutes of exercise daily. 3. Recommend a heart rate monitor to use during exercise. 4. Follow up with me in 1 year   5.  Follow up with Sendy Ford MD as scheduled

## 2020-06-22 NOTE — PROGRESS NOTES
[address] [facility]   Cardiovascular Evaluation    PATIENT: Jefferson Espinal  DATE: 2020  MRN: [id number]  CSN: [id number]  : 1945    Primary Care Doctor/Referring provider: Teresa Vazquez MD    Reason for evaluation/Chief complaint:   Follow-up and Coronary Artery Disease      Subjective:    History of present illness on initial date of evaluation:   Jefferson Espinal is a 75 y.o. male who is here today for follow up for coronary artery disease. He has a history of coronary artery disease with stent placement in  with drug eluting stents to his right coronary artery and left anterior descending artery. His most recent echocardiogram from 10/2019 showed an ejection fraction of 55-60%. He underwent right total hip replacement on 2020. Today he states he has been feeling well. His only complaint is being bored. He plans to start camping again now that the campgrounds are open. He continues to have some hip pain since his surgery. He states his blood sugar has been more elevated since his hip replacement. He has been doing exercises recommended by physical therapy a few times daily. He has been taking his medications as prescribed and is tolerating them well. He has lost 27 lbs with diet control and cooking at home.         Patient Active Problem List   Diagnosis    Hypotestosteronism    CAD (coronary artery disease) PCI to RCA and LAD     History of PTCA    Sleep apnea    DJD (degenerative joint disease)    Essential hypertension, benign    Gout    Skin cancer    Anal fissure    Vitamin D deficiency    SIRS (systemic inflammatory response syndrome) (HCC)    Viral syndrome    Non-intractable vomiting without nausea    Syncope    Bacteremia due to group B Streptococcus    Cellulitis of left lower extremity    Hyperprolactinemia (HCC)    Severe sepsis ([facility])    Cellulitis of right leg    Group B streptococcal bacteriuria    Venous insufficiency of 25 MG tablet Take 1 tablet by mouth every 8 hours 4/13/20  Yes Kimani Steiner MD   penicillin v potassium (VEETID) 500 MG tablet TAKE TWO TABLETS BY MOUTH TWICE A DAY 4/10/20  Yes Kala Jaquez MD   carvedilol (COREG) 25 MG tablet TAKE ONE TABLET BY MOUTH TWICE A DAY 3/30/20  Yes Kimani Steiner MD   lisinopril (PRINIVIL;ZESTRIL) 40 MG tablet Take 0.5 tablets by mouth 2 times daily 3/2/20  Yes Kimani Steiner MD   ibuprofen (ADVIL) 200 MG tablet Take 2 tablets by mouth every 8 hours as needed for Pain 1/28/20  Yes PATTI Walker   acetaminophen (TYLENOL) 325 MG tablet Take 2 tablets by mouth every 6 hours as needed for Pain 1/24/20  Yes PATTI Walker   hydrochlorothiazide (HYDRODIURIL) 25 MG tablet Take 25 mg by mouth daily   Yes Historical Provider, MD   metFORMIN (GLUCOPHAGE-XR) 750 MG extended release tablet Take 1 tablet by mouth 2 times daily 1/21/20  Yes Kimani Steiner MD   blood glucose test strips (ONETOUCH VERIO) strip USE TO TEST BLOOD SUGAR ONCE DAILY 11/11/19  Yes Kimani Steiner MD   ferrous sulfate 325 (65 Fe) MG tablet Take 1 tablet by mouth daily (with breakfast) 11/1/19  Yes Kishore Goyal MD   Probiotic Product (PROBIOTIC DAILY PO) Take by mouth   Yes Historical Provider, MD   cabergoline (DOSTINEX) 0.5 MG tablet TAKE half tablet once a week. Patient taking differently: TAKE half tablet once a week. MONDAY 10/2/19  Yes Fabio Jacinto MD   allopurinol (ZYLOPRIM) 300 MG tablet TAKE ONE TABLET BY MOUTH DAILY 9/5/19  Yes Kimani Steiner MD   Accu-Chek Multiclix Lancets MISC Test once daily  DX  250.00 10/24/17  Yes Kimani Steiner MD   Cholecalciferol (VITAMIN D) 2000 UNITS CAPS capsule Take 1 capsule by mouth daily   Yes Historical Provider, MD   aspirin 81 MG chewable tablet Take 81 mg by mouth daily. Yes Historical Provider, MD       In-patient schedule medications:        Infusion Medications:         Allergies:  Bactrim [sulfamethoxazole-trimethoprim]     Review of Systems:   All 14

## 2020-06-29 RX ORDER — ALLOPURINOL 300 MG/1
TABLET ORAL
Qty: 90 TABLET | Refills: 3 | Status: SHIPPED | OUTPATIENT
Start: 2020-06-29 | End: 2021-06-21

## 2020-06-29 RX ORDER — PENICILLIN V POTASSIUM 500 MG/1
TABLET ORAL
Qty: 120 TABLET | Refills: 1 | Status: SHIPPED | OUTPATIENT
Start: 2020-06-29 | End: 2020-08-31

## 2020-06-29 NOTE — TELEPHONE ENCOUNTER
Medication refill for Allopurinol 300 mgs  #90    Last filled   04.06.2020    Last seen   03.12.200    Future appt  07.14.2020

## 2020-07-20 ENCOUNTER — TELEPHONE (OUTPATIENT)
Dept: FAMILY MEDICINE CLINIC | Age: 75
End: 2020-07-20

## 2020-07-20 NOTE — TELEPHONE ENCOUNTER
----- Message from Deja Marckdylan sent at 7/20/2020 10:30 AM EDT -----  Subject: Appointment Request    QUESTIONS  Reason for appointment request? No appointments available during search  ---------------------------------------------------------------------------  --------------  [address]  What is the best way for the office to contact you? OK to leave message on   voicemail  Preferred Call Back Phone Number? [phone]  ---------------------------------------------------------------------------  --------------  SCRIPT ANSWERS  Relationship to Patient? Self  Appointment reason? Well Care/Follow Ups  Select a Well Care/Follow Ups appointment reason? Adult Existing Condition   Follow Up [Diabetes   CHF   COPD   Hypertension/Blood Pressure Check]  (Is the patient requesting to be seen urgently for their symptoms?)? No  Is this follow up request related to routine Diabetes Management? Yes  Are you having any new concerns about your existing condition? No  Have you been diagnosed with   tested for   or told that you are suspected of having COVID-19 (Coronavirus)? No  Have you had a fever or taken medication to treat a fever within the past   3 days? No  Have you had a cough   shortness of breath or flu-like symptoms within the past 3 days? No  Do you currently have flu-like symptoms including fever or chills   cough   shortness of breath   or difficulty breathing   or new loss of taste or smell? No  (Service Expert  click yes below to proceed with [employer] As Usual   Scheduling)?  Yes

## 2020-07-27 ENCOUNTER — OFFICE VISIT (OUTPATIENT)
Dept: FAMILY MEDICINE CLINIC | Age: 75
End: 2020-07-27
Payer: MEDICARE

## 2020-07-27 VITALS
SYSTOLIC BLOOD PRESSURE: 114 MMHG | TEMPERATURE: 97.8 F | OXYGEN SATURATION: 97 % | DIASTOLIC BLOOD PRESSURE: 72 MMHG | HEART RATE: 65 BPM | WEIGHT: 261 LBS | BODY MASS INDEX: 37.45 KG/M2

## 2020-07-27 PROBLEM — D35.2 PITUITARY MACROADENOMA (HCC): Status: ACTIVE | Noted: 2020-07-27

## 2020-07-27 PROBLEM — E66.01 MORBID OBESITY WITH BMI OF 40.0-44.9, ADULT (HCC): Status: ACTIVE | Noted: 2020-07-27

## 2020-07-27 LAB — HBA1C MFR BLD: 6.7 %

## 2020-07-27 PROCEDURE — 99214 OFFICE O/P EST MOD 30 MIN: CPT | Performed by: FAMILY MEDICINE

## 2020-07-27 PROCEDURE — 83036 HEMOGLOBIN GLYCOSYLATED A1C: CPT | Performed by: FAMILY MEDICINE

## 2020-07-27 RX ORDER — HYDROCODONE POLISTIREX AND CHLORPHENIRAMINE POLISTIREX 10; 8 MG/5ML; MG/5ML
5 SUSPENSION, EXTENDED RELEASE ORAL EVERY 12 HOURS PRN
Qty: 180 ML | Refills: 0 | Status: SHIPPED | OUTPATIENT
Start: 2020-07-27 | End: 2020-08-26

## 2020-07-27 ASSESSMENT — ENCOUNTER SYMPTOMS
COUGH: 1
SHORTNESS OF BREATH: 1
WHEEZING: 1

## 2020-07-27 NOTE — PROGRESS NOTES
2020     Karlene Hernández (:  1945) is a 75 y.o. male, here for evaluation of the following medical concerns:    Karlene Hernández is a 75 y.o. male. Patient presents with:  1 Month Follow-Up  Hypertension  Dizziness: x 1 wk   Cough: fatigue and gait issues   Generalized Body Aches        The patients PMH, surgical history, family history, medications, allergies were all reviewed and updated as appropriate today. Hypertension   This is a chronic problem. The current episode started more than 1 month ago. The problem is unchanged. The problem is controlled. Associated symptoms include shortness of breath. Pertinent negatives include no anxiety, chest pain or headaches. There are no associated agents to hypertension. Risk factors for coronary artery disease include diabetes mellitus, male gender and obesity. The current treatment provides significant improvement. There are no compliance problems. There is no history of angina. There is no history of chronic renal disease. Dizziness   Associated symptoms include coughing and myalgias. Pertinent negatives include no chest pain or headaches. Cough   This is a chronic problem. Associated symptoms include myalgias, nasal congestion, postnasal drip, shortness of breath and wheezing. Pertinent negatives include no chest pain or headaches. Nothing aggravates the symptoms. He has tried nothing for the symptoms. The treatment provided no relief. Generalized Body Aches   Associated symptoms include coughing and myalgias. Pertinent negatives include no chest pain or headaches. Diabetes   He presents for his follow-up diabetic visit. He has type 2 diabetes mellitus. His disease course has been stable. Hypoglycemia symptoms include dizziness. Pertinent negatives for hypoglycemia include no headaches. There are no diabetic associated symptoms. Pertinent negatives for diabetes include no chest pain. There are no hypoglycemic complications.  Symptoms are stable. There are no diabetic complications. Risk factors for coronary artery disease include diabetes mellitus, hypertension and male sex. When asked about current treatments, none were reported. He is compliant with treatment all of the time. His weight is fluctuating minimally. He is following a diabetic diet. He rarely participates in exercise. His home blood glucose trend is fluctuating minimally. An ACE inhibitor/angiotensin II receptor blocker is being taken. He does not see a podiatrist.Eye exam is current. Review of Systems   HENT: Positive for postnasal drip. Respiratory: Positive for cough, shortness of breath and wheezing. Cardiovascular: Negative for chest pain. Musculoskeletal: Positive for myalgias. Neurological: Positive for dizziness. Negative for headaches. Prior to Visit Medications    Medication Sig Taking?  Authorizing Provider   penicillin v potassium (VEETID) 500 MG tablet TAKE TWO TABLETS BY MOUTH TWICE A DAY Yes Merrie Babinski, MD   allopurinol (ZYLOPRIM) 300 MG tablet TAKE ONE TABLET BY MOUTH DAILY Yes Eladia Adler MD   ammonium lactate (LAC-HYDRIN) 12 % lotion APPLY TOPICALLY DAILY Yes Eladia Adler MD   atorvastatin (LIPITOR) 80 MG tablet TAKE ONE TABLET BY MOUTH DAILY Yes DEONNA Way CNP   hydrALAZINE (APRESOLINE) 25 MG tablet Take 1 tablet by mouth every 8 hours Yes Eladia Adler MD   carvedilol (COREG) 25 MG tablet TAKE ONE TABLET BY MOUTH TWICE A DAY Yes Eladia Adler MD   lisinopril (PRINIVIL;ZESTRIL) 40 MG tablet Take 0.5 tablets by mouth 2 times daily Yes Eladia Adler MD   ibuprofen (ADVIL) 200 MG tablet Take 2 tablets by mouth every 8 hours as needed for Pain Yes PATTI Bowers   acetaminophen (TYLENOL) 325 MG tablet Take 2 tablets by mouth every 6 hours as needed for Pain Yes PATTI Bowers   hydrochlorothiazide (HYDRODIURIL) 25 MG tablet Take 25 mg by mouth daily Yes Historical Provider, MD   metFORMIN (GLUCOPHAGE-XR) 750 MG regular rhythm. Heart sounds: Normal heart sounds. No murmur. No friction rub. No gallop. Pulmonary:      Effort: Pulmonary effort is normal. No respiratory distress. Breath sounds: Normal breath sounds. No wheezing. Abdominal:      General: Bowel sounds are normal. There is no distension. Palpations: Abdomen is soft. Tenderness: There is no abdominal tenderness. Musculoskeletal: Normal range of motion. General: No tenderness. Skin:     General: Skin is warm and dry. Neurological:      Mental Status: He is alert and oriented to person, place, and time. Deep Tendon Reflexes: Reflexes are normal and symmetric. Psychiatric:         Behavior: Behavior normal.         Thought Content: Thought content normal.         Judgment: Judgment normal.         ASSESSMENT/PLAN:  1. Type 2 diabetes mellitus without complication, without long-term current use of insulin ([facility])    - POCT glycosylated hemoglobin (Hb A1C)    2. Sleep apnea, unspecified type    - DME Order for BiPAP as OP    3. Morbid obesity with BMI of 40.0-44.9, adult ([facility]    Working on weight loss  4. Hyperprolactinemia ([facility]  Currently controlled. Doing well    5. Pituitary macroadenoma ([facility]  Improving. 6. Cough  PRN tussionex  - HYDROcodone-chlorpheniramine (TUSSIONEX PENNKINETIC ER) 10-8 MG/5ML SUER; Take 5 mLs by mouth every 12 hours as needed (cough) for up to 30 days.   Dispense: 180 mL; Refill: 0

## 2020-07-28 RX ORDER — HYDRALAZINE HYDROCHLORIDE 25 MG/1
TABLET, FILM COATED ORAL
Qty: 90 TABLET | Refills: 2 | Status: SHIPPED | OUTPATIENT
Start: 2020-07-28 | End: 2020-09-15 | Stop reason: SDUPTHER

## 2020-08-24 NOTE — PROGRESS NOTES
Safety Education & Training, Home Exercise Program, Transfer Training  Safety Devices  Type of devices:  All fall risk precautions in place, Gait belt, Patient at risk for falls, Nurse notified, Bed alarm in place, Left in bed, Call light within reach  Restraints  Initially in place: No     Therapy Time   Individual Concurrent Group Co-treatment   Time In 0916         Time Out 0957         Minutes 41         Timed Code Treatment Minutes: [address] [facility], PT negative No chest wall deformities/Normal respiratory pattern/Symmetric breath sounds clear to auscultation and percussion

## 2020-08-31 RX ORDER — PENICILLIN V POTASSIUM 500 MG/1
TABLET ORAL
Qty: 120 TABLET | Refills: 1 | Status: SHIPPED | OUTPATIENT
Start: 2020-08-31 | End: 2020-10-23

## 2020-09-02 RX ORDER — BLOOD SUGAR DIAGNOSTIC
STRIP MISCELLANEOUS
Qty: 50 STRIP | Refills: 4 | Status: SHIPPED | OUTPATIENT
Start: 2020-09-02 | End: 2021-03-31

## 2020-09-15 RX ORDER — HYDRALAZINE HYDROCHLORIDE 25 MG/1
TABLET, FILM COATED ORAL
Qty: 90 TABLET | Refills: 2 | Status: SHIPPED | OUTPATIENT
Start: 2020-09-15 | End: 2021-02-01 | Stop reason: SDUPTHER

## 2020-09-25 RX ORDER — CABERGOLINE 0.5 MG/1
TABLET ORAL
Qty: 8 TABLET | Refills: 1 | Status: SHIPPED | OUTPATIENT
Start: 2020-09-25 | End: 2020-11-27 | Stop reason: SDUPTHER

## 2020-09-25 NOTE — TELEPHONE ENCOUNTER
Medication:   Requested Prescriptions     Pending Prescriptions Disp Refills    cabergoline (DOSTINEX) 0.5 MG tablet [Pharmacy Med Name: CABERGOLINE 0.5 MG TABLET] 8 tablet 1     Sig: TAKE half tablet once a week.  MONDAY       Last Filled:      Patient Phone Number: [phone] (home)     Last appt: 10/2/2019   Next appt: Visit date not found    Last Labs DM:   Lab Results   Component Value Date    LABA1C 6.7 07/27/2020     Last Lipid:   Lab Results   Component Value Date    CHOL 175 06/17/2020    TRIG 201 06/17/2020    HDL 39 06/17/2020    LDLCALC 96 06/17/2020     Last PSA:   Lab Results   Component Value Date    PSA 0.69 09/09/2015     Last Thyroid:   Lab Results   Component Value Date    TSH 4.00 09/25/2019    T4FREE 1.0 09/25/2019

## 2020-09-28 RX ORDER — CARVEDILOL 25 MG/1
TABLET ORAL
Qty: 180 TABLET | Refills: 1 | Status: SHIPPED | OUTPATIENT
Start: 2020-09-28 | End: 2020-09-29 | Stop reason: SDUPTHER

## 2020-09-28 RX ORDER — METFORMIN HYDROCHLORIDE 750 MG/1
750 TABLET, EXTENDED RELEASE ORAL 2 TIMES DAILY
Qty: 180 TABLET | Refills: 2 | Status: SHIPPED | OUTPATIENT
Start: 2020-09-28 | End: 2021-07-28

## 2020-09-28 NOTE — TELEPHONE ENCOUNTER
Refill request for Carvedilol 25 mgs and Metformin HCL  mgs. Please send rx to [facility] in [facility].       Last seen   07.24.2020    Last filled   03.30.2020  Carvedilol                    01.21.2020  Metformin    Future appt  01.26.2021

## 2020-09-29 RX ORDER — CARVEDILOL 25 MG/1
TABLET ORAL
Qty: 180 TABLET | Refills: 1 | Status: SHIPPED | OUTPATIENT
Start: 2020-09-29 | End: 2021-04-19

## 2020-10-23 RX ORDER — PENICILLIN V POTASSIUM 500 MG/1
TABLET ORAL
Qty: 120 TABLET | Refills: 1 | Status: SHIPPED | OUTPATIENT
Start: 2020-10-23 | End: 2020-12-21

## 2020-11-23 ENCOUNTER — TELEPHONE (OUTPATIENT)
Dept: ENDOCRINOLOGY | Age: 75
End: 2020-11-23

## 2020-11-23 DIAGNOSIS — E29.1 HYPOGONADISM IN MALE: ICD-10-CM

## 2020-11-23 DIAGNOSIS — E22.1 HYPERPROLACTINEMIA (HCC): ICD-10-CM

## 2020-11-23 NOTE — TELEPHONE ENCOUNTER
Spoke with pt and pt stated he is terrified to come into a hospital/office setting. Pt is worried due to his age and having diabetes. Would it be ok for pt to get labs done then have a phone visit on Friday?

## 2020-11-24 LAB — PROLACTIN: 19.6 NG/ML

## 2020-11-25 LAB
SEX HORMONE BINDING GLOBULIN: 41 NMOL/L (ref 11–80)
TESTOSTERONE FREE-NONMALE: 15.2 PG/ML (ref 47–244)
TESTOSTERONE TOTAL: 95 NG/DL (ref 220–1000)

## 2020-11-27 ENCOUNTER — VIRTUAL VISIT (OUTPATIENT)
Dept: ENDOCRINOLOGY | Age: 75
End: 2020-11-27
Payer: MEDICARE

## 2020-11-27 PROCEDURE — 99441 PR PHYS/QHP TELEPHONE EVALUATION 5-10 MIN: CPT | Performed by: INTERNAL MEDICINE

## 2020-11-27 RX ORDER — CABERGOLINE 0.5 MG/1
TABLET ORAL
Qty: 8 TABLET | Refills: 1 | Status: SHIPPED | OUTPATIENT
Start: 2020-11-27 | End: 2022-01-24 | Stop reason: SDUPTHER

## 2020-11-27 NOTE — PROGRESS NOTES
Endocrinology    Sid Wallace M.D. Phone: [phone]   FAX: [phone]       Destiny Musa   YOB: 1945    Date of Visit:  11/27/2020    Allergies   Allergen Reactions    Bactrim [Sulfamethoxazole-Trimethoprim]      KANE risk     Outpatient Medications Marked as Taking for the 11/27/20 encounter (Virtual Visit) with Fina Babcock MD   Medication Sig Dispense Refill    penicillin v potassium (VEETID) 500 MG tablet TAKE TWO TABLETS BY MOUTH TWICE A  tablet 1    carvedilol (COREG) 25 MG tablet TAKE ONE TABLET BY MOUTH TWICE A  tablet 1    atorvastatin (LIPITOR) 80 MG tablet TAKE ONE TABLET BY MOUTH DAILY 90 tablet 3    metFORMIN (GLUCOPHAGE-XR) 750 MG extended release tablet Take 1 tablet by mouth 2 times daily 180 tablet 2    cabergoline (DOSTINEX) 0.5 MG tablet TAKE half tablet once a week.  MONDAY 8 tablet 1    hydrALAZINE (APRESOLINE) 25 MG tablet TAKE ONE TABLET BY MOUTH EVERY 8 HOURS 90 tablet 2    blood glucose test strips (ONETOUCH VERIO) strip USE ONE STRIP TO TEST BLOOD SUGAR ONCE DAILY 50 strip 4    allopurinol (ZYLOPRIM) 300 MG tablet TAKE ONE TABLET BY MOUTH DAILY 90 tablet 3    ammonium lactate (LAC-HYDRIN) 12 % lotion APPLY TOPICALLY DAILY 500 g 5    lisinopril (PRINIVIL;ZESTRIL) 40 MG tablet Take 0.5 tablets by mouth 2 times daily 60 tablet 11    ibuprofen (ADVIL) 200 MG tablet Take 2 tablets by mouth every 8 hours as needed for Pain 120 tablet 0    acetaminophen (TYLENOL) 325 MG tablet Take 2 tablets by mouth every 6 hours as needed for Pain 120 tablet 0    hydrochlorothiazide (HYDRODIURIL) 25 MG tablet Take 25 mg by mouth daily      ferrous sulfate 325 (65 Fe) MG tablet Take 1 tablet by mouth daily (with breakfast) 30 tablet 3    Probiotic Product (PROBIOTIC DAILY PO) Take by mouth      Accu-Chek Multiclix Lancets MISC Test once daily  DX  250.00 100 each 3    Cholecalciferol (VITAMIN D) 2000 UNITS CAPS capsule Take 1 capsule by mouth daily      aspirin 81 MG chewable tablet Take 81 mg by mouth daily. There were no vitals filed for this visit. There is no height or weight on file to calculate BMI. Wt Readings from Last 3 Encounters:   20 261 lb (118.4 kg)   20 257 lb (116.6 kg)   20 261 lb (118.4 kg)     BP Readings from Last 3 Encounters:   20 114/72   20 110/64   20 (!) 118/52        Past Medical History:   Diagnosis Date    CAD (coronary artery disease)     Cataract     Diabetes mellitus ([facility]     DJD (degenerative joint disease)     History of PTCA     Dr. Krishna Gilliland Hyperlipidemia     Hypertension     Hypotestosteronism     Dr. Nya Chambers, urology    IGT (impaired glucose tolerance)     Pituitary abnormality ([facility]     growth    Sleep apnea     cpap     Past Surgical History:   Procedure Laterality Date    CARDIAC SURGERY  2009    stents    COLONOSCOPY  2001    COLONOSCOPY  11    diverticulosis    CORONARY ANGIOPLASTY WITH STENT PLACEMENT  2003    EYE SURGERY      cataracts    KNEE ARTHROSCOPY Right 2015    TOTAL KNEE ARTHROPLASTY Right 2020    RIGHT TOTAL KNEE REPLACEMENT      LUTHER & NEPHEW performed by Osman Pond MD at [facility] History   Problem Relation Age of Onset    Heart Disease Brother     High Blood Pressure Brother     Dementia Mother         alzheimers     Social History     Tobacco Use   Smoking Status Former Smoker    Packs/day: 1.00    Years: 15.00    Pack years: 15.00    Types: Cigarettes    Last attempt to quit: 1982    Years since quittin.9   Smokeless Tobacco Never Used      Social History     Substance and Sexual Activity   Alcohol Use No       HPI      This is a 67 yrs old male who is here for management of hyperprolactinemia, pituitary adenoma. PCP Mayelin Nair MD  Urologist : Dr. Nya Chambers.        Due to the COVID-19 restrictions on close contact interactions the patient's visit was conducted via phone in lieu of a face to face visit. Location for patient : home  Physician : home     Pursuant to the emergency declaration under the [facility] [address] and the [employer] Act, this Virtual  Visit was conducted, with patient's consent, to reduce the patient's risk of exposure to COVID-19 and provide necessary care. Patient has a PMH of type 2 DM, HTN, Hyperlipidemia, Sleep apnea, CAD, hypogonadism,  cellulitis and sepsis in 12/17 . He was admitted to [facility] with fevers, chills and was found to have bacteremia. Was treated with antibiotics. Managed by Dr. Angelica Giordano. MRI of the head was done which showed 1.4 x 1.3 cm pituitary lesion. Denies any headaches, visual problems. MRI in 01.18 showed slight decrease in size. Labs showed high prolactin . On cabergoline since 02/17  Current 0.25 mg once a week . Tolerating well. Has h/o hypogonadism diagnosed 8-10 yrs back. Was  testosterone injections as per urology. He stopped it last year     No significant weight changes. He is retired. Worked as a pharmacist.  Lives with his wife. Review of Systems   Constitutional: Positive for malaise/fatigue. Negative for chills, diaphoresis, fever and weight loss. Eyes: Negative for blurred vision, double vision and photophobia. Respiratory: Negative for cough and hemoptysis. Cardiovascular: Negative for chest pain, palpitations and orthopnea. Genitourinary: Negative for dysuria, flank pain, frequency, hematuria and urgency. Musculoskeletal: Positive for myalgias. Negative for back pain, falls, joint pain and neck pain. Skin: Negative for itching and rash. Neurological: Positive for headaches. Negative for dizziness, tingling, tremors, sensory change, speech change, focal weakness, seizures and loss of consciousness.    Endo/Heme/Allergies: of the orbits demonstrate no acute abnormality.       SINUSES: Mild circumferential mucosal thickening of the maxillary, sphenoid,   and ethmoid sinuses.  No fluid is seen in the mastoid air cells.       BONES/SOFT TISSUES: The bone marrow signal intensity appears normal. The   craniocervical junction is normal in appearance.           Impression   1.  No acute intracranial abnormality.       2.  Mild generalized volume loss and mild chronic microvascular ischemic   disease.       3.  Soft tissue lesion in the left aspect of the sella may reflect a   pituitary adenoma.           EXAMINATION:   MRI OF THE BRAIN WITHOUT AND WITH CONTRAST 10/4/2018 10:45 am       TECHNIQUE:   Multiplanar multisequence MRI of the head/brain was performed without and   with the administration of intravenous contrast.       COMPARISON:   MRI brain without contrast 12/04/2016.       HISTORY:   ORDERING SYSTEM PROVIDED HISTORY: Hyperprolactinemia ([facility]   TECHNOLOGIST PROVIDED HISTORY:   Reason for exam:->H/o  pituitary adenoma , high prolactin. On treatment. Compare with last MRI in 12/16       FINDINGS:   INTRACRANIAL STRUCTURES/VENTRICLES:  Relatively hypoenhancing lesion in left   sella measuring 8 x 10 x 14 mm (craniocaudal by transverse by AP dimensions)   with suspected left cavernous sinus extension and partial encasement of left   internal carotid artery.  There is minimal rightward deviation of the   pituitary infundibulum, which appears normal in caliber.  The optic chiasm is   normally positioned.       Please note that the previous exam from 2016 was not performed as a pituitary   imaging protocol.  The lesion appears mildly decreased in size from December 2016 given differences in slice selection.  The lesion currently measures   approximately 14 x 10 mm on axial T2 weighted imaging, previously 14 x 13 mm.       There is no acute infarct.  The ventricles and cisterns are normal in size and   configuration.  Normal intracranial arterial flow voids are preserved. Stable minimal cerebral white matter disease. Jayant Fail is otherwise no abnormal   intracranial enhancement.       ORBITS: The visualized portion of the orbits demonstrate no acute abnormality.       SINUSES: The visualized paranasal sinuses and mastoid air cells are well   aerated.       BONES/SOFT TISSUES: The bone marrow signal intensity appears normal. The   craniocervical junction is normal in appearance.           Impression   Pituitary adenoma appears mildly decreased in size from 2016.                Assessment/Plan        1. Pituitary mass/prolactinoma    This 67 yrs old male was found to have a 1.4  X 1.3 cm left sellar lesion consistent with pituitary adenoma. He denies headache or visual symptoms. Lab work showed a high prolactin of 470 and 562 consistent with prolactinoma. On cabergoline since 02/17  Current dose 0.5 mg three a week since 08/17      Rest of pituitary labs normal in 03/17. visual field exam normal in 02/17, 03/19    Repeat MRI in 10/18 showed the tumor to be slightly decreased in size. Prolactin 562---->154---> 63.8---> 47.6---> 20.1--->  11.6--> 13.8 ---> 12.1---> 19.6     Continue  cabergoline 0.25 mg once a week. Will repeat MRI in 2021. 2. Hypogonadism. Off testosterone     Testosterone low. Testosterone 19---> 31---> 37---> 55---> 66---> 95      3. DM/HTN/HLP/Obesity/Anemia As per PCP        Please schedule a follow-up in 12 months. Approximately 5  minutes spent with patient on phone.

## 2020-12-21 RX ORDER — PENICILLIN V POTASSIUM 500 MG/1
TABLET ORAL
Qty: 120 TABLET | Refills: 1 | Status: SHIPPED | OUTPATIENT
Start: 2020-12-21 | End: 2021-03-08

## 2021-01-26 ENCOUNTER — NURSE ONLY (OUTPATIENT)
Dept: FAMILY MEDICINE CLINIC | Age: 76
End: 2021-01-26
Payer: MEDICARE

## 2021-01-26 DIAGNOSIS — E11.9 TYPE 2 DIABETES MELLITUS WITHOUT COMPLICATION, WITHOUT LONG-TERM CURRENT USE OF INSULIN (HCC): Primary | ICD-10-CM

## 2021-01-26 LAB — HBA1C MFR BLD: 6 %

## 2021-01-26 PROCEDURE — 83036 HEMOGLOBIN GLYCOSYLATED A1C: CPT | Performed by: FAMILY MEDICINE

## 2021-01-28 ENCOUNTER — IMMUNIZATION (OUTPATIENT)
Dept: PRIMARY CARE CLINIC | Age: 76
End: 2021-01-28
Payer: MEDICARE

## 2021-01-28 PROCEDURE — 0011A COVID-19, MODERNA VACCINE 100MCG/0.5ML DOSE: CPT | Performed by: FAMILY MEDICINE

## 2021-01-28 PROCEDURE — 91301 COVID-19, MODERNA VACCINE 100MCG/0.5ML DOSE: CPT | Performed by: FAMILY MEDICINE

## 2021-02-01 ENCOUNTER — OFFICE VISIT (OUTPATIENT)
Dept: FAMILY MEDICINE CLINIC | Age: 76
End: 2021-02-01
Payer: MEDICARE

## 2021-02-01 VITALS
TEMPERATURE: 97.5 F | WEIGHT: 262 LBS | BODY MASS INDEX: 37.59 KG/M2 | SYSTOLIC BLOOD PRESSURE: 132 MMHG | HEART RATE: 57 BPM | DIASTOLIC BLOOD PRESSURE: 68 MMHG | OXYGEN SATURATION: 99 %

## 2021-02-01 DIAGNOSIS — I50.42 CHRONIC COMBINED SYSTOLIC AND DIASTOLIC HEART FAILURE (HCC): ICD-10-CM

## 2021-02-01 DIAGNOSIS — D35.2 PITUITARY MACROADENOMA (HCC): ICD-10-CM

## 2021-02-01 DIAGNOSIS — E11.8 TYPE 2 DIABETES MELLITUS WITH COMPLICATION, WITH LONG-TERM CURRENT USE OF INSULIN (HCC): ICD-10-CM

## 2021-02-01 DIAGNOSIS — Z79.4 TYPE 2 DIABETES MELLITUS WITH COMPLICATION, WITH LONG-TERM CURRENT USE OF INSULIN (HCC): ICD-10-CM

## 2021-02-01 DIAGNOSIS — E22.1 HYPERPROLACTINEMIA (HCC): ICD-10-CM

## 2021-02-01 DIAGNOSIS — E66.01 MORBID OBESITY WITH BMI OF 40.0-44.9, ADULT (HCC): ICD-10-CM

## 2021-02-01 PROCEDURE — 99214 OFFICE O/P EST MOD 30 MIN: CPT | Performed by: FAMILY MEDICINE

## 2021-02-01 RX ORDER — CYCLOBENZAPRINE HCL 10 MG
10 TABLET ORAL 3 TIMES DAILY PRN
Qty: 90 TABLET | Refills: 0 | Status: SHIPPED | OUTPATIENT
Start: 2021-02-01 | End: 2022-03-17

## 2021-02-01 RX ORDER — HYDRALAZINE HYDROCHLORIDE 25 MG/1
TABLET, FILM COATED ORAL
Qty: 120 TABLET | Refills: 2 | Status: SHIPPED | OUTPATIENT
Start: 2021-02-01 | End: 2021-05-17

## 2021-02-01 ASSESSMENT — PATIENT HEALTH QUESTIONNAIRE - PHQ9
SUM OF ALL RESPONSES TO PHQ QUESTIONS 1-9: 0
1. LITTLE INTEREST OR PLEASURE IN DOING THINGS: 0
2. FEELING DOWN, DEPRESSED OR HOPELESS: 0

## 2021-02-14 NOTE — PROGRESS NOTES
Systems    Physical Exam  Vitals signs and nursing note reviewed. Constitutional:       Appearance: Normal appearance. He is well-developed. HENT:      Head: Normocephalic and atraumatic. Right Ear: External ear normal.      Left Ear: External ear normal.      Nose: Nose normal.   Eyes:      Conjunctiva/sclera: Conjunctivae normal.      Pupils: Pupils are equal, round, and reactive to light. Neck:      Musculoskeletal: Normal range of motion and neck supple. Cardiovascular:      Rate and Rhythm: Normal rate and regular rhythm. Heart sounds: Normal heart sounds. No murmur. No friction rub. No gallop. Pulmonary:      Effort: Pulmonary effort is normal. No respiratory distress. Breath sounds: Normal breath sounds. No wheezing. Abdominal:      General: Bowel sounds are normal. There is no distension. Palpations: Abdomen is soft. Tenderness: There is no abdominal tenderness. Musculoskeletal: Normal range of motion. General: No tenderness. Skin:     General: Skin is warm and dry. Neurological:      Mental Status: He is alert and oriented to person, place, and time. Deep Tendon Reflexes: Reflexes are normal and symmetric. Psychiatric:         Behavior: Behavior normal.         Thought Content: Thought content normal.         Judgment: Judgment normal.           On this date 02/14/21 I have spent 30 minutes reviewing previous notes, test results and face to face with the patient discussing the diagnosis and importance of compliance with the treatment plan as well as documenting on the day of the visit. An electronic signature was used to authenticate this note.     --Kalpana Gutierrez MD

## 2021-02-24 ENCOUNTER — VIRTUAL VISIT (OUTPATIENT)
Dept: FAMILY MEDICINE CLINIC | Age: 76
End: 2021-02-24
Payer: MEDICARE

## 2021-02-24 VITALS
SYSTOLIC BLOOD PRESSURE: 130 MMHG | DIASTOLIC BLOOD PRESSURE: 60 MMHG | BODY MASS INDEX: 37.51 KG/M2 | WEIGHT: 262 LBS | TEMPERATURE: 98 F | HEIGHT: 70 IN

## 2021-02-24 DIAGNOSIS — Z00.00 ROUTINE GENERAL MEDICAL EXAMINATION AT A HEALTH CARE FACILITY: Primary | ICD-10-CM

## 2021-02-24 PROCEDURE — G0438 PPPS, INITIAL VISIT: HCPCS | Performed by: FAMILY MEDICINE

## 2021-02-24 ASSESSMENT — LIFESTYLE VARIABLES: HOW OFTEN DO YOU HAVE A DRINK CONTAINING ALCOHOL: 0

## 2021-02-24 ASSESSMENT — PATIENT HEALTH QUESTIONNAIRE - PHQ9
2. FEELING DOWN, DEPRESSED OR HOPELESS: 0
SUM OF ALL RESPONSES TO PHQ9 QUESTIONS 1 & 2: 0
SUM OF ALL RESPONSES TO PHQ QUESTIONS 1-9: 0
SUM OF ALL RESPONSES TO PHQ QUESTIONS 1-9: 0
1. LITTLE INTEREST OR PLEASURE IN DOING THINGS: 0

## 2021-02-24 NOTE — PATIENT INSTRUCTIONS
Personalized Preventive Plan for Doc Carlton - 2/24/2021  Medicare offers a range of preventive health benefits. Some of the tests and screenings are paid in full while other may be subject to a deductible, co-insurance, and/or copay. Some of these benefits include a comprehensive review of your medical history including lifestyle, illnesses that may run in your family, and various assessments and screenings as appropriate. After reviewing your medical record and screening and assessments performed today your provider may have ordered immunizations, labs, imaging, and/or referrals for you. A list of these orders (if applicable) as well as your Preventive Care list are included within your After Visit Summary for your review. Other Preventive Recommendations:    · A preventive eye exam performed by an eye specialist is recommended every 1-2 years to screen for glaucoma; cataracts, macular degeneration, and other eye disorders. · A preventive dental visit is recommended every 6 months. · Try to get at least 150 minutes of exercise per week or 10,000 steps per day on a pedometer . · Order or download the FREE \"Exercise & Physical Activity: Your Everyday Guide\" from [employer]. Call 1-361.272.3154 or search [employer] online. · You need 3965-0037 mg of calcium and 8512-4949 IU of vitamin D per day. It is possible to meet your calcium requirement with diet alone, but a vitamin D supplement is usually necessary to meet this goal.  · When exposed to the sun, use a sunscreen that protects against both UVA and UVB radiation with an SPF of 30 or greater. Reapply every 2 to 3 hours or after sweating, drying off with a towel, or swimming. · Always wear a seat belt when traveling in a car. Always wear a helmet when riding a bicycle or motorcycle. Learning About Medical Power of   What is a medical power of ? A medical power of , also called a durable power of  for health care, is one type of the legal forms called advance directives. It lets you name the person you want to make treatment decisions for you if you can't speak or decide for yourself. The person you choose is called your health care agent. This person is also called a health care proxy or health care surrogate. A medical power of  may be called something else in your state. How do you choose a health care agent? Choose your health care agent carefully. This person may or may not be a family member. Talk to the person before you make your final decision. Make sure he or she is comfortable with this responsibility. It's a good idea to choose someone who:  Is at least 25years old. Knows you well and understands what makes life meaningful for you. Understands your Lutheran and moral values. Will do what you want, not what he or she wants. Will be able to make difficult choices at a stressful time. Will be able to refuse or stop treatment, if that is what you would want, even if you could die. Will be firm and confident with health professionals if needed. Will ask questions to get needed information. Lives near you or agrees to travel to you if needed. Your family may help you make medical decisions while you can still be part of that process. But it's important to choose one person to be your health care agent in case you aren't able to make decisions for yourself. If you don't fill out the legal form and name a health care agent, the decisions your family can make may be limited. A health care agent may be called something else in your state. Who will make decisions for you if you don't have a health care agent? If you don't have a health care agent or a living will, you may not get the care you want. Decisions may be made by family members who disagree about your medical care. Or decisions may be made by a medical professional who doesn't know you well. In some cases, a  makes the decisions. When you name a health care agent, it is very clear who has the power to make health decisions for you. How do you name a health care agent? You name your health care agent on a legal form. This form is usually called a medical power of . Ask your hospital, state bar association, or office on aging where to find these forms. You must sign the form to make it legal. Some states require you to get the form notarized. This means that a person called a  watches you sign the form and then he or she signs the form. Some states also require that two or more witnesses sign the form. Be sure to tell your family members and doctors who your health care agent is. Where can you learn more? Go to https://[email or web address].[employer]igo. org and sign in to your [employer] account. Enter [id number] in the [employer] box to learn more about \"Learning About [address]. \"     If you do not have an account, please click on the \"Sign Up Now\" link. Current as of: December 9, 2019               Content Version: 12.6  © 3165-9262 [employer]. Care instructions adapted under license by [address] If you have questions about a medical condition or this instruction, always ask your healthcare professional. [address] any warranty or liability for your use of this information.     ·

## 2021-02-24 NOTE — PROGRESS NOTES
Medicare Annual Wellness Visit  Name: Chandrika Canales Date: 2021   MRN: [id number] Sex: Male   Age: 67 y.o. Ethnicity: Non-/Non    : 1945 Race: Wilfredo Edge is here for Medicare AWV    Screenings for behavioral, psychosocial and functional/safety risks, and cognitive dysfunction are all negative except as indicated below. These results, as well as other patient data from the [address] form, are documented in Flowsheets linked to this Encounter. Allergies   Allergen Reactions    Bactrim [Sulfamethoxazole-Trimethoprim]      KANE risk       Prior to Visit Medications    Medication Sig Taking? Authorizing Provider   hydrALAZINE (APRESOLINE) 25 MG tablet One tab in am, one tab around noon, two tabs in evening  Juan Mejia MD   cyclobenzaprine (FLEXERIL) 10 MG tablet Take 1 tablet by mouth 3 times daily as needed for Muscle spasms  Juan Mejia MD   penicillin v potassium (VEETID) 500 MG tablet TAKE TWO TABLETS BY MOUTH TWICE A Polinakat Beltre MD   cabergoline (DOSTINEX) 0.5 MG tablet TAKE half tablet once a week.  Mamie Bautista MD   carvedilol (COREG) 25 MG tablet TAKE ONE TABLET BY MOUTH TWICE A DAY  Juan Mejia MD   atorvastatin (LIPITOR) 80 MG tablet TAKE ONE TABLET BY MOUTH DAILY  Juan Mejia MD   metFORMIN (GLUCOPHAGE-XR) 750 MG extended release tablet Take 1 tablet by mouth 2 times daily  Juan Mejia MD   blood glucose test strips (ONETOUCH VERIO) strip USE ONE STRIP TO TEST BLOOD SUGAR ONCE DAILY  Juan Mejia MD   allopurinol (ZYLOPRIM) 300 MG tablet TAKE ONE TABLET BY MOUTH DAILY  Juan Mejia MD   ammonium lactate (LAC-HYDRIN) 12 % lotion APPLY TOPICALLY DAILY  Juan Mejia MD   lisinopril (PRINIVIL;ZESTRIL) 40 MG tablet Take 0.5 tablets by mouth 2 times daily  Juan Mejia MD   ibuprofen (ADVIL) 200 MG tablet Take 2 tablets by mouth every 8 hours as needed for Pain  PATTI [place]   acetaminophen (TYLENOL) 325 MG tablet Take 2 tablets by mouth every 6 hours as needed for Pain  PATTI [place]   hydrochlorothiazide (HYDRODIURIL) 25 MG tablet Take 25 mg by mouth daily  Historical Provider, MD   ferrous sulfate 325 (65 Fe) MG tablet Take 1 tablet by mouth daily (with breakfast)  Dara Brady MD   Probiotic Product (PROBIOTIC DAILY PO) Take by mouth  Historical Provider, MD   Accu-Chek Multiclix Lancets MISC Test once daily  DX  250.00  Cris Ramires MD   Cholecalciferol (VITAMIN D) 2000 UNITS CAPS capsule Take 1 capsule by mouth daily  Historical Provider, MD   aspirin 81 MG chewable tablet Take 81 mg by mouth daily.     Historical Provider, MD       Past Medical History:   Diagnosis Date    CAD (coronary artery disease)     Cataract     Diabetes mellitus ([facility]     DJD (degenerative joint disease)     History of PTCA     Dr. Crandall Comes Hyperlipidemia     Hypertension     Hypotestosteronism     Dr. Rahul Catherine, urology    IGT (impaired glucose tolerance)     Pituitary abnormality ([facility]     growth    Sleep apnea     cpap       Past Surgical History:   Procedure Laterality Date    CARDIAC SURGERY  2009    stents    COLONOSCOPY  2001    COLONOSCOPY  12/9/11    diverticulosis    CORONARY ANGIOPLASTY WITH STENT PLACEMENT  2003    EYE SURGERY      cataracts    KNEE ARTHROSCOPY Right 2015    TOTAL KNEE ARTHROPLASTY Right 1/23/2020    RIGHT TOTAL KNEE REPLACEMENT      [facility] performed by Jennie Morley MD at [address] [place] [address] History   Problem Relation Age of Onset    Heart Disease Brother     High Blood Pressure Brother     Dementia Mother         alzheimers       CareTeam (Including outside providers/suppliers regularly involved in providing care):   Patient Care Team:  Cris Ramires MD as PCP - Alden Schwab, MD as PCP - [address] [place] [address] [facility]  Merry Gibbs MD (Endocrinology)  Janine Gallardo RD, LD as Dietitian (Dietitian)    Wt Readings from Last 3 Encounters: 02/24/21 262 lb (118.8 kg)   02/01/21 262 lb (118.8 kg)   07/27/20 261 lb (118.4 kg)     Vitals:    02/24/21 0930   BP: 130/60   Temp: 98 °F (36.7 °C)   Weight: 262 lb (118.8 kg)   Height: 5' 10\" (1.778 m)     Body mass index is 37.59 kg/m². Patient reported vitals    Based upon direct observation of the patient, evaluation of cognition reveals recent and remote memory intact. Patient's complete Health Risk Assessment and screening values have been reviewed and are found in Flowsheets. The following problems were reviewed today and where indicated follow up appointments were made and/or referrals ordered. Positive Risk Factor Screenings with Interventions:          General Health and ACP:  General  In general, how would you say your health is?: Good  In the past 7 days, have you experienced any of the following?  New or Increased Pain, New or Increased Fatigue, Loneliness, Social Isolation, Stress or Anger?: None of These  Do you get the social and emotional support that you need?: Yes  Do you have a Living Will?: Yes  Advance Directives     Power of [address] [place] [address] Will ACP-Advance Directive ACP-Power of     Not on File Not on File Not on File Not on File      General Health Risk Interventions:  · No Living Will: ACP documents already completed- patient asked to provide copy to the office    Health Habits/Nutrition:  Health Habits/Nutrition  Do you exercise for at least 20 minutes 2-3 times per week?: (!) No  Have you lost any weight without trying in the past 3 months?: No  Do you eat only one meal per day?: No  Have you seen the dentist within the past year?: Yes  Body mass index: (!) 37.59  Health Habits/Nutrition Interventions:  · Inadequate physical activity:  educational materials provided to promote increased physical activity       Personalized Preventive Plan   Current Health Maintenance Status  Immunization History   Administered Date(s) Administered    COVID-19, Moderna, 100mcg/0.5ml 01/28/2021    Influenza Vaccine, unspecified formulation 10/28/2014, 11/17/2016    Influenza Virus Vaccine 11/14/2010, 11/14/2011, 11/14/2012    Influenza, High Dose (Fluzone 65 yrs and older) 10/14/2015, 11/04/2016, 10/02/2017, 10/17/2018    Influenza, High-dose, Thomas Fleeting, 65 yrs +, IM (Fluzone) 10/13/2020    Influenza, Triv, inactivated, subunit, adjuvanted, IM (Fluad 65 yrs and older) 10/08/2019    Pneumococcal Conjugate 13-valent ([id number]) 06/27/2016    Pneumococcal Polysaccharide ([id number]) 07/21/2011    Td, unspecified formulation 07/29/2003    Tdap (Boostrix, Adacel) 06/27/2016    Zoster Live (Zostavax) 07/29/2009        Health Maintenance   Topic Date Due    Hepatitis C screen  1945    Shingles Vaccine (2 of 3) 09/23/2009    Annual Wellness Visit (AWV)  05/29/2019    COVID-19 Vaccine (2 of 2 - Moderna series) 02/25/2021    Lipid screen  06/17/2021    Potassium monitoring  09/17/2021    Creatinine monitoring  09/17/2021    DTaP/Tdap/Td vaccine (2 - Td) 06/27/2026    Flu vaccine  Completed    Pneumococcal 65+ years Vaccine  Completed    Hepatitis A vaccine  Aged Out    Hib vaccine  Aged Out    Meningococcal (ACWY) vaccine  Aged Out     Recommendations for A-TEX Due: see orders and patient instructions/AVS.  . Recommended screening schedule for the next 5-10 years is provided to the patient in written form: see Patient Instructions/AVS.    Brendon FOX LPN, 2/38/7716, performed the documented evaluation under the direct supervision of the attending physician. Juan Baird is a 67 y.o. male being evaluated by a Virtual Visit (phone visit) encounter to address concerns as mentioned above. A caregiver was present when appropriate. Due to this being a TeleHealth encounter (During [place] [id number] public [facility] emergency), evaluation of the following organ systems was limited: Vitals/Constitutional/EENT/Resp/CV/GI//MS/Neuro/Skin/Heme-Lymph-Imm.   Pursuant

## 2021-02-25 ENCOUNTER — IMMUNIZATION (OUTPATIENT)
Dept: PRIMARY CARE CLINIC | Age: 76
End: 2021-02-25
Payer: MEDICARE

## 2021-02-25 PROCEDURE — 91301 COVID-19, MODERNA VACCINE 100MCG/0.5ML DOSE: CPT | Performed by: FAMILY MEDICINE

## 2021-02-25 PROCEDURE — 0012A COVID-19, MODERNA VACCINE 100MCG/0.5ML DOSE: CPT | Performed by: FAMILY MEDICINE

## 2021-03-31 DIAGNOSIS — E11.9 TYPE 2 DIABETES MELLITUS WITHOUT COMPLICATION, WITHOUT LONG-TERM CURRENT USE OF INSULIN (HCC): ICD-10-CM

## 2021-03-31 RX ORDER — BLOOD SUGAR DIAGNOSTIC
STRIP MISCELLANEOUS
Qty: 50 STRIP | Refills: 3 | Status: SHIPPED | OUTPATIENT
Start: 2021-03-31 | End: 2021-09-15

## 2021-04-06 ENCOUNTER — OFFICE VISIT (OUTPATIENT)
Dept: INFECTIOUS DISEASES | Age: 76
End: 2021-04-06
Payer: MEDICARE

## 2021-04-06 VITALS
HEIGHT: 70 IN | BODY MASS INDEX: 37.37 KG/M2 | DIASTOLIC BLOOD PRESSURE: 60 MMHG | TEMPERATURE: 98.2 F | WEIGHT: 261 LBS | SYSTOLIC BLOOD PRESSURE: 132 MMHG

## 2021-04-06 DIAGNOSIS — L03.90 CELLULITIS, UNSPECIFIED CELLULITIS SITE: Primary | ICD-10-CM

## 2021-04-06 PROCEDURE — 99213 OFFICE O/P EST LOW 20 MIN: CPT | Performed by: INTERNAL MEDICINE

## 2021-04-06 NOTE — PROGRESS NOTES
Department of Internal Medicine  Infectious Diseases      Patient Name: Glynn Rasheed      Date of visit: 4/6/2021  SUBJECTIVE:  Donya Honeycutt  is a 67 y.o. male who returns today for follow-up of recurrent LE cellulitis   He was last seen in this office for the same in 1/2020    Since last visit, he had R knee arthroplasty 1/2020 [facility] [place]  Overall good result, pain gone      Last episode cellulitis maybe 15 mos ago       Pertinent history,   He has history of venous stasis and recurrent cellulitis  Admission in 12/2016 and again in 12/2017 with cellulitis (LLE 2016, RLE 2017), both associated with secondary bacteremia with strep species  He is taking daily pcn for ppx of cellulitis (1g po BID) since 12/2017  He was admitted 9/2018 with suspected LE cellulitis, milder case than previous episodes  Another admission 10/2019 with mild cellulitis RLE     He is still taking pcn 1g po BID  Diarrhea unless he takes a probiotic   It is well tolerated    He has had intentional weight loss.   He faithfully uses compression stockings        REVIEW OF SYSTEMS:    CONSTITUTIONAL:  negative for fevers, chills, diaphoresis, activity change, appetite change, fatigue, night sweats   EYES:  negative for blurred vision, eye discharge, visual disturbance and icterus  HEENT:  negative for hearing loss, tinnitus, ear drainage, sinus pressure, nasal congestion, epistaxis and snoring  RESPIRATORY:  No cough or hemoptysis   CARDIOVASCULAR:  negative for chest pain, palpitations, exertional chest pressure/discomfort, syncope  GASTROINTESTINAL:  negative for nausea, vomiting, diarrhea, constipation, blood in stool and abdominal pain  GENITOURINARY:  negative for frequency, dysuria, urinary incontinence, decreased urine volume, and hematuria  ALLERGIC/IMMUNOLOGIC:  no drug reactions  ENDOCRINE:  Intentional weight loss   MUSCULOSKELETAL:  Per HPI   NEUROLOGICAL:  negative for headaches, slurred speech, unilateral weakness  PSYCHIATRIC/BEHAVIORAL: negative for hallucinations, behavioral problems, confusion and agitation. Medications:    Current Outpatient Medications   Medication Sig Dispense Refill    ONETOUCH VERIO strip USE ONE STRIP TO TEST BLOOD SUGAR ONCE DAILY 50 strip 3    penicillin v potassium (VEETID) 500 MG tablet TAKE TWO TABLETS BY MOUTH TWICE A  tablet 3    hydrALAZINE (APRESOLINE) 25 MG tablet One tab in am, one tab around noon, two tabs in evening 120 tablet 2    cyclobenzaprine (FLEXERIL) 10 MG tablet Take 1 tablet by mouth 3 times daily as needed for Muscle spasms 90 tablet 0    cabergoline (DOSTINEX) 0.5 MG tablet TAKE half tablet once a week. MONDAY 8 tablet 1    carvedilol (COREG) 25 MG tablet TAKE ONE TABLET BY MOUTH TWICE A  tablet 1    atorvastatin (LIPITOR) 80 MG tablet TAKE ONE TABLET BY MOUTH DAILY 90 tablet 3    metFORMIN (GLUCOPHAGE-XR) 750 MG extended release tablet Take 1 tablet by mouth 2 times daily 180 tablet 2    allopurinol (ZYLOPRIM) 300 MG tablet TAKE ONE TABLET BY MOUTH DAILY 90 tablet 3    ammonium lactate (LAC-HYDRIN) 12 % lotion APPLY TOPICALLY DAILY 500 g 5    lisinopril (PRINIVIL;ZESTRIL) 40 MG tablet Take 0.5 tablets by mouth 2 times daily 60 tablet 11    ibuprofen (ADVIL) 200 MG tablet Take 2 tablets by mouth every 8 hours as needed for Pain 120 tablet 0    acetaminophen (TYLENOL) 325 MG tablet Take 2 tablets by mouth every 6 hours as needed for Pain 120 tablet 0    hydrochlorothiazide (HYDRODIURIL) 25 MG tablet Take 25 mg by mouth daily      ferrous sulfate 325 (65 Fe) MG tablet Take 1 tablet by mouth daily (with breakfast) 30 tablet 3    Probiotic Product (PROBIOTIC DAILY PO) Take by mouth      Accu-Chek Multiclix Lancets MISC Test once daily  DX  250.00 100 each 3    Cholecalciferol (VITAMIN D) 2000 UNITS CAPS capsule Take 1 capsule by mouth daily      aspirin 81 MG chewable tablet Take 81 mg by mouth daily.          No current facility-administered medications for this visit. Physical:  VITALS:  /60   Temp 98.2 °F (36.8 °C) (Infrared)   Ht 5' 10\" (1.778 m)   Wt 261 lb (118.4 kg)   BMI 37.45 kg/m²     He is well appearing, fully oriented, NAD  Abd soft, NT, +BS   Skin warm, dry, no rash  Faint changes of stasis dermatitis lower right leg  Trace LE edema jose luis   Onychomycosis       Assessment / Plan:      Recurrent streptococcal cellulitis with secondary bacteremia, in the context of obesity, venous stasis dermatitis, onychomycosis   He has been taking ppx pcn for about 2 years now  The frequency and severity of these episodes have been positively impacted with the chronic pcn which he tolerates well   -we discussed continued ppx vs discontinuation and watchful waiting   -continue pcn as ordered     S/p R TKA without complication      DM   Well controlled     His risk of late infection of the R TKA relative to general population is probably higher given his history of recurrent SSTI that has been associated with bacteremia    Since starting ppx pcn, he has not had demonstrated bacteremia with these episodes and the local inflammatory changes of legs have been milder. Therefore, it will appropriate to continue long term pcn ppx both now and post-TKA.    Use compression to help with chronic LE edema      COVID Moderna x2 fully vaccinated         RTC 12 months  Call with any concerns        Corona Guzman MD

## 2021-04-19 DIAGNOSIS — I10 ESSENTIAL HYPERTENSION, BENIGN: ICD-10-CM

## 2021-04-19 RX ORDER — CARVEDILOL 25 MG/1
TABLET ORAL
Qty: 180 TABLET | Refills: 0 | Status: SHIPPED | OUTPATIENT
Start: 2021-04-19 | End: 2021-07-28

## 2021-05-17 RX ORDER — HYDRALAZINE HYDROCHLORIDE 25 MG/1
TABLET, FILM COATED ORAL
Qty: 120 TABLET | Refills: 1 | Status: SHIPPED | OUTPATIENT
Start: 2021-05-17 | End: 2021-07-19

## 2021-05-21 DIAGNOSIS — I10 ESSENTIAL HYPERTENSION, BENIGN: ICD-10-CM

## 2021-05-21 RX ORDER — LISINOPRIL 40 MG/1
TABLET ORAL
Qty: 90 TABLET | Refills: 10 | Status: SHIPPED | OUTPATIENT
Start: 2021-05-21 | End: 2022-08-08

## 2021-05-25 NOTE — TELEPHONE ENCOUNTER
Patsy req refill for patient Carvedilol 25mg tab      Last visit 7/27/20  Future 1/26/21  Anthony mae Helical Rim Text: The closure involved the helical rim.

## 2021-06-03 ENCOUNTER — TELEPHONE (OUTPATIENT)
Dept: FAMILY MEDICINE CLINIC | Age: 76
End: 2021-06-03

## 2021-06-03 ENCOUNTER — OFFICE VISIT (OUTPATIENT)
Dept: FAMILY MEDICINE CLINIC | Age: 76
End: 2021-06-03
Payer: MEDICARE

## 2021-06-03 VITALS
HEART RATE: 63 BPM | WEIGHT: 262 LBS | OXYGEN SATURATION: 97 % | DIASTOLIC BLOOD PRESSURE: 54 MMHG | BODY MASS INDEX: 37.59 KG/M2 | SYSTOLIC BLOOD PRESSURE: 106 MMHG

## 2021-06-03 DIAGNOSIS — E34.9 HYPOTESTOSTERONEMIA: ICD-10-CM

## 2021-06-03 DIAGNOSIS — Z79.4 TYPE 2 DIABETES MELLITUS WITH COMPLICATION, WITH LONG-TERM CURRENT USE OF INSULIN (HCC): Primary | ICD-10-CM

## 2021-06-03 DIAGNOSIS — Z12.5 SCREENING PSA (PROSTATE SPECIFIC ANTIGEN): ICD-10-CM

## 2021-06-03 DIAGNOSIS — R09.89 BRUIT OF LEFT CAROTID ARTERY: ICD-10-CM

## 2021-06-03 DIAGNOSIS — I10 ESSENTIAL HYPERTENSION, BENIGN: ICD-10-CM

## 2021-06-03 DIAGNOSIS — R09.89 LEFT CAROTID BRUIT: Primary | ICD-10-CM

## 2021-06-03 DIAGNOSIS — E11.8 TYPE 2 DIABETES MELLITUS WITH COMPLICATION, WITH LONG-TERM CURRENT USE OF INSULIN (HCC): Primary | ICD-10-CM

## 2021-06-03 LAB — HBA1C MFR BLD: 6 %

## 2021-06-03 PROCEDURE — 99214 OFFICE O/P EST MOD 30 MIN: CPT | Performed by: FAMILY MEDICINE

## 2021-06-03 PROCEDURE — 83036 HEMOGLOBIN GLYCOSYLATED A1C: CPT | Performed by: FAMILY MEDICINE

## 2021-06-03 NOTE — PROGRESS NOTES
Katty Graham (:  1945) is a 67 y.o. male,Established patient, here for evaluation of the following chief complaint(s):  Diabetes and Hypertension         ASSESSMENT/PLAN:  1. Type 2 diabetes mellitus with complication, with long-term current use of insulin (HCC)  -     POCT glycosylated hemoglobin (Hb A1C)  -     Lipid, Fasting; Future  -     COMPREHENSIVE METABOLIC PANEL; Future  Lab Results   Component Value Date    LABA1C 6.0 2021     Lab Results   Component Value Date    .0 2020     Well-controlled at this time. 2. Essential hypertension, benign  Well-controlled at this time. 3. Screening PSA (prostate specific antigen)  -     Psa screening; Future  4. Hypotestosteronemia  -     Testosterone, free, total; Future  -     CBC WITH AUTO DIFFERENTIAL; Future  5. Bruit of left carotid artery  -     US CAROTID ARTERY BILATERAL; Future      No follow-ups on file. Subjective   SUBJECTIVE/OBJECTIVE:  Katty Graham is a 67 y.o. male. Patient presents with:  Diabetes  Hypertension      78-year-old male presents for the following issues    1. Diabetes mellitus. Patient has been following a good diet. He has been taking his medications regularly. He denies any significant polyuria polyphagia or polydipsia. 2.  Hypertension. Patient's been taking his medications regularly. He notes no chest pain shortness of breath or headaches. 3.  Patient has a history of hypotestosterone anemia. Patient would like this rechecked today. He feels that he has lost energy. He has no vigor left. The patients PMH, surgical history, family history, medications, allergies were all reviewed and updated as appropriate today. Review of Systems       Objective   Physical Exam  Vitals and nursing note reviewed. Constitutional:       Appearance: Normal appearance. He is well-developed. HENT:      Head: Normocephalic and atraumatic.       Right Ear: External ear normal.      Left Ear: External ear normal.      Nose: Nose normal.   Eyes:      Conjunctiva/sclera: Conjunctivae normal.      Pupils: Pupils are equal, round, and reactive to light. Neck:      Vascular: Carotid bruit present. Cardiovascular:      Rate and Rhythm: Normal rate and regular rhythm. Heart sounds: Normal heart sounds. No murmur heard. No friction rub. No gallop. Pulmonary:      Effort: Pulmonary effort is normal. No respiratory distress. Breath sounds: Normal breath sounds. No wheezing. Abdominal:      General: Bowel sounds are normal. There is no distension. Palpations: Abdomen is soft. Tenderness: There is no abdominal tenderness. Musculoskeletal:         General: No tenderness. Normal range of motion. Cervical back: Normal range of motion and neck supple. Skin:     General: Skin is warm and dry. Neurological:      Mental Status: He is alert and oriented to person, place, and time. Deep Tendon Reflexes: Reflexes are normal and symmetric. Psychiatric:         Behavior: Behavior normal.         Thought Content: Thought content normal.         Judgment: Judgment normal.           An electronic signature was used to authenticate this note.     --Gus Sin MD

## 2021-06-03 NOTE — TELEPHONE ENCOUNTER
[facility] Needs New Corrected Order for imaging today 6/3/21. Needs to be:  VL Duplex Carotid Artery. -US Carotid Artery Bilateral can not be used.

## 2021-06-04 DIAGNOSIS — Z12.5 SCREENING PSA (PROSTATE SPECIFIC ANTIGEN): ICD-10-CM

## 2021-06-04 DIAGNOSIS — Z79.4 TYPE 2 DIABETES MELLITUS WITH COMPLICATION, WITH LONG-TERM CURRENT USE OF INSULIN (HCC): ICD-10-CM

## 2021-06-04 DIAGNOSIS — E34.9 HYPOTESTOSTERONEMIA: ICD-10-CM

## 2021-06-04 DIAGNOSIS — E11.8 TYPE 2 DIABETES MELLITUS WITH COMPLICATION, WITH LONG-TERM CURRENT USE OF INSULIN (HCC): ICD-10-CM

## 2021-06-04 LAB
A/G RATIO: 2.4 (ref 1.1–2.2)
ALBUMIN SERPL-MCNC: 4.3 G/DL (ref 3.4–5)
ALP BLD-CCNC: 81 U/L (ref 40–129)
ALT SERPL-CCNC: 13 U/L (ref 10–40)
ANION GAP SERPL CALCULATED.3IONS-SCNC: 10 MMOL/L (ref 3–16)
AST SERPL-CCNC: 14 U/L (ref 15–37)
BASOPHILS ABSOLUTE: 0 K/UL (ref 0–0.2)
BASOPHILS RELATIVE PERCENT: 0.4 %
BILIRUB SERPL-MCNC: <0.2 MG/DL (ref 0–1)
BUN BLDV-MCNC: 32 MG/DL (ref 7–20)
CALCIUM SERPL-MCNC: 9.3 MG/DL (ref 8.3–10.6)
CHLORIDE BLD-SCNC: 102 MMOL/L (ref 99–110)
CHOLESTEROL, FASTING: 153 MG/DL (ref 0–199)
CO2: 26 MMOL/L (ref 21–32)
CREAT SERPL-MCNC: 1.3 MG/DL (ref 0.8–1.3)
EOSINOPHILS ABSOLUTE: 0.1 K/UL (ref 0–0.6)
EOSINOPHILS RELATIVE PERCENT: 2.1 %
GFR AFRICAN AMERICAN: >60
GFR NON-AFRICAN AMERICAN: 54
GLOBULIN: 1.8 G/DL
GLUCOSE BLD-MCNC: 135 MG/DL (ref 70–99)
HCT VFR BLD CALC: 35.1 % (ref 40.5–52.5)
HDLC SERPL-MCNC: 38 MG/DL (ref 40–60)
HEMOGLOBIN: 11.5 G/DL (ref 13.5–17.5)
LDL CHOLESTEROL CALCULATED: 74 MG/DL
LYMPHOCYTES ABSOLUTE: 1.4 K/UL (ref 1–5.1)
LYMPHOCYTES RELATIVE PERCENT: 24 %
MCH RBC QN AUTO: 29.4 PG (ref 26–34)
MCHC RBC AUTO-ENTMCNC: 32.7 G/DL (ref 31–36)
MCV RBC AUTO: 90 FL (ref 80–100)
MONOCYTES ABSOLUTE: 0.4 K/UL (ref 0–1.3)
MONOCYTES RELATIVE PERCENT: 7.1 %
NEUTROPHILS ABSOLUTE: 4 K/UL (ref 1.7–7.7)
NEUTROPHILS RELATIVE PERCENT: 66.4 %
PDW BLD-RTO: 15.5 % (ref 12.4–15.4)
PLATELET # BLD: 125 K/UL (ref 135–450)
PMV BLD AUTO: 9.1 FL (ref 5–10.5)
POTASSIUM SERPL-SCNC: 4.1 MMOL/L (ref 3.5–5.1)
PROSTATE SPECIFIC ANTIGEN: 1.48 NG/ML (ref 0–4)
RBC # BLD: 3.9 M/UL (ref 4.2–5.9)
SODIUM BLD-SCNC: 138 MMOL/L (ref 136–145)
TOTAL PROTEIN: 6.1 G/DL (ref 6.4–8.2)
TRIGLYCERIDE, FASTING: 203 MG/DL (ref 0–150)
VLDLC SERPL CALC-MCNC: 41 MG/DL
WBC # BLD: 6 K/UL (ref 4–11)

## 2021-06-07 ENCOUNTER — HOSPITAL ENCOUNTER (OUTPATIENT)
Dept: VASCULAR LAB | Age: 76
Discharge: HOME | End: 2021-06-07
Payer: MEDICARE

## 2021-06-07 DIAGNOSIS — R09.89 LEFT CAROTID BRUIT: ICD-10-CM

## 2021-06-07 PROCEDURE — 93880 EXTRACRANIAL BILAT STUDY: CPT

## 2021-06-08 LAB
SEX HORMONE BINDING GLOBULIN: 32 NMOL/L (ref 11–80)
TESTOSTERONE FREE-NONMALE: 23.6 PG/ML (ref 47–244)
TESTOSTERONE TOTAL: 125 NG/DL (ref 220–1000)

## 2021-06-20 DIAGNOSIS — M10.9 GOUT, UNSPECIFIED CAUSE, UNSPECIFIED CHRONICITY, UNSPECIFIED SITE: ICD-10-CM

## 2021-06-21 RX ORDER — ALLOPURINOL 300 MG/1
TABLET ORAL
Qty: 90 TABLET | Refills: 2 | Status: SHIPPED | OUTPATIENT
Start: 2021-06-21 | End: 2022-03-09

## 2021-07-28 DIAGNOSIS — I10 ESSENTIAL HYPERTENSION, BENIGN: ICD-10-CM

## 2021-07-28 DIAGNOSIS — E11.9 TYPE 2 DIABETES MELLITUS WITHOUT COMPLICATION, WITHOUT LONG-TERM CURRENT USE OF INSULIN (HCC): ICD-10-CM

## 2021-07-28 RX ORDER — CARVEDILOL 25 MG/1
TABLET ORAL
Qty: 180 TABLET | Refills: 0 | Status: SHIPPED | OUTPATIENT
Start: 2021-07-28 | End: 2021-10-11

## 2021-07-28 RX ORDER — METFORMIN HYDROCHLORIDE 750 MG/1
TABLET, EXTENDED RELEASE ORAL
Qty: 180 TABLET | Refills: 1 | Status: SHIPPED | OUTPATIENT
Start: 2021-07-28 | End: 2022-01-10

## 2021-07-28 NOTE — TELEPHONE ENCOUNTER
Last Office Visit  -  6/3/21  Next Office Visit  -  9/9/21    Last Filled  -    Last UDS -    Contract -

## 2021-07-29 RX ORDER — PENICILLIN V POTASSIUM 500 MG/1
TABLET ORAL
Qty: 120 TABLET | Refills: 2 | Status: SHIPPED | OUTPATIENT
Start: 2021-07-29 | End: 2021-12-20

## 2021-07-29 RX ORDER — PENICILLIN V POTASSIUM 500 MG/1
TABLET ORAL
Qty: 120 TABLET | Refills: 2 | OUTPATIENT
Start: 2021-07-29

## 2021-08-30 ENCOUNTER — OFFICE VISIT (OUTPATIENT)
Dept: CARDIOLOGY CLINIC | Age: 76
End: 2021-08-30
Payer: MEDICARE

## 2021-08-30 VITALS
HEIGHT: 70 IN | BODY MASS INDEX: 37.08 KG/M2 | WEIGHT: 259 LBS | HEART RATE: 67 BPM | OXYGEN SATURATION: 97 % | DIASTOLIC BLOOD PRESSURE: 40 MMHG | SYSTOLIC BLOOD PRESSURE: 96 MMHG

## 2021-08-30 DIAGNOSIS — I10 ESSENTIAL HYPERTENSION, BENIGN: ICD-10-CM

## 2021-08-30 DIAGNOSIS — I95.9 HYPOTENSION, UNSPECIFIED HYPOTENSION TYPE: ICD-10-CM

## 2021-08-30 DIAGNOSIS — I50.42 CHRONIC COMBINED SYSTOLIC AND DIASTOLIC HEART FAILURE (HCC): ICD-10-CM

## 2021-08-30 DIAGNOSIS — I25.10 CORONARY ARTERY DISEASE INVOLVING NATIVE CORONARY ARTERY OF NATIVE HEART WITHOUT ANGINA PECTORIS: Primary | ICD-10-CM

## 2021-08-30 DIAGNOSIS — R06.02 SOB (SHORTNESS OF BREATH): ICD-10-CM

## 2021-08-30 PROCEDURE — 93000 ELECTROCARDIOGRAM COMPLETE: CPT | Performed by: INTERNAL MEDICINE

## 2021-08-30 PROCEDURE — 99214 OFFICE O/P EST MOD 30 MIN: CPT | Performed by: INTERNAL MEDICINE

## 2021-08-30 RX ORDER — HYDRALAZINE HYDROCHLORIDE 50 MG/1
TABLET, FILM COATED ORAL
COMMUNITY
Start: 2021-08-13

## 2021-08-30 NOTE — PROGRESS NOTES
Group B streptococcal bacteriuria    Venous insufficiency of both lower extremities    Obesity due to excess calories with serious comorbidity    Cellulitis    Community acquired pneumonia    Bronchiolitis    Acute febrile illness    Tinea pedis of right foot    Chronic combined systolic and diastolic heart failure ([facility])    Type 2 diabetes mellitus with complication, with long-term current use of insulin ([facility])    Leg edema    ARF (acute renal failure) ([facility])    Status post total right knee replacement    Status post total knee replacement, right    Morbid obesity with BMI of 40.0-44.9, adult ([facility])    Pituitary macroadenoma ([facility])         Cardiac Testing: I have reviewed the findings below. EKG:  ECHO:   STRESS TEST:  CATH:  BYPASS:  VASCULAR:    Past Medical History:   has a past medical history of CAD (coronary artery disease), Cataract, Diabetes mellitus ([address] DJD (degenerative joint disease), History of PTCA, Hyperlipidemia, Hypertension, Hypotestosteronism, IGT (impaired glucose tolerance), Pituitary abnormality ([address] and Sleep apnea. Surgical History:   has a past surgical history that includes Coronary angioplasty with stent (2003); Knee arthroscopy (Right, 2015); Colonoscopy (2001); Colonoscopy (12/9/11); eye surgery; Cardiac surgery (2009); and Total knee arthroplasty (Right, 1/23/2020). Social History:   reports that he quit smoking about 38 years ago. His smoking use included cigarettes. He has a 15.00 pack-year smoking history. He has never used smokeless tobacco. He reports that he does not drink alcohol and does not use drugs. Family History:  No evidence for sudden cardiac death or premature CAD    Medications:  Reviewed and are listed in nursing record. and/or listed below  Outpatient Medications:  Prior to Admission medications    Medication Sig Start Date End Date Taking?  Authorizing Provider   hydrALAZINE (APRESOLINE) 50 MG tablet  8/13/21  Yes Historical Provider, MD ammonium lactate (LAC-HYDRIN) 12 % lotion APPLY TOPICALLY DAILY 8/9/21  Yes Nya Liu MD   penicillin v potassium (VEETID) 500 MG tablet TAKE TWO TABLETS BY MOUTH TWICE A DAY 7/29/21  Yes Padilla Burton MD   carvedilol (COREG) 25 MG tablet TAKE ONE TABLET BY MOUTH TWICE A DAY 7/28/21  Yes Nya Liu MD   metFORMIN (GLUCOPHAGE-XR) 750 MG extended release tablet TAKE ONE TABLET BY MOUTH TWICE A DAY 7/28/21  Yes Nya Liu MD   hydrALAZINE (APRESOLINE) 25 MG tablet TAKE ONE TABLET BY MOUTH EVERY MORNING, TAKE ONE TABLET BY MOUTH DAILY AROUND NOON AND TAKE TWO TABLETS BY MOUTH EVERY EVENING 7/19/21  Yes Nya Liu MD   allopurinol (ZYLOPRIM) 300 MG tablet TAKE ONE TABLET BY MOUTH DAILY 6/21/21  Yes Nya Liu MD   lisinopril (PRINIVIL;ZESTRIL) 40 MG tablet TAKE ONE-HALF TABLET BY MOUTH TWICE A DAY  Patient taking differently: Take 20 mg by mouth 2 times daily  5/21/21  Yes Nya Liu MD   [facility] VERIO strip USE ONE STRIP TO TEST BLOOD SUGAR ONCE DAILY 3/31/21  Yes Nya Liu MD   cyclobenzaprine (FLEXERIL) 10 MG tablet Take 1 tablet by mouth 3 times daily as needed for Muscle spasms 2/1/21  Yes Nya Liu MD   cabergoline (DOSTINEX) 0.5 MG tablet TAKE half tablet once a week.  MONDAY 11/27/20  Yes Sylvester Morton MD   atorvastatin (LIPITOR) 80 MG tablet TAKE ONE TABLET BY MOUTH DAILY 9/28/20  Yes Nya Liu MD   ibuprofen (ADVIL) 200 MG tablet Take 2 tablets by mouth every 8 hours as needed for Pain 1/28/20  Yes PATTI Flor   hydrochlorothiazide (HYDRODIURIL) 25 MG tablet Take 25 mg by mouth daily   Yes Historical Provider, MD   ferrous sulfate 325 (65 Fe) MG tablet Take 1 tablet by mouth daily (with breakfast) 11/1/19  Yes Charley Zuniga MD   Probiotic Product (PROBIOTIC DAILY PO) Take by mouth   Yes Historical Provider, MD   Accu-Chek Multiclix Lancets MISC Test once daily  DX  250.00 10/24/17  Yes Nya Liu MD   Cholecalciferol (VITAMIN D) 2000 UNITS CAPS capsule Take 1 capsule by mouth daily   Yes Historical Provider, MD   aspirin 81 MG chewable tablet Take 81 mg by mouth daily. Yes Historical Provider, MD       In-patient schedule medications:        Infusion Medications: Allergies:  Bactrim [sulfamethoxazole-trimethoprim]     Review of Systems:   All 14 point review of symptoms completed. Pertinent positives identified in the HPI, all other review of symptoms findings as below.      Review of Systems - History obtained from the patient  General ROS: negative for - chills, fever or night sweats  Psychological ROS: negative for - disorientation or hallucinations  Ophthalmic ROS: negative for - dry eyes, eye pain or loss of vision  ENT ROS: negative for - nasal discharge or sore throat  Allergy and Immunology ROS: negative for - hives or itchy/watery eyes  Hematological and Lymphatic ROS: negative for - jaundice or night sweats  Endocrine ROS: negative for - mood swings or temperature intolerance  Breast ROS: deferred  Respiratory ROS: negative for - hemoptysis or stridor  Gastrointestinal ROS: no abdominal pain, change in bowel habits, or black or bloody stools  Genito-Urinary ROS: no dysuria, trouble voiding, or hematuria  Musculoskeletal ROS: negative for - gait disturbance, joint pain or joint stiffness  Neurological ROS: negative for - seizures or speech problems  Dermatological ROS: negative for - rash or skin lesion changes      Physical Examination:    BP (!) 96/40   Pulse 67   Ht 5' 10\" (1.778 m)   Wt 259 lb (117.5 kg)   SpO2 97%   BMI 37.16 kg/m²   BP (!) 96/40   Pulse 67   Ht 5' 10\" (1.778 m)   Wt 259 lb (117.5 kg)   SpO2 97%   BMI 37.16 kg/m²    Weight: 259 lb (117.5 kg)     Wt Readings from Last 3 Encounters:   08/30/21 259 lb (117.5 kg)   06/03/21 262 lb (118.8 kg)   04/06/21 261 lb (118.4 kg)     No intake or output data in the 24 hours ending 08/30/21 1455    General Appearance:  Alert, cooperative, no distress, appears stated age   Head: Normocephalic, without obvious abnormality, atraumatic   Eyes:  PERRL, conjunctiva/corneas clear       Nose: Nares normal, no drainage or sinus tenderness   Throat: Lips, mucosa, and tongue normal   Neck: Supple, symmetrical, trachea midline, no adenopathy, thyroid: not enlarged, symmetric, no tenderness/mass/nodules, no carotid bruit or JVD       Lungs:   Clear to auscultation bilaterally, respirations unlabored   Chest Wall:  No tenderness or deformity   Heart:  Regular rhythm and normal rate; S1, S2 are normal; no murmur noted; no rub or gallop   Abdomen:   Soft, non-tender, bowel sounds active all four quadrants,  no masses, no organomegaly           Extremities: Extremities normal, atraumatic, no cyanosis or edema   Pulses: 2+ and symmetric   Skin: Skin color, texture, turgor normal, no rashes or lesions   Pysch: Normal mood and affect   Neurologic: Normal gross motor and sensory exam.         Labs  No results for input(s): WBC, HGB, HCT, MCV, PLT in the last 72 hours. No results for input(s): CREATININE, BUN, NA, K, CL, CO2 in the last 72 hours. No results for input(s): INR, PROTIME in the last 72 hours. No results for input(s): TROPONINI in the last 72 hours. Invalid input(s): PRO-BNP  No results for input(s): CHOL, HDL in the last 72 hours. Invalid input(s): LDL, TG      Imaging:  I have reviewed the below testing personally and my interpretation is below.   EKG:  CXR:    Assessment:  67 y.o. patient with:  Problem List Items Addressed This Visit     CAD (coronary artery disease) PCI to RCA and LAD 2003 - Primary    Relevant Medications    hydrALAZINE (APRESOLINE) 50 MG tablet    Other Relevant Orders    Echo 2D w doppler w color complete    NM MYOCARDIAL SPECT REST EXERCISE OR RX    Essential hypertension, benign    Chronic combined systolic and diastolic heart failure (HCC)      Other Visit Diagnoses     SOB (shortness of breath)        Relevant Orders    Echo 2D w doppler w color complete    NM EMR. Every effort was made to ensure accuracy; however, inadvertent computerized transcription errors may be present.     Yamileth Joseph MD, [address]  [phone] [facility]  [phone] [facility]  8/30/2021  2:55 PM

## 2021-08-30 NOTE — PATIENT INSTRUCTIONS
Plan:  1. Continue taking all medications. No changes. 2. Re-check manual blood pressure   ~ 110/52  3. Echocardiogram~ evaluate ejection fraction  ~A test that records movement of your heart valves and chambers by ultrasound. Evaluates heart valves, any chamber enlargement, abnormal openings, or any fluid in the sac surrounding the heart. 4. Stress Test   ~Recording of your heart while exercising on the treadmill. Electrocardiogram (EKG), Blood Pressure, and Heart Rate will be taken before, during, and after exercising. 5. EKG~ evaluate rhythm in office annual evaluation   6. Patient given detailed instructions on addressing diet, regular exercise, weight control, medication compliance, and stress minimization. The patient was provided written and verbal instructions regarding risk factor modification. 7. Follow up based on testing results.

## 2021-09-14 ENCOUNTER — HOSPITAL ENCOUNTER (OUTPATIENT)
Dept: CARDIOLOGY | Age: 76
End: 2021-09-14
Payer: MEDICARE

## 2021-09-14 ENCOUNTER — TELEPHONE (OUTPATIENT)
Dept: CARDIOLOGY CLINIC | Age: 76
End: 2021-09-14

## 2021-09-14 ENCOUNTER — HOSPITAL ENCOUNTER (OUTPATIENT)
Dept: NON INVASIVE DIAGNOSTICS | Age: 76
Discharge: HOME | End: 2021-09-14
Payer: MEDICARE

## 2021-09-14 ENCOUNTER — HOSPITAL ENCOUNTER (OUTPATIENT)
Dept: NUCLEAR MEDICINE | Age: 76
Discharge: HOME | End: 2021-09-14
Payer: MEDICARE

## 2021-09-14 ENCOUNTER — HOSPITAL ENCOUNTER (OUTPATIENT)
Dept: CARDIOLOGY | Age: 76
Discharge: HOME | End: 2021-09-14
Payer: MEDICARE

## 2021-09-14 DIAGNOSIS — R06.02 SOB (SHORTNESS OF BREATH): ICD-10-CM

## 2021-09-14 DIAGNOSIS — I25.10 CORONARY ARTERY DISEASE INVOLVING NATIVE CORONARY ARTERY OF NATIVE HEART WITHOUT ANGINA PECTORIS: ICD-10-CM

## 2021-09-14 LAB
LV EF: 60 %
LV EF: 63 %
LVEF MODALITY: NORMAL
LVEF MODALITY: NORMAL

## 2021-09-14 PROCEDURE — 3430000000 HC RX DIAGNOSTIC RADIOPHARMACEUTICAL: Performed by: INTERNAL MEDICINE

## 2021-09-14 PROCEDURE — 93017 CV STRESS TEST TRACING ONLY: CPT

## 2021-09-14 PROCEDURE — 78452 HT MUSCLE IMAGE SPECT MULT: CPT

## 2021-09-14 PROCEDURE — 6360000002 HC RX W HCPCS: Performed by: INTERNAL MEDICINE

## 2021-09-14 PROCEDURE — 93306 TTE W/DOPPLER COMPLETE: CPT

## 2021-09-14 PROCEDURE — A9502 TC99M TETROFOSMIN: HCPCS | Performed by: INTERNAL MEDICINE

## 2021-09-14 RX ADMIN — TETROFOSMIN 10.6 MILLICURIE: 1.38 INJECTION, POWDER, LYOPHILIZED, FOR SOLUTION INTRAVENOUS at 08:50

## 2021-09-14 RX ADMIN — TETROFOSMIN 30.2 MILLICURIE: 1.38 INJECTION, POWDER, LYOPHILIZED, FOR SOLUTION INTRAVENOUS at 10:42

## 2021-09-14 RX ADMIN — REGADENOSON 0.4 MG: 0.08 INJECTION, SOLUTION INTRAVENOUS at 10:42

## 2021-09-14 NOTE — TELEPHONE ENCOUNTER
----- Message from Napoleon Escobar MD sent at 9/14/2021  1:22 PM EDT -----  Call  Stress test looks ok  No sign new sig blockage

## 2021-09-15 ENCOUNTER — TELEPHONE (OUTPATIENT)
Dept: CARDIOLOGY CLINIC | Age: 76
End: 2021-09-15

## 2021-09-15 DIAGNOSIS — E11.9 TYPE 2 DIABETES MELLITUS WITHOUT COMPLICATION, WITHOUT LONG-TERM CURRENT USE OF INSULIN (HCC): ICD-10-CM

## 2021-09-15 RX ORDER — BLOOD SUGAR DIAGNOSTIC
STRIP MISCELLANEOUS
Qty: 50 STRIP | Refills: 3 | Status: SHIPPED | OUTPATIENT
Start: 2021-09-15 | End: 2021-09-23 | Stop reason: SDUPTHER

## 2021-09-15 NOTE — TELEPHONE ENCOUNTER
Created telephone encounter. Spoke with Caryle Ivans relayed message per [facility]SP regarding echo. Pt verbalized understanding.

## 2021-09-15 NOTE — TELEPHONE ENCOUNTER
----- Message from Nahomy Miller MD sent at 9/15/2021  9:30 AM EDT -----  There are minor findings that are noticed on the testing. The overall findings suggest normal age related results, but there are degree of changes that will need to be monitored. Please call patient with results.

## 2021-09-23 ENCOUNTER — OFFICE VISIT (OUTPATIENT)
Dept: FAMILY MEDICINE CLINIC | Age: 76
End: 2021-09-23
Payer: MEDICARE

## 2021-09-23 VITALS
DIASTOLIC BLOOD PRESSURE: 50 MMHG | SYSTOLIC BLOOD PRESSURE: 120 MMHG | HEART RATE: 59 BPM | BODY MASS INDEX: 38.08 KG/M2 | WEIGHT: 266 LBS | HEIGHT: 70 IN | OXYGEN SATURATION: 97 %

## 2021-09-23 DIAGNOSIS — Z79.4 TYPE 2 DIABETES MELLITUS WITH COMPLICATION, WITH LONG-TERM CURRENT USE OF INSULIN (HCC): Primary | ICD-10-CM

## 2021-09-23 DIAGNOSIS — E11.9 TYPE 2 DIABETES MELLITUS WITHOUT COMPLICATION, WITHOUT LONG-TERM CURRENT USE OF INSULIN (HCC): ICD-10-CM

## 2021-09-23 DIAGNOSIS — E11.8 TYPE 2 DIABETES MELLITUS WITH COMPLICATION, WITH LONG-TERM CURRENT USE OF INSULIN (HCC): Primary | ICD-10-CM

## 2021-09-23 LAB — HBA1C MFR BLD: 6.6 %

## 2021-09-23 PROCEDURE — 99213 OFFICE O/P EST LOW 20 MIN: CPT | Performed by: FAMILY MEDICINE

## 2021-09-23 PROCEDURE — 83036 HEMOGLOBIN GLYCOSYLATED A1C: CPT | Performed by: FAMILY MEDICINE

## 2021-09-23 RX ORDER — BLOOD SUGAR DIAGNOSTIC
STRIP MISCELLANEOUS
Qty: 150 STRIP | Refills: 3 | Status: SHIPPED | OUTPATIENT
Start: 2021-09-23

## 2021-10-03 NOTE — PROGRESS NOTES
Meagan Bledsoe (:  1945) is a 67 y.o. male,Established patient, here for evaluation of the following chief complaint(s):  Diabetes         ASSESSMENT/PLAN:  1. Type 2 diabetes mellitus with complication, with long-term current use of insulin (HCC)  -     POCT glycosylated hemoglobin (Hb A1C)  2. Type 2 diabetes mellitus without complication, without long-term current use of insulin (HCC)  -     blood glucose test strips (ONETOUCH VERIO) strip; Test 2-3 times daily. , Disp-150 strip, R-3Normal    Lab Results   Component Value Date    LABA1C 6.6 2021     Lab Results   Component Value Date    .0 2020       Well-controlled continue current medication  No follow-ups on file. Subjective   SUBJECTIVE/OBJECTIVE:  Meagan Bledsoe is a 67 y.o. male. Patient presents with:  Diabetes      48-year-old male with diabetes mellitus who presents for follow-up. Denies any significant polyphagia polydips here probably urea. Patient has been feeling well otherwise. No other significant concerns today. The patients PMH, surgical history, family history, medications, allergies were all reviewed and updated as appropriate today. Review of Systems       Objective   Physical Exam  Vitals and nursing note reviewed. Constitutional:       Appearance: Normal appearance. He is well-developed. HENT:      Head: Normocephalic and atraumatic. Right Ear: External ear normal.      Left Ear: External ear normal.      Nose: Nose normal.   Eyes:      Conjunctiva/sclera: Conjunctivae normal.      Pupils: Pupils are equal, round, and reactive to light. Cardiovascular:      Rate and Rhythm: Normal rate and regular rhythm. Heart sounds: Normal heart sounds. No murmur heard. No friction rub. No gallop. Pulmonary:      Effort: Pulmonary effort is normal. No respiratory distress. Breath sounds: Normal breath sounds. No wheezing.    Abdominal:      General: Bowel sounds are normal. There is no distension. Palpations: Abdomen is soft. Tenderness: There is no abdominal tenderness. Musculoskeletal:         General: No tenderness. Normal range of motion. Cervical back: Normal range of motion and neck supple. Skin:     General: Skin is warm and dry. Neurological:      Mental Status: He is alert and oriented to person, place, and time. Deep Tendon Reflexes: Reflexes are normal and symmetric. Psychiatric:         Behavior: Behavior normal.         Thought Content: Thought content normal.         Judgment: Judgment normal.                An electronic signature was used to authenticate this note.     --Zain Son MD

## 2021-11-09 RX ORDER — HYDRALAZINE HYDROCHLORIDE 25 MG/1
TABLET, FILM COATED ORAL
Qty: 120 TABLET | Refills: 3 | Status: SHIPPED | OUTPATIENT
Start: 2021-11-09 | End: 2022-05-24

## 2021-11-30 ENCOUNTER — TELEPHONE (OUTPATIENT)
Dept: PHARMACY | Facility: CLINIC | Age: 76
End: 2021-11-30

## 2021-11-30 NOTE — TELEPHONE ENCOUNTER
POPULATION HEALTH CLINICAL PHARMACY REVIEW: ADHERENCE REVIEW  Identified care gap per [employer]; fills at [facility]: Diabetes and Statin adherence    Last Visit: 21    Patient not found in Outcomes MTM    [address]    Per Insurance Records through 21 ( [place] = 98%; [facility] Passed in ): METFORMIN    TAB 750MG ER last filled on 10/10/21 for 90 day supply. Next refill due: 22    Lab Results   Component Value Date    LABA1C 6.6 2021    LABA1C 6.0 2021    LABA1C 6.0 2021     NOTE A1c >9%    STATIN ADHERENCE    Per Insurance Records through 21 ( [facility] [place] = 100%; YTD PDC = 86%; Potential Fail Date: 21):   ATORVASTATIN TAB 80MG last filled on 21 for 74 day supply. Next refill due: 10/10/21    Per Reconciled Dispense Report:  ATORVASTATIN TAB 80MG last filled on 21 for 74 day supply. Per [address]:   ATORVASTATIN TAB 80MG last picked up on 9/15/21 for 90 day supply. 3 refills remaining. Patient used discount card    Lab Results   Component Value Date    CHOL 175 2020    TRIG 201 (H) 2020    HDL 38 (L) 2021    LDLCALC 74 2021    LDLDIRECT 73 2018     ALT   Date Value Ref Range Status   2021 13 10 - 40 U/L Final     AST   Date Value Ref Range Status   2021 14 (L) 15 - 37 U/L Final     The 10-year ASCVD risk score (Franci Bain, et al., 2013) is: 48.5%    Values used to calculate the score:      Age: 67 years      Sex: Male      Is Non- : No      Diabetic: Yes      Tobacco smoker: No      Systolic Blood Pressure: 395 mmHg      Is BP treated: Yes      HDL Cholesterol: 38 mg/dL      Total Cholesterol: 153 mg/dL     PLAN  The following are interventions that have been identified:       No patient out reach planned at this time.     For [place] in place:  No   Recommendation Provided To: Pharmacy: 1   Intervention Detail: Adherence Monitorin   Gap Closed?: No    Time Spent (min): 15          Future Appointments   Date Time Provider Niko Linn   12/16/2021 10:00 AM Milly Mix MD [facility] RAMBO FRAGA   3/2/2022  9:30 AM SCHEDULE, [facility] AWV LPN [facility] Abhishek FRAGA       [facility].   [address] [place] [address] free: [phone]

## 2021-12-16 ENCOUNTER — OFFICE VISIT (OUTPATIENT)
Dept: FAMILY MEDICINE CLINIC | Age: 76
End: 2021-12-16
Payer: MEDICARE

## 2021-12-16 VITALS
OXYGEN SATURATION: 98 % | HEART RATE: 61 BPM | TEMPERATURE: 97 F | WEIGHT: 268 LBS | SYSTOLIC BLOOD PRESSURE: 130 MMHG | BODY MASS INDEX: 38.37 KG/M2 | DIASTOLIC BLOOD PRESSURE: 80 MMHG | HEIGHT: 70 IN

## 2021-12-16 DIAGNOSIS — E11.8 TYPE 2 DIABETES MELLITUS WITH COMPLICATION, WITH LONG-TERM CURRENT USE OF INSULIN (HCC): Primary | ICD-10-CM

## 2021-12-16 DIAGNOSIS — Z79.4 TYPE 2 DIABETES MELLITUS WITH COMPLICATION, WITH LONG-TERM CURRENT USE OF INSULIN (HCC): Primary | ICD-10-CM

## 2021-12-16 LAB — HBA1C MFR BLD: 7.3 %

## 2021-12-16 PROCEDURE — 3051F HG A1C>EQUAL 7.0%<8.0%: CPT | Performed by: FAMILY MEDICINE

## 2021-12-16 PROCEDURE — 99213 OFFICE O/P EST LOW 20 MIN: CPT | Performed by: FAMILY MEDICINE

## 2021-12-20 ENCOUNTER — PATIENT MESSAGE (OUTPATIENT)
Dept: FAMILY MEDICINE CLINIC | Age: 76
End: 2021-12-20

## 2021-12-20 DIAGNOSIS — R05.9 COUGH: Primary | ICD-10-CM

## 2021-12-20 RX ORDER — HYDROCODONE POLISTIREX AND CHLORPHENIRAMINE POLISTIREX 10; 8 MG/5ML; MG/5ML
5 SUSPENSION, EXTENDED RELEASE ORAL EVERY 12 HOURS PRN
Qty: 473 ML | Refills: 0 | Status: SHIPPED | OUTPATIENT
Start: 2021-12-20 | End: 2022-01-19

## 2021-12-20 NOTE — TELEPHONE ENCOUNTER
From: [place]   To: Dr. Collette Becker: 12/20/2021 8:53 AM EST  Subject: Prescription     I went to [facility] to pickup the Tussionex prescription and they said they never received one from last Thursday. Could you please resend? Thank you.     Carmen Hogan

## 2021-12-31 NOTE — PROGRESS NOTES
Nicolette Camilo (:  1945) is a 67 y.o. male,Established patient, here for evaluation of the following chief complaint(s):  3 Month Follow-Up         ASSESSMENT/PLAN:  1. Type 2 diabetes mellitus with complication, with long-term current use of insulin [facility])    Lab Results   Component Value Date    LABA1C 7.3 2021     Lab Results   Component Value Date    .0 2020     Fair control continue current medication  No follow-ups on file. Subjective   SUBJECTIVE/OBJECTIVE:  Nicolette Camilo is a 67 y.o. male. Patient presents with:  3 Month Follow-Up      This is a 76-year-old male presents for follow-up of diabetes mellitus. Patient has been doing well on current medication. Patient denies any significant side effects from his medications. He has been having no polyuria polyphagia or polydipsia. The patients PMH, surgical history, family history, medications, allergies were all reviewed and updated as appropriate today. Review of Systems       Objective   Physical Exam  Vitals and nursing note reviewed. Constitutional:       Appearance: Normal appearance. He is well-developed. HENT:      Head: Normocephalic and atraumatic. Right Ear: External ear normal.      Left Ear: External ear normal.      Nose: Nose normal.   Eyes:      Conjunctiva/sclera: Conjunctivae normal.      Pupils: Pupils are equal, round, and reactive to light. Cardiovascular:      Rate and Rhythm: Normal rate and regular rhythm. Heart sounds: Normal heart sounds. No murmur heard. No friction rub. No gallop. Pulmonary:      Effort: Pulmonary effort is normal. No respiratory distress. Breath sounds: Normal breath sounds. No wheezing. Abdominal:      General: Bowel sounds are normal. There is no distension. Palpations: Abdomen is soft. Tenderness: There is no abdominal tenderness. Musculoskeletal:         General: No tenderness. Normal range of motion.       Cervical back: Normal range of motion and neck supple. Skin:     General: Skin is warm and dry. Neurological:      Mental Status: He is alert and oriented to person, place, and time. Deep Tendon Reflexes: Reflexes are normal and symmetric. Psychiatric:         Behavior: Behavior normal.         Thought Content: Thought content normal.         Judgment: Judgment normal.            On this date 12/16/2021 I have spent 20 minutes reviewing previous notes, test results and face to face with the patient discussing the diagnosis and importance of compliance with the treatment plan as well as documenting on the day of the visit. An electronic signature was used to authenticate this note.     --Mark Alaniz MD

## 2022-01-08 DIAGNOSIS — E11.9 TYPE 2 DIABETES MELLITUS WITHOUT COMPLICATION, WITHOUT LONG-TERM CURRENT USE OF INSULIN (HCC): ICD-10-CM

## 2022-01-10 RX ORDER — METFORMIN HYDROCHLORIDE 750 MG/1
TABLET, EXTENDED RELEASE ORAL
Qty: 180 TABLET | Refills: 1 | Status: SHIPPED | OUTPATIENT
Start: 2022-01-10 | End: 2022-08-15 | Stop reason: SDUPTHER

## 2022-01-24 ENCOUNTER — TELEPHONE (OUTPATIENT)
Dept: FAMILY MEDICINE CLINIC | Age: 77
End: 2022-01-24

## 2022-01-24 DIAGNOSIS — E22.1 HYPERPROLACTINEMIA (HCC): ICD-10-CM

## 2022-01-24 RX ORDER — CABERGOLINE 0.5 MG/1
TABLET ORAL
Qty: 8 TABLET | Refills: 1 | Status: SHIPPED | OUTPATIENT
Start: 2022-01-24 | End: 2022-05-12 | Stop reason: SDUPTHER

## 2022-01-24 NOTE — TELEPHONE ENCOUNTER
Patient states he has tried multiple times with [facility] in [place] to get a refill on his medication. He has left 4 messages. He would like to know if Dr Maksim Mason will refill his   cabergoline (DOSTINEX) 0.5 MG tablet.  Advise      Last seen 12/16/21  Next office visit   3/17/2022

## 2022-01-28 ENCOUNTER — TELEPHONE (OUTPATIENT)
Dept: FAMILY MEDICINE CLINIC | Age: 77
End: 2022-01-28

## 2022-01-28 DIAGNOSIS — E11.9 TYPE 2 DIABETES MELLITUS WITHOUT COMPLICATION, WITHOUT LONG-TERM CURRENT USE OF INSULIN (HCC): ICD-10-CM

## 2022-01-28 DIAGNOSIS — E22.1 HYPERPROLACTINEMIA (HCC): Primary | ICD-10-CM

## 2022-01-28 NOTE — TELEPHONE ENCOUNTER
----- Message from Den Milian sent at 1/28/2022 11:09 AM EST -----  Subject: Message to Provider    QUESTIONS  Information for Provider? Patient is still unable to reach [facility]. Can you please help with this issue? Patient has given   verbal ok to speak with wife Juana Anthony.  ---------------------------------------------------------------------------  --------------  Darcie OSORIO  What is the best way for the office to contact you? OK to leave message on   voicemail  Preferred Call Back Phone Number? [phone]  ---------------------------------------------------------------------------  --------------  SCRIPT ANSWERS  Relationship to Patient?  Self

## 2022-02-06 ENCOUNTER — APPOINTMENT (OUTPATIENT)
Dept: CT IMAGING | Age: 77
End: 2022-02-06
Payer: MEDICARE

## 2022-02-06 ENCOUNTER — HOSPITAL ENCOUNTER (EMERGENCY)
Age: 77
Discharge: HOME | End: 2022-02-06
Payer: MEDICARE

## 2022-02-06 VITALS
DIASTOLIC BLOOD PRESSURE: 52 MMHG | SYSTOLIC BLOOD PRESSURE: 114 MMHG | RESPIRATION RATE: 16 BRPM | OXYGEN SATURATION: 97 % | HEART RATE: 64 BPM | TEMPERATURE: 97.7 F

## 2022-02-06 DIAGNOSIS — M54.50 ACUTE EXACERBATION OF CHRONIC LOW BACK PAIN: ICD-10-CM

## 2022-02-06 DIAGNOSIS — M51.37 DDD (DEGENERATIVE DISC DISEASE), LUMBOSACRAL: Primary | ICD-10-CM

## 2022-02-06 DIAGNOSIS — G89.29 ACUTE EXACERBATION OF CHRONIC LOW BACK PAIN: ICD-10-CM

## 2022-02-06 PROCEDURE — 72131 CT LUMBAR SPINE W/O DYE: CPT

## 2022-02-06 PROCEDURE — 99285 EMERGENCY DEPT VISIT HI MDM: CPT

## 2022-02-06 PROCEDURE — 6370000000 HC RX 637 (ALT 250 FOR IP): Performed by: PHYSICIAN ASSISTANT

## 2022-02-06 RX ORDER — DIAZEPAM 5 MG/1
5 TABLET ORAL ONCE
Status: COMPLETED | OUTPATIENT
Start: 2022-02-06 | End: 2022-02-06

## 2022-02-06 RX ORDER — LIDOCAINE 4 G/G
1 PATCH TOPICAL DAILY
Status: DISCONTINUED | OUTPATIENT
Start: 2022-02-06 | End: 2022-02-06 | Stop reason: HOSPADM

## 2022-02-06 RX ORDER — METHOCARBAMOL 500 MG/1
500 TABLET, FILM COATED ORAL 4 TIMES DAILY
Qty: 40 TABLET | Refills: 0 | Status: SHIPPED | OUTPATIENT
Start: 2022-02-06 | End: 2022-02-16

## 2022-02-06 RX ORDER — LIDOCAINE 50 MG/G
1 PATCH TOPICAL DAILY
Qty: 10 PATCH | Refills: 0 | Status: SHIPPED | OUTPATIENT
Start: 2022-02-06 | End: 2022-02-16

## 2022-02-06 RX ORDER — HYDROCODONE BITARTRATE AND ACETAMINOPHEN 5; 325 MG/1; MG/1
1 TABLET ORAL ONCE
Status: COMPLETED | OUTPATIENT
Start: 2022-02-06 | End: 2022-02-06

## 2022-02-06 RX ADMIN — HYDROCODONE BITARTRATE AND ACETAMINOPHEN 1 TABLET: 5; 325 TABLET ORAL at 13:08

## 2022-02-06 RX ADMIN — DIAZEPAM 5 MG: 5 TABLET ORAL at 13:08

## 2022-02-06 ASSESSMENT — PAIN DESCRIPTION - DESCRIPTORS: DESCRIPTORS: ACHING

## 2022-02-06 ASSESSMENT — PAIN SCALES - GENERAL
PAINLEVEL_OUTOF10: 0
PAINLEVEL_OUTOF10: 2
PAINLEVEL_OUTOF10: 2

## 2022-02-06 ASSESSMENT — PAIN DESCRIPTION - LOCATION: LOCATION: BACK

## 2022-02-06 ASSESSMENT — PAIN DESCRIPTION - PAIN TYPE: TYPE: ACUTE PAIN;CHRONIC PAIN

## 2022-02-06 ASSESSMENT — PAIN DESCRIPTION - ORIENTATION: ORIENTATION: LOWER

## 2022-02-06 ASSESSMENT — PAIN DESCRIPTION - FREQUENCY: FREQUENCY: INTERMITTENT

## 2022-02-06 NOTE — ED PROVIDER NOTES
[facility]  ED  EMERGENCY DEPARTMENT ENCOUNTER        Pt Name: Gwyn Sullivan  MRN: [id number]  [place] [birth year]  Date of evaluation: 2/6/2022  Provider: PATTI V[place]  PCP: Kim Moran MD    This patient was not seen and evaluated by the attending physician No att. providers found. I have evaluated this patient. My supervising physician was available for consultation. CHIEF COMPLAINT       Chief Complaint   Patient presents with    Back Pain     States lower back pain x 4 days. Denies any injury. States every few years \"my back just goes out. \"        HISTORY OF PRESENT ILLNESS   (Location/Symptom, Timing/Onset, Context/Setting, Quality, Duration, Modifying Factors, Severity)  Note limiting factors. Gwyn Sullivan is a 67 y.o. male with past medical history of coronary artery disease, hypertension, type 2 diabetes on insulin, history of hypotension,  who presents via private vehicle from his home for evaluation of back pain. Patient notes that he has history of intermittent low back spasms he notes every couple years his low back will just flare. He notes that around 4 days ago he woke up and he had the same spot, his right low back was hurting. The pain is constant however the intensity is waxing and waning, made better or worse with certain position changes. He denies radiation of the pain. Denies any lower extremity numbness tingling weakness loss of sphincter control or saddle anesthesia. He denies any recent falls or injuries. He has been taking ibuprofen, he called his family doctor last week and started him on a Medrol Dosepak which initially he had improvement from however the pain then continued. He has been taking Flexeril as needed without improvement. He denies any abdominal pain chest pain urinary symptoms frequency urgency or pain with urination.         Nursing Notes were all reviewed and agreed with or any disagreements were addressed  in the HPI. Pt was seen during the [place] 19 pandemic. Appropriate PPE worn by ME during patient encounters. Pt seen during a time with constrained hospital bed capacity and other potential inpatient and outpatient resources were constrained due to the viral pandemic. REVIEW OF SYSTEMS    (2-9 systems for level 4, 10 or more for level 5)     Review of Systems    Positives and Pertinent negatives as per HPI. Except as noted abovein the ROS, all other systems were reviewed and negative. PAST MEDICAL HISTORY     Past Medical History:   Diagnosis Date    CAD (coronary artery disease)     Cataract     Diabetes mellitus ([address]     DJD (degenerative joint disease)     History of PTCA     Dr. Ryan Morin Hyperlipidemia     Hypertension     Hypotestosteronism     Dr. Padma Castillo, urology    IGT (impaired glucose tolerance)     Pituitary abnormality ([address]     growth    Sleep apnea     cpap         SURGICAL HISTORY     Past Surgical History:   Procedure Laterality Date    CARDIAC SURGERY  2009    stents    COLONOSCOPY  2001    COLONOSCOPY  12/9/11    diverticulosis    CORONARY ANGIOPLASTY WITH STENT PLACEMENT  2003    EYE SURGERY      cataracts    KNEE ARTHROSCOPY Right 2015    TOTAL KNEE ARTHROPLASTY Right 1/23/2020    RIGHT TOTAL KNEE REPLACEMENT      LUTHER & NEPHEW performed by Sarita Segura MD at [address] [place] [address]       Previous Medications    ACCU-CHEK MULTICLIX LANCETS MISC    Test once daily  DX  250.00    ALLOPURINOL (ZYLOPRIM) 300 MG TABLET    TAKE ONE TABLET BY MOUTH DAILY    AMMONIUM LACTATE (LAC-HYDRIN) 12 % LOTION    APPLY TOPICALLY DAILY    ASPIRIN 81 MG CHEWABLE TABLET    Take 81 mg by mouth daily. ATORVASTATIN (LIPITOR) 80 MG TABLET    TAKE ONE TABLET BY MOUTH DAILY    BLOOD GLUCOSE TEST STRIPS (ONETOUCH VERIO) STRIP    Test 2-3 times daily. CABERGOLINE (DOSTINEX) 0.5 MG TABLET    TAKE half tablet once a week.  MONDAY    CARVEDILOL (COREG) 25 MG TABLET    TAKE ONE TABLET BY MOUTH TWICE A DAY    CHOLECALCIFEROL (VITAMIN D) 2000 UNITS CAPS CAPSULE    Take 1 capsule by mouth daily    CYCLOBENZAPRINE (FLEXERIL) 10 MG TABLET    Take 1 tablet by mouth 3 times daily as needed for Muscle spasms    FERROUS SULFATE 325 (65 FE) MG TABLET    Take 1 tablet by mouth daily (with breakfast)    HYDRALAZINE (APRESOLINE) 25 MG TABLET    TAKE ONE TABLET BY MOUTH EVERY MORNING, TAKE ONE TABLET BY MOUTH DAILY AROUND NOON, AND TAKE TWO TABLETS BY MOUTH EVERY EVENING    HYDRALAZINE (APRESOLINE) 50 MG TABLET        HYDROCHLOROTHIAZIDE (HYDRODIURIL) 25 MG TABLET    Take 25 mg by mouth daily    IBUPROFEN (ADVIL) 200 MG TABLET    Take 2 tablets by mouth every 8 hours as needed for Pain    LISINOPRIL (PRINIVIL;ZESTRIL) 40 MG TABLET    TAKE ONE-HALF TABLET BY MOUTH TWICE A DAY    METFORMIN (GLUCOPHAGE-XR) 750 MG EXTENDED RELEASE TABLET    TAKE ONE TABLET BY MOUTH TWICE A DAY    PENICILLIN V POTASSIUM (VEETID) 500 MG TABLET    TAKE TWO TABLETS BY MOUTH TWICE A DAY    PROBIOTIC PRODUCT (PROBIOTIC DAILY PO)    Take by mouth         ALLERGIES     Bactrim [sulfamethoxazole-trimethoprim]    FAMILYHISTORY       Family History   Problem Relation Age of Onset    Heart Disease Brother     High Blood Pressure Brother     Dementia Mother         alzheimers          SOCIAL HISTORY       Social History     Socioeconomic History    Marital status:      Spouse name: None    Number of children: None    Years of education: None    Highest education level: None   Occupational History    None   Tobacco Use    Smoking status: Former Smoker     Packs/day: 1.00     Years: 15.00     Pack years: 15.00     Types: Cigarettes     Quit date: 1982     Years since quittin.1    Smokeless tobacco: Never Used   Vaping Use    Vaping Use: Never used   Substance and Sexual Activity    Alcohol use: No    Drug use: No    Sexual activity: None   Other Topics Concern    None   Social History Narrative    None Social Determinants of Health     Financial Resource Strain:     Difficulty of Paying Living Expenses: Not on file   Food Insecurity:     Worried About Running Out of Food in the Last Year: Not on file    Connor of Food in the Last Year: Not on file   Transportation Needs:     Lack of Transportation (Medical): Not on file    Lack of Transportation (Non-Medical): Not on file   Physical Activity:     Days of Exercise per Week: Not on file    Minutes of Exercise per Session: Not on file   Stress:     Feeling of Stress : Not on file   Social Connections:     Frequency of Communication with Friends and Family: Not on file    Frequency of Social Gatherings with Friends and Family: Not on file    Attends Yazidi Services: Not on file    Active Member of [address] [place] [address] [employer] or Organizations: Not on file    Attends Club or Organization Meetings: Not on file    Marital Status: Not on file   Intimate Partner Violence:     Fear of Current or Ex-Partner: Not on file    Emotionally Abused: Not on file    Physically Abused: Not on file    Sexually Abused: Not on file   Housing Stability:     Unable to Pay for Housing in the Last Year: Not on file    Number of [place] in the Last Year: Not on file    Unstable Housing in the Last Year: Not on file       SCREENINGS    Khadijah Coma Scale  Eye Opening: Spontaneous  Best Verbal Response: Oriented  Best Motor Response: Obeys commands  Khadijah Coma Scale Score: 15      Atraumatic back pain:     Red Flags:   IV drug abuse? No   Fever without source? No   Systemic illness? No   Immunosuppressed? No   Recent surgery/procedure? No   Incontinence or retention? No   Indwelling beverly? No   Alcohol abuse? No      Diabetes? Yes +1   Night pain? No   3rd visit in 20 days? No   Renal failure?  No   Total: 0         PHYSICAL EXAM    (up to 7 for level 4, 8 or more for level 5)     ED Triage Vitals [02/06/22 1224]   BP Temp Temp src Pulse Resp SpO2 Height Weight   (!) 109/58 97.7 °F (36.5 °C) -- 60 16 98 % -- --       Physical Exam  Vitals and nursing note reviewed. Constitutional:       General: He is awake. He is not in acute distress. Appearance: Normal appearance. He is well-developed and overweight. He is not ill-appearing, toxic-appearing or diaphoretic. HENT:      Head: Normocephalic and atraumatic. Right Ear: External ear normal.      Left Ear: External ear normal.      Nose: Nose normal.      Mouth/Throat:      Mouth: Mucous membranes are moist.      Pharynx: Oropharynx is clear. Eyes:      General:         Right eye: No discharge. Left eye: No discharge. Extraocular Movements: Extraocular movements intact. Conjunctiva/sclera: Conjunctivae normal.      Pupils: Pupils are equal, round, and reactive to light. Cardiovascular:      Rate and Rhythm: Normal rate and regular rhythm. Pulses: Normal pulses. Radial pulses are 2+ on the right side and 2+ on the left side. Posterior tibial pulses are 2+ on the right side and 2+ on the left side. Heart sounds: Normal heart sounds. No murmur heard. No friction rub. No gallop. Pulmonary:      Effort: Pulmonary effort is normal. No respiratory distress. Breath sounds: Normal breath sounds. No wheezing or rales. Abdominal:      Palpations: Abdomen is soft. Tenderness: There is no abdominal tenderness. There is no right CVA tenderness, left CVA tenderness or guarding. Musculoskeletal:      Cervical back: Normal range of motion and neck supple. No rigidity or tenderness. Back:       Right lower leg: No edema. Left lower leg: No edema. Lymphadenopathy:      Cervical: No cervical adenopathy. Skin:     General: Skin is warm and dry. Capillary Refill: Capillary refill takes less than 2 seconds. Neurological:      General: No focal deficit present. Mental Status: He is alert, oriented to person, place, and time and easily aroused.       GCS: GCS 02/06/22 1224 02/06/22 1320 02/06/22 1350 02/06/22 1452   BP: (!) 109/58 (!) 116/49 (!) 113/41 (!) 108/48   Pulse: 60 65 60 64   Resp: 16 16 16 16   Temp: 97.7 °F (36.5 °C)      SpO2: 98% 97% 97% 100%       Patient was given thefollowing medications:  Medications   lidocaine 4 % external patch 1 patch (1 patch TransDERmal Patch Applied 2/6/22 1331)   diazePAM (VALIUM) tablet 5 mg (5 mg Oral Given 2/6/22 1308)   HYDROcodone-acetaminophen (NORCO) 5-325 MG per tablet 1 tablet (1 tablet Oral Given 2/6/22 1308)       PDMP Monitoring:    Last PDMP David as Reviewed [facility]):  Review User Review Instant Review Result          Last Controlled Substance Monitoring Documentation      Admission (Discharged) from 1/23/2020 in [facility] C[facility] - Med Surg/Ortho   Periodic Controlled Substance Monitoring No signs of potential drug abuse or diversion identified. filed at 01/23/2020 1559   Acute Pain Prescriptions Severe pain not adequately treated with lower dose. filed at 01/23/2020 1559        Urine Drug Screenings (1 yr)    No resulted procedures found. Medication Contract and Consent for Opioid Use Documents Filed      No documents found                MDM:   Patient seen and evaluated. Old records reviewed. Diagnostic testing reviewed and results discussed. I have independently evaluated this patient based upon my scope of practice. Supervising physician was in the department for consultation as needed. Has no midline tenderness on examination he has paraspinal tenderness. He has intact distal strength. He has no CVA tenderness no abdominal tenderness. Patient was given muscle relaxer, Lidoderm patch, medication for pain with improvement in his symptoms. I reevaluated the patient and patient notes that he was requesting imaging per his wife who is requesting imaging because he has been having pain off and on for so long.   I expressed to the patient that I have low concern for fracture or dislocation based off of his chronicity of symptoms and I do not believe that x-ray would be of any benefit at this point. Patient agreed. I did offer patient on cross-sectional imaging to ensure that there is no compression fracture or severe degenerative changes of his back and he agreed. CT Lumbar spine without contrast was obtained which reveals multilevel degenerative changes with central disc bulging from L2-S1 and no significant canal stenosis identified with right-sided for aminal narrowing at L4-L5. This time I believe patient is renal candidate for discharge home with close follow-up with PCP and referral to orthospine. I completed a structured, evidence-based clinical evaluation to screen for acute non-traumatic spinal emergencies. The patient has a normal detailed neurologic exam and a low red flag score. I have discussed with the patient my clinical impression and the result of an evidence-based clinical evaluation to screen for spinal epidural abscess and other spinal emergencies, as well as the risk of further testing and hospitalization. The evidence shows that the risk for an acute spinal emergency is less than 1%. Although the risk of an acute spinal emergency has not been completely eliminated, the risks of further testing likely exceed any potential benefit, and the patient agrees with not pursuing further emergent evaluation for causes of back pain at this time. Discharge Time out:  CC Reviewed Yes   Test Results Yes     Vitals:    02/06/22 1452   BP: (!) 108/48   Pulse: 64   Resp: 16   Temp:    SpO2: 100%              FINAL IMPRESSION      1. DDD (degenerative disc disease), lumbosacral    2. Acute exacerbation of chronic low back pain          DISPOSITION/PLAN   DISPOSITION        PATIENT REFERREDTO:  No follow-up provider specified.     DISCHARGE MEDICATIONS:  New Prescriptions    No medications on file       DISCONTINUED MEDICATIONS:  Discontinued Medications    METHYLPREDNISOLONE (MEDROL, JESUS,) 4 MG TABLET    Take by mouth as directed              (Please note that portions ofthis note were completed with a voice recognition program.  Efforts were made to edit the dictations but occasionally words are mis-transcribed.)    PATTI Espinal (electronically signed)       PATTI Espinal  02/06/22 1529

## 2022-02-06 NOTE — ED NOTES
Bed: 21  Expected date:   Expected time:   Means of arrival:   Comments:  Ut m50     Deric Parsons RN  02/06/22 1229

## 2022-02-07 ENCOUNTER — TELEPHONE (OUTPATIENT)
Dept: ORTHOPEDIC SURGERY | Age: 77
End: 2022-02-07

## 2022-02-07 ENCOUNTER — OFFICE VISIT (OUTPATIENT)
Dept: ORTHOPEDIC SURGERY | Age: 77
End: 2022-02-07
Payer: MEDICARE

## 2022-02-07 VITALS — BODY MASS INDEX: 38.37 KG/M2 | WEIGHT: 268 LBS | HEIGHT: 70 IN

## 2022-02-07 DIAGNOSIS — M51.36 DDD (DEGENERATIVE DISC DISEASE), LUMBAR: Primary | ICD-10-CM

## 2022-02-07 DIAGNOSIS — M48.061 LUMBAR FORAMINAL STENOSIS: ICD-10-CM

## 2022-02-07 PROCEDURE — 99214 OFFICE O/P EST MOD 30 MIN: CPT | Performed by: PHYSICIAN ASSISTANT

## 2022-02-07 NOTE — PROGRESS NOTES
New Patient: LUMBAR SPINE    Referring Provider:  No ref. provider found    CHIEF COMPLAINT:    Chief Complaint   Patient presents with    Back Pain     Patient was seen yesterday in ED. Patient states that he has had back pain for a while but it got worse about a week ago. He states that the most pain came yesterday when it got even worse. Patient has all low back pain. He has had 1 medrol pack with some relief. Pain came back after he was finished. HISTORY OF PRESENT ILLNESS:       Mr. Kelsy Palencia  is a pleasant 67 y.o. male referred by his primary care physician Tano Hassan and Adolfo Rangel MD, here for consultation regarding his LBP pain. He states his pain began insidiously several years ago and states that he has had episodes of pain on and off since then. He states that his last episode about 5 days ago. Patient was seen at [facility], ED yesterday and a CT scan of his lumbar spine was obtained which showed no acute fractures. He was discharged with medication. His pain has steadily continued since then. He rates his back pain 9/10 without radiation into either lower extremity. He describes the pain as constant. Pain is worse with rising from a seated position, standing, walking, leaning forward and improved some with lying down and sitting. He denies any radiation of pain into either lower extremity. He denies numbness and tingling in his right or left leg. He denies weakness of his right or left leg and denies bowel or bladder dysfunction. . The pain minimally disrupts his sleep.    Pain Assessment  Location of Pain: Back  Location Modifiers: Medial  Severity of Pain: 9  Quality of Pain: Dull,Aching  Duration of Pain: Persistent  Frequency of Pain: Constant  Aggravating Factors: Walking,Standing  Limiting Behavior: Yes  Relieving Factors: Rest  Result of Injury: No  Work-Related Injury: No  Are there other pain locations you wish to document?: No]  Current/Past Treatment:   Physical Therapy: None  Chiropractic: None  Injection: None  Medications: Robaxin and recently finished MDP    Past Medical History:   Past Medical History:   Diagnosis Date    CAD (coronary artery disease)     Cataract     Diabetes mellitus ([address]     DJD (degenerative joint disease)     History of PTCA     Dr. Iris Soliman Hyperlipidemia     Hypertension     Hypotestosteronism     Dr. 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[address]     growth    Sleep apnea     cpap        Past Surgical History:     Past Surgical History:   Procedure Laterality Date    CARDIAC SURGERY  2009    stents    COLONOSCOPY  2001    COLONOSCOPY  12/9/11    diverticulosis    CORONARY ANGIOPLASTY WITH STENT PLACEMENT  2003    EYE SURGERY      cataracts    KNEE ARTHROSCOPY Right 2015    TOTAL KNEE ARTHROPLASTY Right 1/23/2020    RIGHT TOTAL KNEE REPLACEMENT      LUTHER & NEPHEW performed by Aspen Salinas MD at [place] [address]       Current Medications:     Current Outpatient Medications:     methocarbamol (ROBAXIN) 500 MG tablet, Take 1 tablet by mouth 4 times daily for 10 days, Disp: 40 tablet, Rfl: 0    lidocaine (LIDODERM) 5 %, Place 1 patch onto the skin daily for 10 days 12 hours on, 12 hours off., Disp: 10 patch, Rfl: 0    cabergoline (DOSTINEX) 0.5 MG tablet, TAKE half tablet once a week.  MONDAY, Disp: 8 tablet, Rfl: 1    metFORMIN (GLUCOPHAGE-XR) 750 MG extended release tablet, TAKE ONE TABLET BY MOUTH TWICE A DAY, Disp: 180 tablet, Rfl: 1    penicillin v potassium (VEETID) 500 MG tablet, TAKE TWO TABLETS BY MOUTH TWICE A DAY, Disp: 120 tablet, Rfl: 5    hydrALAZINE (APRESOLINE) 25 MG tablet, TAKE ONE TABLET BY MOUTH EVERY MORNING, TAKE ONE TABLET BY MOUTH DAILY AROUND NOON, AND TAKE TWO TABLETS BY MOUTH EVERY EVENING, Disp: 120 tablet, Rfl: 3    carvedilol (COREG) 25 MG tablet, TAKE ONE TABLET BY MOUTH TWICE A DAY, Disp: 180 tablet, Rfl: 3    blood glucose test strips (ONETOUCH VERIO) strip, Test 2-3 times daily. , Disp: 150 strip, Rfl: 3    atorvastatin (LIPITOR) 80 MG tablet, TAKE ONE TABLET BY MOUTH DAILY, Disp: 90 tablet, Rfl: 3    hydrALAZINE (APRESOLINE) 50 MG tablet, , Disp: , Rfl:     ammonium lactate (LAC-HYDRIN) 12 % lotion, APPLY TOPICALLY DAILY, Disp: 500 g, Rfl: 4    allopurinol (ZYLOPRIM) 300 MG tablet, TAKE ONE TABLET BY MOUTH DAILY, Disp: 90 tablet, Rfl: 2    lisinopril (PRINIVIL;ZESTRIL) 40 MG tablet, TAKE ONE-HALF TABLET BY MOUTH TWICE A DAY (Patient taking differently: Take 20 mg by mouth 2 times daily ), Disp: 90 tablet, Rfl: 10    cyclobenzaprine (FLEXERIL) 10 MG tablet, Take 1 tablet by mouth 3 times daily as needed for Muscle spasms, Disp: 90 tablet, Rfl: 0    ibuprofen (ADVIL) 200 MG tablet, Take 2 tablets by mouth every 8 hours as needed for Pain, Disp: 120 tablet, Rfl: 0    hydrochlorothiazide (HYDRODIURIL) 25 MG tablet, Take 25 mg by mouth daily, Disp: , Rfl:     ferrous sulfate 325 (65 Fe) MG tablet, Take 1 tablet by mouth daily (with breakfast), Disp: 30 tablet, Rfl: 3    Probiotic Product (PROBIOTIC DAILY PO), Take by mouth, Disp: , Rfl:     Accu-Chek Multiclix Lancets MISC, Test once daily  DX  250.00, Disp: 100 each, Rfl: 3    Cholecalciferol (VITAMIN D) 2000 UNITS CAPS capsule, Take 1 capsule by mouth daily, Disp: , Rfl:     aspirin 81 MG chewable tablet, Take 81 mg by mouth daily. , Disp: , Rfl:     Allergies:  Bactrim [sulfamethoxazole-trimethoprim]    Social History:    reports that he quit smoking about 39 years ago. His smoking use included cigarettes. He has a 15.00 pack-year smoking history. He has never used smokeless tobacco. He reports that he does not drink alcohol and does not use drugs.     Family History:   Family History   Problem Relation Age of Onset    Heart Disease Brother     High Blood Pressure Brother     Dementia Mother         alzheimers       REVIEW OF SYSTEMS: Full ROS noted & scanned   CONSTITUTIONAL: Denies unexplained weight loss, fevers, chills or fatigue  NEUROLOGICAL: Denies unsteady gait or progressive weakness  MUSCULOSKELETAL: Denies joint swelling or redness  PSYCHOLOGICAL: Denies anxiety, depression   SKIN: Denies skin changes, delayed healing, rash, itching   HEMATOLOGIC: Denies easy bleeding or bruising  ENDOCRINE: Denies excessive thirst, urination, heat/cold  RESPIRATORY: Denies current dyspnea, cough  GI: Denies nausea, vomiting, diarrhea   : Denies bowel or bladder issues      PHYSICAL EXAM:    Vitals: Height 5' 10\" (1.778 m), weight 268 lb (121.6 kg). GENERAL EXAM:  General Apparence: Patient is adequately groomed with no evidence of malnutrition. Orientation: The patient is oriented to time, place and person. Mood & Affect:The patient's mood and affect are appropriate. Vascular: Examination reveals no swelling tenderness in upper or lower extremities. Good capillary refill. Lymphatic: The lymphatic examination bilaterally reveals all areas to be without enlargement or induration  Sensation: Sensation is intact without deficit  Coordination/Balance: Good coordination. LUMBAR/SACRAL EXAMINATION:  Inspection: Local inspection shows no step-off or bruising. Lumbar alignment is normal.  Sagittal and Coronal balance is neutral.      Palpation:   No evidence of tenderness at the midline. No tenderness bilaterally at the paraspinal or trochanters. There is no step-off or paraspinal spasm. Range of Motion: Lumbar flexion, extension and rotation are mildly limited due to pain. Strength:   Strength testing is 5/5 in all muscle groups tested. Special Tests:   Straight leg raise and crossed SLR negative. Leg length and pelvis level. Skin: There are no rashes, ulcerations or lesions. Reflexes: Reflexes are symmetrically 2+ at the patellar and ankle tendons. Clonus absent bilaterally at the feet.   Gait & station: normal, patient ambulates without assistance    Additional Examinations:   RIGHT LOWER EXTREMITY: Inspection/examination of the right lower extremity does not show any tenderness, deformity or injury. Range of motion is unremarkable. There is no gross instability. There are no rashes, ulcerations or lesions. Strength and tone are normal.  LEFT LOWER EXTREMITY:  Inspection/examination of the left lower extremity does not show any tenderness, deformity or injury. Range of motion is unremarkable. There is no gross instability. There are no rashes, ulcerations or lesions. Strength and tone are normal.    Diagnostic Testing:    CT scan of lumbar spine that was performed on 2/6/2022 was reviewed with the patient which shows  Impression   No acute lumbar spine abnormality       Multilevel degenerative changes with central disc bulging from L2-S1, no   significant canal stenosis identified, right-sided foraminal narrowing at L4-5       Impression:   DDD L4-5  Right foraminal stenosis L4-5      Plan:      We discussed the diagnosis and treatment options including observation, additional oral steroids, physical therapy, epidural injections and additional imaging. He wishes to proceed with MRI of his lumbar spine. .    Follow up -after the MRI to review the results and discuss treatment options. Old records were reviewed.     Patient examined and note dictated by Michelle Thomson PA-C.

## 2022-02-08 ENCOUNTER — TELEPHONE (OUTPATIENT)
Dept: ORTHOPEDIC SURGERY | Age: 77
End: 2022-02-08

## 2022-02-08 NOTE — TELEPHONE ENCOUNTER
Spoke with patient and let him know that MRI is approved. Faxed everything over to [facility].

## 2022-02-09 ENCOUNTER — TELEPHONE (OUTPATIENT)
Dept: PHARMACY | Facility: CLINIC | Age: 77
End: 2022-02-09

## 2022-02-09 ENCOUNTER — TELEPHONE (OUTPATIENT)
Dept: ORTHOPEDIC SURGERY | Age: 77
End: 2022-02-09

## 2022-02-09 RX ORDER — TIZANIDINE 4 MG/1
4 TABLET ORAL 3 TIMES DAILY PRN
Qty: 30 TABLET | Refills: 1 | Status: SHIPPED
Start: 2022-02-09 | End: 2022-03-17

## 2022-02-09 NOTE — TELEPHONE ENCOUNTER
General Question     Subject: RT THIGH CRAMP  Patient and /or Facility Request: Lexi Medrano \"Davonte\"  Contact Number: [phone]    WIFE CALLING REGARDING THAT HER  WAS IN TO SEE MADHU ON Monday February 7, 2022    PATIENT HAS GOTTEN WORSE AND KNOW HAVING SEVER CRAMPS IN HIS RT THIGH. PLEASE CALL BACK WIFE AT THE ABOVE NUMBER.

## 2022-02-09 NOTE — TELEPHONE ENCOUNTER
[facility] CLINICAL PHARMACY: ADHERENCE REVIEW  Identified care gap per [employer]: fills at [facility]: ACE/ARB, Diabetes and Statin adherence    Last Visit: 12/16/21    ASSESSMENT  ACE/ARB ADHERENCE    Insurance Records claims through 2021     LISINOPRIL   TAB 40MG last filled on 11/9/21 for 90 day supply. Next refill due: 2/15/22    Per [employer]:  LISINOPRIL   TAB 40MG last filled on 11/9/22 for 90 day supply. Per [employer]:   LISINOPRIL   TAB 40MG in process    BP Readings from Last 3 Encounters:   02/06/22 (!) 114/52   12/16/21 130/80   09/23/21 (!) 120/50     CrCl cannot be calculated (Patient's most recent lab result is older than the maximum 120 days allowed. ). DIABETES ADHERENCE    Insurance Records claims through 2021   METFORMIN    TAB 750MG ER last filled on 10/10/21 for 90 day supply. Next refill due: 1/24/22    Per [employer] Portal:  METFORMIN    TAB 750MG ER last filled on 10/10/21 for 90 day supply. Per [employer]:   METFORMIN    TAB 750MG ER last picked up on 1/11/22 for 90 day supply. Billed through [employer] $0.00    Lab Results   Component Value Date    LABA1C 7.3 12/16/2021    LABA1C 6.6 09/23/2021    LABA1C 6.0 06/03/2021       STATIN ADHERENCE    Insurance Records claims through 2021   ATORVASTATIN TAB 80MG  last filled on 7/28/21 for 90 day supply. Next refill due: 10/10/21    Per [employer] Portal:  ATORVASTATIN TAB 80MG last filled on 7/28/21 for 90 day supply. Per [employer]:   ATORVASTATIN TAB 80MG last picked up on 12/15/21 for 90 day supply. Billed through [employer] Results   Component Value Date    CHOL 175 06/17/2020    TRIG 201 (H) 06/17/2020    HDL 38 (L) 06/04/2021    LDLCALC 74 06/04/2021    LDLDIRECT 73 01/18/2018     ALT   Date Value Ref Range Status   06/04/2021 13 10 - 40 U/L Final     AST   Date Value Ref Range Status   06/04/2021 14 (L) 15 - 37 U/L Final     The 10-year ASCVD risk score (Boy Almaraz., et al., 2013) is: 45.4%    Values used to calculate the score:      Age: 67 years      Sex: Male      Is Non- : No      Diabetic: Yes      Tobacco smoker: No      Systolic Blood Pressure: 014 mmHg      Is BP treated: Yes      HDL Cholesterol: 38 mg/dL      Total Cholesterol: 153 mg/dL     PLAN  The following are interventions that have been identified:     Spoke with [facility] Pharmacist  Atorvastatin was last filled on 12/15/21 with discount card for 90ds  Metformin was last filled on 22 with [employer] for $0.00  Lisinopril is in process at pharmacy currently     Pharmacist stated that all these medications are $0.00 with patients insurance so not sure why discount card was applied. Pharmacist removed discount card from profile so all future fills can be billed under insurance. For Pharmacy Admin Tracking Only     CPA in place:  No   Intervention Detail: Adherence Monitorin   Gap Closed?: No    Intervention Accepted By: Pharmacy: 1   Time Spent (min): 10          Future Appointments   Date Time Provider Niko Linn   2022  2:00 PM Mariaelena López PA-C AND ORTHO ANMOL   3/3/2022  1:00 PM SCHEDULE, [facility] AWV LPN [facility] [place]   3/17/2022 10:00 AM Rigoberto Thomas MD [facility] RAMBO [place]   2022 11:00 AM Sera Mayfield MD AND INFCT ANTONINO Dixon MA.    [facility] free: [phone]

## 2022-02-14 ENCOUNTER — TELEPHONE (OUTPATIENT)
Dept: ORTHOPEDIC SURGERY | Age: 77
End: 2022-02-14

## 2022-02-14 ENCOUNTER — OFFICE VISIT (OUTPATIENT)
Dept: ORTHOPEDIC SURGERY | Age: 77
End: 2022-02-14
Payer: MEDICARE

## 2022-02-14 VITALS — HEIGHT: 70 IN | BODY MASS INDEX: 38.37 KG/M2 | WEIGHT: 268 LBS

## 2022-02-14 DIAGNOSIS — M51.26 HNP (HERNIATED NUCLEUS PULPOSUS), LUMBAR: ICD-10-CM

## 2022-02-14 DIAGNOSIS — M48.061 LUMBAR FORAMINAL STENOSIS: Primary | ICD-10-CM

## 2022-02-14 PROCEDURE — 99213 OFFICE O/P EST LOW 20 MIN: CPT | Performed by: PHYSICIAN ASSISTANT

## 2022-02-14 NOTE — TELEPHONE ENCOUNTER
General Question     Subject: PATIENT HAS QUESTIONS ABOUT MRI TR THAT WERENT DISCUSSED WITH DEX.  PLEASE ADVISE  Patient: Lisa Macias"  Contact Number: [phone]

## 2022-02-14 NOTE — PROGRESS NOTES
Follow-up: LUMBAR SPINE    Referring Provider:  No ref. provider found    CHIEF COMPLAINT:    No chief complaint on file. HISTORY OF PRESENT ILLNESS:       Mr. Luis Gruber  is a pleasant 67 y.o. male  here for follow-up regarding his LBP pain. Patient states that the pain and he was experiencing when I last saw him has significantly improved with the Zanaflex. He states that his back pain is minimal and there is no radiation of pain into either lower extremity. He has been able to return to most all his ADLs without pain. He denies any paresthesias, or bowel or bladder dysfunction. He states his pain began insidiously several years ago and states that he has had episodes of pain on and off since then. He states that his last episode began about 5 days ago. Patient was seen at [facility] ED  and a CT scan of his lumbar spine was obtained which showed no acute fractures. He was discharged with medication. His pain has steadily continued since then. He rates his back pain 9/10 without radiation into either lower extremity. He describes the pain as constant. Pain is worse with rising from a seated position, standing, walking, leaning forward and improved some with lying down and sitting. He denies any radiation of pain into either lower extremity. He denies numbness and tingling in his right or left leg. He denies weakness of his right or left leg and denies bowel or bladder dysfunction.  . The pain minimally disrupts his sleep.    ]  Current/Past Treatment:   Physical Therapy: None  Chiropractic: None  Injection: None  Medications: Robaxin and recently finished MDP    Past Medical History:   Past Medical History:   Diagnosis Date    CAD (coronary artery disease)     Cataract     Diabetes mellitus ([facility] [address]     DJD (degenerative joint disease)     History of PTCA     Dr. Yoel Arias Hyperlipidemia     Hypertension     Hypotestosteronism     Dr. Amira Stockton, urology    IGT (impaired glucose tolerance)     Pituitary abnormality (HCC)     growth    Sleep apnea     cpap        Past Surgical History:     Past Surgical History:   Procedure Laterality Date    CARDIAC SURGERY  2009    stents    COLONOSCOPY  2001    COLONOSCOPY  12/9/11    diverticulosis    CORONARY ANGIOPLASTY WITH STENT PLACEMENT  2003    EYE SURGERY      cataracts    KNEE ARTHROSCOPY Right 2015    TOTAL KNEE ARTHROPLASTY Right 1/23/2020    RIGHT TOTAL KNEE REPLACEMENT      LUTHER & NEPHEW performed by Tierney Owen MD at [place] [address]       Current Medications:     Current Outpatient Medications:     tiZANidine (ZANAFLEX) 4 MG tablet, Take 1 tablet by mouth 3 times daily as needed (muscle spasms), Disp: 30 tablet, Rfl: 1    methocarbamol (ROBAXIN) 500 MG tablet, Take 1 tablet by mouth 4 times daily for 10 days, Disp: 40 tablet, Rfl: 0    lidocaine (LIDODERM) 5 %, Place 1 patch onto the skin daily for 10 days 12 hours on, 12 hours off., Disp: 10 patch, Rfl: 0    cabergoline (DOSTINEX) 0.5 MG tablet, TAKE half tablet once a week. MONDAY, Disp: 8 tablet, Rfl: 1    metFORMIN (GLUCOPHAGE-XR) 750 MG extended release tablet, TAKE ONE TABLET BY MOUTH TWICE A DAY, Disp: 180 tablet, Rfl: 1    penicillin v potassium (VEETID) 500 MG tablet, TAKE TWO TABLETS BY MOUTH TWICE A DAY, Disp: 120 tablet, Rfl: 5    hydrALAZINE (APRESOLINE) 25 MG tablet, TAKE ONE TABLET BY MOUTH EVERY MORNING, TAKE ONE TABLET BY MOUTH DAILY AROUND NOON, AND TAKE TWO TABLETS BY MOUTH EVERY EVENING, Disp: 120 tablet, Rfl: 3    carvedilol (COREG) 25 MG tablet, TAKE ONE TABLET BY MOUTH TWICE A DAY, Disp: 180 tablet, Rfl: 3    blood glucose test strips (ONETOUCH VERIO) strip, Test 2-3 times daily. , Disp: 150 strip, Rfl: 3    atorvastatin (LIPITOR) 80 MG tablet, TAKE ONE TABLET BY MOUTH DAILY, Disp: 90 tablet, Rfl: 3    hydrALAZINE (APRESOLINE) 50 MG tablet, , Disp: , Rfl:     ammonium lactate (LAC-HYDRIN) 12 % lotion, APPLY TOPICALLY DAILY, Disp: 500 g, Rfl: 4   allopurinol (ZYLOPRIM) 300 MG tablet, TAKE ONE TABLET BY MOUTH DAILY, Disp: 90 tablet, Rfl: 2    lisinopril (PRINIVIL;ZESTRIL) 40 MG tablet, TAKE ONE-HALF TABLET BY MOUTH TWICE A DAY (Patient taking differently: Take 20 mg by mouth 2 times daily ), Disp: 90 tablet, Rfl: 10    cyclobenzaprine (FLEXERIL) 10 MG tablet, Take 1 tablet by mouth 3 times daily as needed for Muscle spasms, Disp: 90 tablet, Rfl: 0    ibuprofen (ADVIL) 200 MG tablet, Take 2 tablets by mouth every 8 hours as needed for Pain, Disp: 120 tablet, Rfl: 0    hydrochlorothiazide (HYDRODIURIL) 25 MG tablet, Take 25 mg by mouth daily, Disp: , Rfl:     ferrous sulfate 325 (65 Fe) MG tablet, Take 1 tablet by mouth daily (with breakfast), Disp: 30 tablet, Rfl: 3    Probiotic Product (PROBIOTIC DAILY PO), Take by mouth, Disp: , Rfl:     Accu-Chek Multiclix Lancets MISC, Test once daily  DX  250.00, Disp: 100 each, Rfl: 3    Cholecalciferol (VITAMIN D) 2000 UNITS CAPS capsule, Take 1 capsule by mouth daily, Disp: , Rfl:     aspirin 81 MG chewable tablet, Take 81 mg by mouth daily. , Disp: , Rfl:     Allergies:  Bactrim [sulfamethoxazole-trimethoprim]    Social History:    reports that he quit smoking about 39 years ago. His smoking use included cigarettes. He has a 15.00 pack-year smoking history. He has never used smokeless tobacco. He reports that he does not drink alcohol and does not use drugs.     Family History:   Family History   Problem Relation Age of Onset    Heart Disease Brother     High Blood Pressure Brother     Dementia Mother         alzheimers       REVIEW OF SYSTEMS: Full ROS noted & scanned   CONSTITUTIONAL: Denies unexplained weight loss, fevers, chills or fatigue  NEUROLOGICAL: Denies unsteady gait or progressive weakness  MUSCULOSKELETAL: Denies joint swelling or redness  PSYCHOLOGICAL: Denies anxiety, depression   SKIN: Denies skin changes, delayed healing, rash, itching   HEMATOLOGIC: Denies easy bleeding or bruising  ENDOCRINE: Denies excessive thirst, urination, heat/cold  RESPIRATORY: Denies current dyspnea, cough  GI: Denies nausea, vomiting, diarrhea   : Denies bowel or bladder issues      PHYSICAL EXAM:    Vitals: There were no vitals taken for this visit. GENERAL EXAM:  General Apparence: Patient is adequately groomed with no evidence of malnutrition. Orientation: The patient is oriented to time, place and person. Mood & Affect:The patient's mood and affect are appropriate. Vascular: Examination reveals no swelling tenderness in upper or lower extremities. Good capillary refill. Lymphatic: The lymphatic examination bilaterally reveals all areas to be without enlargement or induration  Sensation: Sensation is intact without deficit  Coordination/Balance: Good coordination. LUMBAR/SACRAL EXAMINATION:  Inspection: Local inspection shows no step-off or bruising. Lumbar alignment is normal.  Sagittal and Coronal balance is neutral.      Palpation:   No evidence of tenderness at the midline. No tenderness bilaterally at the paraspinal or trochanters. There is no step-off or paraspinal spasm. Range of Motion: Lumbar flexion, extension and rotation are mildly limited due to pain. Strength:   Strength testing is 5/5 in all muscle groups tested. Special Tests:   Straight leg raise and crossed SLR negative. Leg length and pelvis level. Skin: There are no rashes, ulcerations or lesions. Reflexes: Reflexes are symmetrically 2+ at the patellar and ankle tendons. Clonus absent bilaterally at the feet. Gait & station: normal, patient ambulates without assistance    Additional Examinations:   RIGHT LOWER EXTREMITY: Inspection/examination of the right lower extremity does not show any tenderness, deformity or injury. Range of motion is unremarkable. There is no gross instability. There are no rashes, ulcerations or lesions.  Strength and tone are normal.  LEFT LOWER EXTREMITY:  Inspection/examination of the left lower extremity does not show any tenderness, deformity or injury. Range of motion is unremarkable. There is no gross instability. There are no rashes, ulcerations or lesions. Strength and tone are normal.    Diagnostic Testing:    CT scan of lumbar spine that was performed on 2/6/2022 was reviewed with the patient which shows  Impression   No acute lumbar spine abnormality       Multilevel degenerative changes with central disc bulging from L2-S1, no   significant canal stenosis identified, right-sided foraminal narrowing at L4-5     MRI of the lumbar spine that was obtained on 2/11/2022 was reviewed with the patient and his wife which shows a central protrusion type L5-S1 disc herniation impinging on the anterior thecal sac. There is a right foraminal protrusion type L4-5 disc herniation projecting into the right L4-5 foramen. There is a right paracentral protrusion type L2-3 disc herniation indenting the anterior thecal sac. There is a left paracentral protrusion type L1-2 disc herniation indenting the anterior thecal sac. There is mild bilateral L3-4, moderate bilateral L4-5, and mild right and moderate left L5-S1 foraminal stenosis. Impression:   DDD L3-S1  Foraminal stenosis L4-5 and L5-S1  HNP L4-5 and L5-S1      Plan:      We discussed the diagnosis and treatment options including observation, additional oral steroids, physical therapy, epidural injections and additional imaging. He wishes to proceed outpatient physical therapy for lumbar stabilization core strengthening exercises with modalities of choice. If he finds that his symptoms return to the point where he is having moderate pain on a regular basis he'll contact the office for referral to Dr. Luigi Butler for his consideration for spinal injections. Follow up -as needed    Old records were reviewed.     Patient examined and note dictated by Wilver Montague PA-C.

## 2022-02-15 ENCOUNTER — TELEPHONE (OUTPATIENT)
Dept: ORTHOPEDIC SURGERY | Age: 77
End: 2022-02-15

## 2022-02-16 ENCOUNTER — HOSPITAL ENCOUNTER (OUTPATIENT)
Dept: PHYSICAL THERAPY | Age: 77
Setting detail: THERAPIES SERIES
Discharge: HOME | End: 2022-02-16
Payer: MEDICARE

## 2022-02-16 DIAGNOSIS — M48.061 LUMBAR FORAMINAL STENOSIS: ICD-10-CM

## 2022-02-16 DIAGNOSIS — M51.36 DDD (DEGENERATIVE DISC DISEASE), LUMBAR: ICD-10-CM

## 2022-02-16 PROCEDURE — 97161 PT EVAL LOW COMPLEX 20 MIN: CPT | Performed by: PHYSICAL THERAPIST

## 2022-02-16 PROCEDURE — 97110 THERAPEUTIC EXERCISES: CPT | Performed by: PHYSICAL THERAPIST

## 2022-02-16 PROCEDURE — 97140 MANUAL THERAPY 1/> REGIONS: CPT | Performed by: PHYSICAL THERAPIST

## 2022-02-16 NOTE — FLOWSHEET NOTE
[place] [facility] [facility],  [address]   [facility]  Phone: [phone]  Fax [phone]    Physical Therapy Treatment Note/ Progress Report:           Date:  2022    Patient Name:  Ana Bautista   \"Davonte\" :  1945  MRN: [id number]  Restrictions/Precautions:    Medical/Treatment Diagnosis Information:  · Diagnosis: M51.26 (ICD-10-CM) - HNP (herniated nucleus pulposus), lumbar, M48.061 (ICD-10-CM) - Lumbar foraminal stenosis  · Treatment Diagnosis: M51.26 (ICD-10-CM) - HNP (herniated nucleus pulposus), lumbar, M48.061 (ICD-10-CM) - Lumbar foraminal stenosis  Insurance/Certification information:  PT Insurance Information: [employer] , $40 cp, BMN, no auth, yes telemed  Physician Information:  Referring Practitioner: Gely Staples PA-C  Has the plan of care been signed (Y/N):        []  Yes  [x]  No     Date of Patient follow up with Physician: prn, referral to Dr Napoleon Calderon for possible ORESTES if symptoms worsen      Is this a Progress Report:     []  Yes  [x]  No        If Yes:  Date Range for reporting period:  Beginnin22  Ending:     Progress report will be due (10 Rx or 30 days whichever is less): 3/66/84       Recertification will be due (POC Duration  / 90 days whichever is less): 3/30/22        Visit # Insurance Allowable Auth Required   In-person 1 BMN []  Yes [x]  No    Telehealth   [x]  Yes []  No    Total            Functional Scale: modODI 28%    Date assessed:  22      Therapy Diagnosis/Practice Pattern:F, conservative      Number of Comorbidities:  []0     []1-2    [x]3+    Latex Allergy:  [x]NO      []YES  Preferred Language for Healthcare:   [x]English       []other:      Pain level:  Eval: 2-9/10    SUBJECTIVE:  See eval    OBJECTIVE: See eval   Observation:    Test measurements:      RESTRICTIONS/PRECAUTIONS: DMll, CAD, renal cysts, R TKR    Exercises/Interventions:     Therapeutic Ex (11987) Sets/sec/Reps Notes/CUES   Supine LTR x10 HEP   Core bracing 5x10\" HEP   Core bracing with march x10 HEP   Core bracing with Leg extension x10 HEP        Seated Lumbar Flexion Stretch 2x30\" HEP        Standing Hip ABD  Standing Hip Ext OVL x10 R/L  OVL x 10 R/L HEP                  Pt ed: eval findings, POC, HEP; typical age related musculoskeletal changes;  10'    Manual Intervention (27528) 10'    STM B lumbar and thoracic paraspinals, glutes 5'    Grade III  and R uPAs thoracic to lumbar 5'                        NMR re-education (74176)  CUES NEEDED                                                Therapeutic Activity (22060)                                       Therapeutic Exercise and NMR EXR  [x] (38360) Provided verbal/tactile cueing for activities related to strengthening, flexibility, endurance, ROM  for improvements in proximal hip and core control with self care, mobility, lifting and ambulation.  [] (27057) Provided verbal/tactile cueing for activities related to improving balance, coordination, kinesthetic sense, posture, motor skill, proprioception  to assist with core control in self care, mobility, lifting, and ambulation.      Therapeutic Activities:    [] (35534 or 67680) Provided verbal/tactile cueing for activities related to improving balance, coordination, kinesthetic sense, posture, motor skill, proprioception and motor activation to allow for proper function  with self care and ADLs  [] (39378) Provided training and instruction to the patient for proper core and proximal hip recruitment and positioning with ambulation re-education     Home Exercise Program:    [x] (69065) Reviewed/Progressed HEP activities related to strengthening, flexibility, endurance, ROM of core, proximal hip and LE for functional self-care, mobility, lifting and ambulation   [] (60474) Reviewed/Progressed HEP activities related to improving balance, coordination, kinesthetic sense, posture, motor skill, proprioception of core, proximal hip and LE for self care, mobility, lifting, and ambulation      Manual Treatments:  PROM / STM / Oscillations-Mobs:  G-I, II, III, IV (PA's, Inf., Post.)  [x] (28803) Provided manual therapy to mobilize proximal hip and LS spine soft tissue/joints for the purpose of modulating pain, promoting relaxation,  increasing ROM, reducing/eliminating soft tissue swelling/inflammation/restriction, improving soft tissue extensibility and allowing for proper ROM for normal function with self care, mobility, lifting and ambulation. Modalities:  Denied     Charges  Timed Code Treatment Minutes: 30   Total Treatment Minutes: 45       [x] EVAL (LOW) 17636   [] EVAL (MOD) 63138   [] EVAL (HIGH) 93424   [] RE-EVAL     [x] HH(82010) x   1  [] IONTO  [] NMR (45508) x     [] VASO  [x] Manual (07256) x 1  [] Other:  [] TA x      [] Mech Traction (26892)  [] ES(attended) (67338)      [] ES (un) (01254):     Goals:  GOALS:  Patient stated goal: Pt will increase back mobility. []? Progressing: []? Met: []? Not Met: []? Adjusted     Therapist goals for Patient:   Short Term Goals: To be achieved in: 2 weeks  1. Independent in HEP and progression per patient tolerance, in order to prevent re-injury. []? Progressing: []? Met: []? Not Met: []? Adjusted  2. Patient will have a decrease in pain to facilitate improvement in movement, function, and ADLs as indicated by Functional Deficits. []? Progressing: []? Met: []? Not Met: []? Adjusted        Long Term Goals: To be achieved in: 6 weeks  1. Disability index score of 28% or less for the modODI  to assist with reaching prior level of function. []? Progressing: []? Met: []? Not Met: []? Adjusted  2. Patient will demonstrate increased lumbar AROM to WNL, to allow for proper joint functioning as indicated by patients difficulty squatting and reaching. []? Progressing: []? Met: []? Not Met: []? Adjusted  3.  Patient will demonstrate an increase in glute and core strength to 5/5 to allow for proper functional mobility as indicated by patients difficulty walking and squatting.   []? Progressing: []? Met: []? Not Met: []? Adjusted  4. Patient will return to walking and standing > 30 mins without increased symptoms or restriction. []? Progressing: []? Met: []? Not Met: []? Adjusted    Progression Towards Functional goals:  [] Patient is progressing as expected towards functional goals listed. [] Progression is slowed due to complexities listed. [] Progression has been slowed due to co-morbidities. [x] Plan just implemented, too soon to assess goals progression  [] Other:     Overall Progression Towards Functional goals/ Treatment Progress Update:  [] Patient is progressing as expected towards functional goals listed. [] Progression is slowed due to complexities/Impairments listed. [] Progression has been slowed due to co-morbidities. [x] Plan just implemented, too soon to assess goals progression <30days   [] Goals require adjustment due to lack of progress  [] Patient is not progressing as expected and requires additional follow up with physician  [] Other    Prognosis for POC: [x] Good [] Fair  [] Poor      Patient requires continued skilled intervention: [x] Yes  [] No    Treatment/Activity Tolerance:  [x] Patient able to complete treatment  [] Patient limited by fatigue  [] Patient limited by pain    [] Patient limited by other medical complications  [] Other:     ASSESSMENT: see eval: global spine hypomobility and increased tension of B paraspinals.      Return to Play: (if applicable)   []  Stage 1: Intro to Strength   []  Stage 2: Return to Run and Strength   []  Stage 3: Return to Jump and Strength   []  Stage 4: Dynamic Strength and Agility   []  Stage 5: Sport Specific Training     []  Ready to Return to Play, Meets All Above Stages   []  Not Ready for Return to Sports   Comments:                         PLAN: See eval  [] Continue per plan of care [] Alter current plan (see comments above)  [x] Plan of care initiated [] Hold pending MD visit [] Discharge      Electronically signed by:  Jenita Gowers, PT Hobart Sarin, SPT. Therapist was present, directed the patient's care, made skilled judgement, and was responsible for assessment and treatment of the patient. Note: If patient does not return for scheduled/ recommended follow up visits, this note will serve as a discharge from care along with most recent update on progress. HEP instruction:   Access Code: [email or web address] [facility]  URL: [email or web address]  Date: 02/16/2022  Prepared by: Jenita Gowers     Exercises  Supine Lower Trunk Rotation - 1-2 x daily - 7 x weekly - 1-2 sets - 10 reps  Supine Transversus Abdominis Bracing - Hands on Stomach - 1-2 x daily - 7 x weekly - 1-2 sets - 10 reps - 5 seconds hold  Supine March - 1 x daily - 7 x weekly - 2-3 sets - 10 reps  Supine Transversus Abdominis Bracing with Leg Extension - 1 x daily - 7 x weekly - 2-3 sets - 10 reps  Seated Lumbar Flexion Stretch - 1-2 x daily - 7 x weekly - 3 sets - 20-30 seconds hold  Standing Hip Abduction with Resistance at Ankles and Counter Support - 1 x daily - 7 x weekly - 2-3 sets - 10 reps  Standing Hip Extension with Resistance at Ankles and Counter Support - 1 x daily - 7 x weekly - 2-3 sets - 10 reps

## 2022-02-16 NOTE — PLAN OF CARE
LBP, starting about 2 weeks ago without known NELDA. Muscle relaxors helped and reduced pain almost entirely. Pain now occurs intermittently, usually when moving the wrong way, such as when reaching over. When muscle spasm was at it's worst, he fell on his side and aggravated his R knee. He saw the MD after the fall and there was no further injury. He had been given oral steroids by PCP with only mild help. He was initially seen in ED on 2/6/22 and CT scan performed that showed multilevel degeneration L2-S1. He was seen by Jose Luis Vega PA-C 2/7/22 where MRI was ordered. See results below. Renal cysts were also noted on MRI and PCP has asked radiologist for more information regarding this. He was also placed on Zanaflex, which has helped greatly, and given PT referral.      MRI obtained 2/11/22: central protrusion type L5-S1 disc herniation impinging on the anterior thecal sac. There is a right foraminal protrusion type L4-5 disc herniation projecting into the right L4-5 foramen. There is a right paracentral protrusion type L2-3 disc herniation indenting the anterior thecal sac. There is a left paracentral protrusion type L1-2 disc herniation indenting the anterior thecal sac. There is mild bilateral L3-4, moderate bilateral L4-5, and mild right and moderate left L5-S1 foraminal stenosis.      Relevant Medical History:OA, CAD w/ stent placement, DMll, hyperlipidemia, htn, sleep apnea, R TKR  Functional Disability Index/G-Codes:   modODI 28%    BMI 38.45  Pain Scale: 2-9/10  Easing factors: muscle relaxors, resting  Provocative factors: standing, walking, squatting     Type: [x]Constant   []Intermittent  []Radiating [x]Localized []other:     Numbness/Tingling: denies    Occupation/School: retired pharmacist    Living Status/Prior Level of Function: Independent with ADLs and IADLs, lives at home with wife, enjoys walking    OBJECTIVE:     ROM     LUMBAR FLEX 6\" below 185 [address] [place] EXT 25%    Sidebend 2\" * 4\"   Rotation 15 deg 40 deg    LEFT RIGHT   HIP Flex ~110 ~110   HIP Abd     HIP ER     HIP IR     Knee Flex     Knee ext     Hamstring FLEX     Piriformis                Strength  LEFT RIGHT   MfA     TrA     HIP Flexors 5 4+ (pain in knee)   HIP Abductors 4- 4-   HIP ext 3+ 3+        Knee EXT (quad) 5 5   Knee Flex (HS) 5 5   Ankle DF 5 5   Ankle PF 5 5   Great Toe Ext       Reflexes/Sensation:    [x]Dermatomes/Myotomes intact    []UE Reflexes     []Normal []Hypo      []Hyper   []LE Reflexes     []Normal []Hypo      []Hyper   []Babinski/Clonus/Hoffmans:    []Other:    Joint mobility:    []Normal    [x]Hypo: thoracic and lumbar spine  and uPAs. []Hyper    Palpation: increased tonicity B lumbar paraspinals; Functional Mobility/Transfers: slow transitions between side lying, prone, supine. Slow supine to sit transitions with limited control and need of UE for balance. Bandages/Dressings/Incisions: NA    Gait: slowed stiff gait. Lack of B heel strike. Limited trunk rotation and arm swing. Orthopedic Special Tests:   - B SLR  Slight limitation in HS flexibility B                       [x] Patient history, allergies, meds reviewed. Medical chart reviewed. See intake form. Review Of Systems (ROS):  [x]Performed Review of systems (Integumentary, CardioPulmonary, Neurological) by intake and observation. Intake form has been scanned into medical record. Patient has been instructed to contact their primary care physician regarding ROS issues if not already being addressed at this time.       Co-morbidities/Complexities (which will affect course of rehabilitation):   []None           Arthritic conditions   []Rheumatoid arthritis (M05.9)  [x]Osteoarthritis (M19.91)   Cardiovascular conditions   [x]Hypertension (I10)  [x]Hyperlipidemia (E78.5)  []Angina pectoris (I20)  []Atherosclerosis (I70)   Musculoskeletal conditions   [x]Disc pathology   []Congenital spine pathologies   []Prior surgical intervention  []Osteoporosis (M81.8)  []Osteopenia (M85.8)   Endocrine conditions   []Hypothyroid (E03.9)  []Hyperthyroid Gastrointestinal conditions   []Constipation (T43.74)   Metabolic conditions   []Morbid obesity (E66.01)  [x]Diabetes 2 (E11.65)   []Neuropathy (G60.9)     Pulmonary conditions   []Asthma (J45)  []Coughing   []COPD (J44.9)   Psychological Disorders  []Anxiety (F41.9)  []Depression (F32.9)   []Other:   []Other:          Barriers to/and or personal factors that will affect rehab potential:              [x]Age  []Sex              []Motivation/Lack of Motivation                        [x]Co-Morbidities              []Cognitive Function, education/learning barriers              []Environmental, home barriers              []profession/work barriers  []past PT/medical experience  []other:  Justification:     Falls Risk Assessment (30 days):   [x] Falls Risk assessed and no intervention required.   [] Falls Risk assessed and Patient requires intervention due to being higher risk   TUG score (>12s at risk):     [] Falls education provided, including       G-Codes:       ASSESSMENT:   Functional Impairments:     [x]Noted lumbar/proximal hip hypomobility   []Noted lumbosacral and/or generalized hypermobility   [x]Decreased Lumbosacral/hip/LE functional ROM   [x]Decreased core/proximal hip strength and neuromuscular control    [x]Decreased LE functional strength    []Abnormal reflexes/sensation/myotomal/dermatomal deficits  []Reduced balance/proprioceptive control    []other:      Functional Activity Limitations (from functional questionnaire and intake)   [x]Reduced ability to tolerate prolonged functional positions   [x]Reduced ability or difficulty with changes of positions or transfers between positions   [x]Reduced ability to maintain good posture and demonstrate good body mechanics with sitting, bending, and lifting   []Reduced ability to sleep   [] Reduced ability or tolerance with driving and/or computer work   [x]Reduced ability to perform lifting, reaching, carrying tasks   [x]Reduced ability to squat   [x]Reduced ability to forward bend   [x]Reduced ability to ambulate prolonged functional periods/distances/surfaces   [x]Reduced ability to ascend/descend stairs   []other:       Participation Restrictions   []Reduced participation in self care activities   [x]Reduced participation in home management activities   []Reduced participation in work activities   [x]Reduced participation in social activities. []Reduced participation in sport/recreation activities. Classification:   []Signs/symptoms consistent with Lumbar instability/stabilization subgroup. []Signs/symptoms consistent with Lumbar mobilization/manipulation subgroup, myotomes and dermatomes intact. Meets manipulation criteria. []Signs/symptoms consistent with Lumbar direction specific/centralization subgroup   []Signs/symptoms consistent with Lumbar traction subgroup     [x]Signs/symptoms consistent with lumbar facet dysfunction   []Signs/symptoms consistent with lumbar stenosis type dysfunction   []Signs/symptoms consistent with nerve root involvement including myotome & dermatome dysfunction   []Signs/symptoms consistent with post-surgical status including: decreased ROM, strength and function.    []signs/symptoms consistent with pathology which may benefit from Dry needling     []other:      Prognosis/Rehab Potential:      []Excellent   [x]Good    []Fair   []Poor    Tolerance of evaluation/treatment:    []Excellent   [x]Good    []Fair   []Poor  Physical Therapy Evaluation Complexity Justification  [x] A history of present problem with:  [] no personal factors and/or comorbidities that impact the plan of care;  []1-2 personal factors and/or comorbidities that impact the plan of care  [x]3 personal factors and/or comorbidities that impact the plan of care  [x] An examination of body systems using standardized tests and measures addressing any of the following: body structures and functions (impairments), activity limitations, and/or participation restrictions;:  [x] a total of 1-2 or more elements   [] a total of 3 or more elements   [] a total of 4 or more elements   [x] A clinical presentation with:  [x] stable and/or uncomplicated characteristics   [] evolving clinical presentation with changing characteristics  [] unstable and unpredictable characteristics;   [x] Clinical decision making of [x] low, [] moderate, [] high complexity using standardized patient assessment instrument and/or measurable assessment of functional outcome. [x] EVAL (LOW) 18460 (typically 20 minutes face-to-face)  [] EVAL (MOD) 56999 (typically 30 minutes face-to-face)  [] EVAL (HIGH) 49402 (typically 45 minutes face-to-face)  [] RE-EVAL         PLAN: Begin PT focusing on: proximal hip mobilizations, LB mobs, LB core activation, proximal hip activation, and HEP    Frequency/Duration:  2 days per week for 6 Weeks:  Interventions:  [x]  Therapeutic exercise including: strength training, ROM, for LE, Glutes and core   [x]  NMR activation and proprioception for glutes , LE and Core   [x]  Manual therapy as indicated for Hip complex, LE and spine to include: Dry Needling/IASTM, STM, PROM, Gr I-IV mobilizations, manipulation. [x]  Modalities as needed that may include: thermal agents, E-stim, Biofeedback, US, iontophoresis as indicated  [x]  Patient education on joint protection, postural re-education, activity modification, progression of HEP. HEP instruction:   Access Code: [id number]  URL: [email or web address]  Date: 02/16/2022  Prepared by: Adele Lizet    Exercises  Supine Lower Trunk Rotation - 1-2 x daily - 7 x weekly - 1-2 sets - 10 reps  Supine Transversus Abdominis Bracing - Hands on Stomach - 1-2 x daily - 7 x weekly - 1-2 sets - 10 reps - 5 seconds hold  Supine March - 1 x daily - 7 x weekly - 2-3 sets - 10 reps  Supine Transversus Abdominis Bracing with Leg Extension - 1 x daily - 7 x weekly - 2-3 sets - 10 reps  Seated Lumbar Flexion Stretch - 1-2 x daily - 7 x weekly - 3 sets - 20-30 seconds hold  Standing Hip Abduction with Resistance at Ankles and Counter Support - 1 x daily - 7 x weekly - 2-3 sets - 10 reps  Standing Hip Extension with Resistance at Ankles and Counter Support - 1 x daily - 7 x weekly - 2-3 sets - 10 reps      GOALS:  Patient stated goal: Pt will increase back mobility. [] Progressing: [] Met: [] Not Met: [] Adjusted    Therapist goals for Patient:   Short Term Goals: To be achieved in: 2 weeks  1. Independent in HEP and progression per patient tolerance, in order to prevent re-injury. [] Progressing: [] Met: [] Not Met: [] Adjusted  2. Patient will have a decrease in pain to facilitate improvement in movement, function, and ADLs as indicated by Functional Deficits. [] Progressing: [] Met: [] Not Met: [] Adjusted      Long Term Goals: To be achieved in: 6 weeks  1. Disability index score of 28% or less for the modODI  to assist with reaching prior level of function. [] Progressing: [] Met: [] Not Met: [] Adjusted  2. Patient will demonstrate increased lumbar AROM to WNL, to allow for proper joint functioning as indicated by patients difficulty squatting and reaching. [] Progressing: [] Met: [] Not Met: [] Adjusted  3. Patient will demonstrate an increase in glute and core strength to 5/5 to allow for proper functional mobility as indicated by patients difficulty walking and squatting. [] Progressing: [] Met: [] Not Met: [] Adjusted  4. Patient will return to walking and standing > 30 mins without increased symptoms or restriction. [] Progressing: [] Met: [] Not Met: [] Adjusted         Electronically signed by:  Jose Manuel Oropeza PT   Yajaira Benz, SPT. Therapist was present, directed the patient's care, made skilled judgement, and was responsible for assessment and treatment of the patient.

## 2022-02-21 ENCOUNTER — HOSPITAL ENCOUNTER (OUTPATIENT)
Dept: PHYSICAL THERAPY | Age: 77
Setting detail: THERAPIES SERIES
Discharge: HOME | End: 2022-02-21
Payer: MEDICARE

## 2022-02-21 PROCEDURE — 97140 MANUAL THERAPY 1/> REGIONS: CPT | Performed by: PHYSICAL THERAPIST

## 2022-02-21 PROCEDURE — 97110 THERAPEUTIC EXERCISES: CPT | Performed by: PHYSICAL THERAPIST

## 2022-02-21 NOTE — FLOWSHEET NOTE
[facility],  [address] [place]  Phone: [phone]  Fax [phone]    Physical Therapy Treatment Note/ Progress Report:           Date:  2022    Patient Name:  Yakov Rivas   \"Davonte\" :  1945  MRN: [id number]  Restrictions/Precautions:    Medical/Treatment Diagnosis Information:  · Diagnosis: M51.26 (ICD-10-CM) - HNP (herniated nucleus pulposus), lumbar, M48.061 (ICD-10-CM) - Lumbar foraminal stenosis  · Treatment Diagnosis: M51.26 (ICD-10-CM) - HNP (herniated nucleus pulposus), lumbar, M48.061 (ICD-10-CM) - Lumbar foraminal stenosis  Insurance/Certification information:  [facility], BMN, no auth, yes telemed  Physician Information:  Referring Practitioner: Concepcion Borrero PA-C  Has the plan of care been signed (Y/N):        [x]  Yes  []  No     Date of Patient follow up with Physician: prn, referral to Dr Drake Stark for possible ORESTES if symptoms worsen      Is this a Progress Report:     []  Yes  [x]  No        If Yes:  Date Range for reporting period:  Beginnin22  Ending:     Progress report will be due (10 Rx or 30 days whichever is less): 0/96/10       Recertification will be due (POC Duration  / 90 days whichever is less): 3/30/22        Visit # Insurance Allowable Auth Required   In-person 2 BMN []  Yes [x]  No    Telehealth   [x]  Yes []  No    Total            Functional Scale: modODI 28%    Date assessed:  22      Therapy Diagnosis/Practice Pattern:F, conservative      Number of Comorbidities:  []0     []1-2    [x]3+    Latex Allergy:  [x]NO      []YES  Preferred Language for Healthcare:   [x]English       []other:      Pain level:  Eval: 2-9/10 Current pain 1/10 lumbar, 4-5/10 R knee    SUBJECTIVE:  Pt reports his HEP is going well and denies any questions. He continues to note pain in his R knee and some clicking.     OBJECTIVE: See Reviewed/Progressed HEP activities related to strengthening, flexibility, endurance, ROM of core, proximal hip and LE for functional self-care, mobility, lifting and ambulation   [] (58333) Reviewed/Progressed HEP activities related to improving balance, coordination, kinesthetic sense, posture, motor skill, proprioception of core, proximal hip and LE for self care, mobility, lifting, and ambulation      Manual Treatments:  PROM / STM / Oscillations-Mobs:  G-I, II, III, IV (PA's, Inf., Post.)  [x] (73145) Provided manual therapy to mobilize proximal hip and LS spine soft tissue/joints for the purpose of modulating pain, promoting relaxation,  increasing ROM, reducing/eliminating soft tissue swelling/inflammation/restriction, improving soft tissue extensibility and allowing for proper ROM for normal function with self care, mobility, lifting and ambulation. Modalities:  Denied     Charges  Timed Code Treatment Minutes: 41   Total Treatment Minutes: 41       [] EVAL (LOW) 90459   [] EVAL (MOD) 36056   [] EVAL (HIGH) 52176   [] RE-EVAL     [x] LP(18001) x   2  [] IONTO  [] NMR (51307) x     [] VASO  [x] Manual (47735) x 1  [] Other:  [] TA x      [] Mech Traction (18004)  [] ES(attended) (37353)      [] ES (un) (00178):     Goals:  GOALS:  Patient stated goal: Pt will increase back mobility. []? Progressing: []? Met: []? Not Met: []? Adjusted     Therapist goals for Patient:   Short Term Goals: To be achieved in: 2 weeks  1. Independent in HEP and progression per patient tolerance, in order to prevent re-injury. []? Progressing: []? Met: []? Not Met: []? Adjusted  2. Patient will have a decrease in pain to facilitate improvement in movement, function, and ADLs as indicated by Functional Deficits. []? Progressing: []? Met: []? Not Met: []? Adjusted        Long Term Goals: To be achieved in: 6 weeks  1. Disability index score of 28% or less for the modODI  to assist with reaching prior level of function.    []? Progressing: []? Met: []? Not Met: []? Adjusted  2. Patient will demonstrate increased lumbar AROM to WNL, to allow for proper joint functioning as indicated by patients difficulty squatting and reaching. []? Progressing: []? Met: []? Not Met: []? Adjusted  3. Patient will demonstrate an increase in glute and core strength to 5/5 to allow for proper functional mobility as indicated by patients difficulty walking and squatting.   []? Progressing: []? Met: []? Not Met: []? Adjusted  4. Patient will return to walking and standing > 30 mins without increased symptoms or restriction. []? Progressing: []? Met: []? Not Met: []? Adjusted    Progression Towards Functional goals:  [] Patient is progressing as expected towards functional goals listed. [] Progression is slowed due to complexities listed. [] Progression has been slowed due to co-morbidities. [x] Plan just implemented, too soon to assess goals progression  [] Other:     Overall Progression Towards Functional goals/ Treatment Progress Update:  [] Patient is progressing as expected towards functional goals listed. [] Progression is slowed due to complexities/Impairments listed. [] Progression has been slowed due to co-morbidities. [x] Plan just implemented, too soon to assess goals progression <30days   [] Goals require adjustment due to lack of progress  [] Patient is not progressing as expected and requires additional follow up with physician  [] Other    Prognosis for POC: [x] Good [] Fair  [] Poor      Patient requires continued skilled intervention: [x] Yes  [] No    Treatment/Activity Tolerance:  [x] Patient able to complete treatment  [] Patient limited by fatigue  [] Patient limited by pain    [] Patient limited by other medical complications  [] Other:     ASSESSMENT: distal quad tightness noted w/ STM and prone stretch. Cues for TA contraction in SL and post hip fatigue reported with progressions without reported knee or LBP.     Return to Play: (if applicable)   []  Stage 1: Intro to Strength   []  Stage 2: Return to Run and Strength   []  Stage 3: Return to Jump and Strength   []  Stage 4: Dynamic Strength and Agility   []  Stage 5: Sport Specific Training     []  Ready to Return to Play, Meets All Above Stages   []  Not Ready for Return to Sports   Comments:                         PLAN: See eval  [x] Continue per plan of care [] Alter current plan (see comments above)  [] Plan of care initiated [] Hold pending MD visit [] Discharge      Electronically signed by:  Jose Manuel Oropeza PT   . Note: If patient does not return for scheduled/ recommended follow up visits, this note will serve as a discharge from care along with most recent update on progress. HEP instruction:   [email or web address]  URL: [email or web address]  Date: 02/21/2022  Prepared by: Jose Manuel Oropeza    Exercises  Supine Lower Trunk Rotation - 1-2 x daily - 7 x weekly - 1-2 sets - 10 reps  Supine Transversus Abdominis Bracing - Hands on Stomach - 1-2 x daily - 7 x weekly - 1-2 sets - 10 reps - 5 seconds hold  Supine March - 1 x daily - 7 x weekly - 2-3 sets - 10 reps  Supine Transversus Abdominis Bracing with Leg Extension - 1 x daily - 7 x weekly - 2-3 sets - 10 reps  Supine Figure 4 Piriformis Stretch - 1 x daily - 7 x weekly - 3 sets - 20 seconds hold  Supine Bridge - 1 x daily - 7 x weekly - 2-3 sets - 10 reps  Clamshell - 1 x daily - 7 x weekly - 2-3 sets - 10 reps  Seated Lumbar Flexion Stretch - 1-2 x daily - 7 x weekly - 3 sets - 20-30 seconds hold  Standing Hip Abduction with Resistance at Ankles and Counter Support - 1 x daily - 7 x weekly - 2-3 sets - 10 reps  Standing Hip Extension with Resistance at Ankles and Counter Support - 1 x daily - 7 x weekly - 2-3 sets - 10 reps  Standing Gastroc Stretch at Counter - 1 x daily - 7 x weekly - 3 sets - 20-30 seconds hold

## 2022-02-23 ENCOUNTER — HOSPITAL ENCOUNTER (OUTPATIENT)
Dept: PHYSICAL THERAPY | Age: 77
Setting detail: THERAPIES SERIES
Discharge: HOME | End: 2022-02-23
Payer: MEDICARE

## 2022-02-23 PROCEDURE — 97110 THERAPEUTIC EXERCISES: CPT | Performed by: PHYSICAL THERAPIST

## 2022-02-23 PROCEDURE — 97140 MANUAL THERAPY 1/> REGIONS: CPT | Performed by: PHYSICAL THERAPIST

## 2022-02-23 NOTE — FLOWSHEET NOTE
[facility],  [address] [place]  Phone: [phone]  Fax [phone]    Physical Therapy Treatment Note/ Progress Report:           Date:  2022    Patient Name:  Yakov Rivas   \"Davonte\" :  1945  MRN: [id number]  Restrictions/Precautions:    Medical/Treatment Diagnosis Information:  · Diagnosis: M51.26 (ICD-10-CM) - HNP (herniated nucleus pulposus), lumbar, M48.061 (ICD-10-CM) - Lumbar foraminal stenosis  · Treatment Diagnosis: M51.26 (ICD-10-CM) - HNP (herniated nucleus pulposus), lumbar, M48.061 (ICD-10-CM) - Lumbar foraminal stenosis  Insurance/Certification information:  PT Insurance Information: [facility], $40 cp, BMN, no auth, yes telemed  Physician Information:  Referring Practitioner: Concepcion Borrero PA-C  Has the plan of care been signed (Y/N):        [x]  Yes  []  No     Date of Patient follow up with Physician: prn, referral to Dr Drake Stark for possible ORESTES if symptoms worsen      Is this a Progress Report:     []  Yes  [x]  No        If Yes:  Date Range for reporting period:  Beginnin22  Ending:     Progress report will be due (10 Rx or 30 days whichever is less):        Recertification will be due (POC Duration  / 90 days whichever is less): 3/30/22        Visit # Insurance Allowable Auth Required   In-person 3 BMN []  Yes [x]  No    Telehealth   [x]  Yes []  No    Total            Functional Scale: modODI 28%    Date assessed:  22      Therapy Diagnosis/Practice Pattern:F, conservative      Number of Comorbidities:  []0     []1-2    [x]3+    Latex Allergy:  [x]NO      []YES  Preferred Language for Healthcare:   [x]English       []other:      Pain level:  Eval: 2-9/10 Current pain NT    SUBJECTIVE:  Pt reports both his back and his knee are feeling much better. He currently does not report any symptoms. He reports HEP has been going well.      OBJECTIVE: See eval   Observation:    Test measurements:      RESTRICTIONS/PRECAUTIONS: DMll, CAD, renal cysts, R TKR    Exercises/Interventions:     Therapeutic Ex (44999) Sets/sec/Reps Notes/CUES   Supine LTR HEP   Core bracing HEP   Core bracing with march HEP   Core bracing with Leg extension HEP   SL open book S 3x20\" R/L HEP   SL clams w/ TA 2x16 R/L HEP   Supine fig 4 S, press out on knee 3x20\" R/L HEP   Supine bridge w/ TA 2x16 HEP        Seated Lumbar Flexion Stretch  HEP   Seated SB flexion stretches 3D 5x5\"         Standing Hip ABD  Standing Hip Ext  HEP   Standing gastroc S 3x20\" R HEP   Multifius walkout with [employer] [id number] R/L         Pt ed: ;      Manual Intervention (36716) 15'    STM B lumbar and thoracic paraspinals, glutes   8'        Grade III  and R uPAs thoracic to lumbar 5'    Prone quad S 3x30\" R                   NMR re-education (75117)  CUES NEEDED                                                Therapeutic Activity (79083)                                       Therapeutic Exercise and NMR EXR  [x] (44113) Provided verbal/tactile cueing for activities related to strengthening, flexibility, endurance, ROM  for improvements in proximal hip and core control with self care, mobility, lifting and ambulation.  [] (99570) Provided verbal/tactile cueing for activities related to improving balance, coordination, kinesthetic sense, posture, motor skill, proprioception  to assist with core control in self care, mobility, lifting, and ambulation.      Therapeutic Activities:    [] (23941 or 91196) Provided verbal/tactile cueing for activities related to improving balance, coordination, kinesthetic sense, posture, motor skill, proprioception and motor activation to allow for proper function  with self care and ADLs  [] (53894) Provided training and instruction to the patient for proper core and proximal hip recruitment and positioning with ambulation re-education     Home Exercise Program:    [x] (42921) Reviewed/Progressed HEP activities related to strengthening, flexibility, endurance, ROM of core, proximal hip and LE for functional self-care, mobility, lifting and ambulation   [] (22017) Reviewed/Progressed HEP activities related to improving balance, coordination, kinesthetic sense, posture, motor skill, proprioception of core, proximal hip and LE for self care, mobility, lifting, and ambulation      Manual Treatments:  PROM / STM / Oscillations-Mobs:  G-I, II, III, IV (PA's, Inf., Post.)  [x] (80942) Provided manual therapy to mobilize proximal hip and LS spine soft tissue/joints for the purpose of modulating pain, promoting relaxation,  increasing ROM, reducing/eliminating soft tissue swelling/inflammation/restriction, improving soft tissue extensibility and allowing for proper ROM for normal function with self care, mobility, lifting and ambulation. Modalities:  Denied     Charges  Timed Code Treatment Minutes: 45   Total Treatment Minutes: 45       [] EVAL (LOW) 95122   [] EVAL (MOD) 20392   [] EVAL (HIGH) 94249   [] RE-EVAL     [x] AH(58352) x   2  [] IONTO  [] NMR (97200) x     [] VASO  [x] Manual (47325) x 1  [] Other:  [] TA x      [] Mech Traction (90739)  [] ES(attended) (31894)      [] ES (un) (20110):     Goals:  GOALS:  Patient stated goal: Pt will increase back mobility. []? Progressing: []? Met: []? Not Met: []? Adjusted     Therapist goals for Patient:   Short Term Goals: To be achieved in: 2 weeks  1. Independent in HEP and progression per patient tolerance, in order to prevent re-injury. []? Progressing: []? Met: []? Not Met: []? Adjusted  2. Patient will have a decrease in pain to facilitate improvement in movement, function, and ADLs as indicated by Functional Deficits. []? Progressing: []? Met: []? Not Met: []? Adjusted        Long Term Goals: To be achieved in: 6 weeks  1. Disability index score of 28% or less for the modODI  to assist with reaching prior level of function.    []? Progressing: []? Met: []? Not Met: []? Adjusted  2. Patient will demonstrate increased lumbar AROM to WNL, to allow for proper joint functioning as indicated by patients difficulty squatting and reaching. []? Progressing: []? Met: []? Not Met: []? Adjusted  3. Patient will demonstrate an increase in glute and core strength to 5/5 to allow for proper functional mobility as indicated by patients difficulty walking and squatting.   []? Progressing: []? Met: []? Not Met: []? Adjusted  4. Patient will return to walking and standing > 30 mins without increased symptoms or restriction. []? Progressing: []? Met: []? Not Met: []? Adjusted    Progression Towards Functional goals:  [] Patient is progressing as expected towards functional goals listed. [] Progression is slowed due to complexities listed. [] Progression has been slowed due to co-morbidities. [x] Plan just implemented, too soon to assess goals progression  [] Other:     Overall Progression Towards Functional goals/ Treatment Progress Update:  [] Patient is progressing as expected towards functional goals listed. [] Progression is slowed due to complexities/Impairments listed. [] Progression has been slowed due to co-morbidities. [x] Plan just implemented, too soon to assess goals progression <30days   [] Goals require adjustment due to lack of progress  [] Patient is not progressing as expected and requires additional follow up with physician  [] Other    Prognosis for POC: [x] Good [] Fair  [] Poor      Patient requires continued skilled intervention: [x] Yes  [] No    Treatment/Activity Tolerance:  [x] Patient able to complete treatment  [] Patient limited by fatigue  [] Patient limited by pain    [] Patient limited by other medical complications  [] Other:     ASSESSMENT: Pt demonstrates tension of the paraspinals (thoracic>lumbar). Pt requires cuing for LE stability with S/L exercises.  Pt continues to improve with functional mobility of the back.     Return to Play: (if applicable)   []  Stage 1: Intro to Strength   []  Stage 2: Return to Run and Strength   []  Stage 3: Return to Jump and Strength   []  Stage 4: Dynamic Strength and Agility   []  Stage 5: Sport Specific Training     []  Ready to Return to Play, Meets All Above Stages   []  Not Ready for Return to Sports   Comments:                         PLAN: See eval  [x] Continue per plan of care [] Alter current plan (see comments above)  [] Plan of care initiated [] Hold pending MD visit [] Discharge      Electronically signed by:  KIAN Tracy, JULIANA. Therapist was present, directed the patient's care, made skilled judgement, and was responsible for assessment and treatment of the patient. Note: If patient does not return for scheduled/ recommended follow up visits, this note will serve as a discharge from care along with most recent update on progress. HEP instruction:   Access Code: [email or web address] [facility]  URL: [email or web address]  Date: 02/21/2022  Prepared by: Kita Reyes    Exercises  Supine Lower Trunk Rotation - 1-2 x daily - 7 x weekly - 1-2 sets - 10 reps  Supine Transversus Abdominis Bracing - Hands on Stomach - 1-2 x daily - 7 x weekly - 1-2 sets - 10 reps - 5 seconds hold  Supine March - 1 x daily - 7 x weekly - 2-3 sets - 10 reps  Supine Transversus Abdominis Bracing with Leg Extension - 1 x daily - 7 x weekly - 2-3 sets - 10 reps  Supine Figure 4 Piriformis Stretch - 1 x daily - 7 x weekly - 3 sets - 20 seconds hold  Supine Bridge - 1 x daily - 7 x weekly - 2-3 sets - 10 reps  Clamshell - 1 x daily - 7 x weekly - 2-3 sets - 10 reps  Seated Lumbar Flexion Stretch - 1-2 x daily - 7 x weekly - 3 sets - 20-30 seconds hold  Standing Hip Abduction with Resistance at Ankles and Counter Support - 1 x daily - 7 x weekly - 2-3 sets - 10 reps  Standing Hip Extension with Resistance at Ankles and Counter Support - 1 x daily - 7 x weekly - 2-3 sets - 10 reps  Standing Gastroc Stretch at [employer] - 1 x daily - 7 x weekly - 3 sets - 20-30 seconds hold

## 2022-02-28 ENCOUNTER — HOSPITAL ENCOUNTER (OUTPATIENT)
Dept: PHYSICAL THERAPY | Age: 77
Setting detail: THERAPIES SERIES
Discharge: HOME | End: 2022-02-28
Payer: MEDICARE

## 2022-02-28 PROCEDURE — 97110 THERAPEUTIC EXERCISES: CPT | Performed by: PHYSICAL THERAPIST

## 2022-02-28 PROCEDURE — 97140 MANUAL THERAPY 1/> REGIONS: CPT | Performed by: PHYSICAL THERAPIST

## 2022-02-28 NOTE — DISCHARGE SUMMARY
[facility],  [address]  Phone: [phone]  Fax [phone]    Physical Therapy Treatment Note/ Progress Report:           Date:  2022    Patient Name:  Kathy Lazaro   \"Davonte\" :  1945  MRN: [id number]  Restrictions/Precautions:    Medical/Treatment Diagnosis Information:  · Diagnosis: M51.26 (ICD-10-CM) - HNP (herniated nucleus pulposus), lumbar, M48.061 (ICD-10-CM) - Lumbar foraminal stenosis  · Treatment Diagnosis: M51.26 (ICD-10-CM) - HNP (herniated nucleus pulposus), lumbar, M48.061 (ICD-10-CM) - Lumbar foraminal stenosis  Insurance/Certification information:  PT [facility] Information: [facility], [facility], BMN, no auth, yes telemed  Physician Information:  Referring Practitioner: Rufino Abreu PA-C  Has the plan of care been signed (Y/N):        [x]  Yes  []  No     Date of Patient follow up with Physician: prn, referral to Dr Garland Ambrocio for possible ORESTES if symptoms worsen      Is this a Progress Report:     [x]  Yes  []  No        If Yes:  Date Range for reporting period:  Beginnin22  Endin22    Progress report will be due (10 Rx or 30 days whichever is less): 8/10/24       Recertification will be due (POC Duration  / 90 days whichever is less): 3/30/22        Visit # Insurance Allowable Auth Required   In-person 4 BMN []  Yes [x]  No    Telehealth   [x]  Yes []  No    Total 4           Functional Scale: modODI 28%    Date assessed:  22             modODI 6%    Date assessed: 22     Therapy Diagnosis/Practice Pattern:F, conservative      Number of Comorbidities:  []0     []1-2    [x]3+    Latex Allergy:  [x]NO      []YES  Preferred Language for Healthcare:   [x]English       []other:      Pain level:  Eval: 2-9/10 Current pain 0/10    SUBJECTIVE:  Pt reports he has not had any back symptoms in the past week.  He has performed his HEP at home 4x since previous visit, although does admit having some difficulty remembering to do them due to not having symptoms anymore. OBJECTIVE:    Observation: slow transitions between side lying, prone, and supine, although improved since starting physical therapy   Test measurements:   B glute med: 4+/5; B glute max: 4+/5 ;  L/S ROM: Flex: 6\" below KJL Ext: 50%, SB: L: 6\" R: 4\", Rot L: 40 deg R: 40 deg    RESTRICTIONS/PRECAUTIONS: DMll, CAD, renal cysts, R TKR    Exercises/Interventions:     Therapeutic Ex (88476) Sets/sec/Reps Notes/CUES   Supine LTR HEP   Core bracing HEP   Core bracing with march HEP   Core bracing with Leg extension HEP   SL open book S 3x20\" R/L HEP   SL clams w/ TA 2x16 R/L HEP   Supine fig 4 S, press out on knee 3x20\" R/L HEP   Supine bridge w/ TA 2x16 HEP        Seated Lumbar Flexion Stretch  HEP   Seated SB flexion stretches 3D 5x5\"         Standing Hip ABD  Standing Hip Ext  HEP   Standing gastroc S 3x20\" R HEP   Multifius walkout with [employer]          Pt ed: ; D/C, continuing HEP, visual reminders of HEP such as TB or exercise sheet in room at home, or alarm on phone  3'    Manual Intervention (30578) 13'    STM B lumbar and thoracic paraspinals, glutes   8'        Grade III  and R uPAs thoracic to lumbar 5'    Prone quad S 3x30\" R                   NMR re-education (23405)  CUES NEEDED                                                Therapeutic Activity (16540)                                       Therapeutic Exercise and NMR EXR  [x] (04048) Provided verbal/tactile cueing for activities related to strengthening, flexibility, endurance, ROM  for improvements in proximal hip and core control with self care, mobility, lifting and ambulation.  [] (44316) Provided verbal/tactile cueing for activities related to improving balance, coordination, kinesthetic sense, posture, motor skill, proprioception  to assist with core control in self care, mobility, lifting, and ambulation.      Therapeutic Activities: [] (60067 or 81187) Provided verbal/tactile cueing for activities related to improving balance, coordination, kinesthetic sense, posture, motor skill, proprioception and motor activation to allow for proper function  with self care and ADLs  [] (91021) Provided training and instruction to the patient for proper core and proximal hip recruitment and positioning with ambulation re-education     Home Exercise Program:    [x] (47811) Reviewed/Progressed HEP activities related to strengthening, flexibility, endurance, ROM of core, proximal hip and LE for functional self-care, mobility, lifting and ambulation   [] (35530) Reviewed/Progressed HEP activities related to improving balance, coordination, kinesthetic sense, posture, motor skill, proprioception of core, proximal hip and LE for self care, mobility, lifting, and ambulation      Manual Treatments:  PROM / STM / Oscillations-Mobs:  G-I, II, III, IV (PA's, Inf., Post.)  [x] (71241) Provided manual therapy to mobilize proximal hip and LS spine soft tissue/joints for the purpose of modulating pain, promoting relaxation,  increasing ROM, reducing/eliminating soft tissue swelling/inflammation/restriction, improving soft tissue extensibility and allowing for proper ROM for normal function with self care, mobility, lifting and ambulation. Modalities:  Denied     Charges  Timed Code Treatment Minutes: 45   Total Treatment Minutes: 45       [] EVAL (LOW) 95559   [] EVAL (MOD) 46045   [] EVAL (HIGH) 23829   [] RE-EVAL     [x] TR(41443) x   2  [] IONTO  [] NMR (98216) x     [] VASO  [x] Manual (25518) x 1  [] Other:  [] TA x      [] Mech Traction (79043)  [] ES(attended) (90278)      [] ES (un) (99625):     Goals:  GOALS:  Patient stated goal: Pt will increase back mobility. []? Progressing: [x]? Met: []? Not Met: []? Adjusted     Therapist goals for Patient:   Short Term Goals: To be achieved in: 2 weeks  1.  Independent in HEP and progression per patient tolerance, in order to prevent re-injury. []? Progressing: [x]? Met: []? Not Met: []? Adjusted  2. Patient will have a decrease in pain to facilitate improvement in movement, function, and ADLs as indicated by Functional Deficits. []? Progressing: [x]? Met: []? Not Met: []? Adjusted        Long Term Goals: To be achieved in: 6 weeks  1. Disability index score of 28% or less for the modODI  to assist with reaching prior level of function. []? Progressing: [x]? Met: []? Not Met: []? Adjusted  2. Patient will demonstrate increased lumbar AROM to WNL, to allow for proper joint functioning as indicated by patients difficulty squatting and reaching. []? Progressing: [x]? Met: []? Not Met: []? Adjusted  3. Patient will demonstrate an increase in glute and core strength to 5/5 to allow for proper functional mobility as indicated by patients difficulty walking and squatting. [x]? Progressing:will continue to progress with iHEP []? Met: []? Not Met: []? Adjusted  4. Patient will return to walking and standing > 30 mins without increased symptoms or restriction. []? Progressing: [x]? Met: []? Not Met: []? Adjusted    Progression Towards Functional goals:  [x] Patient is progressing as expected towards functional goals listed. [] Progression is slowed due to complexities listed. [] Progression has been slowed due to co-morbidities. [] Plan just implemented, too soon to assess goals progression  [] Other:     Overall Progression Towards Functional goals/ Treatment Progress Update:  [x] Patient is progressing as expected towards functional goals listed. [] Progression is slowed due to complexities/Impairments listed. [] Progression has been slowed due to co-morbidities.   [] Plan just implemented, too soon to assess goals progression <30days   [] Goals require adjustment due to lack of progress  [] Patient is not progressing as expected and requires additional follow up with physician  [] Other    Prognosis for POC: [x] Good [] Fair  [] Poor      Patient requires continued skilled intervention: [x] Yes  [] No    Treatment/Activity Tolerance:  [x] Patient able to complete treatment  [] Patient limited by fatigue  [] Patient limited by pain    [] Patient limited by other medical complications  [] Other:     ASSESSMENT: Pt demonstrates improved overall lumbar mobility and LE strength since starting physical therapy. He reports having no symptoms for the past week and is feeling much better overall. He is able to perform exercises with minimal cuing and will do well with continuing independently at home. Discussed with patient visual or audio cues to help remember to do exercises on own. Pt has met goals and is safe for D/C today. Return to Play: (if applicable)   []  Stage 1: Intro to Strength   []  Stage 2: Return to Run and Strength   []  Stage 3: Return to Jump and Strength   []  Stage 4: Dynamic Strength and Agility   []  Stage 5: Sport Specific Training     []  Ready to Return to Play, Meets All Above Stages   []  Not Ready for Return to Sports   Comments:                         PLAN: DC  [] Continue per plan of care [] Alter current plan (see comments above)  [] Plan of care initiated [] Hold pending MD visit [x] Discharge      Electronically signed by:  Jenita Gowers, PT Hobart Sarin, SPT. Therapist was present, directed the patient's care, made skilled judgement, and was responsible for assessment and treatment of the patient. Note: If patient does not return for scheduled/ recommended follow up visits, this note will serve as a discharge from care along with most recent update on progress. HEP instruction:   Access Code: [email or web address] [facility]  URL: [email or web address]  Date: 02/21/2022  Prepared by: Jenita Gowers    Exercises  Supine Lower Trunk Rotation - 1-2 x daily - 7 x weekly - 1-2 sets - 10 reps  Supine Transversus Abdominis Bracing - Hands on Stomach - 1-2 x daily - 7 x weekly - 1-2 sets - 10 reps - 5 seconds hold  Supine March - 1 x daily - 7 x weekly - 2-3 sets - 10 reps  Supine Transversus Abdominis Bracing with Leg Extension - 1 x daily - 7 x weekly - 2-3 sets - 10 reps  Supine Figure 4 Piriformis Stretch - 1 x daily - 7 x weekly - 3 sets - 20 seconds hold  Supine Bridge - 1 x daily - 7 x weekly - 2-3 sets - 10 reps  Clamshell - 1 x daily - 7 x weekly - 2-3 sets - 10 reps  Seated Lumbar Flexion Stretch - 1-2 x daily - 7 x weekly - 3 sets - 20-30 seconds hold  Standing Hip Abduction with Resistance at Ankles and Counter Support - 1 x daily - 7 x weekly - 2-3 sets - 10 reps  Standing Hip Extension with Resistance at Ankles and Counter Support - 1 x daily - 7 x weekly - 2-3 sets - 10 reps  Standing Gastroc Stretch at Counter - 1 x daily - 7 x weekly - 3 sets - 20-30 seconds hold

## 2022-03-03 ENCOUNTER — TELEPHONE (OUTPATIENT)
Dept: FAMILY MEDICINE CLINIC | Age: 77
End: 2022-03-03

## 2022-03-03 NOTE — TELEPHONE ENCOUNTER
Called patient 3x for his medicare AWV. Left message for patient to call PCP office to schedule their AWV.

## 2022-03-03 NOTE — TELEPHONE ENCOUNTER
Received call from Pt to follow up about AWV at 1:00 PM on 3/3/2022. Pt was upset and stated that he didn't receive a call for appt.  Pt was rescheduled per [facility]

## 2022-03-04 NOTE — TELEPHONE ENCOUNTER
3/4 PM concluded that this issue stemmed from the issues with the caller ID on the phone - [employer] ticket has been placed again for this issue to be resolved on this phone - PM called pt - spoke to wife - wife confirmed pt rec'd call from [facility] but did not answer the phone - PM offered to reschedule AWV to 3/17 when he sees Enoch Cushing - pt is going to call me back

## 2022-03-09 DIAGNOSIS — M10.9 GOUT, UNSPECIFIED CAUSE, UNSPECIFIED CHRONICITY, UNSPECIFIED SITE: ICD-10-CM

## 2022-03-09 RX ORDER — ALLOPURINOL 300 MG/1
TABLET ORAL
Qty: 90 TABLET | Refills: 2 | Status: SHIPPED | OUTPATIENT
Start: 2022-03-09

## 2022-03-09 NOTE — TELEPHONE ENCOUNTER
Last Office Visit  -  12/16/21  Next Office Visit  -  3/17/22    Last Filled  -    Last UDS -    Contract -

## 2022-03-17 ENCOUNTER — OFFICE VISIT (OUTPATIENT)
Dept: FAMILY MEDICINE CLINIC | Age: 77
End: 2022-03-17
Payer: MEDICARE

## 2022-03-17 VITALS
BODY MASS INDEX: 37.22 KG/M2 | WEIGHT: 260 LBS | SYSTOLIC BLOOD PRESSURE: 118 MMHG | HEIGHT: 70 IN | DIASTOLIC BLOOD PRESSURE: 60 MMHG | OXYGEN SATURATION: 97 % | TEMPERATURE: 97.2 F | HEART RATE: 61 BPM

## 2022-03-17 DIAGNOSIS — D63.1 ANEMIA DUE TO CHRONIC KIDNEY DISEASE, UNSPECIFIED CKD STAGE: Primary | ICD-10-CM

## 2022-03-17 DIAGNOSIS — N18.9 CHRONIC KIDNEY DISEASE, UNSPECIFIED CKD STAGE: ICD-10-CM

## 2022-03-17 DIAGNOSIS — Z79.4 TYPE 2 DIABETES MELLITUS WITH COMPLICATION, WITH LONG-TERM CURRENT USE OF INSULIN (HCC): ICD-10-CM

## 2022-03-17 DIAGNOSIS — I50.42 CHRONIC COMBINED SYSTOLIC AND DIASTOLIC HEART FAILURE (HCC): ICD-10-CM

## 2022-03-17 DIAGNOSIS — D35.2 PITUITARY MACROADENOMA (HCC): ICD-10-CM

## 2022-03-17 DIAGNOSIS — N18.9 ANEMIA DUE TO CHRONIC KIDNEY DISEASE, UNSPECIFIED CKD STAGE: Primary | ICD-10-CM

## 2022-03-17 DIAGNOSIS — E66.01 SEVERE OBESITY (BMI 35.0-39.9) WITH COMORBIDITY (HCC): ICD-10-CM

## 2022-03-17 DIAGNOSIS — E11.8 TYPE 2 DIABETES MELLITUS WITH COMPLICATION, WITH LONG-TERM CURRENT USE OF INSULIN (HCC): ICD-10-CM

## 2022-03-17 DIAGNOSIS — E22.1 HYPERPROLACTINEMIA (HCC): ICD-10-CM

## 2022-03-17 LAB
A/G RATIO: 2.6 (ref 1.1–2.2)
ALBUMIN SERPL-MCNC: 4.6 G/DL (ref 3.4–5)
ALP BLD-CCNC: 109 U/L (ref 40–129)
ALT SERPL-CCNC: 15 U/L (ref 10–40)
ANION GAP SERPL CALCULATED.3IONS-SCNC: 14 MMOL/L (ref 3–16)
AST SERPL-CCNC: 14 U/L (ref 15–37)
BASOPHILS ABSOLUTE: 0 K/UL (ref 0–0.2)
BASOPHILS RELATIVE PERCENT: 0.5 %
BILIRUB SERPL-MCNC: 0.3 MG/DL (ref 0–1)
BUN BLDV-MCNC: 39 MG/DL (ref 7–20)
CALCIUM SERPL-MCNC: 9.7 MG/DL (ref 8.3–10.6)
CHLORIDE BLD-SCNC: 109 MMOL/L (ref 99–110)
CO2: 20 MMOL/L (ref 21–32)
CREAT SERPL-MCNC: 1.5 MG/DL (ref 0.8–1.3)
EOSINOPHILS ABSOLUTE: 0.3 K/UL (ref 0–0.6)
EOSINOPHILS RELATIVE PERCENT: 4 %
FERRITIN: 187.4 NG/ML (ref 30–400)
GFR AFRICAN AMERICAN: 55
GFR NON-AFRICAN AMERICAN: 45
GLUCOSE BLD-MCNC: 159 MG/DL (ref 70–99)
HBA1C MFR BLD: 6.8 %
HCT VFR BLD CALC: 35.4 % (ref 40.5–52.5)
HEMOGLOBIN: 11.4 G/DL (ref 13.5–17.5)
IRON SATURATION: 32 % (ref 20–50)
IRON: 79 UG/DL (ref 59–158)
LYMPHOCYTES ABSOLUTE: 1.2 K/UL (ref 1–5.1)
LYMPHOCYTES RELATIVE PERCENT: 18.4 %
MCH RBC QN AUTO: 29 PG (ref 26–34)
MCHC RBC AUTO-ENTMCNC: 32.2 G/DL (ref 31–36)
MCV RBC AUTO: 90.1 FL (ref 80–100)
MONOCYTES ABSOLUTE: 0.5 K/UL (ref 0–1.3)
MONOCYTES RELATIVE PERCENT: 6.8 %
NEUTROPHILS ABSOLUTE: 4.7 K/UL (ref 1.7–7.7)
NEUTROPHILS RELATIVE PERCENT: 70.3 %
PDW BLD-RTO: 16.2 % (ref 12.4–15.4)
PLATELET # BLD: 136 K/UL (ref 135–450)
PMV BLD AUTO: 9.3 FL (ref 5–10.5)
POTASSIUM SERPL-SCNC: 4.5 MMOL/L (ref 3.5–5.1)
RBC # BLD: 3.93 M/UL (ref 4.2–5.9)
SODIUM BLD-SCNC: 143 MMOL/L (ref 136–145)
TOTAL IRON BINDING CAPACITY: 250 UG/DL (ref 260–445)
TOTAL PROTEIN: 6.4 G/DL (ref 6.4–8.2)
WBC # BLD: 6.7 K/UL (ref 4–11)

## 2022-03-17 PROCEDURE — 3044F HG A1C LEVEL LT 7.0%: CPT | Performed by: FAMILY MEDICINE

## 2022-03-17 PROCEDURE — 36415 COLL VENOUS BLD VENIPUNCTURE: CPT | Performed by: FAMILY MEDICINE

## 2022-03-17 PROCEDURE — 83036 HEMOGLOBIN GLYCOSYLATED A1C: CPT | Performed by: FAMILY MEDICINE

## 2022-03-17 PROCEDURE — 99214 OFFICE O/P EST MOD 30 MIN: CPT | Performed by: FAMILY MEDICINE

## 2022-03-20 NOTE — PROGRESS NOTES
Anatoly Blanco (:  1945) is a 67 y.o. male,Established patient, here for evaluation of the following chief complaint(s):  3 Month Follow-Up         ASSESSMENT/PLAN:  1. Anemia due to chronic kidney disease, unspecified CKD stage  -     CBC with Auto Differential  -     Iron and TIBC  -     Ferritin    Check levels today. 2. Chronic kidney disease, unspecified CKD stage  -     Comprehensive Metabolic Panel  Check creatinine and kidney function today. 3. Chronic combined systolic and diastolic heart failure ([address]  The moment well compensated. Fluid status seems to be in check. 4. Type 2 diabetes mellitus with complication, with long-term current use of insulin ([facility])  -     POCT glycosylated hemoglobin (Hb A1C)  Lab Results   Component Value Date    LABA1C 6.8 2022     Lab Results   Component Value Date    .0 2020     Very well-controlled continue current management  5. Hyperprolactinemia ([address]  6. Pituitary macroadenoma ([address]  7. Severe obesity (BMI 35.0-39. 9) with comorbidity ([address]      No follow-ups on file. Subjective   SUBJECTIVE/OBJECTIVE:  Anatoly Blanco is a 67 y.o. male. Patient presents with:  1 Month Follow-Up      56-year-old male with chronic kidney disease recently was given blood and have been noticed to be anemia. Patient would like further iron studies to check this. Patient has a history of diabetes mellitus and this is well controlled. Patient denies polyuria polyphagia or polydipsia. Is taking all of his medications regularly. Hypertension his blood sugar denies any chest pain shortness of breath or headaches  The patients PMH, surgical history, family history, medications, allergies were all reviewed and updated as appropriate today. Review of Systems       Objective   Physical Exam  Constitutional:       Appearance: He is well-developed. HENT:      Head: Normocephalic and atraumatic.       Right Ear: External ear normal.      Left Ear: External ear normal.      Nose: Nose normal.   Eyes:      Conjunctiva/sclera: Conjunctivae normal.      Pupils: Pupils are equal, round, and reactive to light. Cardiovascular:      Rate and Rhythm: Normal rate and regular rhythm. Heart sounds: Normal heart sounds. No murmur heard. No friction rub. No gallop. Pulmonary:      Effort: Pulmonary effort is normal. No respiratory distress. Breath sounds: Normal breath sounds. No wheezing. Abdominal:      General: Bowel sounds are normal. There is no distension. Palpations: Abdomen is soft. Tenderness: There is no abdominal tenderness. Musculoskeletal:         General: No tenderness. Normal range of motion. Cervical back: Normal range of motion and neck supple. Skin:     General: Skin is warm and dry. Neurological:      Mental Status: He is alert and oriented to person, place, and time. Deep Tendon Reflexes: Reflexes are normal and symmetric. Psychiatric:         Behavior: Behavior normal.         Thought Content: Thought content normal.         Judgment: Judgment normal.            On this date 3/17/2022 I have spent 30 minutes reviewing previous notes, test results and face to face with the patient discussing the diagnosis and importance of compliance with the treatment plan as well as documenting on the day of the visit. An electronic signature was used to authenticate this note.     --Coby Barlow MD

## 2022-05-17 ENCOUNTER — OFFICE VISIT (OUTPATIENT)
Dept: INFECTIOUS DISEASES | Age: 77
End: 2022-05-17
Payer: MEDICARE

## 2022-05-17 VITALS
HEIGHT: 70 IN | BODY MASS INDEX: 36.65 KG/M2 | TEMPERATURE: 97.9 F | WEIGHT: 256 LBS | DIASTOLIC BLOOD PRESSURE: 62 MMHG | SYSTOLIC BLOOD PRESSURE: 122 MMHG

## 2022-05-17 DIAGNOSIS — L03.90 CELLULITIS, UNSPECIFIED CELLULITIS SITE: Primary | ICD-10-CM

## 2022-05-17 PROCEDURE — 99213 OFFICE O/P EST LOW 20 MIN: CPT | Performed by: INTERNAL MEDICINE

## 2022-05-17 RX ORDER — CEPHALEXIN 500 MG/1
1000 CAPSULE ORAL 3 TIMES DAILY
Qty: 42 CAPSULE | Refills: 0 | Status: SHIPPED | OUTPATIENT
Start: 2022-05-17 | End: 2022-05-24

## 2022-05-17 NOTE — PROGRESS NOTES
Department of Internal Medicine  Infectious Diseases      Patient Name: Holley Rivers      Date of visit: 5/17/2022     SUBJECTIVE:  Maulik Balderas  is a 67 y.o. male who returns today for follow-up of recurrent LE cellulitis   He was last seen in this office for the same in 4/2021    Since last visit, no significant changes  Has not had an episode of cellulitis since then. Last episode was > 2 years ago. He is still taking daily PCNfor ppx of cellulitis. He continues to tolerate it well but is interested in stopping it if no longer clearly indicated. S/p R knee arthroplasty 1/2020 Art Carrie Idol  Overall good result, pain gone        Pertinent history,   He has history of venous stasis and recurrent cellulitis  Admission in 12/2016 and again in 12/2017 with cellulitis (LLE 2016, RLE 2017), both associated with secondary bacteremia with strep species  He is taking daily pcn for ppx of cellulitis (1g po BID) since 12/2017  He was admitted 9/2018 with suspected LE cellulitis, milder case than previous episodes  Another admission 10/2019 with mild cellulitis RLE       REVIEW OF SYSTEMS:    CONSTITUTIONAL:  negative for fevers, chills  EYES:  negative for blurred vision  HEENT:  negative for hearing loss  RESPIRATORY:  No cough    CARDIOVASCULAR:  negative for chest pain  GASTROINTESTINAL:  negative for nausea, vomiting, diarrhea  GENITOURINARY:  negative for frequency, dysuria  ALLERGIC/IMMUNOLOGIC:  no drug reactions  ENDOCRINE:  Intentional weight loss 5#  MUSCULOSKELETAL:  Per HPI   NEUROLOGICAL:  negative for headaches, slurred speech, unilateral weakness  PSYCHIATRIC/BEHAVIORAL: negative for hallucinations, behavioral problems, confusion and agitation. Medications:    Current Outpatient Medications   Medication Sig Dispense Refill    cabergoline (DOSTINEX) 0.5 MG tablet TAKE half tablet once a week.  MONDAY 8 tablet 1    allopurinol (ZYLOPRIM) 300 MG tablet TAKE ONE TABLET BY MOUTH DAILY 90 tablet 2    metFORMIN (GLUCOPHAGE-XR) 750 MG extended release tablet TAKE ONE TABLET BY MOUTH TWICE A  tablet 1    penicillin v potassium (VEETID) 500 MG tablet TAKE TWO TABLETS BY MOUTH TWICE A  tablet 5    hydrALAZINE (APRESOLINE) 25 MG tablet TAKE ONE TABLET BY MOUTH EVERY MORNING, TAKE ONE TABLET BY MOUTH DAILY AROUND NOON, AND TAKE TWO TABLETS BY MOUTH EVERY EVENING 120 tablet 3    carvedilol (COREG) 25 MG tablet TAKE ONE TABLET BY MOUTH TWICE A  tablet 3    blood glucose test strips (ONETOUCH VERIO) strip Test 2-3 times daily. 150 strip 3    atorvastatin (LIPITOR) 80 MG tablet TAKE ONE TABLET BY MOUTH DAILY 90 tablet 3    hydrALAZINE (APRESOLINE) 50 MG tablet       ammonium lactate (LAC-HYDRIN) 12 % lotion APPLY TOPICALLY DAILY 500 g 4    lisinopril (PRINIVIL;ZESTRIL) 40 MG tablet TAKE ONE-HALF TABLET BY MOUTH TWICE A DAY (Patient taking differently: Take 20 mg by mouth 2 times daily ) 90 tablet 10    ibuprofen (ADVIL) 200 MG tablet Take 2 tablets by mouth every 8 hours as needed for Pain 120 tablet 0    hydrochlorothiazide (HYDRODIURIL) 25 MG tablet Take 25 mg by mouth daily      ferrous sulfate 325 (65 Fe) MG tablet Take 1 tablet by mouth daily (with breakfast) 30 tablet 3    Probiotic Product (PROBIOTIC DAILY PO) Take by mouth      Accu-Chek Multiclix Lancets MISC Test once daily  DX  250.00 100 each 3    Cholecalciferol (VITAMIN D) 2000 UNITS CAPS capsule Take 1 capsule by mouth daily      aspirin 81 MG chewable tablet Take 81 mg by mouth daily. No current facility-administered medications for this visit.          Physical:  VITALS:  /62   Temp 97.9 °F (36.6 °C) (Oral)   Ht 5' 10\" (1.778 m)   Wt 256 lb (116.1 kg)   BMI 36.73 kg/m²     He is well appearing, fully oriented, NAD  Abd soft, NT, +BS   Skin warm, dry, no rash  Faint changes of stasis dermatitis lower right leg  Trace LE edema jose luis   Dystrophic toenails         Assessment / Plan:         Recurrent streptococcal cellulitis with secondary bacteremia, in the context of obesity, venous stasis dermatitis, onychomycosis   He has been taking ppx pcn for several years now  The last episode of cellulitis was 10/2019      -we discussed continued ppx vs discontinuation and watchful waiting   -since it has been so long since last episode, reasonable to take a break  -continue to wear compression to manage swelling   -look for tinea infection as well as other abrasions to the feet that might increase susceptibility   -Rx cephalexin provided for self-initiation of abx if/when cellulitis recurs, to be taked at first recognition of s/s cellulitis.   In that event, please call and let me know     S/p R TKA without complication      DM   Well controlled       COVID Moderna x3 fully vaccinated         RTC PRN   Call with any concerns        Kelsea Prasad MD

## 2022-05-24 RX ORDER — HYDRALAZINE HYDROCHLORIDE 25 MG/1
TABLET, FILM COATED ORAL
Qty: 120 TABLET | Refills: 3 | Status: SHIPPED | OUTPATIENT
Start: 2022-05-24

## 2022-05-24 NOTE — TELEPHONE ENCOUNTER
Last Office Visit  -  3/17/22  Next Office Visit  -  6/23/22    Last Filled  -    Last UDS -    Contract -

## 2022-06-01 ENCOUNTER — TELEPHONE (OUTPATIENT)
Dept: FAMILY MEDICINE CLINIC | Age: 77
End: 2022-06-01

## 2022-06-01 DIAGNOSIS — Z79.4 TYPE 2 DIABETES MELLITUS WITH COMPLICATION, WITH LONG-TERM CURRENT USE OF INSULIN (HCC): ICD-10-CM

## 2022-06-01 DIAGNOSIS — E11.8 TYPE 2 DIABETES MELLITUS WITH COMPLICATION, WITH LONG-TERM CURRENT USE OF INSULIN (HCC): ICD-10-CM

## 2022-06-01 DIAGNOSIS — D35.2 PITUITARY MACROADENOMA (HCC): Primary | ICD-10-CM

## 2022-06-16 ENCOUNTER — TELEPHONE (OUTPATIENT)
Dept: FAMILY MEDICINE CLINIC | Age: 77
End: 2022-06-16

## 2022-06-23 ENCOUNTER — OFFICE VISIT (OUTPATIENT)
Dept: FAMILY MEDICINE CLINIC | Age: 77
End: 2022-06-23
Payer: MEDICARE

## 2022-06-23 VITALS
SYSTOLIC BLOOD PRESSURE: 132 MMHG | WEIGHT: 259 LBS | HEART RATE: 62 BPM | BODY MASS INDEX: 37.08 KG/M2 | DIASTOLIC BLOOD PRESSURE: 52 MMHG | OXYGEN SATURATION: 97 % | HEIGHT: 70 IN

## 2022-06-23 DIAGNOSIS — Z00.00 MEDICARE ANNUAL WELLNESS VISIT, SUBSEQUENT: ICD-10-CM

## 2022-06-23 DIAGNOSIS — Z79.4 TYPE 2 DIABETES MELLITUS WITH COMPLICATION, WITH LONG-TERM CURRENT USE OF INSULIN (HCC): Primary | ICD-10-CM

## 2022-06-23 DIAGNOSIS — D35.2 PITUITARY MACROADENOMA (HCC): ICD-10-CM

## 2022-06-23 DIAGNOSIS — E22.1 HYPERPROLACTINEMIA (HCC): ICD-10-CM

## 2022-06-23 DIAGNOSIS — E11.8 TYPE 2 DIABETES MELLITUS WITH COMPLICATION, WITH LONG-TERM CURRENT USE OF INSULIN (HCC): Primary | ICD-10-CM

## 2022-06-23 LAB — HBA1C MFR BLD: 6.6 %

## 2022-06-23 PROCEDURE — G0439 PPPS, SUBSEQ VISIT: HCPCS | Performed by: FAMILY MEDICINE

## 2022-06-23 PROCEDURE — 83036 HEMOGLOBIN GLYCOSYLATED A1C: CPT | Performed by: FAMILY MEDICINE

## 2022-06-23 PROCEDURE — 99213 OFFICE O/P EST LOW 20 MIN: CPT | Performed by: FAMILY MEDICINE

## 2022-06-23 PROCEDURE — 1123F ACP DISCUSS/DSCN MKR DOCD: CPT | Performed by: FAMILY MEDICINE

## 2022-06-23 PROCEDURE — 3044F HG A1C LEVEL LT 7.0%: CPT | Performed by: FAMILY MEDICINE

## 2022-06-23 SDOH — ECONOMIC STABILITY: FOOD INSECURITY: WITHIN THE PAST 12 MONTHS, THE FOOD YOU BOUGHT JUST DIDN'T LAST AND YOU DIDN'T HAVE MONEY TO GET MORE.: NEVER TRUE

## 2022-06-23 SDOH — ECONOMIC STABILITY: FOOD INSECURITY: WITHIN THE PAST 12 MONTHS, YOU WORRIED THAT YOUR FOOD WOULD RUN OUT BEFORE YOU GOT MONEY TO BUY MORE.: NEVER TRUE

## 2022-06-23 ASSESSMENT — PATIENT HEALTH QUESTIONNAIRE - PHQ9
SUM OF ALL RESPONSES TO PHQ9 QUESTIONS 1 & 2: 0
SUM OF ALL RESPONSES TO PHQ QUESTIONS 1-9: 0
SUM OF ALL RESPONSES TO PHQ QUESTIONS 1-9: 0
1. LITTLE INTEREST OR PLEASURE IN DOING THINGS: 0
SUM OF ALL RESPONSES TO PHQ QUESTIONS 1-9: 0
2. FEELING DOWN, DEPRESSED OR HOPELESS: 0
SUM OF ALL RESPONSES TO PHQ QUESTIONS 1-9: 0

## 2022-06-23 ASSESSMENT — LIFESTYLE VARIABLES
HOW OFTEN DO YOU HAVE A DRINK CONTAINING ALCOHOL: MONTHLY OR LESS
HOW MANY STANDARD DRINKS CONTAINING ALCOHOL DO YOU HAVE ON A TYPICAL DAY: 1 OR 2

## 2022-06-23 ASSESSMENT — SOCIAL DETERMINANTS OF HEALTH (SDOH): HOW HARD IS IT FOR YOU TO PAY FOR THE VERY BASICS LIKE FOOD, HOUSING, MEDICAL CARE, AND HEATING?: NOT HARD AT ALL

## 2022-06-23 NOTE — PROGRESS NOTES
Elma Wilkerson (:  1945) is a 67 y.o. male,Established patient, here for evaluation of the following chief complaint(s):  3 Month Follow-Up and Diabetes         ASSESSMENT/PLAN:  1. Type 2 diabetes mellitus with complication, with long-term current use of insulin ([facility])  -     POCT glycosylated hemoglobin (Hb A1C)  Lab Results   Component Value Date    LABA1C 6.6 2022     Lab Results   Component Value Date    .0 2020     Lab Results   Component Value Date    LABA1C 6.6 2022     Lab Results   Component Value Date    .0 2020   Controlled continue current medication. 2. Hyperprolactinemia ([facility] [address]  3. Pituitary macroadenoma ([facility] [address] [facility]  4. Medicare annual wellness visit, subsequent      No follow-ups on file. Subjective   SUBJECTIVE/OBJECTIVE:  Elma Wilkerson is a 67 y.o. male. Patient presents with:  3 Month Follow-Up  Diabetes      Lightheadedness in morning. A little dizzy in morning. Patient does have history of pituitary adenoma and is on cabergoline. Patient needs to establish with a new endocrinologist.  Patient has not had this rechecked recently. He does have a history of diabetes and this has been well controlled. He has been taking his medicines and having no significant issues. The patients PMH, surgical history, family history, medications, allergies were all reviewed and updated as appropriate today. Diabetes        Review of Systems       Objective   Physical Exam  Vitals and nursing note reviewed. Constitutional:       Appearance: Normal appearance. He is well-developed. HENT:      Head: Normocephalic and atraumatic. Right Ear: External ear normal.      Left Ear: External ear normal.      Nose: Nose normal.   Eyes:      Conjunctiva/sclera: Conjunctivae normal.      Pupils: Pupils are equal, round, and reactive to light. Cardiovascular:      Rate and Rhythm: Normal rate and regular rhythm.       Heart sounds: Normal heart

## 2022-06-23 NOTE — PATIENT INSTRUCTIONS
Personalized Preventive Plan for Meagan Bledsoe - 6/23/2022  Medicare offers a range of preventive health benefits. Some of the tests and screenings are paid in full while other may be subject to a deductible, co-insurance, and/or copay. Some of these benefits include a comprehensive review of your medical history including lifestyle, illnesses that may run in your family, and various assessments and screenings as appropriate. After reviewing your medical record and screening and assessments performed today your provider may have ordered immunizations, labs, imaging, and/or referrals for you. A list of these orders (if applicable) as well as your Preventive Care list are included within your After Visit Summary for your review. Other Preventive Recommendations:    · A preventive eye exam performed by an eye specialist is recommended every 1-2 years to screen for glaucoma; cataracts, macular degeneration, and other eye disorders. · A preventive dental visit is recommended every 6 months. · Try to get at least 150 minutes of exercise per week or 10,000 steps per day on a pedometer . · Order or download the FREE \"Exercise & Physical Activity: Your Everyday Guide\" from [employer]. Call 7-334.201.5773 or search [employer] online. · You need 8470-3341 mg of calcium and 0676-1248 IU of vitamin D per day. It is possible to meet your calcium requirement with diet alone, but a vitamin D supplement is usually necessary to meet this goal.  · When exposed to the sun, use a sunscreen that protects against both UVA and UVB radiation with an SPF of 30 or greater. Reapply every 2 to 3 hours or after sweating, drying off with a towel, or swimming. · Always wear a seat belt when traveling in a car. Always wear a helmet when riding a bicycle or motorcycle.

## 2022-06-23 NOTE — PROGRESS NOTES
Medicare Annual Wellness Visit    Ines Sauer is here for 3 Month Follow-Up, Diabetes, and Medicare AWV    Assessment & Plan   Type 2 diabetes mellitus with complication, with long-term current use of insulin ([facility] [address]  -     POCT glycosylated hemoglobin (Hb A1C)  Hyperprolactinemia ([facility] [address]  Pituitary macroadenoma ([facility] [address]  Medicare annual wellness visit, subsequent      Recommendations for Preventive Services Due: see orders and patient instructions/AVS.  Recommended screening schedule for the next 5-10 years is provided to the patient in written form: see Patient Instructions/AVS.     No follow-ups on file. Subjective       Patient's complete Health Risk Assessment and screening values have been reviewed and are found in Flowsheets. The following problems were reviewed today and where indicated follow up appointments were made and/or referrals ordered.     Positive Risk Factor Screenings with Interventions:               General Health and ACP:  General  In general, how would you say your health is?: Very Good  In the past 7 days, have you experienced any of the following: New or Increased Pain, New or Increased Fatigue, Loneliness, Social Isolation, Stress or Anger?: No  Do you get the social and emotional support that you need?: Yes  Do you have a Living Will?: Yes    Advance Directives     Power of  Living Will ACP-Advance Directive ACP-Power of     Not on File Not on File Not on File Not on File      General Health Risk Interventions:  · No Living Will: ACP documents already completed- patient asked to provide copy to the office    Health Habits/Nutrition:     Physical Activity: Inactive    Days of Exercise per Week: 0 days    Minutes of Exercise per Session: 0 min     Have you lost any weight without trying in the past 3 months?: No  Body mass index: (!) 37.16  Have you seen the dentist within the past year?: Yes    Health Habits/Nutrition Interventions:  · Inadequate physical activity: Patient stated he knows he needs to be more physically active than he currently is. Objective   Vitals:    06/23/22 0948   BP: (!) 132/52   Pulse: 62   SpO2: 97%   Weight: 259 lb (117.5 kg)   Height: 5' 10\" (1.778 m)      Body mass index is 37.16 kg/m². Allergies   Allergen Reactions    Bactrim [Sulfamethoxazole-Trimethoprim]      KANE risk     Prior to Visit Medications    Medication Sig Taking? Authorizing Provider   hydrALAZINE (APRESOLINE) 25 MG tablet TAKE ONE TABLET BY MOUTH EVERY MORNING, TAKE ONE TABLET BY MOUTH AROUND NOON AND TAKE TWO TABLETS BY MOUTH EVERY EVENING Yes Omer Lopez MD   cabergoline (DOSTINEX) 0.5 MG tablet TAKE half tablet once a week. Magali Salgado MD   allopurinol (ZYLOPRIM) 300 MG tablet TAKE ONE TABLET BY MOUTH DAILY Yes Omer Lopez MD   metFORMIN (GLUCOPHAGE-XR) 750 MG extended release tablet TAKE ONE TABLET BY MOUTH TWICE A DAY Yes DEONNA Chaudhari - CNP   carvedilol (COREG) 25 MG tablet TAKE ONE TABLET BY MOUTH TWICE A DAY Yes Omer Lopez MD   blood glucose test strips (ONETOUCH VERIO) strip Test 2-3 times daily.  Yes Omer Lopez MD   atorvastatin (LIPITOR) 80 MG tablet TAKE ONE TABLET BY MOUTH DAILY Yes Omer Lopez MD   hydrALAZINE (APRESOLINE) 50 MG tablet  Yes Historical Provider, MD   ammonium lactate (LAC-HYDRIN) 12 % lotion APPLY TOPICALLY DAILY Yes Omer Lopez MD   lisinopril (PRINIVIL;ZESTRIL) 40 MG tablet TAKE ONE-HALF TABLET BY MOUTH TWICE A DAY  Patient taking differently: Take 20 mg by mouth 2 times daily  Yes Omer Lopez MD   ibuprofen (ADVIL) 200 MG tablet Take 2 tablets by mouth every 8 hours as needed for Pain Yes PATTI Hogan   hydrochlorothiazide (HYDRODIURIL) 25 MG tablet Take 25 mg by mouth daily Yes Historical Provider, MD   ferrous sulfate 325 (65 Fe) MG tablet Take 1 tablet by mouth daily (with breakfast) Yes Beverley Barajas MD   Accu-Chek Multiclix Lancets MISC Test once daily  DX  250.00 Yes Sergio Solorio MD   Cholecalciferol (VITAMIN D) 2000 UNITS CAPS capsule Take 1 capsule by mouth daily Yes Historical Provider, MD   aspirin 81 MG chewable tablet Take 81 mg by mouth daily. Yes Historical Provider, MD Monique (Including outside providers/suppliers regularly involved in providing care):   Patient Care Team:  Sergio Solorio MD as PCP - Flory Patricia MD as PCP - [facility] Empaneled Provider  Kirsten Webster MD (Endocrinology)  Carmencita Almaraz RD, LD as Dietitian (Dietitian Registered)     Reviewed and updated this visit:  Tobacco  Allergies  Meds  Problems  Med Hx  Surg Hx  Soc Hx  Fam Hx                I, Cam Daley LPN, 9/07/3717, performed the documented evaluation under the direct supervision of the attending physician. This encounter was performed under my, Keke Hong, direct supervision, 6/23/2022.

## 2022-07-31 ENCOUNTER — HOSPITAL ENCOUNTER (EMERGENCY)
Age: 77
Discharge: HOME | End: 2022-07-31
Payer: MEDICARE

## 2022-07-31 ENCOUNTER — APPOINTMENT (OUTPATIENT)
Dept: GENERAL RADIOLOGY | Age: 77
End: 2022-07-31
Payer: MEDICARE

## 2022-07-31 VITALS
TEMPERATURE: 99.2 F | OXYGEN SATURATION: 95 % | RESPIRATION RATE: 18 BRPM | DIASTOLIC BLOOD PRESSURE: 50 MMHG | SYSTOLIC BLOOD PRESSURE: 143 MMHG | WEIGHT: 250 LBS | HEART RATE: 67 BPM | BODY MASS INDEX: 35.79 KG/M2 | HEIGHT: 70 IN

## 2022-07-31 DIAGNOSIS — U07.1 COVID-19: Primary | ICD-10-CM

## 2022-07-31 LAB
A/G RATIO: 1.6 (ref 1.1–2.2)
ALBUMIN SERPL-MCNC: 4.2 G/DL (ref 3.4–5)
ALP BLD-CCNC: 113 U/L (ref 40–129)
ALT SERPL-CCNC: 13 U/L (ref 10–40)
ANION GAP SERPL CALCULATED.3IONS-SCNC: 13 MMOL/L (ref 3–16)
AST SERPL-CCNC: 12 U/L (ref 15–37)
BASOPHILS ABSOLUTE: 0 K/UL (ref 0–0.2)
BASOPHILS RELATIVE PERCENT: 0.3 %
BILIRUB SERPL-MCNC: 0.7 MG/DL (ref 0–1)
BILIRUBIN URINE: NEGATIVE
BLOOD, URINE: NEGATIVE
BUN BLDV-MCNC: 34 MG/DL (ref 7–20)
CALCIUM SERPL-MCNC: 9.4 MG/DL (ref 8.3–10.6)
CHLORIDE BLD-SCNC: 102 MMOL/L (ref 99–110)
CLARITY: CLEAR
CO2: 23 MMOL/L (ref 21–32)
COLOR: YELLOW
CREAT SERPL-MCNC: 1.5 MG/DL (ref 0.8–1.3)
EOSINOPHILS ABSOLUTE: 0.1 K/UL (ref 0–0.6)
EOSINOPHILS RELATIVE PERCENT: 1.2 %
GFR AFRICAN AMERICAN: 55
GFR NON-AFRICAN AMERICAN: 45
GLUCOSE BLD-MCNC: 210 MG/DL (ref 70–99)
GLUCOSE URINE: NEGATIVE MG/DL
HCT VFR BLD CALC: 34.5 % (ref 40.5–52.5)
HEMOGLOBIN: 11.5 G/DL (ref 13.5–17.5)
KETONES, URINE: ABNORMAL MG/DL
LACTIC ACID: 1.2 MMOL/L (ref 0.4–2)
LEUKOCYTE ESTERASE, URINE: NEGATIVE
LYMPHOCYTES ABSOLUTE: 0.5 K/UL (ref 1–5.1)
LYMPHOCYTES RELATIVE PERCENT: 6.1 %
MCH RBC QN AUTO: 28.8 PG (ref 26–34)
MCHC RBC AUTO-ENTMCNC: 33.4 G/DL (ref 31–36)
MCV RBC AUTO: 86.4 FL (ref 80–100)
MICROSCOPIC EXAMINATION: ABNORMAL
MONOCYTES ABSOLUTE: 0.9 K/UL (ref 0–1.3)
MONOCYTES RELATIVE PERCENT: 9.9 %
NEUTROPHILS ABSOLUTE: 7.3 K/UL (ref 1.7–7.7)
NEUTROPHILS RELATIVE PERCENT: 82.5 %
NITRITE, URINE: NEGATIVE
PDW BLD-RTO: 15.5 % (ref 12.4–15.4)
PH UA: 6 (ref 5–8)
PLATELET # BLD: 125 K/UL (ref 135–450)
PMV BLD AUTO: 9 FL (ref 5–10.5)
POTASSIUM SERPL-SCNC: 4.2 MMOL/L (ref 3.5–5.1)
PROTEIN UA: NEGATIVE MG/DL
RBC # BLD: 3.99 M/UL (ref 4.2–5.9)
SODIUM BLD-SCNC: 138 MMOL/L (ref 136–145)
SPECIFIC GRAVITY UA: 1.02 (ref 1–1.03)
SPECIMEN STATUS: NORMAL
TOTAL PROTEIN: 6.8 G/DL (ref 6.4–8.2)
TROPONIN: <0.01 NG/ML
URINE REFLEX TO CULTURE: ABNORMAL
URINE TYPE: ABNORMAL
UROBILINOGEN, URINE: 0.2 E.U./DL
WBC # BLD: 8.8 K/UL (ref 4–11)

## 2022-07-31 PROCEDURE — 99285 EMERGENCY DEPT VISIT HI MDM: CPT

## 2022-07-31 PROCEDURE — 96361 HYDRATE IV INFUSION ADD-ON: CPT

## 2022-07-31 PROCEDURE — 96374 THER/PROPH/DIAG INJ IV PUSH: CPT

## 2022-07-31 PROCEDURE — 81003 URINALYSIS AUTO W/O SCOPE: CPT

## 2022-07-31 PROCEDURE — 93005 ELECTROCARDIOGRAM TRACING: CPT | Performed by: NURSE PRACTITIONER

## 2022-07-31 PROCEDURE — 80053 COMPREHEN METABOLIC PANEL: CPT

## 2022-07-31 PROCEDURE — 96375 TX/PRO/DX INJ NEW DRUG ADDON: CPT

## 2022-07-31 PROCEDURE — 2580000003 HC RX 258: Performed by: NURSE PRACTITIONER

## 2022-07-31 PROCEDURE — 84484 ASSAY OF TROPONIN QUANT: CPT

## 2022-07-31 PROCEDURE — 6360000002 HC RX W HCPCS: Performed by: NURSE PRACTITIONER

## 2022-07-31 PROCEDURE — 85025 COMPLETE CBC W/AUTO DIFF WBC: CPT

## 2022-07-31 PROCEDURE — 71045 X-RAY EXAM CHEST 1 VIEW: CPT

## 2022-07-31 PROCEDURE — 83605 ASSAY OF LACTIC ACID: CPT

## 2022-07-31 RX ORDER — ONDANSETRON 2 MG/ML
4 INJECTION INTRAMUSCULAR; INTRAVENOUS ONCE
Status: COMPLETED | OUTPATIENT
Start: 2022-07-31 | End: 2022-07-31

## 2022-07-31 RX ORDER — ONDANSETRON 4 MG/1
4 TABLET, ORALLY DISINTEGRATING ORAL EVERY 8 HOURS PRN
Qty: 20 TABLET | Refills: 0 | Status: SHIPPED | OUTPATIENT
Start: 2022-07-31

## 2022-07-31 RX ORDER — KETOROLAC TROMETHAMINE 30 MG/ML
15 INJECTION, SOLUTION INTRAMUSCULAR; INTRAVENOUS ONCE
Status: COMPLETED | OUTPATIENT
Start: 2022-07-31 | End: 2022-07-31

## 2022-07-31 RX ORDER — 0.9 % SODIUM CHLORIDE 0.9 %
500 INTRAVENOUS SOLUTION INTRAVENOUS ONCE
Status: COMPLETED | OUTPATIENT
Start: 2022-07-31 | End: 2022-07-31

## 2022-07-31 RX ADMIN — KETOROLAC TROMETHAMINE 15 MG: 30 INJECTION, SOLUTION INTRAMUSCULAR; INTRAVENOUS at 21:33

## 2022-07-31 RX ADMIN — SODIUM CHLORIDE 500 ML: 9 INJECTION, SOLUTION INTRAVENOUS at 21:29

## 2022-07-31 RX ADMIN — ONDANSETRON 4 MG: 2 INJECTION INTRAMUSCULAR; INTRAVENOUS at 21:29

## 2022-07-31 ASSESSMENT — PAIN - FUNCTIONAL ASSESSMENT: PAIN_FUNCTIONAL_ASSESSMENT: NONE - DENIES PAIN

## 2022-08-01 ENCOUNTER — CARE COORDINATION (OUTPATIENT)
Dept: FAMILY MEDICINE CLINIC | Age: 77
End: 2022-08-01

## 2022-08-01 LAB
EKG ATRIAL RATE: 75 BPM
EKG DIAGNOSIS: NORMAL
EKG P AXIS: 35 DEGREES
EKG P-R INTERVAL: 152 MS
EKG Q-T INTERVAL: 428 MS
EKG QRS DURATION: 146 MS
EKG QTC CALCULATION (BAZETT): 477 MS
EKG R AXIS: -54 DEGREES
EKG T AXIS: 37 DEGREES
EKG VENTRICULAR RATE: 75 BPM

## 2022-08-01 PROCEDURE — 93010 ELECTROCARDIOGRAM REPORT: CPT | Performed by: INTERNAL MEDICINE

## 2022-08-01 NOTE — ED NOTES
Pt scripts x1 instructed to follow up with PCP. Assessed per [facility] AMIRA.      Nino Berg LPN  81/00/05 [id number]

## 2022-08-01 NOTE — ED PROVIDER NOTES
[facility] Emergency Department    CHIEF COMPLAINT  Positive For Covid-19 (COVID + all the Sx, including fever, no tylenol or IBU, positive yesterday )      HISTORY OF PRESENT ILLNESS  Charles Zavaleta is a 67 y.o. male who presents to the ED complaining of Covid-19. Patient reports symptoms started yesterday. Patient complaining of fever, chills, body aches, generalized weakness and fatigue, lack of appetite, nausea. Patient denies chest pain, shortness of breath, emesis, diarrhea, urinary complaints, flank pain, leg swelling, calf pain. Wife at bedside is concerned that patient was a little \"disoriented\" and had an unsteady gait she was worried that he \"might fall. \"  Patient did not fall. No head injury. No other complaints, modifying factors or associated symptoms. Nursing notes reviewed.    Past Medical History:   Diagnosis Date    CAD (coronary artery disease)     Cataract     Diabetes mellitus ([address]     DJD (degenerative joint disease)     History of PTCA     Dr. Elizabeth Jordan    Hyperlipidemia     Hypertension     Hypotestosteronism     Dr. Oliver Funk, urology    IGT (impaired glucose tolerance)     Pituitary abnormality ([facility] [address]     growth    Sleep apnea     cpap     Past Surgical History:   Procedure Laterality Date    CARDIAC SURGERY  2009    stents    COLONOSCOPY  2001    COLONOSCOPY  12/9/11    diverticulosis    CORONARY ANGIOPLASTY WITH STENT PLACEMENT  2003    EYE SURGERY      cataracts    KNEE ARTHROSCOPY Right 2015    TOTAL KNEE ARTHROPLASTY Right 1/23/2020    RIGHT TOTAL KNEE REPLACEMENT      LUTHER & NEPHEW performed by Juancarlos Don MD at [facility] History   Problem Relation Age of Onset    Heart Disease Brother     High Blood Pressure Brother     Dementia Mother         alzheimers     Social History     Socioeconomic History    Marital status:      Spouse name: Not on file    Number of children: Not on file    Years of education: Not on file Highest education level: Not on file   Occupational History    Not on file   Tobacco Use    Smoking status: Former     Packs/day: 1.00     Years: 15.00     Pack years: 15.00     Types: Cigarettes     Quit date: 1982     Years since quittin.6    Smokeless tobacco: Never   Vaping Use    Vaping Use: Never used   Substance and Sexual Activity    Alcohol use: No    Drug use: No    Sexual activity: Not on file   Other Topics Concern    Not on file   Social History Narrative    Not on file     Social Determinants of Health     Financial Resource Strain: Low Risk     Difficulty of Paying Living Expenses: Not hard at all   Food Insecurity: No Food Insecurity    Worried About Running Out of Food in the Last Year: Never true    Ran Out of Food in the Last Year: Never true   Transportation Needs: Not on file   Physical Activity: Inactive    Days of Exercise per Week: 0 days    Minutes of Exercise per Session: 0 min   Stress: Not on file   Social Connections: Not on file   Intimate Partner Violence: Not on file   Housing Stability: Not on file     No current facility-administered medications for this encounter. Current Outpatient Medications   Medication Sig Dispense Refill    ondansetron (ZOFRAN ODT) 4 MG disintegrating tablet Take 1 tablet by mouth every 8 hours as needed for Nausea 20 tablet 0    hydrALAZINE (APRESOLINE) 25 MG tablet TAKE ONE TABLET BY MOUTH EVERY MORNING, TAKE ONE TABLET BY MOUTH AROUND NOON AND TAKE TWO TABLETS BY MOUTH EVERY EVENING 120 tablet 3    cabergoline (DOSTINEX) 0.5 MG tablet TAKE half tablet once a week. MONDAY 8 tablet 1    allopurinol (ZYLOPRIM) 300 MG tablet TAKE ONE TABLET BY MOUTH DAILY 90 tablet 2    metFORMIN (GLUCOPHAGE-XR) 750 MG extended release tablet TAKE ONE TABLET BY MOUTH TWICE A  tablet 1    carvedilol (COREG) 25 MG tablet TAKE ONE TABLET BY MOUTH TWICE A  tablet 3    blood glucose test strips (ONETOUCH VERIO) strip Test 2-3 times daily.  150 strip 3 rigidity. Bowel sounds are present. Negative mcgowan's. Negative McBurney's point. Negative CVA tenderness. EXTREMITIES: No peripheral edema. Moves all extremities equally. All extremities neurovascularly intact. SKIN: Warm and dry. No acute rashes. NEUROLOGICAL: Alert and oriented x4. No gross facial drooping. Strength 5/5, sensation intact. Speech is clear. No dysmetria or dysarthria. No ataxia. CN II through XII intact. PSYCHIATRIC: Normal mood and affect. SCREENINGS       RADIOLOGY  XR CHEST PORTABLE    Result Date: 7/31/2022  EXAMINATION: ONE XRAY VIEW OF THE CHEST 7/31/2022 6:07 pm COMPARISON: October 30, 2019 HISTORY: ORDERING SYSTEM PROVIDED HISTORY: covid TECHNOLOGIST PROVIDED HISTORY: Reason for exam:->covid Reason for Exam: fever and SOB, covid + FINDINGS: Marginal inspiration is noted. Patchy airspace disease is present within the right mid to lower lung zone, left lung base. No pneumothorax or pleural effusion is present. Borderline cardiomegaly is noted. Vascular markings are distinct. No acute osseous abnormality is present. 1. Faint bilateral pulmonary opacities, differential considerations include COVID-19 pulmonary disease given the clinical history. 2. Borderline cardiomegaly, without evidence of CHF         ED COURSE/MDM  Patient seen and evaluated. Old records reviewed. Diagnostic testing reviewed and results discussed. I have independently evaluated this patient based upon my scope of practice. Supervising physician was in the department for consultation as needed. Ines Sauer presented to the ED today with above noted complaints. Upon arrival to emergency department patient resting comfortably in emergency department stretcher. Wife at bedside. Oxygen saturation 95% on room air. Patient is hypertensive at 188/62. Low-grade fever 99.2. Heart rate 77. Initial work-up blood work unremarkable no leukocytosis or neutropenia. No anemia. No bandemia. Kidney function close to baseline GFR 45, creatinine 1.5, BUN 34. No electrolyte abnormality. No transaminitis. Troponin less than 0.01. Lactic acid 1.2. Chest x-ray shows faint bilateral pulmonary opacities differential considerations include COVID-19 pulmonary disease given the clinical history. Borderline cardiomegaly without evidence of CHF. EKG shows a normal sinus rhythm no malignant arrhythmia ST elevation or sign of ischemia. Patient given sodium chloride bolus, Toradol and Zofran. Ambulatory trial patient ambulated in the emergency department room with a steady gait independently. Oxygen saturation remained above 96% on room air. No respiratory distress. Reevaluation patient states that he feels better after sodium chloride bolus, Toradol, and Zofran. Patient would like to go home. Patient reports that he has a walker at home that he can use for assistance for ambulation. Patient and wife both feel comfortable with this plan of care. Patient was sent in Zofran to take as needed if nausea and vomiting returns. Patient discharged home in stable condition. While in ED patient received   Medications   0.9 % sodium chloride bolus (0 mLs IntraVENous Stopped 7/31/22 2241)   ketorolac (TORADOL) injection 15 mg (15 mg IntraVENous Given 7/31/22 2133)   ondansetron (ZOFRAN) injection 4 mg (4 mg IntraVENous Given 7/31/22 2129)             At this point I do not feel the patient requires further work up and it is reasonable to discharge the patient. A discussion was had with the patient and/or their surrogate regarding diagnosis, diagnostic testing results, treatment/ plan of care, and follow up. There was shared decision-making between myself as well as the patient and/or their surrogate and we are all in agreement with discharge home.   There was an opportunity for questions and all questions were answered to the best of my ability and to the satisfaction of the patient and/or 1.2 0.4 - 2.0 mmol/L   Urinalysis with Reflex to Culture    Specimen: Urine   Result Value Ref Range    Color, UA Yellow Straw/Yellow    Clarity, UA Clear Clear    Glucose, Ur Negative Negative mg/dL    Bilirubin Urine Negative Negative    Ketones, Urine TRACE (A) Negative mg/dL    Specific Gravity, UA 1.025 1.005 - 1.030    Blood, Urine Negative Negative    pH, UA 6.0 5.0 - 8.0    Protein, UA Negative Negative mg/dL    Urobilinogen, Urine 0.2 <2.0 E.U./dL    Nitrite, Urine Negative Negative    Leukocyte Esterase, Urine Negative Negative    Microscopic Examination Not Indicated     Urine Type NotGiven     Urine Reflex to Culture Not Indicated    Sample possible blood bank testing   Result Value Ref Range    Specimen Status JANAK    EKG 12 Lead   Result Value Ref Range    Ventricular Rate 75 BPM    Atrial Rate 75 BPM    P-R Interval 152 ms    QRS Duration 146 ms    Q-T Interval 428 ms    QTc Calculation (Bazett) 477 ms    P Axis 35 degrees    R Axis -54 degrees    T Axis 37 degrees    Diagnosis       Normal sinus rhythmRight bundle branch blockLeft anterior fascicular block Bifascicular block Abnormal ECGWhen compared with ECG of 29-OCT-2019 07:46,(RBBB and left anterior fascicular block) has replaced Incomplete right bundle branch block         I estimate there is LOW risk for EPIGLOTTITIS, PNEUMONIA, MENINGITIS, OR URINARY TRACT INFECTION, thus I consider the discharge disposition reasonable. Also, there is no evidence or peritonitis, sepsis, or toxicity. Melva Trent and I have discussed the diagnosis and risks, and we agree with discharging home to follow-up with their primary doctor. We also discussed returning to the Emergency Department immediately if new or worsening symptoms occur. We have discussed the symptoms which are most concerning (e.g., changing or worsening pain, trouble swallowing or breating, neck stiffness, fever) that necessitate immediate return.     Final Impression    1. COVID-19 Discharge Vital Signs:  Blood pressure (!) 143/50, pulse 67, temperature 99.2 °F (37.3 °C), temperature source Oral, resp. rate 18, height 5' 10\" (1.778 m), weight 250 lb (113.4 kg), SpO2 95 %. mdm    Patient was sent home with a prescription for below medication/s. I did [place] patient on appropriate use of these medication. Discharge Medication List as of 7/31/2022 11:34 PM              FOLLOW UP  Kelly Cifuentes MD  [place] [address]  [phone]          [facility]  [address] [id number] [phone]        DISPOSITION  Patient was discharged to home in good condition. Comment: Please note this report has been produced using speech recognition software and may contain errors related to that system including errors in grammar, punctuation, and spelling, as well as words and phrases that may be inappropriate. If there are any questions or concerns please feel free to contact the dictating provider for clarification.             Hernán Pretty, DEONNA - CNP  08/01/22 0104

## 2022-08-01 NOTE — CARE COORDINATION
Patient contacted regarding COVID-19 diagnosis. Discussed COVID-19 related testing which was available at this time. Test results were positive. Patient informed of results, if available? Yes. Ambulatory Care Manager contacted the patient by telephone to perform post discharge assessment. Call within 2 business days of discharge: Yes. Verified name and  with patient as identifiers. Provided introduction to self, and explanation of the CTN/ACM role, and reason for call due to risk factors for infection and/or exposure to COVID-19. Symptoms reviewed with patient who verbalized the following symptoms: fatigue  cough  dizziness/lightheadedness. Due to no new or worsening symptoms encounter was not routed to provider for escalation. Discussed follow-up appointments. If no appointment was previously scheduled, appointment scheduling offered: Yes. [facility] follow up appointment(s):   Future Appointments   Date Time Provider Niko Linn   2022 10:45 AM MD MAHOGANY Strange     [facility] follow up appointment(s): Patient contacted for ED follow up for Covid. Patient notes the following symptoms, lightheaded, dizziness, cough, no appitite, fatigue. Patient denies SOB, CP, pressure, fever. Patient is vaccinated x3. Patient is monitoring his blood sugar currently at 160, also monitoring his oxygen which is 91% hr 69. Patient is not taking any OTC medications at this time. Patient was given strict return to hospital guidelines for oxygen below 90%, chest pain, pressure, increase in lightheaded, dizziness. Patient verbalized understanding. ACM encouraged patient to drink fluids to stay hydrated and eat small meals though the day. Patient was concerned with developing blood clots. ACM advised patient that moving around will help prevent any blood clot complications. During the call patient wife was on the phone and she too is ill with what she believes is Covid.  ACM discussed follow ups and patient and wife are in agreement to follow up calls and short term follow in care coordination. Patient wife called back and asked to have antiviral Paxlovid called in for . ACM contacted PCP office spoke to MA and advised her of this request. ACM will pend order to be sent to pharmacy as PCP feels appropriate. Non-face-to-face services provided:  Obtained and reviewed discharge summary and/or continuity of care documents     Advance Care Planning:   Does patient have an Advance Directive:  not on file. Educated patient about risk for severe COVID-19 due to risk factors according to CDC guidelines. ACM reviewed discharge instructions, medical action plan and red flag symptoms with the patient who verbalized understanding. Discussed COVID vaccination status: Yes. Education provided on COVID-19 vaccination as appropriate. Discussed exposure protocols and quarantine with CDC Guidelines. Patient was given an opportunity to verbalize any questions and concerns and agrees to contact ACM or health care provider for questions related to their healthcare. Reviewed and educated patient on any new and changed medications related to discharge diagnosis     Was patient discharged with a pulse oximeter? yes, discussed and confirmed pulse oximeter discharge instructions and when to notify provider or seek emergency care. ACM provided contact information. Plan for follow-up call in 1-2 days based on severity of symptoms and risk factors.

## 2022-08-06 DIAGNOSIS — I10 ESSENTIAL HYPERTENSION, BENIGN: ICD-10-CM

## 2022-08-08 ENCOUNTER — CARE COORDINATION (OUTPATIENT)
Dept: CARE COORDINATION | Age: 77
End: 2022-08-08

## 2022-08-08 RX ORDER — LISINOPRIL 40 MG/1
TABLET ORAL
Qty: 90 TABLET | Refills: 1 | Status: SHIPPED | OUTPATIENT
Start: 2022-08-08

## 2022-08-08 SDOH — ECONOMIC STABILITY: INCOME INSECURITY: IN THE LAST 12 MONTHS, WAS THERE A TIME WHEN YOU WERE NOT ABLE TO PAY THE MORTGAGE OR RENT ON TIME?: NO

## 2022-08-08 SDOH — ECONOMIC STABILITY: TRANSPORTATION INSECURITY
IN THE PAST 12 MONTHS, HAS THE LACK OF TRANSPORTATION KEPT YOU FROM MEDICAL APPOINTMENTS OR FROM GETTING MEDICATIONS?: NO

## 2022-08-08 SDOH — ECONOMIC STABILITY: TRANSPORTATION INSECURITY
IN THE PAST 12 MONTHS, HAS LACK OF TRANSPORTATION KEPT YOU FROM MEETINGS, WORK, OR FROM GETTING THINGS NEEDED FOR DAILY LIVING?: NO

## 2022-08-08 SDOH — ECONOMIC STABILITY: HOUSING INSECURITY
IN THE LAST 12 MONTHS, WAS THERE A TIME WHEN YOU DID NOT HAVE A STEADY PLACE TO SLEEP OR SLEPT IN A SHELTER (INCLUDING NOW)?: NO

## 2022-08-08 SDOH — ECONOMIC STABILITY: HOUSING INSECURITY: IN THE LAST 12 MONTHS, HOW MANY PLACES HAVE YOU LIVED?: 1

## 2022-08-08 ASSESSMENT — SOCIAL DETERMINANTS OF HEALTH (SDOH)
HOW OFTEN DO YOU GET TOGETHER WITH FRIENDS OR RELATIVES?: TWICE A WEEK
IN A TYPICAL WEEK, HOW MANY TIMES DO YOU TALK ON THE PHONE WITH FAMILY, FRIENDS, OR NEIGHBORS?: TWICE A WEEK
HOW OFTEN DO YOU ATTEND CHURCH OR RELIGIOUS SERVICES?: NEVER
HOW OFTEN DO YOU ATTENT MEETINGS OF THE CLUB OR ORGANIZATION YOU BELONG TO?: NEVER
DO YOU BELONG TO ANY CLUBS OR ORGANIZATIONS SUCH AS CHURCH GROUPS UNIONS, FRATERNAL OR ATHLETIC GROUPS, OR SCHOOL GROUPS?: NO

## 2022-08-08 NOTE — TELEPHONE ENCOUNTER
Last Office Visit  -  6/23/22  Next Office Visit  -  9/22/22    Last Filled  -    Last UDS -    Contract -

## 2022-08-08 NOTE — CARE COORDINATION
Ambulatory Care Coordination Note  8/8/2022    ACC: Rosaura Peterson, RN    Summary Note: AC outreach to patient/wife to follow up on covid and antiviral therapy. Patient was sleeping at the time of call and patient wife Geri Dan (HIPAA) completed follow up with ACM. Geri Dan noted that patient is now covid negative but has started having dizzy, light headed spells. Patient had on this morning and had a fall with it. Patient did not hit his head. Patient is said to have had these spells for several days. Patient wife notes its normally when he is getting up but patient is doing that slowly and she has monitored his BP and notes there has not been any changes when sitting to standing. Patient wife noted patient BP today was 135/64 98% HR 56 . Blood sugars 106,109,120. Denies any SOB, CP, Pressure. Notes patient sometimes feels clammy, admits fatigue. ACM discussed with patient wife his new symptoms and advised an appointment with PCP is needed. AC scheduled patient for an appointment tomorrow with Dr. Olga Bradley at 10:30. Patient wife thanked AC. AC updated CC protocol and will send resources out to follow up at next outreach. Patient wife notes patient had been driving but with dizzy spells she makes sure if he is feeling this way he is not driving. AC advised she will follow up with patient after his appointment, patient wife thanked AC. Plan    Follow up three days  Assess for needs      Lab Results       None            Care Coordination Interventions    Referral from Primary Care Provider: No  Suggested Interventions and Community Resources  Diabetes Education: In Process  Fall Risk Prevention: In Process  Zone Management Tools: In Process (Comment: DM CHF 8.8.22)          Goals Addressed                   This Visit's Progress     Conditions and Symptoms        I will schedule office visits, as directed by my provider. I will keep my appointment or reschedule if I have to cancel.   I will notify my provider of any barriers to my plan of care. I will follow my Zone Management tool to seek urgent or emergent care. I will notify my provider of any symptoms that indicate a worsening of my condition. Barriers: lack of education  Plan for overcoming my barriers: ACM   Confidence: 9/10  Anticipated Goal Completion Date: 10.15.22                Prior to Admission medications    Medication Sig Start Date End Date Taking? Authorizing Provider   lisinopril (PRINIVIL;ZESTRIL) 40 MG tablet TAKE 1/2 TABLET BY MOUTH TWICE A DAY 8/8/22   Kraig Coombs MD   ondansetron (ZOFRAN ODT) 4 MG disintegrating tablet Take 1 tablet by mouth every 8 hours as needed for Nausea 7/31/22   DEONNA Ahmadi CNP   hydrALAZINE (APRESOLINE) 25 MG tablet TAKE ONE TABLET BY MOUTH EVERY MORNING, TAKE ONE TABLET BY MOUTH AROUND NOON AND TAKE TWO TABLETS BY MOUTH EVERY EVENING 5/24/22   Kraig Coombs MD   cabergoline (DOSTINEX) 0.5 MG tablet TAKE half tablet once a week. MONDAY 5/12/22   Kraig Coombs MD   allopurinol (ZYLOPRIM) 300 MG tablet TAKE ONE TABLET BY MOUTH DAILY 3/9/22   Kraig Coombs MD   metFORMIN (GLUCOPHAGE-XR) 750 MG extended release tablet TAKE ONE TABLET BY MOUTH TWICE A DAY 1/10/22   DEONNA Vargas CNP   carvedilol (COREG) 25 MG tablet TAKE ONE TABLET BY MOUTH TWICE A DAY 10/11/21   Kraig Coombs MD   blood glucose test strips (ONETOUCH VERIO) strip Test 2-3 times daily.  9/23/21   Kraig Coombs MD   atorvastatin (LIPITOR) 80 MG tablet TAKE ONE TABLET BY MOUTH DAILY 9/10/21   Kraig Coombs MD   hydrALAZINE (APRESOLINE) 50 MG tablet  8/13/21   Historical Provider, MD   ammonium lactate (LAC-HYDRIN) 12 % lotion APPLY TOPICALLY DAILY 8/9/21   Kraig Coombs MD   ibuprofen (ADVIL) 200 MG tablet Take 2 tablets by mouth every 8 hours as needed for Pain 1/28/20   PATTI Joseph   hydrochlorothiazide (HYDRODIURIL) 25 MG tablet Take 25 mg by mouth daily    Historical Provider, MD   ferrous sulfate 325 (65 Fe) MG tablet Take 1 tablet by mouth daily (with breakfast) 11/1/19   Julia Batista MD   Accu-Chek Multiclix Lancets MISC Test once daily  DX  250.00 10/24/17   Kraig Coombs MD   Cholecalciferol (VITAMIN D) 2000 UNITS CAPS capsule Take 1 capsule by mouth daily    Historical Provider, MD   aspirin 81 MG chewable tablet Take 81 mg by mouth daily. Historical Provider, MD       Future Appointments   Date Time Provider Niko Qureshii   8/9/2022 10:30 AM MD MAHOGANY Faria  Abhishek FRAGA   9/22/2022 10:45 AM MD MAHOGANY Faria  Abhishek FRAGA   ,   Diabetes Assessment    Medic Alert ID: Yes  Meal Planning: Avoidance of concentrated sweets   How often do you test your blood sugar?: Daily   Do you have barriers with adherence to non-pharmacologic self-management interventions?  (Nutrition/Exercise/Self-Monitoring): No   Have you ever had to go to the ED for symptoms of low blood sugar?: No       No patient-reported symptoms        ,   Congestive Heart Failure Assessment    Are you currently restricting fluids?: No Restriction  Do you understand a low sodium diet?: Yes  Do you understand how to read food labels?: Yes  How many restaurant meals do you eat per week?: 0  Do you salt your food before tasting it?: No     No patient-reported symptoms      Symptoms:          , and   General Assessment    Do you have any symptoms that are causing concern?: Yes  Progression since Onset: Gradually Worsening  Reported Symptoms: Fatigue (Comment: dizziness 8.8.22)

## 2022-08-09 ENCOUNTER — APPOINTMENT (OUTPATIENT)
Dept: GENERAL RADIOLOGY | Age: 77
End: 2022-08-09
Payer: MEDICARE

## 2022-08-09 ENCOUNTER — HOSPITAL ENCOUNTER (EMERGENCY)
Age: 77
Discharge: HOME | End: 2022-08-09
Attending: EMERGENCY MEDICINE
Payer: MEDICARE

## 2022-08-09 ENCOUNTER — OFFICE VISIT (OUTPATIENT)
Dept: FAMILY MEDICINE CLINIC | Age: 77
End: 2022-08-09
Payer: MEDICARE

## 2022-08-09 VITALS
TEMPERATURE: 98.4 F | WEIGHT: 250 LBS | BODY MASS INDEX: 35.79 KG/M2 | RESPIRATION RATE: 12 BRPM | HEIGHT: 70 IN | HEART RATE: 54 BPM | OXYGEN SATURATION: 99 % | DIASTOLIC BLOOD PRESSURE: 60 MMHG | SYSTOLIC BLOOD PRESSURE: 150 MMHG

## 2022-08-09 VITALS
OXYGEN SATURATION: 94 % | SYSTOLIC BLOOD PRESSURE: 90 MMHG | WEIGHT: 258 LBS | HEIGHT: 70 IN | BODY MASS INDEX: 36.94 KG/M2 | HEART RATE: 57 BPM | DIASTOLIC BLOOD PRESSURE: 42 MMHG

## 2022-08-09 DIAGNOSIS — I95.9 HYPOTENSION, UNSPECIFIED HYPOTENSION TYPE: Primary | ICD-10-CM

## 2022-08-09 DIAGNOSIS — R42 ORTHOSTATIC LIGHTHEADEDNESS: Primary | ICD-10-CM

## 2022-08-09 DIAGNOSIS — R55 SYNCOPE AND COLLAPSE: ICD-10-CM

## 2022-08-09 DIAGNOSIS — R00.1 BRADYCARDIA: ICD-10-CM

## 2022-08-09 LAB
A/G RATIO: 1.6 (ref 1.1–2.2)
ALBUMIN SERPL-MCNC: 4.4 G/DL (ref 3.4–5)
ALP BLD-CCNC: 119 U/L (ref 40–129)
ALT SERPL-CCNC: 11 U/L (ref 10–40)
ANION GAP SERPL CALCULATED.3IONS-SCNC: 12 MMOL/L (ref 3–16)
AST SERPL-CCNC: 12 U/L (ref 15–37)
BASOPHILS ABSOLUTE: 0 K/UL (ref 0–0.2)
BASOPHILS RELATIVE PERCENT: 0.4 %
BILIRUB SERPL-MCNC: 0.3 MG/DL (ref 0–1)
BUN BLDV-MCNC: 39 MG/DL (ref 7–20)
CALCIUM SERPL-MCNC: 9.2 MG/DL (ref 8.3–10.6)
CHLORIDE BLD-SCNC: 101 MMOL/L (ref 99–110)
CO2: 23 MMOL/L (ref 21–32)
CREAT SERPL-MCNC: 1.8 MG/DL (ref 0.8–1.3)
EKG ATRIAL RATE: 53 BPM
EKG DIAGNOSIS: NORMAL
EKG P AXIS: 10 DEGREES
EKG P-R INTERVAL: 154 MS
EKG Q-T INTERVAL: 460 MS
EKG QRS DURATION: 144 MS
EKG QTC CALCULATION (BAZETT): 431 MS
EKG R AXIS: -45 DEGREES
EKG T AXIS: 12 DEGREES
EKG VENTRICULAR RATE: 53 BPM
EOSINOPHILS ABSOLUTE: 0.1 K/UL (ref 0–0.6)
EOSINOPHILS RELATIVE PERCENT: 2.1 %
GFR AFRICAN AMERICAN: 44
GFR NON-AFRICAN AMERICAN: 37
GLUCOSE BLD-MCNC: 166 MG/DL (ref 70–99)
HCT VFR BLD CALC: 35.8 % (ref 40.5–52.5)
HEMOGLOBIN: 11.8 G/DL (ref 13.5–17.5)
LYMPHOCYTES ABSOLUTE: 1.2 K/UL (ref 1–5.1)
LYMPHOCYTES RELATIVE PERCENT: 18 %
MCH RBC QN AUTO: 28.9 PG (ref 26–34)
MCHC RBC AUTO-ENTMCNC: 32.9 G/DL (ref 31–36)
MCV RBC AUTO: 87.7 FL (ref 80–100)
MONOCYTES ABSOLUTE: 0.5 K/UL (ref 0–1.3)
MONOCYTES RELATIVE PERCENT: 7.6 %
NEUTROPHILS ABSOLUTE: 4.9 K/UL (ref 1.7–7.7)
NEUTROPHILS RELATIVE PERCENT: 71.9 %
PDW BLD-RTO: 15.4 % (ref 12.4–15.4)
PLATELET # BLD: 183 K/UL (ref 135–450)
PMV BLD AUTO: 8.8 FL (ref 5–10.5)
POTASSIUM REFLEX MAGNESIUM: 4.7 MMOL/L (ref 3.5–5.1)
RBC # BLD: 4.08 M/UL (ref 4.2–5.9)
SODIUM BLD-SCNC: 136 MMOL/L (ref 136–145)
TOTAL PROTEIN: 7.1 G/DL (ref 6.4–8.2)
TROPONIN: <0.01 NG/ML
WBC # BLD: 6.8 K/UL (ref 4–11)

## 2022-08-09 PROCEDURE — 93005 ELECTROCARDIOGRAM TRACING: CPT | Performed by: EMERGENCY MEDICINE

## 2022-08-09 PROCEDURE — 1123F ACP DISCUSS/DSCN MKR DOCD: CPT | Performed by: FAMILY MEDICINE

## 2022-08-09 PROCEDURE — 99214 OFFICE O/P EST MOD 30 MIN: CPT | Performed by: FAMILY MEDICINE

## 2022-08-09 PROCEDURE — 80053 COMPREHEN METABOLIC PANEL: CPT

## 2022-08-09 PROCEDURE — 99285 EMERGENCY DEPT VISIT HI MDM: CPT

## 2022-08-09 PROCEDURE — 93000 ELECTROCARDIOGRAM COMPLETE: CPT | Performed by: FAMILY MEDICINE

## 2022-08-09 PROCEDURE — 2580000003 HC RX 258: Performed by: EMERGENCY MEDICINE

## 2022-08-09 PROCEDURE — 71045 X-RAY EXAM CHEST 1 VIEW: CPT

## 2022-08-09 PROCEDURE — 84484 ASSAY OF TROPONIN QUANT: CPT

## 2022-08-09 PROCEDURE — 85025 COMPLETE CBC W/AUTO DIFF WBC: CPT

## 2022-08-09 PROCEDURE — 93010 ELECTROCARDIOGRAM REPORT: CPT | Performed by: INTERNAL MEDICINE

## 2022-08-09 RX ORDER — 0.9 % SODIUM CHLORIDE 0.9 %
1000 INTRAVENOUS SOLUTION INTRAVENOUS ONCE
Status: COMPLETED | OUTPATIENT
Start: 2022-08-09 | End: 2022-08-09

## 2022-08-09 RX ADMIN — SODIUM CHLORIDE 1000 ML: 9 INJECTION, SOLUTION INTRAVENOUS at 12:33

## 2022-08-09 ASSESSMENT — ENCOUNTER SYMPTOMS
DIARRHEA: 0
CHEST TIGHTNESS: 0
ABDOMINAL PAIN: 0
EYE DISCHARGE: 0
BACK PAIN: 0
VOMITING: 0
EYE REDNESS: 0
RHINORRHEA: 0
SHORTNESS OF BREATH: 0
COUGH: 0

## 2022-08-09 ASSESSMENT — PAIN - FUNCTIONAL ASSESSMENT
PAIN_FUNCTIONAL_ASSESSMENT: NONE - DENIES PAIN

## 2022-08-09 NOTE — ED TRIAGE NOTES
Linad Ibrahim is a 67 y.o. male who presents to the ED via wife for syncope and bradycardia. Pt reports symptoms began Saturday as dizziness (described as a wobbly feeling). Pt also reports he had coronavirus 10 days ago and tested negative the day of Sx onset. Pt reports falling once yesterday and once today, each episode lasting \"just seconds\". Pt sent by PCP. Pt has Hx of HTN, DM, high cholesterol. Pt denies chest pain, SOB, N/V/D. Pt resting in bed with call light within reach and updated on plan of care; pending provider to assess. Pt denies any needs at this time.

## 2022-08-09 NOTE — ED PROVIDER NOTES
Emergency Department Provider Note  Location: [address] [place] [address]  ED  8/9/2022     Patient Identification  Coit  is a 67 y.o. male    Chief Complaint  Loss of Consciousness (See triage note)      HPI    (History provided by patient and spouse/SO)    Patient is a 67 y.o. male with a significant PMHx of CAD, diabetes, hyperlipidemia, and other comorbidities as listed below, presents emergency department today with 3 episodes of very lightheadedness and episode of syncope this morning, prior to arrival.  Patient states that he had severe COVID over the past 10 days, in the past 3 days, had tested negative. However, he is very lightheaded only upon standing. At rest, sitting, he is completely asymptomatic. Wife states that he has not been drinking enough water, and barely ate when he had COVID, though he tried to drink water yesterday. Says his appetite is still quite diminished. He was never hypoxic during COVID. Never had a hospital admission. Today in the emergency department, and over the past 3 days, he denies any chest pain or shortness of breath. No leg swelling or calf tenderness. No recent periods of prolonged immobilization regarding travel or surgeries. No previous blood clots. No blood thinners. Otherwise, no nausea, vomiting, diarrhea at this time. Otherwise feels at the baseline of his health while laying here in the emergency department. I have reviewed the following nursing documentation:  Allergies: Allergies   Allergen Reactions    Bactrim [Sulfamethoxazole-Trimethoprim]      KANE risk       Past medical history:  has a past medical history of CAD (coronary artery disease), Cataract, Diabetes mellitus ([address] DJD (degenerative joint disease), History of PTCA, Hyperlipidemia, Hypertension, Hypotestosteronism, IGT (impaired glucose tolerance), Pituitary abnormality ([address] and Sleep apnea.     Past surgical history:  has a past surgical history that includes Coronary 250.00 10/24/17   Severo Morales MD   Cholecalciferol (VITAMIN D) 2000 UNITS CAPS capsule Take 1 capsule by mouth daily    Historical Provider, MD   aspirin 81 MG chewable tablet Take 81 mg by mouth daily. Historical Provider, MD       Social history:  reports that he quit smoking about 39 years ago. His smoking use included cigarettes. He has a 15.00 pack-year smoking history. He has never used smokeless tobacco. He reports that he does not drink alcohol and does not use drugs. Family history:    Family History   Problem Relation Age of Onset    Heart Disease Brother     High Blood Pressure Brother     Dementia Mother         alzheimers         ROS  Review of Systems   Constitutional:  Negative for activity change, appetite change, fatigue and fever. HENT:  Negative for congestion and rhinorrhea. Eyes:  Negative for discharge and redness. Respiratory:  Negative for cough, chest tightness and shortness of breath. Cardiovascular:  Negative for chest pain and leg swelling. Gastrointestinal:  Negative for abdominal pain, diarrhea and vomiting. Genitourinary:  Negative for difficulty urinating and dysuria. Musculoskeletal:  Negative for back pain and neck pain. Skin:  Negative for rash and wound. Neurological:  Positive for syncope and light-headedness. Negative for dizziness, weakness and numbness. Exam  Vitals:    08/09/22 1153 08/09/22 1230 08/09/22 1334 08/09/22 1356   BP:    (!) 150/60   Pulse: 52   54   Resp:  18 17 12   Temp:       TempSrc:       SpO2:  97% 100% 99%   Weight:       Height:             Physical Exam  Constitutional:       Appearance: Normal appearance. He is normal weight. He is not ill-appearing or diaphoretic. HENT:      Head: Normocephalic and atraumatic. Right Ear: External ear normal.      Left Ear: External ear normal.      Nose: Nose normal.      Mouth/Throat:      Mouth: Mucous membranes are moist.      Pharynx: Oropharynx is clear.    Eyes: General:         Right eye: No discharge. Left eye: No discharge. Conjunctiva/sclera: Conjunctivae normal.   Cardiovascular:      Rate and Rhythm: Normal rate and regular rhythm. Heart sounds: No murmur heard. Pulmonary:      Effort: Pulmonary effort is normal. No respiratory distress. Breath sounds: Normal breath sounds. Abdominal:      General: Abdomen is flat. Palpations: Abdomen is soft. Musculoskeletal:         General: No swelling or tenderness. Cervical back: Normal range of motion and neck supple. Right lower leg: No edema. Left lower leg: No edema. Skin:     General: Skin is warm and dry. Findings: No rash. Neurological:      General: No focal deficit present. Mental Status: He is alert and oriented to person, place, and time. Psychiatric:         Mood and Affect: Mood normal.         Thought Content: Thought content normal.     ED Course    ED Medication Orders (From admission, onward)      Start Ordered     Status Ordering Provider    08/09/22 1215 08/09/22 1212  0.9 % sodium chloride bolus  ONCE         Last MAR action: Stopped - by Franca Ochoa on 08/09/22 at 1419 ZEHRA KENNEDY            EKG  EKG obtained today in the emergency department shows a bundle branch block, rate 55. Previous EKG on 7/31, straits right bundle branch block. No ST segment elevation, ST segment depression, hyperacute T waves. Previous cardiac evaluations have documented heart rates in the 50s. Including, most recent echo, on 9/14/2021;   Summary   Technically difficult examination. Left ventricular systolic function is normal with a visually estimated   ejection fraction of 60-65%. The left ventricle is normal in size with mild posterior hypertrophy. No obvious regional wall motion abnormalities noted. Grade II diastolic dysfunction with elevated LV pressure. The left atrium appears moderately enlarged.    Inadequate tricuspid valve regurgitation to estimate systolic pulmonary   artery pressure. Stress test 9/14/2021; no signs of new ischemia. Radiology  XR CHEST PORTABLE    Result Date: 8/9/2022  EXAMINATION: ONE XRAY VIEW OF THE CHEST 8/9/2022 11:49 am COMPARISON: 07/31/2022 HISTORY: ORDERING SYSTEM PROVIDED HISTORY: #ConsciousnessProbs TECHNOLOGIST PROVIDED HISTORY: Reason for exam:->#ConsciousnessProbs Reason for Exam: loss of consciousness FINDINGS: Top-normal cardiac size. No focal consolidation noted. Overall improvement from prior comparison. Minimal ill-defined opacities remain. No pneumothorax. Significant osteopenic changes and degenerative changes noted in the bony structures. .     Moderate improvement. Minimal ill-defined opacities remain in the lungs, previously seen infiltrates have mostly resolved.       Labs  Results for orders placed or performed during the hospital encounter of 08/09/22   CBC with Auto Differential   Result Value Ref Range    WBC 6.8 4.0 - 11.0 K/uL    RBC 4.08 (L) 4.20 - 5.90 M/uL    Hemoglobin 11.8 (L) 13.5 - 17.5 g/dL    Hematocrit 35.8 (L) 40.5 - 52.5 %    MCV 87.7 80.0 - 100.0 fL    MCH 28.9 26.0 - 34.0 pg    MCHC 32.9 31.0 - 36.0 g/dL    RDW 15.4 12.4 - 15.4 %    Platelets 820 690 - 503 K/uL    MPV 8.8 5.0 - 10.5 fL    Neutrophils % 71.9 %    Lymphocytes % 18.0 %    Monocytes % 7.6 %    Eosinophils % 2.1 %    Basophils % 0.4 %    Neutrophils Absolute 4.9 1.7 - 7.7 K/uL    Lymphocytes Absolute 1.2 1.0 - 5.1 K/uL    Monocytes Absolute 0.5 0.0 - 1.3 K/uL    Eosinophils Absolute 0.1 0.0 - 0.6 K/uL    Basophils Absolute 0.0 0.0 - 0.2 K/uL   Comprehensive Metabolic Panel w/ Reflex to MG   Result Value Ref Range    Sodium 136 136 - 145 mmol/L    Potassium reflex Magnesium 4.7 3.5 - 5.1 mmol/L    Chloride 101 99 - 110 mmol/L    CO2 23 21 - 32 mmol/L    Anion Gap 12 3 - 16    Glucose 166 (H) 70 - 99 mg/dL    BUN 39 (H) 7 - 20 mg/dL    Creatinine 1.8 (H) 0.8 - 1.3 mg/dL    GFR Non-African American 37 (A) >60    GFR  44 (A) >60    Calcium 9.2 8.3 - 10.6 mg/dL    Total Protein 7.1 6.4 - 8.2 g/dL    Albumin 4.4 3.4 - 5.0 g/dL    Albumin/Globulin Ratio 1.6 1.1 - 2.2    Total Bilirubin 0.3 0.0 - 1.0 mg/dL    Alkaline Phosphatase 119 40 - 129 U/L    ALT 11 10 - 40 U/L    AST 12 (L) 15 - 37 U/L   Troponin   Result Value Ref Range    Troponin <0.01 <0.01 ng/mL   EKG 12 Lead   Result Value Ref Range    Ventricular Rate 53 BPM    Atrial Rate 53 BPM    P-R Interval 154 ms    QRS Duration 144 ms    Q-T Interval 460 ms    QTc Calculation (Bazett) 431 ms    P Axis 10 degrees    R Axis -45 degrees    T Axis 12 degrees    Diagnosis       Sinus bradycardiaRight bundle branch blockLeft anterior fascicular block Bifascicular block Abnormal ECGWhen compared with ECG of 31-JUL-2022 22:02,No significant change was found     Procedures  Procedures    MDM  Patient seen and evaluated. Relevant records reviewed. - Patient is a 67 y.o. male with multiple comorbidities as listed above, with recent COVID-19 infection, mostly recovered, that presents emergency department what sounds like orthostatic hypotension and an episode of syncope this morning; only symptomatic when he stands up, and at rest, is not in any chest pain or having shortness of breath. Very significantly decreased appetite over the past 10 days, but is slowly trying to improve. Vital signs today in the emergency department showed a wide pulse pressure at 127/56, and will evaluate for cardiac, anemia, and other etiologies. However, orthostatic measurements ordered prior to IV hydration. 1L NS ordered. - Evaluation today in the ED revealed:    3:52 PM  Orthostatic hypotension; patient was positive with orthostatics, systolic blood pressure dropped by 20 points from standing. Provide IV fluids at this point. Laboratory evaluation today reveals a minimal elevation in his BUN and creatinine to 39 and 1.18, which is within patient's baseline. Likely in the setting of hypovolemia with recent COVID-19 infection. ESR is decreased, and I discussed this with patient and his wife at length length. Otherwise, chronically anemic, actually higher hemoglobin than usual.  Troponin reassuring. [place] with improvement of opacities from COVID-19. After IV fluids, patient ambulates in the emergency department, and orthostatics are no longer positive. He states he feels much better. Discussed fluid management at home with patient and his wife at length. Follow-up with cardiology and PCP as soon as possible. I do not believe patient's syncope is cardiac or neurologic in nature, as he has a very suspicious story for hypovolemia over the past multiple weeks. Patient was instructed to return to the ED should they experience any concerning new or worsening symptoms. Instructed patient to follow up with their PCP in 1-3 days or as soon as possible for follow up. Patient understands and agrees with the discharge plan, and I have answered all their questions at this time. Patient is stable for discharge home from the ED at this time. Medications   0.9 % sodium chloride bolus (0 mLs IntraVENous Stopped 8/9/22 1419)       - I have a low concern for other emergent process, and do not see indication for further work-up in the ER, as it is unlikely  and poses more risk than benefit. - I discussed the results, including any incidental findings, with patient and spouse/SO. Questions answered. Patient/family agreeable to plan and express understanding of plan. Disposition:  Discharge to home in fair condition. Is this patient to be included in the SEP-1 Core Measure due to severe sepsis or septic shock? No   Exclusion criteria - the patient is NOT to be included for SEP-1 Core Measure due to: Infection is not suspected      Clinical Impression:  1. Orthostatic lightheadedness    2.  Syncope and collapse        Blood pressure (!) 150/60, pulse 54, temperature 98.4 °F (36.9 °C), temperature source Oral, resp. rate 12, height 5' 10\" (1.778 m), weight 250 lb (113.4 kg), SpO2 99 %. Patient was given prescriptions for the following medications. I counseled patient how to take these medications. Discharge Medication List as of 8/9/2022  2:03 PM        Disposition referral (if applicable):  No follow-up provider specified. Ade Kennedy DO, am the primary attending of record and contributed the majority of evaluation and treatment of emergent care for this encounter. This chart was generated in part by using Dragon Dictation system and may contain errors related to that system including errors in grammar, punctuation, and spelling, as well as words and phrases that may be inappropriate. If there are any questions or concerns please feel free to contact the dictating provider for clarification.      ZEHRA KENNEDY, [address] Roberto Melgoza DO  08/09/22 2615

## 2022-08-09 NOTE — ED NOTES
Ambulated pt approximately 52 feet without assistance of staff or assistive device without oxygen. Pt denies feeling SOB, dizzy, or feeling light headed. Pt's gait was even, rhythmic and steady.        Tahira Shrestha RN  08/09/22 [id number]

## 2022-08-09 NOTE — PROGRESS NOTES
Aure Kate (:  1945) is a 67 y.o. male,Established patient, here for evaluation of the following chief complaint(s):  Follow-Up from [facility], still dizzy and faint/)         ASSESSMENT/PLAN:  1. Hypotension, unspecified hypotension type  -     EKG 12 Lead - Clinic Performed  2. Bradycardia  Hypotension and bradycardia. Was transferred to emergency department via private car. No follow-ups on file. Subjective   SUBJECTIVE/OBJECTIVE:  Aure Kate is a 67 y.o. male. Patient presents with: Follow-Up from Hospital: Covid, still dizzy and faint      Patient is here for follow-up of recent hospital visit where he had COVID. He has still been dizzy and faint since that time. He has been markedly fatigued and unable to do a lot. When he stands he feels lightheaded and has presyncope. He has been feeling overwhelmingly tired. Patient denies chest pains or shortness of breath at this time. Patient denies any significant swelling in his extremities. The patients PMH, surgical history, family history, medications, allergies were all reviewed and updated as appropriate today. Review of Systems       Objective   Physical Exam  Vitals and nursing note reviewed. Constitutional:       Appearance: Normal appearance. He is well-developed. HENT:      Head: Normocephalic and atraumatic. Right Ear: External ear normal.      Left Ear: External ear normal.      Nose: Nose normal.   Eyes:      Conjunctiva/sclera: Conjunctivae normal.      Pupils: Pupils are equal, round, and reactive to light. Cardiovascular:      Rate and Rhythm: Normal rate and regular rhythm. Heart sounds: Normal heart sounds. No murmur heard. No friction rub. No gallop. Pulmonary:      Effort: Pulmonary effort is normal. No respiratory distress. Breath sounds: Normal breath sounds. No wheezing. Abdominal:      General: Bowel sounds are normal. There is no distension.       Palpations: Abdomen is soft. Tenderness: There is no abdominal tenderness. Musculoskeletal:         General: No tenderness. Normal range of motion. Cervical back: Normal range of motion and neck supple. Skin:     General: Skin is warm and dry. Neurological:      Mental Status: He is alert and oriented to person, place, and time. Deep Tendon Reflexes: Reflexes are normal and symmetric. Psychiatric:         Behavior: Behavior normal.         Thought Content: Thought content normal.         Judgment: Judgment normal.            An electronic signature was used to authenticate this note.     --Will Humphreys MD

## 2022-08-11 ENCOUNTER — CARE COORDINATION (OUTPATIENT)
Dept: CARE COORDINATION | Age: 77
End: 2022-08-11

## 2022-08-11 ENCOUNTER — TELEPHONE (OUTPATIENT)
Dept: CARDIOLOGY CLINIC | Age: 77
End: 2022-08-11

## 2022-08-11 DIAGNOSIS — R55 SYNCOPE, UNSPECIFIED SYNCOPE TYPE: Primary | ICD-10-CM

## 2022-08-11 NOTE — TELEPHONE ENCOUNTER
Attempted to reach patient. No answer. Left VM to call office to set up Lab appointment for 30 day Medily[facility] per [facility]SP for syncope. Will also speak with patient and have appointment set up, please reach me in regards to setting appointment up for patient.

## 2022-08-11 NOTE — TELEPHONE ENCOUNTER
Spoke with patient. Lab appointment set up for 08/12/22 @ [facility]. PAtient MELIDA. Will call patient to schedule OV with VSP.

## 2022-08-11 NOTE — TELEPHONE ENCOUNTER
----- Message from Lin Richardson MD sent at 8/10/2022  9:31 AM EDT -----  Please order a 30-day event monitor on this patient. Diagnosis of syncope, abnormal EKG. Can you please contact the patient and get set up also.     P

## 2022-08-12 ENCOUNTER — TELEPHONE (OUTPATIENT)
Dept: CARDIOLOGY CLINIC | Age: 77
End: 2022-08-12

## 2022-08-12 ENCOUNTER — NURSE ONLY (OUTPATIENT)
Dept: CARDIOLOGY CLINIC | Age: 77
End: 2022-08-12

## 2022-08-12 NOTE — TELEPHONE ENCOUNTER
Called and left detailed VM for patient per VSP okay to schedule appointment based on 30 day monitor results, will await results.

## 2022-08-12 NOTE — TELEPHONE ENCOUNTER
08/12 Pt came in for placement of monitor per pt, ED recommended 6/8 week f/u. Please provide an approval for an overbook date and time.

## 2022-08-12 NOTE — PROGRESS NOTES
Monitor placed by [address] [place] [address]  Length of monitor 30 DAYS  Monitor ordered by [employer]  Serial number [id number]   Activation successful prior to pt leaving office?  Yes

## 2022-08-15 DIAGNOSIS — E11.9 TYPE 2 DIABETES MELLITUS WITHOUT COMPLICATION, WITHOUT LONG-TERM CURRENT USE OF INSULIN (HCC): ICD-10-CM

## 2022-08-15 RX ORDER — METFORMIN HYDROCHLORIDE 750 MG/1
TABLET, EXTENDED RELEASE ORAL
Qty: 180 TABLET | Refills: 1 | Status: SHIPPED | OUTPATIENT
Start: 2022-08-15

## 2022-08-15 NOTE — TELEPHONE ENCOUNTER
Received fax from  [facility] requesting metFORMIN (GLUCOPHAGE-XR) 750 MG extended release tablet    Last Office Visit  -  8/9/22  Next Office Visit  -  9/22/22

## 2022-08-17 ENCOUNTER — CARE COORDINATION (OUTPATIENT)
Dept: CARE COORDINATION | Age: 77
End: 2022-08-17

## 2022-08-22 NOTE — TELEPHONE ENCOUNTER
Spoke with pt, was going to schedule 9/8 appt with VSP. Pt said he dose not finish wearing monitor until 9/12 and would like seen after that has been completed. He is out of town until 9/19 and scheduled him for [place] 9/20. Pt is aware of the location in [place] and was given the address.

## 2022-08-23 NOTE — TELEPHONE ENCOUNTER
Patient called I this AM and had to re-schedule appt due to he was going to be OOT 09/20/22. Called and spoke with patient I re-scheduled appt to 10/12/22 @ [facility] with MIRANDA. He VU.

## 2022-09-12 ENCOUNTER — CARE COORDINATION (OUTPATIENT)
Dept: CARE COORDINATION | Age: 77
End: 2022-09-12

## 2022-09-15 ENCOUNTER — CARE COORDINATION (OUTPATIENT)
Dept: CARE COORDINATION | Age: 77
End: 2022-09-15

## 2022-09-15 ENCOUNTER — TELEPHONE (OUTPATIENT)
Dept: CARDIOLOGY CLINIC | Age: 77
End: 2022-09-15

## 2022-09-15 NOTE — CARE COORDINATION
ACM outreach to patient to discuss RPM. Patient did not answer. ACM left message for patient to return call. Will follow up at a later date.

## 2022-09-20 PROCEDURE — 93228 REMOTE 30 DAY ECG REV/REPORT: CPT | Performed by: INTERNAL MEDICINE

## 2022-09-22 ENCOUNTER — OFFICE VISIT (OUTPATIENT)
Dept: FAMILY MEDICINE CLINIC | Age: 77
End: 2022-09-22
Payer: MEDICARE

## 2022-09-22 VITALS
HEIGHT: 70 IN | OXYGEN SATURATION: 98 % | BODY MASS INDEX: 37.94 KG/M2 | DIASTOLIC BLOOD PRESSURE: 50 MMHG | HEART RATE: 62 BPM | WEIGHT: 265 LBS | SYSTOLIC BLOOD PRESSURE: 132 MMHG

## 2022-09-22 DIAGNOSIS — I10 ESSENTIAL HYPERTENSION, BENIGN: ICD-10-CM

## 2022-09-22 DIAGNOSIS — E11.9 TYPE 2 DIABETES MELLITUS WITHOUT COMPLICATION, WITHOUT LONG-TERM CURRENT USE OF INSULIN (HCC): ICD-10-CM

## 2022-09-22 DIAGNOSIS — Z23 NEED FOR PROPHYLACTIC VACCINATION AND INOCULATION AGAINST INFLUENZA: Primary | ICD-10-CM

## 2022-09-22 LAB — HBA1C MFR BLD: 6.4 %

## 2022-09-22 PROCEDURE — 3044F HG A1C LEVEL LT 7.0%: CPT | Performed by: FAMILY MEDICINE

## 2022-09-22 PROCEDURE — 1123F ACP DISCUSS/DSCN MKR DOCD: CPT | Performed by: FAMILY MEDICINE

## 2022-09-22 PROCEDURE — 90694 VACC AIIV4 NO PRSRV 0.5ML IM: CPT | Performed by: FAMILY MEDICINE

## 2022-09-22 PROCEDURE — G0008 ADMIN INFLUENZA VIRUS VAC: HCPCS | Performed by: FAMILY MEDICINE

## 2022-09-22 PROCEDURE — 83036 HEMOGLOBIN GLYCOSYLATED A1C: CPT | Performed by: FAMILY MEDICINE

## 2022-09-22 PROCEDURE — 99214 OFFICE O/P EST MOD 30 MIN: CPT | Performed by: FAMILY MEDICINE

## 2022-09-26 ENCOUNTER — TELEPHONE (OUTPATIENT)
Dept: CARDIOLOGY CLINIC | Age: 77
End: 2022-09-26

## 2022-09-26 ENCOUNTER — OFFICE VISIT (OUTPATIENT)
Dept: CARDIOLOGY CLINIC | Age: 77
End: 2022-09-26
Payer: MEDICARE

## 2022-09-26 VITALS
SYSTOLIC BLOOD PRESSURE: 136 MMHG | DIASTOLIC BLOOD PRESSURE: 52 MMHG | WEIGHT: 267 LBS | HEART RATE: 56 BPM | HEIGHT: 70 IN | BODY MASS INDEX: 38.22 KG/M2 | OXYGEN SATURATION: 96 %

## 2022-09-26 DIAGNOSIS — I10 ESSENTIAL HYPERTENSION, BENIGN: ICD-10-CM

## 2022-09-26 DIAGNOSIS — Z87.891 FORMER SMOKER: ICD-10-CM

## 2022-09-26 DIAGNOSIS — R55 SYNCOPE AND COLLAPSE: ICD-10-CM

## 2022-09-26 DIAGNOSIS — I25.10 CORONARY ARTERY DISEASE INVOLVING NATIVE CORONARY ARTERY OF NATIVE HEART WITHOUT ANGINA PECTORIS: Primary | ICD-10-CM

## 2022-09-26 PROCEDURE — 99214 OFFICE O/P EST MOD 30 MIN: CPT | Performed by: INTERNAL MEDICINE

## 2022-09-26 PROCEDURE — 1123F ACP DISCUSS/DSCN MKR DOCD: CPT | Performed by: INTERNAL MEDICINE

## 2022-09-26 NOTE — PATIENT INSTRUCTIONS
Plan: 1. Patient given detailed instructions on addressing diet, regular exercise, weight control, smoking abstention, medication compliance, and stress minimization. The patient was provided written and verbal instructions regarding risk factor modification. 2. Continue to follow with Olga Peters MD for diabetes management goal less than 6.5 for HgbA1c  3. Discussed Papo Barone will discuss with primary care provider if appropriate to prescribe. 4. Order carotid doppler ~ assess for any stenosis. ~A ultrasound test that uses sound waves to detect narrowing of your arteries or potential blockages caused by plaque.   5. Follow up in 6 months

## 2022-09-26 NOTE — PROGRESS NOTES
[facility]   Cardiovascular Evaluation    PATIENT: Sapna Koch  DATE: 2022  MRN: [id number]  CSN: [id number]  : 1945    Primary Care Doctor/Referring provider: Taylor Zavala MD    Reason for evaluation/Chief complaint:   Follow-up and Coronary Artery Disease      Subjective:    History of present illness on initial date of evaluation:   Sapna Koch is a 65 y.o. male who is here today for cardiology follow up. He has a history of coronary artery disease with stent placement in  with drug eluting stents to his right coronary artery and left anterior descending artery. His most recent echocardiogram from 10/2019 showed an ejection fraction of 55-60%. He was found to have a bruit on assessment to carotid artery with Taylor Zavala MD and had a carotid doppler on 21 that showed <50% stenosis bilaterally and normal antegrade flow. Since last office visit Echo 21 EF 60-65%. Stress test 21 no suggestive of ischemia, artifact vs attenuation. He presented to Emergency department 22 with LOC episode. He was positive for orthostatic hypotension. Recent COVID infection. Treated with IV fluids. Cardiac event monitor wore -08/10/22 showed no episodes of atrial fibrillation or atrial flutter. Today he states his wife and himself had COVID which prompted him to ED. Since he was discharged from hospital no other episodes of LOC. He states he is feeling well. Patient currently denies any weight gain, edema, palpitations, chest pain, shortness of breath, dizziness, and syncope. He is taking medications as prescribed, tolerating well. He states he is following with PCP for diabetes management, his hemoglobin A1c is controlled. He is taking testosterone supplement.      Patient Active Problem List   Diagnosis    Hypotestosteronism    CAD (coronary artery disease) PCI to RCA and LAD     History of PTCA    Sleep apnea    DJD (degenerative joint disease)    Essential hypertension, benign    Gout    Skin cancer    Anal fissure    Vitamin D deficiency    SIRS (systemic inflammatory response syndrome) ([facility])    Viral syndrome    Non-intractable vomiting without nausea    Syncope    Bacteremia due to group B Streptococcus    Cellulitis of left lower extremity    Hyperprolactinemia ([facility])    Severe sepsis ([facility])    Cellulitis of right leg    Group B streptococcal bacteriuria    Venous insufficiency of both lower extremities    Obesity due to excess calories with serious comorbidity    Cellulitis    Community acquired pneumonia    Bronchiolitis    Acute febrile illness    Tinea pedis of right foot    Chronic combined systolic and diastolic heart failure ([facility])    Type 2 diabetes mellitus with complication, with long-term current use of insulin ([facility])    Leg edema    ARF (acute renal failure) ([facility])    Status post total right knee replacement    Status post total knee replacement, right    Morbid obesity with BMI of 40.0-44.9, adult ([facility])    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 EKG:  ECHO:   STRESS TEST:  CATH:  BYPASS:  VASCULAR:    Past Medical History:   has a past medical history of CAD (coronary artery disease), Cataract, Diabetes mellitus ([address] DJD (degenerative joint disease), History of PTCA, Hyperlipidemia, Hypertension, Hypotestosteronism, IGT (impaired glucose tolerance), Pituitary abnormality ([address] and Sleep apnea. Surgical History:   has a past surgical history that includes Coronary angioplasty with stent (2003); Knee arthroscopy (Right, 2015); Colonoscopy (2001); Colonoscopy (12/9/11); eye surgery; Cardiac surgery (2009); and Total knee arthroplasty (Right, 1/23/2020). Social History:   reports that he quit smoking about 39 years ago. His smoking use included cigarettes. He has a 15.00 pack-year smoking history.  He has never used smokeless tobacco. He reports that he does not drink alcohol and does not use drugs. Family History:  No evidence for sudden cardiac death or premature CAD    Medications:  Reviewed and are listed in nursing record. and/or listed below  Outpatient Medications:  Prior to Admission medications    Medication Sig Start Date End Date Taking? Authorizing Provider   metFORMIN (GLUCOPHAGE-XR) 750 MG extended release tablet TAKE ONE TABLET BY MOUTH TWICE A DAY 8/15/22  Yes Olga Peters MD   lisinopril (PRINIVIL;ZESTRIL) 40 MG tablet TAKE 1/2 TABLET BY MOUTH TWICE A DAY 8/8/22  Yes Olga Peters MD   hydrALAZINE (APRESOLINE) 25 MG tablet TAKE ONE TABLET BY MOUTH EVERY MORNING, TAKE ONE TABLET BY MOUTH AROUND NOON AND TAKE TWO TABLETS BY MOUTH EVERY EVENING 5/24/22  Yes Olga Peters MD   cabergoline (DOSTINEX) 0.5 MG tablet TAKE half tablet once a week. MONDAY 5/12/22  Yes Olga Peters MD   allopurinol (ZYLOPRIM) 300 MG tablet TAKE ONE TABLET BY MOUTH DAILY 3/9/22  Yes Olga Peters MD   carvedilol (COREG) 25 MG tablet TAKE ONE TABLET BY MOUTH TWICE A DAY 10/11/21  Yes Olga Peters MD   blood glucose test strips (ONETOUCH VERIO) strip Test 2-3 times daily.  9/23/21  Yes Olga Peters MD   atorvastatin (LIPITOR) 80 MG tablet TAKE ONE TABLET BY MOUTH DAILY 9/10/21  Yes Olga Peters MD   hydrALAZINE (APRESOLINE) 50 MG tablet  8/13/21  Yes Historical Provider, MD   ammonium lactate (LAC-HYDRIN) 12 % lotion APPLY TOPICALLY DAILY 8/9/21  Yes Olga Peters MD   ibuprofen (ADVIL) 200 MG tablet Take 2 tablets by mouth every 8 hours as needed for Pain 1/28/20  Yes PATTI Lynn   hydrochlorothiazide (HYDRODIURIL) 25 MG tablet Take 25 mg by mouth daily   Yes Historical Provider, MD   ferrous sulfate 325 (65 Fe) MG tablet Take 1 tablet by mouth daily (with breakfast) 11/1/19  Yes Radha Flowers MD   Accu-Chek Multiclix Lancets MISC Test once daily  DX  250.00 10/24/17  Yes Olga Peters MD   Cholecalciferol (VITAMIN D) 2000 UNITS CAPS capsule Take 1 capsule by mouth daily   Yes Historical Provider, MD   aspirin 81 MG chewable tablet Take 81 mg by mouth daily. Yes Historical Provider, MD   ondansetron (ZOFRAN ODT) 4 MG disintegrating tablet Take 1 tablet by mouth every 8 hours as needed for Nausea  Patient not taking: Reported on 9/26/2022 7/31/22   Fred DEONNA Rinaldi CNP       In-patient schedule medications:        Infusion Medications: Allergies:  Bactrim [sulfamethoxazole-trimethoprim]     Review of Systems:   All 14 point review of symptoms completed. Pertinent positives identified in the HPI, all other review of symptoms findings as below.      Review of Systems - History obtained from the patient  General ROS: negative for - chills, fever or night sweats  Psychological ROS: negative for - disorientation or hallucinations  Ophthalmic ROS: negative for - dry eyes, eye pain or loss of vision  ENT ROS: negative for - nasal discharge or sore throat  Allergy and Immunology ROS: negative for - hives or itchy/watery eyes  Hematological and Lymphatic ROS: negative for - jaundice or night sweats  Endocrine ROS: negative for - mood swings or temperature intolerance  Breast ROS: deferred  Respiratory ROS: negative for - hemoptysis or stridor  Gastrointestinal ROS: no abdominal pain, change in bowel habits, or black or bloody stools  Genito-Urinary ROS: no dysuria, trouble voiding, or hematuria  Musculoskeletal ROS: negative for - gait disturbance, joint pain or joint stiffness  Neurological ROS: negative for - seizures or speech problems  Dermatological ROS: negative for - rash or skin lesion changes      Physical Examination:    BP (!) 136/52 (Site: Left Upper Arm, Position: Sitting, Cuff Size: Medium Adult)   Pulse 56   Ht 5' 10\" (1.778 m)   Wt 267 lb (121.1 kg)   SpO2 96%   BMI 38.31 kg/m²   BP (!) 136/52 (Site: Left Upper Arm, Position: Sitting, Cuff Size: Medium Adult)   Pulse 56   Ht 5' 10\" (1.778 m)   Wt 267 lb (121.1 kg)   SpO2 96%   BMI 38.31 kg/m²    Weight: 267 lb (121.1 kg)     Wt Readings from Last 3 Encounters:   09/26/22 267 lb (121.1 kg)   09/22/22 265 lb (120.2 kg)   08/09/22 250 lb (113.4 kg)     No intake or output data in the 24 hours ending 09/26/22 1314    General Appearance:  Alert, cooperative, no distress, appears stated age   Head:  Normocephalic, without obvious abnormality, atraumatic   Eyes:  PERRL, conjunctiva/corneas clear       Nose: Nares normal, no drainage or sinus tenderness   Throat: Lips, mucosa, and tongue normal   Neck: Supple, symmetrical, trachea midline, no adenopathy, thyroid: not enlarged, symmetric, no tenderness/mass/nodules, no carotid bruit or JVD       Lungs:   Clear to auscultation bilaterally, respirations unlabored   Chest Wall:  No tenderness or deformity   Heart:  Regular rhythm and normal rate; S1, S2 are normal; no murmur noted; no rub or gallop   Abdomen:   Soft, non-tender, bowel sounds active all four quadrants,  no masses, no organomegaly           Extremities: Extremities normal, atraumatic, no cyanosis or edema   Pulses: 2+ and symmetric   Skin: Skin color, texture, turgor normal, no rashes or lesions   Pysch: Normal mood and affect   Neurologic: Normal gross motor and sensory exam.         Labs  No results for input(s): WBC, HGB, HCT, MCV, PLT in the last 72 hours. No results for input(s): CREATININE, BUN, NA, K, CL, CO2 in the last 72 hours. No results for input(s): INR, PROTIME in the last 72 hours. No results for input(s): TROPONINI in the last 72 hours. Invalid input(s): PRO-BNP  No results for input(s): CHOL, LDL, HDL in the last 72 hours. Invalid input(s): TG      Imaging:  I have reviewed the below testing personally and my interpretation is below.   EKG:  CXR:    Assessment:  65 y.o. patient with:  Problem List Items Addressed This Visit       CAD (coronary artery disease) PCI to RCA and LAD 2003    Relevant Orders    CT CARDIAC CALCIUM SCORING    VL DUP CAROTID BILATERAL    Essential hypertension, benign    Relevant Orders    VL DUP CAROTID BILATERAL    Syncope - Primary     Other Visit Diagnoses       Former smoker        Relevant Orders    VL DUP CAROTID BILATERAL    Syncope and collapse         Relevant Orders    VL DUP CAROTID BILATERAL            Plan: 1. Patient given detailed instructions on addressing diet, regular exercise, weight control, smoking abstention, medication compliance, and stress minimization. The patient was provided written and verbal instructions regarding risk factor modification. 2. Continue to follow with Mark Alaniz MD for diabetes management goal less than 6.5 for HgbA1c  3. Discussed Shun [place] will discuss with primary care provider if appropriate to prescribe. 4. Order carotid doppler ~ assess for any stenosis. ~A ultrasound test that uses sound waves to detect narrowing of your arteries or potential blockages caused by plaque. 5. Follow up in 6 months     Scribe's attestation: This note was scribed in the presence of Erroll Pages by Lovett Cooks RN     I, Dr. Faisal Ewing, personally performed the services described in this documentation, as scribed by the above signed scribe in my presence. It is both accurate and complete to my knowledge. I agree with the details independently gathered by the clinical support staff, while the remaining scribed note accurately describes my personal service to the patient. Medical Decision Making: The following items were considered in medical decision making:  Independent review of images  Review / order clinical lab tests  Review / order radiology tests  Decision to obtain old records  Review and summation of old records as accessed through [address] [place] [address] (a summary of my findings in these old records)      Time Based Itemization  A total of 30 minutes was spent on today's patient encounter.   If applicable, non-patient-facing activities:  (X)Preparing to see the patient and reviewing records  (X) Individual interpretation of results  ( ) Discussion or coordination of care with other health care professionals  () Ordering of unique tests, medications, or procedures  (X) Documentation within the EHR           All questions and concerns were addressed to the patient/family. Alternatives to my treatment were discussed. The note was completed using EMR. Every effort was made to ensure accuracy; however, inadvertent computerized transcription errors may be present.     Eliana Rush MD, [address], [place], [place]  [phone] [facility] office  [phone] [facility]  9/26/2022  1:14 PM

## 2022-09-26 NOTE — TELEPHONE ENCOUNTER
Called and spoke with patient pharmacy at [facility]. Jardiance cost $47.00 for 30 day supply for both 10 mg and 25 mg tablet daily.

## 2022-09-27 DIAGNOSIS — R06.02 SOB (SHORTNESS OF BREATH): Primary | ICD-10-CM

## 2022-09-29 ENCOUNTER — HOSPITAL ENCOUNTER (OUTPATIENT)
Dept: VASCULAR LAB | Age: 77
Discharge: HOME | End: 2022-09-29
Payer: MEDICARE

## 2022-09-29 ENCOUNTER — CARE COORDINATION (OUTPATIENT)
Dept: CARE COORDINATION | Age: 77
End: 2022-09-29

## 2022-09-29 DIAGNOSIS — I10 ESSENTIAL HYPERTENSION, BENIGN: ICD-10-CM

## 2022-09-29 DIAGNOSIS — I25.10 CORONARY ARTERY DISEASE INVOLVING NATIVE CORONARY ARTERY OF NATIVE HEART WITHOUT ANGINA PECTORIS: ICD-10-CM

## 2022-09-29 DIAGNOSIS — Z87.891 FORMER SMOKER: ICD-10-CM

## 2022-09-29 DIAGNOSIS — R55 SYNCOPE AND COLLAPSE: ICD-10-CM

## 2022-09-29 PROCEDURE — 93880 EXTRACRANIAL BILAT STUDY: CPT

## 2022-09-29 NOTE — TELEPHONE ENCOUNTER
Attempted to reach patient, no answer. Checking in to see if patient is agreeable to starting Jardiance.

## 2022-09-29 NOTE — TELEPHONE ENCOUNTER
Pt called mhi, read RNDP message. Pt stated he wanted to make sure Dr. Beni Ramsey agreed w/ medication and stated he has a few questions for RNDP. Please advise.

## 2022-10-02 NOTE — PROGRESS NOTES
Quincy Mcintyre (:  1945) is a 65 y.o. male,Established patient, here for evaluation of the following chief complaint(s):  3 Month Follow-Up         ASSESSMENT/PLAN:  1. Need for prophylactic vaccination and inoculation against influenza  -     Influenza, FLUAD, (age 72 y+), IM, Preservative Free, 0.5 mL  2. Type 2 diabetes mellitus without complication, without long-term current use of insulin ([facility])  -     POCT glycosylated hemoglobin (Hb A1C)  Lab Results   Component Value Date    LABA1C 6.4 2022     Lab Results   Component Value Date    .0 2020     Trolled continue current medications. Continue diet and exercise. 3. Essential hypertension, bening  Continue angiotensin blockers for blood pressure control and renal protection. No follow-ups on file. Subjective   SUBJECTIVE/OBJECTIVE:  Quincy Mcintyre is a 65 y.o. male. Patient presents with:  3 Month Follow-Up      26-year-old male with a history of hypertension and diabetes mellitus who presents for follow-up today. Patient has been taking his medications regularly. He denies polyuria polyphagia polydipsia. He denies any chest pain or shortness of breath. He denies any significant side effects from his medications. He has no numbness or tingling in his extremities. He has had no vision changes. The patients PMH, surgical history, family history, medications, allergies were all reviewed and updated as appropriate today. Review of Systems       Objective   Physical Exam  Vitals and nursing note reviewed. Constitutional:       Appearance: Normal appearance. He is well-developed. HENT:      Head: Normocephalic and atraumatic. Right Ear: External ear normal.      Left Ear: External ear normal.      Nose: Nose normal.   Eyes:      Conjunctiva/sclera: Conjunctivae normal.      Pupils: Pupils are equal, round, and reactive to light.    Cardiovascular:      Rate and Rhythm: Normal rate and regular rhythm. Heart sounds: Normal heart sounds. No murmur heard. No friction rub. No gallop. Pulmonary:      Effort: Pulmonary effort is normal. No respiratory distress. Breath sounds: Normal breath sounds. No wheezing. Abdominal:      General: Bowel sounds are normal. There is no distension. Palpations: Abdomen is soft. Tenderness: There is no abdominal tenderness. Musculoskeletal:         General: No tenderness. Normal range of motion. Cervical back: Normal range of motion and neck supple. Skin:     General: Skin is warm and dry. Neurological:      Mental Status: He is alert and oriented to person, place, and time. Deep Tendon Reflexes: Reflexes are normal and symmetric. Psychiatric:         Behavior: Behavior normal.         Thought Content: Thought content normal.         Judgment: Judgment normal.              An electronic signature was used to authenticate this note.     --Daisy Greenberg MD

## 2022-10-03 ENCOUNTER — TELEPHONE (OUTPATIENT)
Dept: CARDIOLOGY CLINIC | Age: 77
End: 2022-10-03

## 2022-10-03 NOTE — TELEPHONE ENCOUNTER
Created telephone encounter. Spoke with Yumiko Palma relayed message per VSP regarding carotid. Pt wife verbalized understanding.

## 2022-10-03 NOTE — TELEPHONE ENCOUNTER
----- Message from Kelsey Martinez MD sent at 9/30/2022  4:49 PM EDT -----  The test is normal. Please call the patient and inform them of the normal result.

## 2022-10-06 NOTE — TELEPHONE ENCOUNTER
Pt returned call. Pt stated that he is still waiting to here back from his pcp on his recommendations about the jardiance. Pt stated that he will give us a call back .

## 2022-10-14 ENCOUNTER — CARE COORDINATION (OUTPATIENT)
Dept: CARE COORDINATION | Age: 77
End: 2022-10-14

## 2022-10-14 NOTE — CARE COORDINATION
Ambulatory Care Coordination Note  10/14/2022    ACC: Sharman Phalen, DANIELA    Patient returned AC call. Patient notes they are getting ready for a camping trip and wanted to call back quickly. Patient notes he has had a cold for a few weeks, using delsym for cough and that seems to help. Notes cough started back up this am. Denies any SOB, CP, swelling. Patient notes he is doing well over all. Has all medications and taking as prescribed. Call was kept short and patient thanked [facility] for the call. The plan to follow up with patient in two weeks to complete any education/goals, AD and assess for further needs. Plan   Follow up two weeks  Complete any remaining education   Assess for further needs      Offered patient enrollment in the Remote Patient Monitoring (RPM) program for in-home monitoring: Patient declined. Lab Results       None            Care Coordination Interventions    Referral from Primary Care Provider: No  Suggested Interventions and Community Resources  Diabetes Education: In Process  Fall Risk Prevention: In Process  Senior Services: Declined  Zone Management Tools: In Process (Comment: DM CHF 8.8.22)          Goals Addressed    None         Prior to Admission medications    Medication Sig Start Date End Date Taking?  Authorizing Provider   metFORMIN (GLUCOPHAGE-XR) 750 MG extended release tablet TAKE ONE TABLET BY MOUTH TWICE A DAY 8/15/22   Benton Briceno MD   lisinopril (PRINIVIL;ZESTRIL) 40 MG tablet TAKE 1/2 TABLET BY MOUTH TWICE A DAY 8/8/22   Benton Briceno MD   ondansetron (ZOFRAN ODT) 4 MG disintegrating tablet Take 1 tablet by mouth every 8 hours as needed for Nausea  Patient not taking: Reported on 9/26/2022 7/31/22   Kathlean Coma, APRN - CNP   hydrALAZINE (APRESOLINE) 25 MG tablet TAKE ONE TABLET BY MOUTH EVERY MORNING, TAKE ONE TABLET BY MOUTH AROUND NOON AND TAKE TWO TABLETS BY MOUTH EVERY EVENING 5/24/22   Benton Briceno MD   cabergoline (DOSTINEX) 0.5 MG tablet TAKE half tablet once a week. MONDAY 5/12/22   Glenn Schultz MD   allopurinol (ZYLOPRIM) 300 MG tablet TAKE ONE TABLET BY MOUTH DAILY 3/9/22   Glenn Schultz MD   carvedilol (COREG) 25 MG tablet TAKE ONE TABLET BY MOUTH TWICE A DAY 10/11/21   Glenn Schultz MD   blood glucose test strips (ONETOUCH VERIO) strip Test 2-3 times daily. 9/23/21   Glenn Schultz MD   atorvastatin (LIPITOR) 80 MG tablet TAKE ONE TABLET BY MOUTH DAILY 9/10/21   Glenn Schultz MD   hydrALAZINE (APRESOLINE) 50 MG tablet  8/13/21   Historical Provider, MD   ammonium lactate (LAC-HYDRIN) 12 % lotion APPLY TOPICALLY DAILY 8/9/21   Glenn Schultz MD   ibuprofen (ADVIL) 200 MG tablet Take 2 tablets by mouth every 8 hours as needed for Pain 1/28/20   PATTI Núñez   hydrochlorothiazide (HYDRODIURIL) 25 MG tablet Take 25 mg by mouth daily    Historical Provider, MD   ferrous sulfate 325 (65 Fe) MG tablet Take 1 tablet by mouth daily (with breakfast) 11/1/19   Juan Luis Gaspar MD   Accu-Chek Multiclix Lancets MISC Test once daily  DX  250.00 10/24/17   Glenn Schultz MD   Cholecalciferol (VITAMIN D) 2000 UNITS CAPS capsule Take 1 capsule by mouth daily    Historical Provider, MD   aspirin 81 MG chewable tablet Take 81 mg by mouth daily.       Historical Provider, MD       Future Appointments   Date Time Provider Niko Linn   12/22/2022 10:00 AM Glenn Schultz MD [facility] - [facility]    and   Congestive Heart Failure Assessment    Are you currently restricting fluids?: No Restriction  Do you understand a low sodium diet?: Yes  Do you understand how to read food labels?: Yes  How many restaurant meals do you eat per week?: 0  Do you salt your food before tasting it?: No     No patient-reported symptoms      Symptoms:

## 2022-10-28 ENCOUNTER — CARE COORDINATION (OUTPATIENT)
Dept: CARE COORDINATION | Age: 77
End: 2022-10-28

## 2022-10-31 ENCOUNTER — OFFICE VISIT (OUTPATIENT)
Dept: FAMILY MEDICINE CLINIC | Age: 77
End: 2022-10-31
Payer: MEDICARE

## 2022-10-31 VITALS
WEIGHT: 269 LBS | OXYGEN SATURATION: 96 % | DIASTOLIC BLOOD PRESSURE: 50 MMHG | BODY MASS INDEX: 38.51 KG/M2 | HEART RATE: 64 BPM | HEIGHT: 70 IN | SYSTOLIC BLOOD PRESSURE: 130 MMHG

## 2022-10-31 DIAGNOSIS — I25.10 CORONARY ARTERY DISEASE INVOLVING NATIVE CORONARY ARTERY OF NATIVE HEART WITHOUT ANGINA PECTORIS: ICD-10-CM

## 2022-10-31 DIAGNOSIS — R41.3 MEMORY LOSS: ICD-10-CM

## 2022-10-31 DIAGNOSIS — E11.9 TYPE 2 DIABETES MELLITUS WITHOUT COMPLICATION, WITHOUT LONG-TERM CURRENT USE OF INSULIN (HCC): Primary | ICD-10-CM

## 2022-10-31 LAB
A/G RATIO: 2.1 (ref 1.1–2.2)
ALBUMIN SERPL-MCNC: 4 G/DL (ref 3.4–5)
ALP BLD-CCNC: 90 U/L (ref 40–129)
ALT SERPL-CCNC: 15 U/L (ref 10–40)
ANION GAP SERPL CALCULATED.3IONS-SCNC: 15 MMOL/L (ref 3–16)
AST SERPL-CCNC: 11 U/L (ref 15–37)
BASOPHILS ABSOLUTE: 0.1 K/UL (ref 0–0.2)
BASOPHILS RELATIVE PERCENT: 1.2 %
BILIRUB SERPL-MCNC: 0.4 MG/DL (ref 0–1)
BUN BLDV-MCNC: 20 MG/DL (ref 7–20)
CALCIUM SERPL-MCNC: 9 MG/DL (ref 8.3–10.6)
CHLORIDE BLD-SCNC: 108 MMOL/L (ref 99–110)
CO2: 23 MMOL/L (ref 21–32)
CREAT SERPL-MCNC: 1.3 MG/DL (ref 0.8–1.3)
EOSINOPHILS ABSOLUTE: 0.4 K/UL (ref 0–0.6)
EOSINOPHILS RELATIVE PERCENT: 4.5 %
GFR SERPL CREATININE-BSD FRML MDRD: 56 ML/MIN/{1.73_M2}
GLUCOSE BLD-MCNC: 163 MG/DL (ref 70–99)
HCT VFR BLD CALC: 35.3 % (ref 40.5–52.5)
HEMOGLOBIN: 11.8 G/DL (ref 13.5–17.5)
LYMPHOCYTES ABSOLUTE: 1.6 K/UL (ref 1–5.1)
LYMPHOCYTES RELATIVE PERCENT: 17.9 %
MCH RBC QN AUTO: 29.4 PG (ref 26–34)
MCHC RBC AUTO-ENTMCNC: 33.6 G/DL (ref 31–36)
MCV RBC AUTO: 87.5 FL (ref 80–100)
MONOCYTES ABSOLUTE: 0.6 K/UL (ref 0–1.3)
MONOCYTES RELATIVE PERCENT: 6.6 %
NEUTROPHILS ABSOLUTE: 6.2 K/UL (ref 1.7–7.7)
NEUTROPHILS RELATIVE PERCENT: 69.8 %
PDW BLD-RTO: 15.9 % (ref 12.4–15.4)
PLATELET # BLD: 137 K/UL (ref 135–450)
PMV BLD AUTO: 10.2 FL (ref 5–10.5)
POTASSIUM SERPL-SCNC: 4.5 MMOL/L (ref 3.5–5.1)
RBC # BLD: 4.03 M/UL (ref 4.2–5.9)
SODIUM BLD-SCNC: 146 MMOL/L (ref 136–145)
TOTAL PROTEIN: 5.9 G/DL (ref 6.4–8.2)
TSH REFLEX: 3.1 UIU/ML (ref 0.27–4.2)
VITAMIN B-12: 402 PG/ML (ref 211–911)
WBC # BLD: 8.9 K/UL (ref 4–11)

## 2022-10-31 PROCEDURE — 3044F HG A1C LEVEL LT 7.0%: CPT | Performed by: FAMILY MEDICINE

## 2022-10-31 PROCEDURE — 1123F ACP DISCUSS/DSCN MKR DOCD: CPT | Performed by: FAMILY MEDICINE

## 2022-10-31 PROCEDURE — 99214 OFFICE O/P EST MOD 30 MIN: CPT | Performed by: FAMILY MEDICINE

## 2022-10-31 PROCEDURE — 3078F DIAST BP <80 MM HG: CPT | Performed by: FAMILY MEDICINE

## 2022-10-31 PROCEDURE — 3074F SYST BP LT 130 MM HG: CPT | Performed by: FAMILY MEDICINE

## 2022-10-31 RX ORDER — TESTOSTERONE GEL, 1% 10 MG/G
GEL TRANSDERMAL
COMMUNITY
Start: 2022-10-30

## 2022-10-31 NOTE — PROGRESS NOTES
Charmaine Nunez (:  1945) is a 65 y.o. male,Established patient, here for evaluation of the following chief complaint(s):  1 Month Follow-Up         ASSESSMENT/PLAN:  1. Type 2 diabetes mellitus without complication, without long-term current use of insulin ([facility])  -     empagliflozin (JARDIANCE) 10 MG tablet; Take 1 tablet by mouth daily, Disp-90 tablet, R-1Normal  2. Memory loss  -     Vitamin B12; Future  -     TSH with Reflex; Future  -     CBC with Auto Differential; Future  -     Comprehensive Metabolic Panel; Future  -     [facility] - Malinda Cruz MD, Neurology, [facility]    No follow-ups on file. Subjective   SUBJECTIVE/OBJECTIVE:  Charmaine Nunez is a 65 y.o. male. Patient presents with:  1 Month Follow-Up      Memory issues:  Financial has not been an issue. Social activities become very confused. Walking, not lifitng feet like he used to. Only has one gear \"slow\". Remembers remote past well, no issues. Has a hard time following conversations in groups. The patients PMH, surgical history, family history, medications, allergies were all reviewed and updated as appropriate today. Diabetes  He presents for his follow-up diabetic visit. He has type 2 diabetes mellitus. His disease course has been stable. Pertinent negatives for hypoglycemia include no confusion or dizziness. Pertinent negatives for diabetes include no blurred vision, no chest pain and no fatigue. There are no hypoglycemic complications. Symptoms are stable. There are no diabetic complications. There are no known risk factors for coronary artery disease. When asked about current treatments, none were reported. He is compliant with treatment all of the time. There is no change in his home blood glucose trend. Review of Systems   Constitutional:  Negative for fatigue. Eyes:  Negative for blurred vision. Cardiovascular:  Negative for chest pain. Neurological:  Negative for dizziness. Psychiatric/Behavioral:  Negative for confusion. Objective   Physical Exam  Vitals and nursing note reviewed. Constitutional:       Appearance: Normal appearance. He is well-developed. HENT:      Head: Normocephalic and atraumatic. Right Ear: External ear normal.      Left Ear: External ear normal.      Nose: Nose normal.   Eyes:      Conjunctiva/sclera: Conjunctivae normal.      Pupils: Pupils are equal, round, and reactive to light. Cardiovascular:      Rate and Rhythm: Normal rate and regular rhythm. Heart sounds: Normal heart sounds. No murmur heard. No friction rub. No gallop. Pulmonary:      Effort: Pulmonary effort is normal. No respiratory distress. Breath sounds: Normal breath sounds. No wheezing. Abdominal:      General: Bowel sounds are normal. There is no distension. Palpations: Abdomen is soft. Tenderness: There is no abdominal tenderness. Musculoskeletal:         General: No tenderness. Normal range of motion. Cervical back: Normal range of motion and neck supple. Skin:     General: Skin is warm and dry. Neurological:      Mental Status: He is alert and oriented to person, place, and time. Deep Tendon Reflexes: Reflexes are normal and symmetric. Psychiatric:         Behavior: Behavior normal.         Thought Content: Thought content normal.         Judgment: Judgment normal.          On this date 10/31/2022 I have spent 30 minutes reviewing previous notes, test results and face to face with the patient discussing the diagnosis and importance of compliance with the treatment plan as well as documenting on the day of the visit. An electronic signature was used to authenticate this note.     --Susana Mena MD

## 2022-11-04 DIAGNOSIS — I10 ESSENTIAL HYPERTENSION, BENIGN: ICD-10-CM

## 2022-11-04 RX ORDER — CARVEDILOL 25 MG/1
TABLET ORAL
Qty: 180 TABLET | Refills: 3 | Status: SHIPPED | OUTPATIENT
Start: 2022-11-04

## 2022-11-04 RX ORDER — ATORVASTATIN CALCIUM 80 MG/1
TABLET, FILM COATED ORAL
Qty: 90 TABLET | Refills: 3 | Status: SHIPPED | OUTPATIENT
Start: 2022-11-04

## 2022-11-08 ASSESSMENT — ENCOUNTER SYMPTOMS: BLURRED VISION: 0

## 2022-11-15 ENCOUNTER — CARE COORDINATION (OUTPATIENT)
Dept: CARE COORDINATION | Age: 77
End: 2022-11-15

## 2022-11-15 NOTE — CARE COORDINATION
Ambulatory Care Coordination Note  11/15/2022    ACC: Marc Mayer, RN    ACM reached out to patient for follow up and assess DM concerns, CHF and huber kam in education needed. Pt returned ACM call back and spoke about his blood sugar trends and readings. Pt last BS was 157 this morning and was recently started on Jardiance 10 mg. Pt tolerating new med change well and reports his blood sugars are higher in the morning than usual. Pt is now going to monitor BS twice daily instead of once daily going forward. ACM educated on DM and will resend handouts for his use today. Pt is denying any other issues related to DM at this time. Patient discussed with ACM CHF huber kam in education for the holidays. Low salt Diet and S/S to watch for like leg swelling or increase weights. ACM educated on how to check for any swelling in his legs and stressed importance of daily weights to monitor that if it occurred. Pt also will be starting to weigh himself daily going forward. Has a good understanding of this and thanked AC for the information. ACM also went over medication list completely and all verified and reviewed. Pt denied any falls or medication adherence issues on this call. ACM reviewed cc protocol and updated any changes accordingly. Pt has a follow up PCP appointment 12/22 at 10 am and plans to make this appointment. Reviewed goals and remains on track with his goals at this time. Pt also educated with ACM contact information and when to let PCP or  ACM know if/when changes or issues may arise. Verbalized understanding. Pt gets around and remains independent on everything. Will send out DM, CHF handouts to him at this time. Huber kam in was also completed. Patient has no other needs at this time. Pt was very pleasant and has a good understanding of what was discussed today. Will follow up to check trends with blood glucose and monitoring. AS well as CHF and holidays to see how his weights are trending.  Pt accepts further reach out by [facility]. Plan    F/U 2 weeks on CHF my chart message handout sent  Weigh trends or any new loss? Blood sugar readings  Assess any new issues or concerns   How was holidays and diet review      Heart Failure Education outreach Date/Time: 11/15/2022 11:19 AM    Ambulatory Care Manager (STARLA) contacted the patient by telephone to perform Ambulatory Care Coordination. Verified name and  with patient as identifiers. Provided introduction to self, and explanation of the Ambulatory Care Manager's role. ACM reviewed that a Health Healthy tips for the Holiday packet has been sent to [employer]. ACM reviewed CHF zones, daily weights, fluid restriction, the importance of low sodium diet, and healthy tips packet with the patient. Instructed patient to call their PCP if they have a weight gain of 3 lbs in 2 days or 5 lbs in a week. Patient reminded that there is a physician on call 24 hours a day / 7 days a week should the patient have questions or concerns. The patient verbalized understanding. Offered patient enrollment in the Remote Patient Monitoring (RPM) program for in-home monitoring: Patient declined. Lab Results       None            Care Coordination Interventions    Referral from Primary Care Provider: No  Suggested Interventions and Community Resources  Diabetes Education: In Process  Fall Risk Prevention: In Process  Senior Services: Declined  Zone Management Tools: In Process (Comment: DM. CHF handouts mailed to keep with tuck me in education completed.)          Goals Addressed                   This Visit's Progress     Conditions and Symptoms   On track     I will schedule office visits, as directed by my provider. I will keep my appointment or reschedule if I have to cancel. I will notify my provider of any barriers to my plan of care. I will follow my Zone Management tool to seek urgent or emergent care.   I will notify my provider of any symptoms that indicate a worsening of my condition. Barriers: lack of education  Plan for overcoming my barriers: ACM   Confidence: 9/10  Anticipated Goal Completion Date: 10.15.22    Pt has scheduled follow up with PCP on 12/22 and will be going. On track with this goal. 11/15 ar       Self Monitoring   No change     Self-Monitored Blood Glucose - I will check my blood sugar Fasting blood sugar and blood sugars ac & HS  I will notify my provider of any trends of increasing or decreasing blood sugars over a 1 month period. I will notify my provider if I have any blood sugar readings less than 70 more than 2 times a month. Daily Weights - I will weight myself as directed - Daily and write down weights  I will notify my provider of any increase in weight by 3 or more pounds in 2 days OR 5 or more pounds in a week. Patient Reported Weight No flowsheet data found. Patient Reported Blood Glucose No flowsheet data found. Barriers: lack of education  Plan for overcoming my barriers: ACM and education  Confidence: 9/10  Anticipated Goal Completion Date: 02/23                Prior to Admission medications    Medication Sig Start Date End Date Taking?  Authorizing Provider   atorvastatin (LIPITOR) 80 MG tablet TAKE ONE TABLET BY MOUTH DAILY 11/4/22  Yes DEONNA Morgan CNP   carvedilol (COREG) 25 MG tablet TAKE ONE TABLET BY MOUTH TWICE A DAY 11/4/22  Yes DEONNA Morgan CNP   testosterone (ANDROGEL; TESTIM) 50 MG/5GM (1%) GEL 1% gel  10/30/22  Yes Historical Provider, MD   empagliflozin (JARDIANCE) 10 MG tablet Take 1 tablet by mouth daily 10/31/22  Yes Luke Rangel MD   metFORMIN (GLUCOPHAGE-XR) 750 MG extended release tablet TAKE ONE TABLET BY MOUTH TWICE A DAY 8/15/22  Yes Luke Rangel MD   lisinopril (PRINIVIL;ZESTRIL) 40 MG tablet TAKE 1/2 TABLET BY MOUTH TWICE A DAY 8/8/22  Yes Luke Rangel MD   hydrALAZINE (APRESOLINE) 25 MG tablet TAKE ONE TABLET BY MOUTH EVERY MORNING, TAKE ONE TABLET BY MOUTH AROUND NOON AND TAKE TWO TABLETS BY MOUTH EVERY EVENING 5/24/22  Yes Wendi Smith MD   cabergoline (DOSTINEX) 0.5 MG tablet TAKE half tablet once a week. MONDAY 5/12/22  Yes Wendi Smith MD   allopurinol (ZYLOPRIM) 300 MG tablet TAKE ONE TABLET BY MOUTH DAILY 3/9/22  Yes Wendi Smith MD   blood glucose test strips (ONETOUCH VERIO) strip Test 2-3 times daily. 9/23/21  Yes Wendi Smith MD   hydrALAZINE (APRESOLINE) 50 MG tablet  8/13/21  Yes Historical Provider, MD   ammonium lactate (LAC-HYDRIN) 12 % lotion APPLY TOPICALLY DAILY 8/9/21  Yes Wendi Smith MD   ibuprofen (ADVIL) 200 MG tablet Take 2 tablets by mouth every 8 hours as needed for Pain 1/28/20  Yes PATTI Rivera   hydrochlorothiazide (HYDRODIURIL) 25 MG tablet Take 25 mg by mouth daily   Yes Historical Provider, MD   ferrous sulfate 325 (65 Fe) MG tablet Take 1 tablet by mouth daily (with breakfast) 11/1/19  Yes Holland Chavez MD   Accu-Chek Multiclix Lancets MISC Test once daily  DX  250.00 10/24/17  Yes Wendi Smith MD   Cholecalciferol (VITAMIN D) 2000 UNITS CAPS capsule Take 1 capsule by mouth daily   Yes Historical Provider, MD   aspirin 81 MG chewable tablet Take 81 mg by mouth daily. Yes Historical Provider, MD   ondansetron (ZOFRAN ODT) 4 MG disintegrating tablet Take 1 tablet by mouth every 8 hours as needed for Nausea  Patient not taking: No sig reported 7/31/22   DEONNA Bartlett - CNP       Future Appointments   Date Time Provider Niko Linn   12/22/2022 10:00 AM MD MAHOGANY Bill   ,   Diabetes Assessment    Medic Alert ID: Yes  Meal Planning: Avoidance of concentrated sweets   How often do you test your blood sugar?: Daily (Comment: pt increased to twice daily since sugar has been higher of a morning 11/15 AR)   Do you have barriers with adherence to non-pharmacologic self-management interventions?  (Nutrition/Exercise/Self-Monitoring): No   Have you ever had to go to the ED for symptoms of low blood sugar?: No

## 2022-11-17 ENCOUNTER — SCHEDULED TELEPHONE ENCOUNTER (OUTPATIENT)
Dept: FAMILY MEDICINE CLINIC | Age: 77
End: 2022-11-17
Payer: MEDICARE

## 2022-11-17 ENCOUNTER — HOSPITAL ENCOUNTER (OUTPATIENT)
Dept: GENERAL RADIOLOGY | Age: 77
Discharge: HOME | End: 2022-11-17
Payer: MEDICARE

## 2022-11-17 ENCOUNTER — HOSPITAL ENCOUNTER (OUTPATIENT)
Age: 77
Discharge: HOME | End: 2022-11-17
Payer: MEDICARE

## 2022-11-17 DIAGNOSIS — R05.3 CHRONIC COUGH: ICD-10-CM

## 2022-11-17 DIAGNOSIS — R05.3 CHRONIC COUGH: Primary | ICD-10-CM

## 2022-11-17 PROCEDURE — 99442 PR PHYS/QHP TELEPHONE EVALUATION 11-20 MIN: CPT | Performed by: FAMILY MEDICINE

## 2022-11-17 PROCEDURE — 71046 X-RAY EXAM CHEST 2 VIEWS: CPT

## 2022-11-17 ASSESSMENT — ENCOUNTER SYMPTOMS: COUGH: 1

## 2022-11-17 NOTE — PROGRESS NOTES
Charmaine Nunez is a 65 y.o. male evaluated via telephone on 11/17/2022 for Cough  . Assessment & Plan   1. Chronic cough  -     XR CHEST STANDARD (2 VW); Future  No follow-ups on file. Subjective   Prior to Visit Medications    Medication Sig Taking? Authorizing Provider   atorvastatin (LIPITOR) 80 MG tablet TAKE ONE TABLET BY MOUTH DAILY Yes DEONNA Lugo CNP   carvedilol (COREG) 25 MG tablet TAKE ONE TABLET BY MOUTH TWICE A DAY Yes DEONNA Lugo CNP   testosterone (ANDROGEL; TESTIM) 50 MG/5GM (1%) GEL 1% gel  Yes Historical Provider, MD   empagliflozin (JARDIANCE) 10 MG tablet Take 1 tablet by mouth daily Yes Gael Curiel MD   metFORMIN (GLUCOPHAGE-XR) 750 MG extended release tablet TAKE ONE TABLET BY MOUTH TWICE A DAY Yes Gael Curiel MD   lisinopril (PRINIVIL;ZESTRIL) 40 MG tablet TAKE 1/2 TABLET BY MOUTH TWICE A DAY Yes Gael Curiel MD   ondansetron (ZOFRAN ODT) 4 MG disintegrating tablet Take 1 tablet by mouth every 8 hours as needed for Nausea Yes DEONNA Huang CNP   hydrALAZINE (APRESOLINE) 25 MG tablet TAKE ONE TABLET BY MOUTH EVERY MORNING, TAKE ONE TABLET BY MOUTH AROUND NOON AND TAKE TWO TABLETS BY MOUTH EVERY EVENING Yes Gael Curiel MD   cabergoline (DOSTINEX) 0.5 MG tablet TAKE half tablet once a week. Sommer Mckee MD   allopurinol (ZYLOPRIM) 300 MG tablet TAKE ONE TABLET BY MOUTH DAILY Yes Gael Curiel MD   blood glucose test strips (ONETOUCH VERIO) strip Test 2-3 times daily.  Yes Gael Curiel MD   hydrALAZINE (APRESOLINE) 50 MG tablet  Yes Historical Provider, MD   ammonium lactate (LAC-HYDRIN) 12 % lotion APPLY TOPICALLY DAILY Yes Gael Curiel MD   ibuprofen (ADVIL) 200 MG tablet Take 2 tablets by mouth every 8 hours as needed for Pain Yes PATTI Blackburn   hydrochlorothiazide (HYDRODIURIL) 25 MG tablet Take 25 mg by mouth daily Yes Historical Provider, MD   ferrous sulfate 325 (65 Fe) MG tablet Take 1 tablet by mouth daily (with breakfast) Yes Manish Bustamante MD   Accu-Chek Multiclix Lancets MISC Test once daily  DX  250.00 Yes America Weston MD   Cholecalciferol (VITAMIN D) 2000 UNITS CAPS capsule Take 1 capsule by mouth daily Yes Historical Provider, MD   aspirin 81 MG chewable tablet Take 81 mg by mouth daily. Yes Historical Provider, MD     Nico Alaniz is a 65 y.o. male. Patient presents with:  Cough      66 yo male with productive cough and nasal congestion for 2 weeks, not improving. Patient notes no energy, but denies fever, shortness of breath. Hurts in upper chest.     The patients PMH, surgical history, family history, medications, allergies were all reviewed and updated as appropriate today. Cough      Review of Systems   Respiratory:  Positive for cough. Objective   Patient-Reported Vitals  No data recorded       Total Time: minutes: 11-20 minutes     Nico Alaniz was evaluated through a synchronous (real-time) audio only encounter. Patient identification was verified at the start of the visit. He (or guardian if applicable) is aware that this is a billable service, which includes applicable co-pays. This visit was conducted with patient's (and/or legal guardian's) verbal consent. He has not had a related appointment within my department in the past 7 days or scheduled within the next 24 hours. The patient was located at Home: [place] [id number].   The provider was located at Home ([facility]): Deo Arreola MD

## 2022-11-29 DIAGNOSIS — E11.9 TYPE 2 DIABETES MELLITUS WITHOUT COMPLICATION, WITHOUT LONG-TERM CURRENT USE OF INSULIN (HCC): ICD-10-CM

## 2022-11-29 RX ORDER — BLOOD SUGAR DIAGNOSTIC
STRIP MISCELLANEOUS
Qty: 150 STRIP | Refills: 3 | OUTPATIENT
Start: 2022-11-29

## 2022-11-29 RX ORDER — BLOOD SUGAR DIAGNOSTIC
STRIP MISCELLANEOUS
Qty: 150 STRIP | Refills: 3 | Status: SHIPPED | OUTPATIENT
Start: 2022-11-29

## 2022-11-30 ENCOUNTER — CARE COORDINATION (OUTPATIENT)
Dept: CARE COORDINATION | Age: 77
End: 2022-11-30

## 2022-12-14 ENCOUNTER — CARE COORDINATION (OUTPATIENT)
Dept: CARE COORDINATION | Age: 77
End: 2022-12-14

## 2022-12-14 NOTE — CARE COORDINATION
ACM called but patient did not answer. [facility] left message for patient to call back and left call back number. Will follow up at a later date. [facility] sent [facility] for elisa kam in [place] resources to patient.

## 2022-12-20 RX ORDER — AMMONIUM LACTATE 12 G/100G
LOTION TOPICAL
Qty: 450 ML | Refills: 0 | Status: SHIPPED | OUTPATIENT
Start: 2022-12-20

## 2022-12-20 NOTE — TELEPHONE ENCOUNTER
Last Office Visit  -  10/31/22  Next Office Visit  -  12/22/22    Last Filled  -    Last UDS -    Contract -

## 2022-12-22 ENCOUNTER — OFFICE VISIT (OUTPATIENT)
Dept: FAMILY MEDICINE CLINIC | Age: 77
End: 2022-12-22
Payer: MEDICARE

## 2022-12-22 VITALS
OXYGEN SATURATION: 97 % | DIASTOLIC BLOOD PRESSURE: 60 MMHG | BODY MASS INDEX: 37.37 KG/M2 | WEIGHT: 261 LBS | HEART RATE: 64 BPM | HEIGHT: 70 IN | SYSTOLIC BLOOD PRESSURE: 140 MMHG

## 2022-12-22 DIAGNOSIS — E11.8 TYPE 2 DIABETES MELLITUS WITH COMPLICATION, WITH LONG-TERM CURRENT USE OF INSULIN (HCC): Primary | ICD-10-CM

## 2022-12-22 DIAGNOSIS — Z79.4 TYPE 2 DIABETES MELLITUS WITH COMPLICATION, WITH LONG-TERM CURRENT USE OF INSULIN (HCC): Primary | ICD-10-CM

## 2022-12-22 LAB — HBA1C MFR BLD: 7 %

## 2022-12-22 PROCEDURE — 3074F SYST BP LT 130 MM HG: CPT | Performed by: FAMILY MEDICINE

## 2022-12-22 PROCEDURE — 3078F DIAST BP <80 MM HG: CPT | Performed by: FAMILY MEDICINE

## 2022-12-22 PROCEDURE — 99213 OFFICE O/P EST LOW 20 MIN: CPT | Performed by: FAMILY MEDICINE

## 2022-12-22 PROCEDURE — 1123F ACP DISCUSS/DSCN MKR DOCD: CPT | Performed by: FAMILY MEDICINE

## 2022-12-22 PROCEDURE — 83036 HEMOGLOBIN GLYCOSYLATED A1C: CPT | Performed by: FAMILY MEDICINE

## 2022-12-28 ENCOUNTER — CARE COORDINATION (OUTPATIENT)
Dept: CARE COORDINATION | Age: 77
End: 2022-12-28

## 2022-12-28 RX ORDER — HYDRALAZINE HYDROCHLORIDE 25 MG/1
TABLET, FILM COATED ORAL
Qty: 120 TABLET | Refills: 3 | Status: SHIPPED | OUTPATIENT
Start: 2022-12-28

## 2022-12-28 NOTE — CARE COORDINATION
ACM attempted follow up with patient. Patient did not answer. ACM left a detailed message for patient to return ACM call if he had any questions or concerns. Patient has been unable to reach x3 attempts. At this time ACM will sign off care team.  No further follow up at this time.

## 2023-01-02 NOTE — PROGRESS NOTES
Paola Goldmann (:  1945) is a 65 y.o. male,Established patient, here for evaluation of the following chief complaint(s):  3 Month Follow-Up and Diabetes         ASSESSMENT/PLAN:  1. Type 2 diabetes mellitus with complication, with long-term current use of insulin ([facility])  -     POCT glycosylated hemoglobin (Hb A1C)  Hemoglobin A1C   Date Value Ref Range Status   2022 7.0 % Final     Fair control at this time. Continue current medication. No follow-ups on file. Subjective   SUBJECTIVE/OBJECTIVE:  Paola Goldmann is a 65 y.o. male. Patient presents with:  3 Month Follow-Up  Diabetes      79-year-old male who follows up for diabetes. Patient has had no significant complaints at this time. He has been taking his medication regularly and try to follow a good diet although diet has not been as good as it has been. Patient has not been exercising regularly. Denies any significant polyuria polyphagia or polydipsia. The patients PMH, surgical history, family history, medications, allergies were all reviewed and updated as appropriate today. Diabetes      Review of Systems       Objective   Physical Exam  Vitals and nursing note reviewed. Constitutional:       Appearance: Normal appearance. He is well-developed. HENT:      Head: Normocephalic and atraumatic. Right Ear: External ear normal.      Left Ear: External ear normal.      Nose: Nose normal.   Eyes:      Conjunctiva/sclera: Conjunctivae normal.      Pupils: Pupils are equal, round, and reactive to light. Cardiovascular:      Rate and Rhythm: Normal rate and regular rhythm. Heart sounds: Normal heart sounds. No murmur heard. No friction rub. No gallop. Pulmonary:      Effort: Pulmonary effort is normal. No respiratory distress. Breath sounds: Normal breath sounds. No wheezing. Abdominal:      General: Bowel sounds are normal. There is no distension. Palpations: Abdomen is soft.       Tenderness: There is no abdominal tenderness. Musculoskeletal:         General: No tenderness. Normal range of motion. Cervical back: Normal range of motion and neck supple. Skin:     General: Skin is warm and dry. Neurological:      Mental Status: He is alert and oriented to person, place, and time. Deep Tendon Reflexes: Reflexes are normal and symmetric. Psychiatric:         Behavior: Behavior normal.         Thought Content: Thought content normal.         Judgment: Judgment normal.              An electronic signature was used to authenticate this note.     --Jaylin Traylor MD

## 2023-01-03 DIAGNOSIS — E11.9 TYPE 2 DIABETES MELLITUS WITHOUT COMPLICATION, WITHOUT LONG-TERM CURRENT USE OF INSULIN (HCC): ICD-10-CM

## 2023-01-03 RX ORDER — METFORMIN HYDROCHLORIDE 750 MG/1
TABLET, EXTENDED RELEASE ORAL
Qty: 180 TABLET | Refills: 1 | Status: SHIPPED | OUTPATIENT
Start: 2023-01-03

## 2023-01-03 NOTE — TELEPHONE ENCOUNTER
Pharmacy is requesting a rx for  metFORMIN (GLUCOPHAGE-XR) 750 MG extended release tablet    OV 12/22/22    Next office visit   3/23/2023

## 2023-02-02 ENCOUNTER — TELEPHONE (OUTPATIENT)
Dept: FAMILY MEDICINE CLINIC | Age: 78
End: 2023-02-02

## 2023-02-02 DIAGNOSIS — E11.9 TYPE 2 DIABETES MELLITUS WITHOUT COMPLICATION, WITHOUT LONG-TERM CURRENT USE OF INSULIN (HCC): ICD-10-CM

## 2023-02-02 DIAGNOSIS — I10 ESSENTIAL HYPERTENSION, BENIGN: ICD-10-CM

## 2023-02-02 DIAGNOSIS — M10.9 GOUT, UNSPECIFIED CAUSE, UNSPECIFIED CHRONICITY, UNSPECIFIED SITE: ICD-10-CM

## 2023-02-02 RX ORDER — EMPAGLIFLOZIN 10 MG/1
TABLET, FILM COATED ORAL
Qty: 90 TABLET | Refills: 2 | Status: SHIPPED | OUTPATIENT
Start: 2023-02-02

## 2023-02-02 RX ORDER — LISINOPRIL 40 MG/1
TABLET ORAL
Qty: 90 TABLET | Refills: 1 | Status: SHIPPED | OUTPATIENT
Start: 2023-02-02

## 2023-02-02 RX ORDER — ALLOPURINOL 300 MG/1
TABLET ORAL
Qty: 90 TABLET | Refills: 2 | Status: SHIPPED | OUTPATIENT
Start: 2023-02-02

## 2023-02-02 NOTE — TELEPHONE ENCOUNTER
Patsy req refill for patient on  Lisinopril 40mg tabs  Last visit 12/22/22  Future 3/23/23  Anthony mae

## 2023-02-02 NOTE — TELEPHONE ENCOUNTER
Last Office Visit  -  12/22/22  Next Office Visit  -  3/23/23    Last Filled  -  3/9/22 allopurinol  10/31/22 jardiance  Last UDS -    Contract -

## 2023-03-06 ENCOUNTER — PATIENT MESSAGE (OUTPATIENT)
Dept: FAMILY MEDICINE CLINIC | Age: 78
End: 2023-03-06

## 2023-03-07 NOTE — TELEPHONE ENCOUNTER
From: Linda Ibrahim  To: Dr. Pedro Old: 3/6/2023 7:06 PM EST  Subject: Aricept    Dr. Anai Rush,    I don't know if you have been sent my results of neurological testing from Dr. Sommer Norwood, but I did the testing and have been diagnosed with mild cognitive impairment. After returning to go over the results again, Dr. Sommer Norwood suggested that I begin taking Aricept. I would like your thoughts regarding this and also for you to review my current list of meds to make sure everything is compatible before I get the RX filled. Thank you!     Nitin Pruitt

## 2023-03-15 ENCOUNTER — OFFICE VISIT (OUTPATIENT)
Dept: PULMONOLOGY | Age: 78
End: 2023-03-15
Payer: MEDICARE

## 2023-03-15 VITALS
HEART RATE: 64 BPM | BODY MASS INDEX: 38.51 KG/M2 | DIASTOLIC BLOOD PRESSURE: 57 MMHG | TEMPERATURE: 98 F | SYSTOLIC BLOOD PRESSURE: 112 MMHG | HEIGHT: 70 IN | RESPIRATION RATE: 20 BRPM | OXYGEN SATURATION: 99 % | WEIGHT: 269 LBS

## 2023-03-15 DIAGNOSIS — E66.01 SEVERE OBESITY (BMI 35.0-39.9) WITH COMORBIDITY (HCC): ICD-10-CM

## 2023-03-15 DIAGNOSIS — G47.33 OBSTRUCTIVE SLEEP APNEA SYNDROME: Primary | ICD-10-CM

## 2023-03-15 PROBLEM — N18.30 CHRONIC RENAL DISEASE, STAGE III (HCC): Status: ACTIVE | Noted: 2023-03-15

## 2023-03-15 PROCEDURE — 99204 OFFICE O/P NEW MOD 45 MIN: CPT | Performed by: STUDENT IN AN ORGANIZED HEALTH CARE EDUCATION/TRAINING PROGRAM

## 2023-03-15 PROCEDURE — 3078F DIAST BP <80 MM HG: CPT | Performed by: STUDENT IN AN ORGANIZED HEALTH CARE EDUCATION/TRAINING PROGRAM

## 2023-03-15 PROCEDURE — 1123F ACP DISCUSS/DSCN MKR DOCD: CPT | Performed by: STUDENT IN AN ORGANIZED HEALTH CARE EDUCATION/TRAINING PROGRAM

## 2023-03-15 PROCEDURE — 3074F SYST BP LT 130 MM HG: CPT | Performed by: STUDENT IN AN ORGANIZED HEALTH CARE EDUCATION/TRAINING PROGRAM

## 2023-03-15 ASSESSMENT — SLEEP AND FATIGUE QUESTIONNAIRES
HOW LIKELY ARE YOU TO NOD OFF OR FALL ASLEEP WHILE SITTING INACTIVE IN A PUBLIC PLACE: 1
ESS TOTAL SCORE: 6
HOW LIKELY ARE YOU TO NOD OFF OR FALL ASLEEP WHILE SITTING QUIETLY AFTER LUNCH WITHOUT ALCOHOL: 0
HOW LIKELY ARE YOU TO NOD OFF OR FALL ASLEEP WHEN YOU ARE A PASSENGER IN A CAR FOR AN HOUR WITHOUT A BREAK: 1
HOW LIKELY ARE YOU TO NOD OFF OR FALL ASLEEP WHILE SITTING AND READING: 1
HOW LIKELY ARE YOU TO NOD OFF OR FALL ASLEEP WHILE WATCHING TV: 1
NECK CIRCUMFERENCE (INCHES): 19
HOW LIKELY ARE YOU TO NOD OFF OR FALL ASLEEP IN A CAR, WHILE STOPPED FOR A FEW MINUTES IN TRAFFIC: 0
HOW LIKELY ARE YOU TO NOD OFF OR FALL ASLEEP WHILE SITTING AND TALKING TO SOMEONE: 0
HOW LIKELY ARE YOU TO NOD OFF OR FALL ASLEEP WHILE LYING DOWN TO REST IN THE AFTERNOON WHEN CIRCUMSTANCES PERMIT: 2

## 2023-03-15 ASSESSMENT — ENCOUNTER SYMPTOMS
DIARRHEA: 0
SORE THROAT: 0
CONSTIPATION: 0
SHORTNESS OF BREATH: 0
EYE PAIN: 0
EYE REDNESS: 0
VOMITING: 0
BACK PAIN: 0
EYE DISCHARGE: 0
WHEEZING: 0
COUGH: 0
TROUBLE SWALLOWING: 0
COLOR CHANGE: 0
ABDOMINAL DISTENTION: 0
EYE ITCHING: 0
STRIDOR: 0
ABDOMINAL PAIN: 0
NAUSEA: 0

## 2023-03-15 NOTE — PROGRESS NOTES
[address] Visit   [address], [address] [place] [address]  [phone]    Chief Complaint/Referring Provider:  Patient is being seen at the request of Dr. Max Guido for a consultation for sleep apnea    Presenting HPI:   Patient is a 71-year-old male with significant past medical history of CAD, hyperlipidemia, hypertension that presents to [facility] pulmonary clinic for sleep apnea. Patient was seen in neurologist for memory loss who suggested reevaluation of sleep apnea. Patient cannot remember the last time he had a sleep study, he believes it was over 5 years. He denies any other symptoms. He denies any fever, chills, chest pain, shortness of breath, cough, nausea, vomiting, abdominal pain. He denies any morning headaches, denies any sleepiness. He feels refreshed sometimes. He takes 1 nap in the afternoon 3 days a week for 1 hour. He wakes up 1-2 times overnight to go to the bathroom. Compliance reports for reviewed, he has good compliance. His AHI is under 5. Patient used to smoke, he quit 41 years ago. He is smoking 1 pack/day. He denies any illicit drug use, no vaping/pipes. He rarely drinks. Used to work as a pharmacist in the past, he is retired now, he denies any occupational exposures. He has no pets at home, he denies any household exposures.     Past Medical History:   Diagnosis Date    CAD (coronary artery disease)     Cataract     Diabetes mellitus ([facility] [address]     DJD (degenerative joint disease)     History of PTCA     Dr. Jenkins Manual    Hyperlipidemia     Hypertension     Hypotestosteronism     Dr. 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   Allergen Reactions    Bactrim [Sulfamethoxazole-Trimethoprim]      KANE risk       Medication listwas reviewed and updated as needed in Epic    Social History     Socioeconomic History    Marital status:      Spouse name: Not on file    Number of children: Not on file    Years of education: Not on file    Highest education level: Not on file   Occupational History    Not on file   Tobacco Use    Smoking status: Former     Packs/day: 1.00     Years: 15.00     Pack years: 15.00     Types: Cigarettes     Quit date: 1982     Years since quittin.2     Passive exposure: Past    Smokeless tobacco: Never   Vaping Use    Vaping Use: Never used   Substance and Sexual Activity    Alcohol use: No    Drug use: No    Sexual activity: Not on file   Other Topics Concern    Not on file   Social History Narrative    Not on file     Social Determinants of Health     Financial Resource Strain: Low Risk     Difficulty of Paying Living Expenses: Not hard at all   Food Insecurity: No Food Insecurity    Worried About [employer] [employer] in the Last Year: Never true    [facility] in the Last Year: Never true   Transportation Needs: No Transportation Needs    Lack of Transportation (Medical): No    Lack of Transportation (Non-Medical): No   Physical Activity: Inactive    Days of Exercise per Week: 0 days    Minutes of Exercise per Session: 0 min   Stress: Not on file   Social Connections:  Moderately Isolated    Frequency of Communication with Friends and Family: Twice a week    Frequency of Social Gatherings with Friends and Family: Twice a week    Attends Holiness Services: Never    Active Member of Clubs or Organizations: No    Attends Club or Organization Meetings: Never    Marital Status:    Intimate Partner Violence: Not on file Housing Stability: Low Risk     Unable to Pay for Housing in the Last Year: No    Number of Places Lived in the Last Year: 1    Unstable Housing in the Last Year: No       Family History   Problem Relation Age of Onset    Heart Disease Brother     High Blood Pressure Brother     Dementia Mother         alzheimers        Review of Systems: CompleteReview of system reviewed with patient and noted on attached review of system sheet. Review of Systems   Constitutional:  Negative for activity change, appetite change, chills, diaphoresis and fatigue. HENT:  Negative for congestion, sore throat and trouble swallowing. Eyes:  Negative for pain, discharge, redness and itching. Respiratory:  Negative for cough, shortness of breath, wheezing and stridor. Cardiovascular:  Negative for chest pain, palpitations and leg swelling. Gastrointestinal:  Negative for abdominal distention, abdominal pain, constipation, diarrhea, nausea and vomiting. Endocrine: Negative for polydipsia, polyphagia and polyuria. Genitourinary:  Negative for difficulty urinating. Musculoskeletal:  Negative for back pain, myalgias and neck pain. Skin:  Negative for color change. Neurological:  Negative for dizziness, weakness and light-headedness. Psychiatric/Behavioral:  Negative for agitation and behavioral problems. Physical Exam:  Vitals:    03/15/23 1427   BP: (!) 112/57   Pulse: 64   Resp: 20   Temp: 98 °F (36.7 °C)   SpO2: 99%        Physical Exam  Constitutional:       General: He is not in acute distress. Appearance: He is obese. He is not toxic-appearing. HENT:      Head: Normocephalic and atraumatic. Nose: Nose normal.      Mouth/Throat:      Pharynx: No oropharyngeal exudate. Eyes:      General: No scleral icterus. Right eye: No discharge. Left eye: No discharge. Cardiovascular:      Rate and Rhythm: Normal rate and regular rhythm. Heart sounds: No murmur heard.     No friction rub. No gallop.   Pulmonary:      Effort: Pulmonary effort is normal. No respiratory distress.      Breath sounds: No wheezing or rales.   Abdominal:      General: Abdomen is flat. Bowel sounds are normal.      Palpations: Abdomen is soft.   Musculoskeletal:         General: Normal range of motion.      Cervical back: Normal range of motion.   Skin:     General: Skin is warm and dry.   Neurological:      General: No focal deficit present.      Mental Status: He is alert and oriented to person, place, and time.   Psychiatric:         Mood and Affect: Mood normal.        Data:     Imaging    CT Chest:   9/14/18  Impression   1. No acute large central pulmonary thromboembolus.   2. Right upper and lower lobe bronchiolitis.   3. 4 mm left lower lobe pulmonary nodule.       RECOMMENDATION:   Fleischner Society guidelines for follow-up and management of incidentally   detected pulmonary nodules:       Single Solid Nodule:       Nodule size less than 6 mm   In a low-risk patient, no routine follow-up.   In a high-risk patient, optional CT at 12 months.         CXR:   11/17/22  Impression   No acute process.         ECHO:   9/14/21  Conclusions      Summary   Technically difficult examination.   Left ventricular systolic function is normal with a visually estimated   ejection fraction of 60-65%.   The left ventricle is normal in size with mild posterior hypertrophy.   No obvious regional wall motion abnormalities noted.   Grade II diastolic dysfunction with elevated LV pressure.   The left atrium appears moderately enlarged.   Inadequate tricuspid valve regurgitation to estimate systolic pulmonary   artery pressure.    PFT: None    Assessment:  Encounter Diagnoses   Name Primary?    Obstructive sleep apnea syndrome Yes    Severe obesity (BMI 35.0-39.9) with comorbidity (HCC)      Plan:   - Reviewed compliance reports with great compliance, AHI < 5 with settings 10/5. Cont for now  - Will review whether patient needs repeat  sleep study per Neurology recommendations.  His compliance, AHI, settings appear within goal range  - f/u in 6 months    Monisha Read MD  [facility] Pulmonary Critical Care

## 2023-03-15 NOTE — PATIENT INSTRUCTIONS
Remember to bring a list of pulmonary medications and any CPAP or BiPAP machines to your next appointment with the office. Please keep all of your future appointments scheduled by [facility] [place] office. Out of respect for other patients and providers, you may be asked to reschedule your appointment if you arrive later than your scheduled appointment time. Appointments cancelled less than 24hrs in advance will be considered a no show. Patients with three missed appointments within 1 year or four missed appointments within 2 years can be dismissed from the practice. Please be aware that our physicians are required to work in the Intensive Care Unit at [facility].  Your appointment may need to be rescheduled if they are designated to work during your appointment time. You may receive a survey regarding the care you received during your visit. Your input is valuable to us. We encourage you to complete and return your survey. We hope you will choose us in the future for your healthcare needs. Pt instructed of all future appointment dates & times, including radiology, labs, procedures & referrals. If procedures were scheduled preparation instructions provided. Instructions on future appointments with [facility] were given.

## 2023-03-15 NOTE — PROGRESS NOTES
MA Communication:   The following orders are received by verbal communication from Idania Fernández MD    Orders include:  PSG (sleep center to call pt)       6 mo fu scheduled 9/20/23

## 2023-03-23 ENCOUNTER — OFFICE VISIT (OUTPATIENT)
Dept: FAMILY MEDICINE CLINIC | Age: 78
End: 2023-03-23

## 2023-03-23 VITALS
BODY MASS INDEX: 37.22 KG/M2 | WEIGHT: 260 LBS | OXYGEN SATURATION: 96 % | SYSTOLIC BLOOD PRESSURE: 112 MMHG | HEART RATE: 57 BPM | HEIGHT: 70 IN | DIASTOLIC BLOOD PRESSURE: 50 MMHG

## 2023-03-23 DIAGNOSIS — G31.84 MILD COGNITIVE IMPAIRMENT: ICD-10-CM

## 2023-03-23 DIAGNOSIS — E22.1 HYPERPROLACTINEMIA (HCC): ICD-10-CM

## 2023-03-23 DIAGNOSIS — N18.30 STAGE 3 CHRONIC KIDNEY DISEASE, UNSPECIFIED WHETHER STAGE 3A OR 3B CKD (HCC): ICD-10-CM

## 2023-03-23 DIAGNOSIS — I50.42 CHRONIC COMBINED SYSTOLIC AND DIASTOLIC HEART FAILURE (HCC): ICD-10-CM

## 2023-03-23 DIAGNOSIS — D35.2 PITUITARY MACROADENOMA (HCC): ICD-10-CM

## 2023-03-23 DIAGNOSIS — Z79.4 TYPE 2 DIABETES MELLITUS WITH COMPLICATION, WITH LONG-TERM CURRENT USE OF INSULIN (HCC): Primary | ICD-10-CM

## 2023-03-23 DIAGNOSIS — E11.8 TYPE 2 DIABETES MELLITUS WITH COMPLICATION, WITH LONG-TERM CURRENT USE OF INSULIN (HCC): Primary | ICD-10-CM

## 2023-03-23 LAB — HBA1C MFR BLD: 5.9 %

## 2023-03-23 ASSESSMENT — PATIENT HEALTH QUESTIONNAIRE - PHQ9
SUM OF ALL RESPONSES TO PHQ QUESTIONS 1-9: 0
SUM OF ALL RESPONSES TO PHQ9 QUESTIONS 1 & 2: 0
1. LITTLE INTEREST OR PLEASURE IN DOING THINGS: 0
2. FEELING DOWN, DEPRESSED OR HOPELESS: 0
SUM OF ALL RESPONSES TO PHQ QUESTIONS 1-9: 0

## 2023-03-23 NOTE — PROGRESS NOTES
gallop. Pulmonary:      Effort: Pulmonary effort is normal. No respiratory distress. Breath sounds: Normal breath sounds. No wheezing. Abdominal:      General: Bowel sounds are normal. There is no distension. Palpations: Abdomen is soft. Tenderness: There is no abdominal tenderness. Musculoskeletal:         General: No tenderness. Normal range of motion. Cervical back: Normal range of motion and neck supple. Skin:     General: Skin is warm and dry. Neurological:      Mental Status: He is alert and oriented to person, place, and time. Deep Tendon Reflexes: Reflexes are normal and symmetric. Psychiatric:         Behavior: Behavior normal.         Thought Content: Thought content normal.         Judgment: Judgment normal.              An electronic signature was used to authenticate this note.     --Max Martinez MD

## 2023-04-28 RX ORDER — AMMONIUM LACTATE 12 G/100G
LOTION TOPICAL
Qty: 450 ML | Refills: 0 | Status: SHIPPED | OUTPATIENT
Start: 2023-04-28

## 2023-04-28 NOTE — TELEPHONE ENCOUNTER
Last Office Visit  -  4-  Next Office Visit  -  6-    Last Filled  -    Last UDS -    Contract -      Refill ammonium lactate (LAC-HYDRIN) 12 % lotion   Dispense Quantity: 450 mL      Pharmacy    [facility] [id number] - [place] [id number] - [place] [phone]

## 2023-05-02 RX ORDER — HYDRALAZINE HYDROCHLORIDE 25 MG/1
TABLET, FILM COATED ORAL
Qty: 360 TABLET | Refills: 1 | Status: SHIPPED | OUTPATIENT
Start: 2023-05-02

## 2023-05-02 NOTE — TELEPHONE ENCOUNTER
Last Office Visit  -  3/23/23  Next Office Visit  -  6/22/23    Last Filled  -    Last UDS -    Contract -

## 2023-05-03 ENCOUNTER — TELEPHONE (OUTPATIENT)
Dept: CARDIOLOGY CLINIC | Age: 78
End: 2023-05-03

## 2023-05-03 NOTE — TELEPHONE ENCOUNTER
Pt called stating his jardiance for a 90 day supply will cost him over $400. He stated the medication has been successful for him and hopes not discontinue taking it. Is there patient assistance for this? Please advise and contact pt at [phone].

## 2023-05-04 NOTE — TELEPHONE ENCOUNTER
VSP are you taking over this RX? I have the prescribing doctor as the PCP. There is pt assistance that can be filled out from prescribing doctor.

## 2023-05-04 NOTE — TELEPHONE ENCOUNTER
Patient returned call. Relayed VSP message. Patient agreeable to patient assistance. Jardiance assistance program card mailed out to patient.

## 2023-06-29 ENCOUNTER — OFFICE VISIT (OUTPATIENT)
Dept: FAMILY MEDICINE CLINIC | Age: 78
End: 2023-06-29

## 2023-06-29 VITALS
HEIGHT: 70 IN | HEART RATE: 53 BPM | SYSTOLIC BLOOD PRESSURE: 124 MMHG | WEIGHT: 255 LBS | OXYGEN SATURATION: 96 % | BODY MASS INDEX: 36.51 KG/M2 | DIASTOLIC BLOOD PRESSURE: 48 MMHG

## 2023-06-29 DIAGNOSIS — Z79.4 TYPE 2 DIABETES MELLITUS WITH COMPLICATION, WITH LONG-TERM CURRENT USE OF INSULIN (HCC): Primary | ICD-10-CM

## 2023-06-29 DIAGNOSIS — Z79.4 TYPE 2 DIABETES MELLITUS WITH COMPLICATION, WITH LONG-TERM CURRENT USE OF INSULIN (HCC): ICD-10-CM

## 2023-06-29 DIAGNOSIS — E11.8 TYPE 2 DIABETES MELLITUS WITH COMPLICATION, WITH LONG-TERM CURRENT USE OF INSULIN (HCC): ICD-10-CM

## 2023-06-29 DIAGNOSIS — Z00.00 MEDICARE ANNUAL WELLNESS VISIT, SUBSEQUENT: ICD-10-CM

## 2023-06-29 DIAGNOSIS — E11.8 TYPE 2 DIABETES MELLITUS WITH COMPLICATION, WITH LONG-TERM CURRENT USE OF INSULIN (HCC): Primary | ICD-10-CM

## 2023-06-29 LAB
CHOLEST SERPL-MCNC: 134 MG/DL (ref 0–199)
HBA1C MFR BLD: 6.3 %
HDLC SERPL-MCNC: 41 MG/DL (ref 40–60)
LDL CHOLESTEROL CALCULATED: 56 MG/DL
TRIGL SERPL-MCNC: 183 MG/DL (ref 0–150)
VLDLC SERPL CALC-MCNC: 37 MG/DL

## 2023-06-29 RX ORDER — DONEPEZIL HYDROCHLORIDE 10 MG/1
TABLET, FILM COATED ORAL
COMMUNITY
Start: 2023-05-17

## 2023-06-29 ASSESSMENT — LIFESTYLE VARIABLES
HOW MANY STANDARD DRINKS CONTAINING ALCOHOL DO YOU HAVE ON A TYPICAL DAY: 1 OR 2
HOW OFTEN DO YOU HAVE A DRINK CONTAINING ALCOHOL: MONTHLY OR LESS

## 2023-06-30 ENCOUNTER — TELEPHONE (OUTPATIENT)
Dept: PULMONOLOGY | Age: 78
End: 2023-06-30

## 2023-07-06 DIAGNOSIS — E11.9 TYPE 2 DIABETES MELLITUS WITHOUT COMPLICATION, WITHOUT LONG-TERM CURRENT USE OF INSULIN (HCC): ICD-10-CM

## 2023-07-06 DIAGNOSIS — I10 ESSENTIAL HYPERTENSION, BENIGN: ICD-10-CM

## 2023-07-06 RX ORDER — METFORMIN HYDROCHLORIDE 750 MG/1
TABLET, EXTENDED RELEASE ORAL
Qty: 180 TABLET | Refills: 1 | Status: SHIPPED | OUTPATIENT
Start: 2023-07-06

## 2023-07-06 RX ORDER — LISINOPRIL 40 MG/1
TABLET ORAL
Qty: 90 TABLET | Refills: 1 | Status: SHIPPED | OUTPATIENT
Start: 2023-07-06

## 2023-07-06 NOTE — TELEPHONE ENCOUNTER
Last Office Visit  -  6/29/23  Next Office Visit  -  9/28/23    Last Filled  -    Last UDS -    Contract -

## 2023-07-06 NOTE — TELEPHONE ENCOUNTER
Dionisio Form is requesting a rx for   lisinopril (PRINIVIL;ZESTRIL) 40 MG tablet    metFORMIN (GLUCOPHAGE-XR) 750 MG extended release tablet    Advise

## 2023-07-28 DIAGNOSIS — M10.9 GOUT, UNSPECIFIED CAUSE, UNSPECIFIED CHRONICITY, UNSPECIFIED SITE: ICD-10-CM

## 2023-07-28 RX ORDER — ALLOPURINOL 300 MG/1
300 TABLET ORAL DAILY
Qty: 90 TABLET | Refills: 2 | Status: SHIPPED | OUTPATIENT
Start: 2023-07-28

## 2023-07-28 NOTE — TELEPHONE ENCOUNTER
[facility] requesting refill      allopurinol (ZYLOPRIM) 300 MG tablet       [facility] [employer]

## 2023-09-08 DIAGNOSIS — E11.9 TYPE 2 DIABETES MELLITUS WITHOUT COMPLICATION, WITHOUT LONG-TERM CURRENT USE OF INSULIN (HCC): ICD-10-CM

## 2023-09-08 RX ORDER — BLOOD SUGAR DIAGNOSTIC
STRIP MISCELLANEOUS
Qty: 150 STRIP | Refills: 3 | Status: SHIPPED | OUTPATIENT
Start: 2023-09-08

## 2023-09-27 DIAGNOSIS — I10 ESSENTIAL HYPERTENSION, BENIGN: ICD-10-CM

## 2023-09-27 RX ORDER — ATORVASTATIN CALCIUM 80 MG/1
80 TABLET, FILM COATED ORAL DAILY
Qty: 90 TABLET | Refills: 3 | Status: SHIPPED | OUTPATIENT
Start: 2023-09-27

## 2023-09-27 RX ORDER — CARVEDILOL 25 MG/1
25 TABLET ORAL 2 TIMES DAILY
Qty: 180 TABLET | Refills: 3 | Status: SHIPPED | OUTPATIENT
Start: 2023-09-27

## 2023-09-27 NOTE — TELEPHONE ENCOUNTER
Ciera Salazar is requesting a rx for     atorvastatin (LIPITOR) 80 MG tablet    carvedilol (COREG) 25 MG tablet      AWV 6/29/23  Next office visit   9/28/2023

## 2023-09-28 ENCOUNTER — OFFICE VISIT (OUTPATIENT)
Dept: FAMILY MEDICINE CLINIC | Age: 78
End: 2023-09-28

## 2023-09-28 VITALS
SYSTOLIC BLOOD PRESSURE: 136 MMHG | HEIGHT: 70 IN | HEART RATE: 61 BPM | WEIGHT: 255 LBS | BODY MASS INDEX: 36.51 KG/M2 | OXYGEN SATURATION: 98 % | DIASTOLIC BLOOD PRESSURE: 52 MMHG

## 2023-09-28 DIAGNOSIS — E11.8 TYPE 2 DIABETES MELLITUS WITH COMPLICATION, WITH LONG-TERM CURRENT USE OF INSULIN (HCC): ICD-10-CM

## 2023-09-28 DIAGNOSIS — D35.2 PITUITARY MACROADENOMA (HCC): Primary | ICD-10-CM

## 2023-09-28 DIAGNOSIS — Z79.4 TYPE 2 DIABETES MELLITUS WITH COMPLICATION, WITH LONG-TERM CURRENT USE OF INSULIN (HCC): ICD-10-CM

## 2023-09-28 DIAGNOSIS — E23.0 HYPOPITUITARISM (HCC): ICD-10-CM

## 2023-09-28 DIAGNOSIS — E34.9 HYPOTESTOSTERONISM: ICD-10-CM

## 2023-09-28 LAB — HBA1C MFR BLD: 6.9 %

## 2023-09-28 RX ORDER — GLIPIZIDE 5 MG/1
5 TABLET, FILM COATED, EXTENDED RELEASE ORAL DAILY
Qty: 30 TABLET | Refills: 3 | Status: SHIPPED | OUTPATIENT
Start: 2023-09-28

## 2023-09-28 RX ORDER — HYDRALAZINE HYDROCHLORIDE 100 MG/1
100 TABLET, FILM COATED ORAL 3 TIMES DAILY
COMMUNITY
Start: 2023-08-14

## 2023-09-28 NOTE — PROGRESS NOTES
Pupils: Pupils are equal, round, and reactive to light. Cardiovascular:      Rate and Rhythm: Normal rate and regular rhythm. Heart sounds: Normal heart sounds. No murmur heard. No friction rub. No gallop. Pulmonary:      Effort: Pulmonary effort is normal. No respiratory distress. Breath sounds: Normal breath sounds. No wheezing. Abdominal:      General: Bowel sounds are normal. There is no distension. Palpations: Abdomen is soft. Tenderness: There is no abdominal tenderness. Musculoskeletal:         General: No tenderness. Normal range of motion. Cervical back: Normal range of motion and neck supple. Skin:     General: Skin is warm and dry. Neurological:      Mental Status: He is alert and oriented to person, place, and time. Deep Tendon Reflexes: Reflexes are normal and symmetric. Psychiatric:         Behavior: Behavior normal.         Thought Content: Thought content normal.         Judgment: Judgment normal.                  An electronic signature was used to authenticate this note.     --Selene Yu MD

## 2023-10-05 ENCOUNTER — TELEPHONE (OUTPATIENT)
Dept: FAMILY MEDICINE CLINIC | Age: 78
End: 2023-10-05

## 2023-10-05 RX ORDER — AMMONIUM LACTATE 12 G/100G
LOTION TOPICAL
Qty: 450 ML | Refills: 0 | Status: SHIPPED | OUTPATIENT
Start: 2023-10-05

## 2023-10-05 NOTE — TELEPHONE ENCOUNTER
Pharmacy req refill for patient on  Ammonuim Lactate  Last visit 9/28/23  Future 12/28/23  Angel mae

## 2023-10-05 NOTE — TELEPHONE ENCOUNTER
Last Office Visit  -  9/28/23  Next Office Visit  -  12/28/23    Last Filled  -  4/28/23  Last UDS -    Contract -

## 2023-10-11 ENCOUNTER — HOSPITAL ENCOUNTER (OUTPATIENT)
Dept: GENERAL RADIOLOGY | Age: 78
Discharge: HOME | End: 2023-10-11
Payer: MEDICARE

## 2023-10-11 DIAGNOSIS — E34.9 HYPOTESTOSTERONISM: ICD-10-CM

## 2023-10-11 DIAGNOSIS — E23.0 HYPOPITUITARISM (HCC): ICD-10-CM

## 2023-10-11 PROCEDURE — 77080 DXA BONE DENSITY AXIAL: CPT

## 2023-11-01 ENCOUNTER — OFFICE VISIT (OUTPATIENT)
Dept: ENDOCRINOLOGY | Age: 78
End: 2023-11-01

## 2023-11-01 VITALS
RESPIRATION RATE: 16 BRPM | HEIGHT: 70 IN | SYSTOLIC BLOOD PRESSURE: 142 MMHG | DIASTOLIC BLOOD PRESSURE: 59 MMHG | HEART RATE: 52 BPM | BODY MASS INDEX: 37.59 KG/M2 | WEIGHT: 262.6 LBS

## 2023-11-01 DIAGNOSIS — E22.1 HYPERPROLACTINEMIA (HCC): Primary | ICD-10-CM

## 2023-11-01 DIAGNOSIS — D35.2 PITUITARY MACROADENOMA (HCC): ICD-10-CM

## 2023-11-01 DIAGNOSIS — E34.9 HYPOTESTOSTERONISM: ICD-10-CM

## 2023-11-01 RX ORDER — TESTOSTERONE GEL, 1% 10 MG/G
1 GEL TRANSDERMAL DAILY
Qty: 50 EACH | Refills: 3 | Status: SHIPPED | OUTPATIENT
Start: 2023-11-01 | End: 2023-12-21

## 2023-11-01 RX ORDER — CABERGOLINE 0.5 MG/1
TABLET ORAL
Qty: 8 TABLET | Refills: 1 | Status: SHIPPED | OUTPATIENT
Start: 2023-11-01

## 2023-11-01 NOTE — PROGRESS NOTES
-----------------------------------------------------------------     Assessment/Plan:   1. 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 He had been on cabergoline since 2017 with normalization of prolactin level. There is decrease in pituitary macroadenoma though remains large at 1.4 cm therefore I would recommend to continue with cabergoline therapy. We will plan on obtaining prolactin level and adjusting dose of cabergoline if needed. Otherwise we will trend prolactin level on an annual basis with plan to update pituitary MRI in 1 year. Patient reports understanding. Would recommend updating visual field testing upon repeating eye exam.  -     Hemoglobin and Hematocrit; Future  -     Prolactin; Future  -     Testosterone, free, total; Future  -     cabergoline (DOSTINEX) 0.5 MG tablet; TAKE half tablet once a week. MONDAY, Disp-8 tablet, R-1Normal  -     testosterone (ANDROGEL; TESTIM) 50 MG/5GM (1%) GEL 1% gel; Apply 1 Package topically daily for 50 days. Max Daily Amount: 1 Package, Disp-50 each, R-3Normal  -     Prolactin; Future  -     Testosterone, free, total; Future  -     Hemoglobin and Hematocrit; Future  -     MRI BRAIN W WO CONTRAST; Future    3. Hypotestosteronism, late onset hypogonadotrophic hypogonadism. Currently on testosterone gel packets, 1 packet daily. Testosterone level most recently is in the 200 range. Would recommend updating level about 2 to 4 hours after application. If testosterone is in the 200-400 range then may continue current regimen. If below that then may consider increasing dose of testosterone packets and reassessing levels. Discussed the importance of avoiding over replacement to limit adverse effects. Patient reports understanding. Continue regular use of CPAP. Return in about 1 year (around 11/1/2024) for Hypogonadism, pituitary adenoma .     Eugenio Bonilla MD

## 2023-11-06 DIAGNOSIS — E22.1 HYPERPROLACTINEMIA (HCC): ICD-10-CM

## 2023-11-06 LAB
HCT VFR BLD AUTO: 38.9 % (ref 40.5–52.5)
HGB BLD-MCNC: 12.8 G/DL (ref 13.5–17.5)
PROLACTIN SERPL IA-MCNC: 6.5 NG/ML

## 2023-11-09 DIAGNOSIS — D35.2 PITUITARY MACROADENOMA (HCC): Primary | ICD-10-CM

## 2023-11-09 LAB
SHBG SERPL-SCNC: 46 NMOL/L (ref 11–80)
TESTOST FREE SERPL-MCNC: 96.8 PG/ML (ref 47–244)
TESTOST SERPL-MCNC: 560 NG/DL (ref 220–1000)

## 2023-11-21 ENCOUNTER — OFFICE VISIT (OUTPATIENT)
Dept: FAMILY MEDICINE CLINIC | Age: 78
End: 2023-11-21

## 2023-11-21 VITALS
SYSTOLIC BLOOD PRESSURE: 140 MMHG | HEART RATE: 52 BPM | BODY MASS INDEX: 37.02 KG/M2 | DIASTOLIC BLOOD PRESSURE: 60 MMHG | WEIGHT: 258 LBS | OXYGEN SATURATION: 98 %

## 2023-11-21 DIAGNOSIS — I10 PRIMARY HYPERTENSION: Primary | ICD-10-CM

## 2023-11-21 RX ORDER — VALSARTAN 160 MG/1
160 TABLET ORAL DAILY
Qty: 90 TABLET | Refills: 3 | Status: SHIPPED | OUTPATIENT
Start: 2023-11-21

## 2023-11-21 ASSESSMENT — ENCOUNTER SYMPTOMS
WHEEZING: 0
SHORTNESS OF BREATH: 0

## 2023-11-21 NOTE — PROGRESS NOTES
Patient: Joselo Lucero is a 77 y.o. male who presents today with the following Chief Complaint(s):  Chief Complaint   Patient presents with    Hypertension         HPI   Here for BP check. States over the last few days BP has been elevated at home. 180's-190's/80's. Looks flushed in the evenings. Denies any headache, chest pain, SOB or dizziness. Taking carvedilol 25mg BID, lisinopril 20mg BID, and hydralazine 100mg TID. He states his kidney specialist does not want him to take HCTZ unless he has swelling which he has not had. Current Outpatient Medications   Medication Sig Dispense Refill    valsartan (DIOVAN) 160 MG tablet Take 1 tablet by mouth daily 90 tablet 3    SITagliptin (JANUVIA) 25 MG tablet Take 10 mg by mouth daily      cabergoline (DOSTINEX) 0.5 MG tablet TAKE half tablet once a week. MONDAY 8 tablet 1    testosterone (ANDROGEL; TESTIM) 50 MG/5GM (1%) GEL 1% gel Apply 1 Package topically daily for 50 days.  Max Daily Amount: 1 Package 50 each 3    ammonium lactate (LAC-HYDRIN) 12 % lotion APPLY TO AFFECTED AREA(S) DAILY 450 mL 0    hydrALAZINE (APRESOLINE) 100 MG tablet Take 1 tablet by mouth 3 times daily      carvedilol (COREG) 25 MG tablet Take 1 tablet by mouth 2 times daily 180 tablet 3    atorvastatin (LIPITOR) 80 MG tablet Take 1 tablet by mouth daily 90 tablet 3    blood glucose test strips (ONETOUCH VERIO) strip USE TO TEST 2-3 TIMES DAILY 150 strip 3    allopurinol (ZYLOPRIM) 300 MG tablet Take 1 tablet by mouth daily (Patient taking differently: Take 1 tablet by mouth daily Takes half tablet daily) 90 tablet 2    metFORMIN (GLUCOPHAGE-XR) 750 MG extended release tablet TAKE ONE TABLET BY MOUTH TWICE A  tablet 1    donepezil (ARICEPT) 10 MG tablet       JARDIANCE 10 MG tablet TAKE ONE TABLET BY MOUTH DAILY 90 tablet 2    ferrous sulfate 325 (65 Fe) MG tablet Take 1 tablet by mouth daily (with breakfast) 30 tablet 3    Accu-Chek Multiclix Lancets MISC Test once daily  DX

## 2023-12-28 ENCOUNTER — OFFICE VISIT (OUTPATIENT)
Dept: FAMILY MEDICINE CLINIC | Age: 78
End: 2023-12-28

## 2023-12-28 VITALS
HEART RATE: 58 BPM | BODY MASS INDEX: 37.16 KG/M2 | SYSTOLIC BLOOD PRESSURE: 132 MMHG | WEIGHT: 259.6 LBS | HEIGHT: 70 IN | DIASTOLIC BLOOD PRESSURE: 50 MMHG | OXYGEN SATURATION: 98 %

## 2023-12-28 DIAGNOSIS — E11.8 TYPE 2 DIABETES MELLITUS WITH COMPLICATION, WITH LONG-TERM CURRENT USE OF INSULIN (HCC): Primary | ICD-10-CM

## 2023-12-28 DIAGNOSIS — Z79.4 TYPE 2 DIABETES MELLITUS WITH COMPLICATION, WITH LONG-TERM CURRENT USE OF INSULIN (HCC): Primary | ICD-10-CM

## 2023-12-28 LAB — HBA1C MFR BLD: 6.6 %

## 2023-12-28 RX ORDER — SEMAGLUTIDE 0.68 MG/ML
0.25 INJECTION, SOLUTION SUBCUTANEOUS WEEKLY
Qty: 3 ML | Refills: 2 | Status: SHIPPED | OUTPATIENT
Start: 2023-12-28

## 2023-12-28 RX ORDER — PEN NEEDLE, DIABETIC 31 GX5/16"
1 NEEDLE, DISPOSABLE MISCELLANEOUS WEEKLY
Qty: 30 EACH | Refills: 3 | Status: SHIPPED | OUTPATIENT
Start: 2023-12-28

## 2023-12-28 RX ORDER — TELMISARTAN 80 MG/1
80 TABLET ORAL DAILY
Qty: 30 TABLET | Refills: 3 | Status: SHIPPED | OUTPATIENT
Start: 2023-12-28

## 2023-12-28 ASSESSMENT — PATIENT HEALTH QUESTIONNAIRE - PHQ9
SUM OF ALL RESPONSES TO PHQ QUESTIONS 1-9: 0
SUM OF ALL RESPONSES TO PHQ QUESTIONS 1-9: 0
1. LITTLE INTEREST OR PLEASURE IN DOING THINGS: 0
2. FEELING DOWN, DEPRESSED OR HOPELESS: 0
SUM OF ALL RESPONSES TO PHQ QUESTIONS 1-9: 0
SUM OF ALL RESPONSES TO PHQ9 QUESTIONS 1 & 2: 0
SUM OF ALL RESPONSES TO PHQ QUESTIONS 1-9: 0

## 2023-12-28 NOTE — PROGRESS NOTES
Romero Keene (:  1945) is a 78 y.o. male,Established patient, here for evaluation of the following chief complaint(s):  3 Month Follow-Up         ASSESSMENT/PLAN:  1. Type 2 diabetes mellitus with complication, with long-term current use of insulin (HCC)  -     Semaglutide,0.25 or 0.5MG/DOS, (OZEMPIC, 0.25 OR 0.5 MG/DOSE,) 2 MG/3ML SOPN; Inject 0.25 mg into the skin once a week, Disp-3 mL, R-2Normal  -     Insulin Pen Needle ([employer] PEN NEEDLES 31G) 31G X 8 MM MISC; WEEKLY Starting Thu 2023, Disp-30 each, R-3, Normal  -     POCT glycosylated hemoglobin (Hb A1C)    Hemoglobin A1C   Date Value Ref Range Status   2023 6.6 % Final     Controlled, but with morbid obesity will add semaglutide.  2. HTN: Telmisartan 80 mg.  No follow-ups on file.         Subjective   SUBJECTIVE/OBJECTIVE:  Romero Keene is a 78 y.o. male. Patient presents with:  3 Month Follow-Up      1.  Letter from Jae, needs colonoscopy. Recommend that he do this.  2. DM:  Has been overall well controlled, but would like to lose weight. Notes he has been feeling tired and not being able to exercise much. Joints hurt somewhat.      The patients PMH, surgical history, family history, medications, allergies were all reviewed and updated as appropriate today.          Review of Systems       Objective   Physical Exam  Vitals and nursing note reviewed.   Constitutional:       Appearance: Normal appearance. He is well-developed.   HENT:      Head: Normocephalic and atraumatic.      Right Ear: External ear normal.      Left Ear: External ear normal.      Nose: Nose normal.   Eyes:      Conjunctiva/sclera: Conjunctivae normal.      Pupils: Pupils are equal, round, and reactive to light.   Cardiovascular:      Rate and Rhythm: Normal rate and regular rhythm.      Heart sounds: Normal heart sounds. No murmur heard.     No friction rub. No gallop.   Pulmonary:      Effort: Pulmonary effort is normal. No respiratory distress.

## 2024-01-01 NOTE — TELEPHONE ENCOUNTER
Called and spoke with patient appt moved to 09/26/22 @ [facility] with VSP per request based on location. Patient VU to time, date, and location.
O positive

## 2024-01-26 DIAGNOSIS — E11.9 TYPE 2 DIABETES MELLITUS WITHOUT COMPLICATION, WITHOUT LONG-TERM CURRENT USE OF INSULIN (HCC): ICD-10-CM

## 2024-01-26 RX ORDER — EMPAGLIFLOZIN 10 MG/1
TABLET, FILM COATED ORAL
Qty: 30 TABLET | Refills: 0 | Status: SHIPPED | OUTPATIENT
Start: 2024-01-26 | End: 2024-03-26

## 2024-01-26 RX ORDER — AMMONIUM LACTATE 12 G/100G
LOTION TOPICAL
Qty: 400 ML | Refills: 0 | Status: SHIPPED | OUTPATIENT
Start: 2024-01-26

## 2024-01-26 NOTE — TELEPHONE ENCOUNTER
Last Office Visit  -  12/28/23  Next Office Visit  -  3/28/24    Last Filled  -    Last UDS -    Contract -

## 2024-02-06 ENCOUNTER — PATIENT MESSAGE (OUTPATIENT)
Dept: FAMILY MEDICINE CLINIC | Age: 79
End: 2024-02-06

## 2024-02-06 DIAGNOSIS — E11.8 TYPE 2 DIABETES MELLITUS WITH COMPLICATION, WITH LONG-TERM CURRENT USE OF INSULIN (HCC): ICD-10-CM

## 2024-02-06 DIAGNOSIS — Z79.4 TYPE 2 DIABETES MELLITUS WITH COMPLICATION, WITH LONG-TERM CURRENT USE OF INSULIN (HCC): ICD-10-CM

## 2024-02-06 RX ORDER — SEMAGLUTIDE 0.68 MG/ML
0.5 INJECTION, SOLUTION SUBCUTANEOUS WEEKLY
Qty: 3 ML | Refills: 2 | Status: SHIPPED | OUTPATIENT
Start: 2024-02-06

## 2024-02-06 NOTE — TELEPHONE ENCOUNTER
From: Romero Keene  To: Dr. Derek Levin  Sent: 2/6/2024 2:45 PM EST  Subject: Ozempic    Would you please send an RX for Ozempic, 0.25-0.5 to be used 0.5mg's each week with refills to [facility]?     Also for my records, I forgot to tell you that in 2023 I received a DPT, Flu, Covid booster and RSV.    Thanks!    [place]

## 2024-03-12 DIAGNOSIS — M10.9 GOUT, UNSPECIFIED CAUSE, UNSPECIFIED CHRONICITY, UNSPECIFIED SITE: ICD-10-CM

## 2024-03-12 DIAGNOSIS — E11.9 TYPE 2 DIABETES MELLITUS WITHOUT COMPLICATION, WITHOUT LONG-TERM CURRENT USE OF INSULIN (HCC): ICD-10-CM

## 2024-03-12 RX ORDER — METFORMIN HYDROCHLORIDE 750 MG/1
TABLET, EXTENDED RELEASE ORAL
Qty: 180 TABLET | Refills: 1 | Status: SHIPPED | OUTPATIENT
Start: 2024-03-12

## 2024-03-12 RX ORDER — ALLOPURINOL 300 MG/1
300 TABLET ORAL DAILY
Qty: 90 TABLET | Refills: 2 | Status: SHIPPED | OUTPATIENT
Start: 2024-03-12

## 2024-03-12 NOTE — TELEPHONE ENCOUNTER
[facility] is requesting a rx of metFORMIN (GLUCOPHAGE-XR) 750 MG extended release tablet , #90.      Last Office Visit  -  12/28/23  Next Office Visit  -  3/26/24

## 2024-03-12 NOTE — TELEPHONE ENCOUNTER
Last Office Visit  -  12/28/23  Next Office Visit  -  3/26/24    Last Filled  -    Last UDS -    Contract -

## 2024-03-14 ENCOUNTER — TELEPHONE (OUTPATIENT)
Dept: FAMILY MEDICINE CLINIC | Age: 79
End: 2024-03-14

## 2024-03-14 NOTE — TELEPHONE ENCOUNTER
Email rec'd from [employer] PAP Support <[email or web address]  At 113pm today stating that we 'You've successfully submitted a [employer] Patient Assistance Program (PAP) form for Romero Keene. '

## 2024-03-14 NOTE — TELEPHONE ENCOUNTER
Spoke to pt's wife (on HIPAA) and this is for the Ozempic. Pt is currently on 0.5 mg but wants to know if he should increase to 1mg? If so will you sign the form for a 1 mg dose?

## 2024-03-14 NOTE — TELEPHONE ENCOUNTER
Pt called office requesting Dr Long's email address in order to send over pt assistance paperwork. Pt was given 's email in order for pt to send paperwork. Pt was encouraged to send paperwork through [employer]

## 2024-03-14 NOTE — TELEPHONE ENCOUNTER
PM rec'd the email - its an electronic automated form - PM is not able to complete this - also requires a signature by provider - forwarded email to provider and MA

## 2024-03-26 ENCOUNTER — OFFICE VISIT (OUTPATIENT)
Dept: FAMILY MEDICINE CLINIC | Age: 79
End: 2024-03-26

## 2024-03-26 VITALS
HEART RATE: 60 BPM | DIASTOLIC BLOOD PRESSURE: 58 MMHG | HEIGHT: 70 IN | SYSTOLIC BLOOD PRESSURE: 122 MMHG | OXYGEN SATURATION: 98 % | WEIGHT: 249 LBS | BODY MASS INDEX: 35.65 KG/M2

## 2024-03-26 DIAGNOSIS — Z79.4 TYPE 2 DIABETES MELLITUS WITH COMPLICATION, WITH LONG-TERM CURRENT USE OF INSULIN (HCC): Primary | ICD-10-CM

## 2024-03-26 DIAGNOSIS — E11.8 TYPE 2 DIABETES MELLITUS WITH COMPLICATION, WITH LONG-TERM CURRENT USE OF INSULIN (HCC): Primary | ICD-10-CM

## 2024-03-26 LAB — HBA1C MFR BLD: 6.8 %

## 2024-03-26 SDOH — ECONOMIC STABILITY: FOOD INSECURITY: WITHIN THE PAST 12 MONTHS, YOU WORRIED THAT YOUR FOOD WOULD RUN OUT BEFORE YOU GOT MONEY TO BUY MORE.: NEVER TRUE

## 2024-03-26 SDOH — ECONOMIC STABILITY: INCOME INSECURITY: HOW HARD IS IT FOR YOU TO PAY FOR THE VERY BASICS LIKE FOOD, HOUSING, MEDICAL CARE, AND HEATING?: NOT VERY HARD

## 2024-03-26 SDOH — ECONOMIC STABILITY: FOOD INSECURITY: WITHIN THE PAST 12 MONTHS, THE FOOD YOU BOUGHT JUST DIDN'T LAST AND YOU DIDN'T HAVE MONEY TO GET MORE.: NEVER TRUE

## 2024-03-26 ASSESSMENT — PATIENT HEALTH QUESTIONNAIRE - PHQ9
SUM OF ALL RESPONSES TO PHQ QUESTIONS 1-9: 0
1. LITTLE INTEREST OR PLEASURE IN DOING THINGS: NOT AT ALL
SUM OF ALL RESPONSES TO PHQ QUESTIONS 1-9: 0
SUM OF ALL RESPONSES TO PHQ9 QUESTIONS 1 & 2: 0
2. FEELING DOWN, DEPRESSED OR HOPELESS: NOT AT ALL

## 2024-03-26 NOTE — PROGRESS NOTES
Romero Keene (:  1945) is a 78 y.o. male,Established patient, here for evaluation of the following chief complaint(s):  3 Month Follow-Up and Diabetes         ASSESSMENT/PLAN:  1. Type 2 diabetes mellitus with complication, with long-term current use of insulin (HCC)    Hemoglobin A1C   Date Value Ref Range Status   2024 6.8 % Final     Controlled continue current medication.    Return in about 3 months (around 2024) for AWV.         Subjective   SUBJECTIVE/OBJECTIVE:  Romero Keene is a 78 y.o. male. Patient presents with:  3 Month Follow-Up  Diabetes      78-year-old male presents for follow-up of diabetes mellitus.  Patient has been doing well on his medication.  Denies any polyuria polyphagia or polydipsia.  Has been overall having no significant issues.  The patients PMH, surgical history, family history, medications, allergies were all reviewed and updated as appropriate today.          Review of Systems       Objective   Physical Exam  Vitals and nursing note reviewed.   Constitutional:       Appearance: Normal appearance. He is well-developed.   HENT:      Head: Normocephalic and atraumatic.      Right Ear: External ear normal.      Left Ear: External ear normal.      Nose: Nose normal.   Eyes:      Conjunctiva/sclera: Conjunctivae normal.      Pupils: Pupils are equal, round, and reactive to light.   Cardiovascular:      Rate and Rhythm: Normal rate and regular rhythm.      Heart sounds: Normal heart sounds. No murmur heard.     No friction rub. No gallop.   Pulmonary:      Effort: Pulmonary effort is normal. No respiratory distress.      Breath sounds: Normal breath sounds. No wheezing.   Abdominal:      General: Bowel sounds are normal. There is no distension.      Palpations: Abdomen is soft.      Tenderness: There is no abdominal tenderness.   Musculoskeletal:         General: No tenderness. Normal range of motion.      Cervical back: Normal range of motion and neck

## 2024-04-03 ENCOUNTER — TELEPHONE (OUTPATIENT)
Dept: CARDIOLOGY CLINIC | Age: 79
End: 2024-04-03

## 2024-04-03 ENCOUNTER — OFFICE VISIT (OUTPATIENT)
Dept: CARDIOLOGY CLINIC | Age: 79
End: 2024-04-03
Payer: MEDICARE

## 2024-04-03 VITALS
WEIGHT: 250 LBS | SYSTOLIC BLOOD PRESSURE: 146 MMHG | OXYGEN SATURATION: 95 % | DIASTOLIC BLOOD PRESSURE: 64 MMHG | HEART RATE: 62 BPM | HEIGHT: 70 IN | BODY MASS INDEX: 35.79 KG/M2

## 2024-04-03 DIAGNOSIS — R00.2 PALPITATIONS: ICD-10-CM

## 2024-04-03 DIAGNOSIS — Z79.4 TYPE 2 DIABETES MELLITUS WITH COMPLICATION, WITH LONG-TERM CURRENT USE OF INSULIN (HCC): ICD-10-CM

## 2024-04-03 DIAGNOSIS — I45.10 RIGHT BUNDLE BRANCH BLOCK: ICD-10-CM

## 2024-04-03 DIAGNOSIS — E66.01 CLASS 2 SEVERE OBESITY DUE TO EXCESS CALORIES WITH SERIOUS COMORBIDITY AND BODY MASS INDEX (BMI) OF 35.0 TO 35.9 IN ADULT (HCC): ICD-10-CM

## 2024-04-03 DIAGNOSIS — E11.8 TYPE 2 DIABETES MELLITUS WITH COMPLICATION, WITH LONG-TERM CURRENT USE OF INSULIN (HCC): ICD-10-CM

## 2024-04-03 DIAGNOSIS — I25.10 CORONARY ARTERY DISEASE INVOLVING NATIVE CORONARY ARTERY OF NATIVE HEART WITHOUT ANGINA PECTORIS: Primary | ICD-10-CM

## 2024-04-03 PROCEDURE — 3044F HG A1C LEVEL LT 7.0%: CPT | Performed by: INTERNAL MEDICINE

## 2024-04-03 PROCEDURE — 99214 OFFICE O/P EST MOD 30 MIN: CPT | Performed by: INTERNAL MEDICINE

## 2024-04-03 PROCEDURE — 3078F DIAST BP <80 MM HG: CPT | Performed by: INTERNAL MEDICINE

## 2024-04-03 PROCEDURE — 1123F ACP DISCUSS/DSCN MKR DOCD: CPT | Performed by: INTERNAL MEDICINE

## 2024-04-03 PROCEDURE — 93000 ELECTROCARDIOGRAM COMPLETE: CPT | Performed by: INTERNAL MEDICINE

## 2024-04-03 PROCEDURE — 3077F SYST BP >= 140 MM HG: CPT | Performed by: INTERNAL MEDICINE

## 2024-04-03 NOTE — PATIENT INSTRUCTIONS
Plan:  Order cardiac event monitor ~ palpitations  Discussed ordering further testing if cardiac event monitor if negative   EKG reviewed  Follow up bsed on testing results.

## 2024-04-03 NOTE — TELEPHONE ENCOUNTER
Monitor placed by [employer]  Monitor company [employer]  Length of monitor 2 weeks  Monitor ordered by [facility]  Monitor ID [id number]  Activation successful prior to pt leaving office? Yes

## 2024-04-03 NOTE — PROGRESS NOTES
the below testing personally and my interpretation is below.  EKG:  CXR:    Assessment:  78 y.o. patient with:  Problem List Items Addressed This Visit       CAD (coronary artery disease) PCI to RCA and LAD 2003 - Primary    Relevant Orders    EKG 12 lead (Completed)    Obesity due to excess calories with serious comorbidity    Type 2 diabetes mellitus with complication, with long-term current use of insulin (HCC)    Right bundle branch block    Palpitations    Relevant Orders    Cardiac event monitor         Plan:  Order cardiac event monitor ~ palpitations  Discussed ordering further testing if cardiac event monitor if negative   ~stress test and echocardiogram  EKG reviewed  Unable to afford Jardiance  On Semglutide  Follow up based on testing results.    Scribe's attestation:  This note was scribed in the presence of Dr.Vanshipal Mukherjee by Deana Hogue RN       I, Dr. Lisa Mukherjee, personally performed the services described in this documentation, as scribed by the above signed scribe in my presence. It is both accurate and complete to my knowledge. I agree with the details independently gathered by the clinical support staff, while the remaining scribed note accurately describes my personal service to the patient.    Medical Decision Making:  The following items were considered in medical decision making:  Independent review of images  Review / order clinical lab tests  Review / order radiology tests  Decision to obtain old records  Review and summation of old records as accessed through [employer] (a summary of my findings in these old records)      Time Based Itemization  A total of 30 minutes was spent on today's patient encounter.  If applicable, non-patient-facing activities:  (X)Preparing to see the patient and reviewing records  (X) Individual interpretation of results  ( ) Discussion or coordination of care with other health care professionals  () Ordering of unique tests, medications, or

## 2024-04-04 ENCOUNTER — TELEPHONE (OUTPATIENT)
Dept: CARDIOLOGY CLINIC | Age: 79
End: 2024-04-04

## 2024-04-04 NOTE — TELEPHONE ENCOUNTER
Called pts wife, pt answered and said they got it figured out with the company. Pt stated he is connected with no issues.

## 2024-04-04 NOTE — TELEPHONE ENCOUNTER
Pt sts he can not get phone portion of monitor to come on. Phone is fully charged, and what Ena did when placing monitor, screen in not coming up. Please call [phone](pt's wife Ericka)

## 2024-04-05 RX ORDER — TELMISARTAN 80 MG/1
80 TABLET ORAL DAILY
Qty: 90 TABLET | Refills: 1 | Status: SHIPPED | OUTPATIENT
Start: 2024-04-05

## 2024-04-05 NOTE — TELEPHONE ENCOUNTER
Last Office Visit  -  3/26/24  Next Office Visit  -  7/2/24    Last Filled  -    Last UDS -    Contract -

## 2024-04-20 PROCEDURE — 93228 REMOTE 30 DAY ECG REV/REPORT: CPT | Performed by: INTERNAL MEDICINE

## 2024-04-22 ENCOUNTER — TELEPHONE (OUTPATIENT)
Dept: FAMILY MEDICINE CLINIC | Age: 79
End: 2024-04-22

## 2024-04-22 NOTE — TELEPHONE ENCOUNTER
Anthony Saenz0 tu req refill for patient on  Jardiance 10 mg tabs  Last visit 3/26/24  Future 7/2/24  Anthony Saenz0 tu

## 2024-05-06 ENCOUNTER — TELEPHONE (OUTPATIENT)
Dept: CARDIOLOGY CLINIC | Age: 79
End: 2024-05-06

## 2024-05-06 DIAGNOSIS — R00.2 PALPITATIONS: Primary | ICD-10-CM

## 2024-05-06 NOTE — TELEPHONE ENCOUNTER
----- Message from Lisa Mukherjee MD sent at 5/6/2024 10:57 AM EDT -----  The patient's heart monitor results demonstrates mainly normal rhythm with occasional premature beats.     NO AFIB    These are considered benign. Please educated patient on lifestyle changes, including healthy nutrition balanced with an active lifestyle.    Important to reduce/eliminate caffeine, alcohol, and substance use if applicable.

## 2024-05-07 DIAGNOSIS — R00.2 PALPITATIONS: Primary | ICD-10-CM

## 2024-05-29 ENCOUNTER — TELEPHONE (OUTPATIENT)
Dept: FAMILY MEDICINE CLINIC | Age: 79
End: 2024-05-29

## 2024-05-29 NOTE — TELEPHONE ENCOUNTER
Pt's left over 0.25 and 0.5 ozempic has to be sent back by the prescribing provider. Pt is currently taking 1 mg ozempic. Company is faxing over a form to receive shipping label to send meds back. Pt was advised to bring the medications to the office by the company. They also provided a pt ID # [id number].

## 2024-05-29 NOTE — TELEPHONE ENCOUNTER
Paperwork faxed from [employer] for new script for 1 mg ozempic dose. Paperwork filled out, faxed, and scanned into media.    Pt returned the two boxes of ozempic that were the wrong dose to the office. No return label was sent with the fax that came earlier today from [employer].

## 2024-06-09 DIAGNOSIS — E22.1 HYPERPROLACTINEMIA (HCC): ICD-10-CM

## 2024-06-10 RX ORDER — TESTOSTERONE GEL, 1% 10 MG/G
GEL TRANSDERMAL
Qty: 150 G | Refills: 0 | Status: SHIPPED | OUTPATIENT
Start: 2024-06-10 | End: 2024-09-08

## 2024-06-10 NOTE — TELEPHONE ENCOUNTER
Medication:   Requested Prescriptions     Pending Prescriptions Disp Refills    testosterone (ANDROGEL; TESTIM) 50 MG/5GM (1%) GEL 1% gel [Pharmacy Med Name: TESTOSTERONE 50 MG/5 GRAM GEL] 150 g      Sig: APPLY 1 PACKAGE TO THE SKIN DAILY        Last Filled:      Patient Phone Number: [phone] (home)     Last appt: 11/1/2023   Next appt: 10/29/2024    Last OARRS:       1/23/2020     3:59 PM   RX Monitoring   Acute Pain Prescriptions Severe pain not adequately treated with lower dose.   Periodic Controlled Substance Monitoring No signs of potential drug abuse or diversion identified.

## 2024-06-12 RX ORDER — AMMONIUM LACTATE 12 G/100G
LOTION TOPICAL
Qty: 400 ML | Refills: 0 | Status: SHIPPED | OUTPATIENT
Start: 2024-06-12

## 2024-06-12 NOTE — TELEPHONE ENCOUNTER
Pharmacy requesting refill    ammonium lactate (LAC-HYDRIN) 12 % lotion     [facility]

## 2024-06-12 NOTE — TELEPHONE ENCOUNTER
Last Office Visit  -  3/26/24  Next Office Visit  -  7/9/24    Last Filled  -    Last UDS -    Contract -

## 2024-06-21 DIAGNOSIS — I10 ESSENTIAL HYPERTENSION, BENIGN: ICD-10-CM

## 2024-06-21 RX ORDER — TELMISARTAN 80 MG/1
80 TABLET ORAL DAILY
Qty: 90 TABLET | Refills: 1 | Status: SHIPPED | OUTPATIENT
Start: 2024-06-21

## 2024-06-21 RX ORDER — CARVEDILOL 25 MG/1
25 TABLET ORAL 2 TIMES DAILY
Qty: 180 TABLET | Refills: 3 | Status: SHIPPED | OUTPATIENT
Start: 2024-06-21

## 2024-06-21 RX ORDER — TELMISARTAN 80 MG/1
80 TABLET ORAL DAILY
Qty: 90 TABLET | Refills: 1 | Status: SHIPPED | OUTPATIENT
Start: 2024-06-21 | End: 2024-06-21 | Stop reason: SDUPTHER

## 2024-06-21 NOTE — TELEPHONE ENCOUNTER
Last Office Visit  -  03/26/24  Next Office Visit  -  7/9/24    Last Filled  -    Last UDS -    Contract -

## 2024-06-21 NOTE — TELEPHONE ENCOUNTER
[facility] is requesting a 90 day rx for     telmisartan (MICARDIS) 80 MG tablet        Last OV 3/26/2024    Future OV 7/9/2024

## 2024-06-28 DIAGNOSIS — E22.1 HYPERPROLACTINEMIA (HCC): ICD-10-CM

## 2024-06-28 DIAGNOSIS — E11.9 TYPE 2 DIABETES MELLITUS WITHOUT COMPLICATION, WITHOUT LONG-TERM CURRENT USE OF INSULIN (HCC): ICD-10-CM

## 2024-06-28 RX ORDER — CABERGOLINE 0.5 MG/1
TABLET ORAL
Qty: 8 TABLET | Refills: 1 | Status: SHIPPED | OUTPATIENT
Start: 2024-06-28

## 2024-06-28 RX ORDER — METFORMIN HYDROCHLORIDE 750 MG/1
TABLET, EXTENDED RELEASE ORAL
Qty: 180 TABLET | Refills: 1 | Status: SHIPPED | OUTPATIENT
Start: 2024-06-28

## 2024-06-28 NOTE — TELEPHONE ENCOUNTER
Requested Prescriptions     Signed Prescriptions Disp Refills    cabergoline (DOSTINEX) 0.5 MG tablet 8 tablet 1     Sig: TAKE 1/2 TABLET BY MOUTH ONCE WEEKLY ON MONDAY     Authorizing Provider: RUDDY VEGA     Ordering User: ALVAREZ HOWARD

## 2024-07-09 ENCOUNTER — OFFICE VISIT (OUTPATIENT)
Dept: FAMILY MEDICINE CLINIC | Age: 79
End: 2024-07-09
Payer: MEDICARE

## 2024-07-09 VITALS
HEART RATE: 62 BPM | DIASTOLIC BLOOD PRESSURE: 58 MMHG | SYSTOLIC BLOOD PRESSURE: 126 MMHG | WEIGHT: 244 LBS | BODY MASS INDEX: 34.93 KG/M2 | HEIGHT: 70 IN | OXYGEN SATURATION: 98 %

## 2024-07-09 DIAGNOSIS — E11.22 TYPE 2 DIABETES MELLITUS WITH DIABETIC CHRONIC KIDNEY DISEASE, UNSPECIFIED CKD STAGE, UNSPECIFIED WHETHER LONG TERM INSULIN USE (HCC): ICD-10-CM

## 2024-07-09 DIAGNOSIS — I10 ESSENTIAL HYPERTENSION, BENIGN: ICD-10-CM

## 2024-07-09 DIAGNOSIS — E22.1 HYPERPROLACTINEMIA (HCC): ICD-10-CM

## 2024-07-09 DIAGNOSIS — D35.2 PITUITARY MACROADENOMA (HCC): ICD-10-CM

## 2024-07-09 DIAGNOSIS — Z00.00 MEDICARE ANNUAL WELLNESS VISIT, SUBSEQUENT: ICD-10-CM

## 2024-07-09 DIAGNOSIS — Z12.5 SCREENING FOR MALIGNANT NEOPLASM OF PROSTATE: ICD-10-CM

## 2024-07-09 DIAGNOSIS — Z79.4 TYPE 2 DIABETES MELLITUS WITH COMPLICATION, WITH LONG-TERM CURRENT USE OF INSULIN (HCC): Primary | ICD-10-CM

## 2024-07-09 DIAGNOSIS — I50.42 CHRONIC COMBINED SYSTOLIC AND DIASTOLIC HEART FAILURE (HCC): ICD-10-CM

## 2024-07-09 DIAGNOSIS — E11.8 TYPE 2 DIABETES MELLITUS WITH COMPLICATION, WITH LONG-TERM CURRENT USE OF INSULIN (HCC): Primary | ICD-10-CM

## 2024-07-09 DIAGNOSIS — E78.5 HYPERLIPIDEMIA, UNSPECIFIED HYPERLIPIDEMIA TYPE: ICD-10-CM

## 2024-07-09 DIAGNOSIS — D69.6 THROMBOCYTOPENIA, UNSPECIFIED (HCC): ICD-10-CM

## 2024-07-09 PROBLEM — N18.30 CHRONIC RENAL DISEASE, STAGE III (HCC): Status: RESOLVED | Noted: 2023-03-15 | Resolved: 2024-07-09

## 2024-07-09 LAB — HBA1C MFR BLD: 5.9 %

## 2024-07-09 PROCEDURE — G0439 PPPS, SUBSEQ VISIT: HCPCS | Performed by: FAMILY MEDICINE

## 2024-07-09 PROCEDURE — 1123F ACP DISCUSS/DSCN MKR DOCD: CPT | Performed by: FAMILY MEDICINE

## 2024-07-09 PROCEDURE — 3044F HG A1C LEVEL LT 7.0%: CPT | Performed by: FAMILY MEDICINE

## 2024-07-09 PROCEDURE — 99214 OFFICE O/P EST MOD 30 MIN: CPT | Performed by: FAMILY MEDICINE

## 2024-07-09 PROCEDURE — 3078F DIAST BP <80 MM HG: CPT | Performed by: FAMILY MEDICINE

## 2024-07-09 PROCEDURE — 83036 HEMOGLOBIN GLYCOSYLATED A1C: CPT | Performed by: FAMILY MEDICINE

## 2024-07-09 PROCEDURE — 3074F SYST BP LT 130 MM HG: CPT | Performed by: FAMILY MEDICINE

## 2024-07-09 ASSESSMENT — PATIENT HEALTH QUESTIONNAIRE - PHQ9
SUM OF ALL RESPONSES TO PHQ QUESTIONS 1-9: 0
1. LITTLE INTEREST OR PLEASURE IN DOING THINGS: NOT AT ALL
SUM OF ALL RESPONSES TO PHQ9 QUESTIONS 1 & 2: 0
SUM OF ALL RESPONSES TO PHQ QUESTIONS 1-9: 0
2. FEELING DOWN, DEPRESSED OR HOPELESS: NOT AT ALL

## 2024-07-09 NOTE — PROGRESS NOTES
Medicare Annual Wellness Visit    Romero Keene is here for Medicare AWV    Assessment & Plan   Type 2 diabetes mellitus with complication, with long-term current use of insulin (HCC)  -     POCT glycosylated hemoglobin (Hb A1C)  Hemoglobin A1C   Date Value Ref Range Status   07/09/2024 5.9 % Final     Well-controlled continue current medication.  Medicare annual wellness visit, subsequent  Essential hypertension, benign  Well-controlled at this time.  Thrombocytopenia, unspecified (HCC)  Lab Results   Component Value Date     (L) 06/07/2023     Platelets are within a reasonable range.  Chronic combined systolic and diastolic heart failure (HCC)  Hyperprolactinemia (HCC)  -     Prolactin; Future  Pituitary macroadenoma (HCC)  -     Prolactin; Future  Type 2 diabetes mellitus with diabetic chronic kidney disease, unspecified CKD stage, unspecified whether long term insulin use (HCC)  -     Comprehensive Metabolic Panel; Future  Screening for malignant neoplasm of prostate  -     PSA Screening; Future  Hyperlipidemia, unspecified hyperlipidemia type  -     LIPID PANEL; Future    Recommendations for Preventive Services Due: see orders and patient instructions/AVS.  Recommended screening schedule for the next 5-10 years is provided to the patient in written form: see Patient Instructions/AVS.     No follow-ups on file.     Subjective   The following acute and/or chronic problems were also addressed today:  This is a 78-year-old male who presents for follow-up of type 2 diabetes mellitus and for Medicare annual wellness visit.  Patient does have a history of essential hypertension thrombocytopenia.  These have been stable.  He also has a history of pituitary macroadenoma with hyperprolactinemia.  Has been seen by endocrinology for this.  He has also been having some chronic kidney disease which he sees nephrology for.  Patient generally has been doing well without any significant other illnesses or concerns

## 2024-07-10 ENCOUNTER — PATIENT MESSAGE (OUTPATIENT)
Dept: FAMILY MEDICINE CLINIC | Age: 79
End: 2024-07-10

## 2024-07-10 DIAGNOSIS — R97.20 ELEVATED PSA: Primary | ICD-10-CM

## 2024-07-10 LAB
ALBUMIN SERPL-MCNC: 4 G/DL (ref 3.4–5)
ALBUMIN/GLOB SERPL: 1.7 {RATIO} (ref 1.1–2.2)
ALP SERPL-CCNC: 116 U/L (ref 40–129)
ALT SERPL-CCNC: 14 U/L (ref 10–40)
ANION GAP SERPL CALCULATED.3IONS-SCNC: 10 MMOL/L (ref 3–16)
AST SERPL-CCNC: 13 U/L (ref 15–37)
BILIRUB SERPL-MCNC: 0.4 MG/DL (ref 0–1)
BUN SERPL-MCNC: 28 MG/DL (ref 7–20)
CALCIUM SERPL-MCNC: 9.1 MG/DL (ref 8.3–10.6)
CHLORIDE SERPL-SCNC: 105 MMOL/L (ref 99–110)
CHOLEST SERPL-MCNC: 132 MG/DL (ref 0–199)
CO2 SERPL-SCNC: 27 MMOL/L (ref 21–32)
CREAT SERPL-MCNC: 1.5 MG/DL (ref 0.8–1.3)
GFR SERPLBLD CREATININE-BSD FMLA CKD-EPI: 47 ML/MIN/{1.73_M2}
GLUCOSE SERPL-MCNC: 126 MG/DL (ref 70–99)
HDLC SERPL-MCNC: 38 MG/DL (ref 40–60)
LDLC SERPL CALC-MCNC: 37 MG/DL
POTASSIUM SERPL-SCNC: 4.1 MMOL/L (ref 3.5–5.1)
PROLACTIN SERPL IA-MCNC: 7.5 NG/ML
PROT SERPL-MCNC: 6.3 G/DL (ref 6.4–8.2)
PSA SERPL DL<=0.01 NG/ML-MCNC: 4.18 NG/ML (ref 0–4)
SODIUM SERPL-SCNC: 142 MMOL/L (ref 136–145)
TRIGL SERPL-MCNC: 283 MG/DL (ref 0–150)
VLDLC SERPL CALC-MCNC: 57 MG/DL

## 2024-07-10 NOTE — TELEPHONE ENCOUNTER
From: Romero Keene  To: Dr. Derek Levin  Sent: 7/10/2024 4:54 PM EDT  Subject: PSA    No I have not seen a urologist in recent years. Could you please recommend one?  Thanks.

## 2024-07-25 ENCOUNTER — TELEPHONE (OUTPATIENT)
Dept: FAMILY MEDICINE CLINIC | Age: 79
End: 2024-07-25

## 2024-08-20 ENCOUNTER — PATIENT MESSAGE (OUTPATIENT)
Dept: FAMILY MEDICINE CLINIC | Age: 79
End: 2024-08-20

## 2024-08-20 DIAGNOSIS — E11.8 TYPE 2 DIABETES MELLITUS WITH COMPLICATION, WITH LONG-TERM CURRENT USE OF INSULIN (HCC): Primary | ICD-10-CM

## 2024-08-20 DIAGNOSIS — Z79.4 TYPE 2 DIABETES MELLITUS WITH COMPLICATION, WITH LONG-TERM CURRENT USE OF INSULIN (HCC): Primary | ICD-10-CM

## 2024-08-20 RX ORDER — SEMAGLUTIDE 0.68 MG/ML
0.5 INJECTION, SOLUTION SUBCUTANEOUS WEEKLY
Qty: 3 ML | Refills: 5 | Status: SHIPPED | OUTPATIENT
Start: 2024-08-20

## 2024-09-10 DIAGNOSIS — E22.1 HYPERPROLACTINEMIA (HCC): ICD-10-CM

## 2024-09-10 RX ORDER — TESTOSTERONE GEL, 1% 10 MG/G
1 GEL TRANSDERMAL DAILY
Qty: 150 G | Refills: 1 | Status: SHIPPED | OUTPATIENT
Start: 2024-09-10 | End: 2024-12-09

## 2024-09-18 ENCOUNTER — TELEPHONE (OUTPATIENT)
Dept: FAMILY MEDICINE CLINIC | Age: 79
End: 2024-09-18

## 2024-10-10 DIAGNOSIS — D35.2 PITUITARY MACROADENOMA (HCC): ICD-10-CM

## 2024-10-10 LAB
ALBUMIN SERPL-MCNC: 3.9 G/DL (ref 3.4–5)
ALBUMIN/GLOB SERPL: 2.1 {RATIO} (ref 1.1–2.2)
ALP SERPL-CCNC: 100 U/L (ref 40–129)
ALT SERPL-CCNC: 25 U/L (ref 10–40)
ANION GAP SERPL CALCULATED.3IONS-SCNC: 11 MMOL/L (ref 3–16)
AST SERPL-CCNC: 20 U/L (ref 15–37)
BILIRUB SERPL-MCNC: 0.5 MG/DL (ref 0–1)
BUN SERPL-MCNC: 28 MG/DL (ref 7–20)
CALCIUM SERPL-MCNC: 9.2 MG/DL (ref 8.3–10.6)
CHLORIDE SERPL-SCNC: 106 MMOL/L (ref 99–110)
CO2 SERPL-SCNC: 26 MMOL/L (ref 21–32)
CREAT SERPL-MCNC: 1.2 MG/DL (ref 0.8–1.3)
GFR SERPLBLD CREATININE-BSD FMLA CKD-EPI: 61 ML/MIN/{1.73_M2}
GLUCOSE SERPL-MCNC: 127 MG/DL (ref 70–99)
HCT VFR BLD AUTO: 38.3 % (ref 40.5–52.5)
HGB BLD-MCNC: 12.5 G/DL (ref 13.5–17.5)
POTASSIUM SERPL-SCNC: 4.2 MMOL/L (ref 3.5–5.1)
PROLACTIN SERPL IA-MCNC: 8.5 NG/ML
PROT SERPL-MCNC: 5.8 G/DL (ref 6.4–8.2)
SODIUM SERPL-SCNC: 143 MMOL/L (ref 136–145)
T4 FREE SERPL-MCNC: 1 NG/DL (ref 0.9–1.8)
TSH SERPL DL<=0.005 MIU/L-ACNC: 4.04 UIU/ML (ref 0.27–4.2)

## 2024-10-11 LAB
SHBG SERPL-SCNC: 38 NMOL/L (ref 19–76)
TESTOST FREE SERPL-MCNC: 41.3 PG/ML (ref 47–244)
TESTOST SERPL-MCNC: 234 NG/DL (ref 193–740)

## 2024-10-25 DIAGNOSIS — D35.2 PITUITARY MACROADENOMA (HCC): ICD-10-CM

## 2024-10-25 LAB — CORTIS AM PEAK SERPL-MCNC: 13.3 UG/DL (ref 4.3–22.4)

## 2024-10-29 ENCOUNTER — OFFICE VISIT (OUTPATIENT)
Dept: ENDOCRINOLOGY | Age: 79
End: 2024-10-29
Payer: MEDICARE

## 2024-10-29 VITALS
HEART RATE: 63 BPM | SYSTOLIC BLOOD PRESSURE: 171 MMHG | RESPIRATION RATE: 16 BRPM | HEIGHT: 70 IN | DIASTOLIC BLOOD PRESSURE: 75 MMHG | BODY MASS INDEX: 34.79 KG/M2 | WEIGHT: 243 LBS

## 2024-10-29 DIAGNOSIS — R35.1 NOCTURIA: ICD-10-CM

## 2024-10-29 DIAGNOSIS — E34.9 HYPOTESTOSTERONISM: ICD-10-CM

## 2024-10-29 DIAGNOSIS — E22.1 HYPERPROLACTINEMIA (HCC): ICD-10-CM

## 2024-10-29 DIAGNOSIS — D35.2 PITUITARY MACROADENOMA (HCC): Primary | ICD-10-CM

## 2024-10-29 PROCEDURE — 1123F ACP DISCUSS/DSCN MKR DOCD: CPT | Performed by: INTERNAL MEDICINE

## 2024-10-29 PROCEDURE — 3078F DIAST BP <80 MM HG: CPT | Performed by: INTERNAL MEDICINE

## 2024-10-29 PROCEDURE — 1159F MED LIST DOCD IN RCRD: CPT | Performed by: INTERNAL MEDICINE

## 2024-10-29 PROCEDURE — 3077F SYST BP >= 140 MM HG: CPT | Performed by: INTERNAL MEDICINE

## 2024-10-29 PROCEDURE — 99214 OFFICE O/P EST MOD 30 MIN: CPT | Performed by: INTERNAL MEDICINE

## 2024-10-29 PROCEDURE — G2211 COMPLEX E/M VISIT ADD ON: HCPCS | Performed by: INTERNAL MEDICINE

## 2024-10-29 NOTE — PROGRESS NOTES
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  We will plan on obtaining prolactin level annually and adjusting dose of cabergoline if needed.  To consider repeating MRI in 2-3 years.  Patient reports understanding.   -     Hemoglobin and Hematocrit; Future  -     Prolactin; Future  -     Testosterone, free, total; Future  -     cabergoline (DOSTINEX) 0.5 MG tablet; TAKE half tablet once a week. MONDAY, Disp-8 tablet, R-1Normal  -     testosterone (ANDROGEL; TESTIM) 50 MG/5GM (1%) GEL 1% gel; Apply 1 Package topically daily for 50 days. Max Daily Amount: 1 Package, Disp-50 each, R-3Normal  -     Prolactin; Future  -     Testosterone, free, total; Future  -     Hemoglobin and Hematocrit; Future  -     MRI BRAIN W WO CONTRAST; Future    3. Hypotestosteronism, late onset hypogonadotrophic hypogonadism.  Currently on testosterone gel packets, 1 packet 3 times per week.  Testosterone remains around 200 range.  May continue with current dose given cost of testosterone.  He had not noticed a major change in symptoms after cutting down.  We discussed that this testosterone level is sufficient for bone health.       Return in about 1 year (around 10/29/2025).    Yvonne Hoffman MD   12:50 PM  10/29/2024      Thank you very much for allowing me to take care of this patient along with you.    Comment: This documentation utilized a software-based speech recognition technology (voice-to-text process), where occasional errors in transcription can occur.

## 2024-11-04 ENCOUNTER — OFFICE VISIT (OUTPATIENT)
Age: 79
End: 2024-11-04

## 2024-11-04 ENCOUNTER — APPOINTMENT (OUTPATIENT)
Dept: GENERAL RADIOLOGY | Age: 79
End: 2024-11-04
Payer: MEDICARE

## 2024-11-04 ENCOUNTER — HOSPITAL ENCOUNTER (EMERGENCY)
Age: 79
Discharge: HOME | End: 2024-11-04
Attending: EMERGENCY MEDICINE
Payer: MEDICARE

## 2024-11-04 VITALS
DIASTOLIC BLOOD PRESSURE: 48 MMHG | HEART RATE: 58 BPM | SYSTOLIC BLOOD PRESSURE: 108 MMHG | OXYGEN SATURATION: 96 % | TEMPERATURE: 97.1 F

## 2024-11-04 VITALS
SYSTOLIC BLOOD PRESSURE: 163 MMHG | HEART RATE: 55 BPM | RESPIRATION RATE: 14 BRPM | HEIGHT: 70 IN | WEIGHT: 239 LBS | BODY MASS INDEX: 34.22 KG/M2 | OXYGEN SATURATION: 99 % | TEMPERATURE: 97.7 F | DIASTOLIC BLOOD PRESSURE: 65 MMHG

## 2024-11-04 DIAGNOSIS — I95.9 HYPOTENSION, UNSPECIFIED HYPOTENSION TYPE: Primary | ICD-10-CM

## 2024-11-04 DIAGNOSIS — I95.9 HYPOTENSION, UNSPECIFIED HYPOTENSION TYPE: ICD-10-CM

## 2024-11-04 DIAGNOSIS — R05.1 ACUTE COUGH: Primary | ICD-10-CM

## 2024-11-04 LAB
ALBUMIN SERPL-MCNC: 3.9 G/DL (ref 3.4–5)
ALBUMIN/GLOB SERPL: 1.6 {RATIO} (ref 1.1–2.2)
ALP SERPL-CCNC: 120 U/L (ref 40–129)
ALT SERPL-CCNC: 45 U/L (ref 10–40)
ANION GAP SERPL CALCULATED.3IONS-SCNC: 13 MMOL/L (ref 3–16)
AST SERPL-CCNC: 32 U/L (ref 15–37)
BASOPHILS # BLD: 0 K/UL (ref 0–0.2)
BASOPHILS NFR BLD: 0.2 %
BILIRUB SERPL-MCNC: 0.5 MG/DL (ref 0–1)
BUN SERPL-MCNC: 27 MG/DL (ref 7–20)
CALCIUM SERPL-MCNC: 8.8 MG/DL (ref 8.3–10.6)
CHLORIDE SERPL-SCNC: 104 MMOL/L (ref 99–110)
CO2 SERPL-SCNC: 24 MMOL/L (ref 21–32)
CREAT SERPL-MCNC: 1.4 MG/DL (ref 0.8–1.3)
DEPRECATED RDW RBC AUTO: 14.3 % (ref 12.4–15.4)
EKG ATRIAL RATE: 54 BPM
EKG DIAGNOSIS: NORMAL
EKG P AXIS: 44 DEGREES
EKG P-R INTERVAL: 176 MS
EKG Q-T INTERVAL: 500 MS
EKG QRS DURATION: 156 MS
EKG QTC CALCULATION (BAZETT): 474 MS
EKG R AXIS: -52 DEGREES
EKG T AXIS: 15 DEGREES
EKG VENTRICULAR RATE: 54 BPM
EOSINOPHIL # BLD: 0.1 K/UL (ref 0–0.6)
EOSINOPHIL NFR BLD: 1.6 %
FLUAV RNA RESP QL NAA+PROBE: NOT DETECTED
FLUBV RNA RESP QL NAA+PROBE: NOT DETECTED
GFR SERPLBLD CREATININE-BSD FMLA CKD-EPI: 51 ML/MIN/{1.73_M2}
GLUCOSE SERPL-MCNC: 107 MG/DL (ref 70–99)
HCT VFR BLD AUTO: 37.6 % (ref 40.5–52.5)
HGB BLD-MCNC: 12.5 G/DL (ref 13.5–17.5)
LACTATE BLDV-SCNC: 0.9 MMOL/L (ref 0.4–1.9)
LYMPHOCYTES # BLD: 1.6 K/UL (ref 1–5.1)
LYMPHOCYTES NFR BLD: 19.3 %
MCH RBC QN AUTO: 29.5 PG (ref 26–34)
MCHC RBC AUTO-ENTMCNC: 33.1 G/DL (ref 31–36)
MCV RBC AUTO: 89.1 FL (ref 80–100)
MONOCYTES # BLD: 0.7 K/UL (ref 0–1.3)
MONOCYTES NFR BLD: 8.8 %
NEUTROPHILS # BLD: 5.8 K/UL (ref 1.7–7.7)
NEUTROPHILS NFR BLD: 70.1 %
PLATELET # BLD AUTO: 125 K/UL (ref 135–450)
PMV BLD AUTO: 8.9 FL (ref 5–10.5)
POTASSIUM SERPL-SCNC: 3.9 MMOL/L (ref 3.5–5.1)
PROT SERPL-MCNC: 6.3 G/DL (ref 6.4–8.2)
RBC # BLD AUTO: 4.23 M/UL (ref 4.2–5.9)
SARS-COV-2 RNA RESP QL NAA+PROBE: NOT DETECTED
SODIUM SERPL-SCNC: 141 MMOL/L (ref 136–145)
WBC # BLD AUTO: 8.3 K/UL (ref 4–11)

## 2024-11-04 PROCEDURE — 6370000000 HC RX 637 (ALT 250 FOR IP): Performed by: EMERGENCY MEDICINE

## 2024-11-04 PROCEDURE — 85025 COMPLETE CBC W/AUTO DIFF WBC: CPT

## 2024-11-04 PROCEDURE — 71046 X-RAY EXAM CHEST 2 VIEWS: CPT

## 2024-11-04 PROCEDURE — 99285 EMERGENCY DEPT VISIT HI MDM: CPT

## 2024-11-04 PROCEDURE — 87636 SARSCOV2 & INF A&B AMP PRB: CPT

## 2024-11-04 PROCEDURE — 80053 COMPREHEN METABOLIC PANEL: CPT

## 2024-11-04 PROCEDURE — 93005 ELECTROCARDIOGRAM TRACING: CPT | Performed by: EMERGENCY MEDICINE

## 2024-11-04 PROCEDURE — 93010 ELECTROCARDIOGRAM REPORT: CPT | Performed by: INTERNAL MEDICINE

## 2024-11-04 PROCEDURE — 94640 AIRWAY INHALATION TREATMENT: CPT

## 2024-11-04 PROCEDURE — 83605 ASSAY OF LACTIC ACID: CPT

## 2024-11-04 PROCEDURE — 36415 COLL VENOUS BLD VENIPUNCTURE: CPT

## 2024-11-04 RX ORDER — AZITHROMYCIN 250 MG/1
TABLET, FILM COATED ORAL
Qty: 6 TABLET | Refills: 0 | Status: SHIPPED | OUTPATIENT
Start: 2024-11-04 | End: 2024-11-14

## 2024-11-04 RX ORDER — IPRATROPIUM BROMIDE AND ALBUTEROL SULFATE 2.5; .5 MG/3ML; MG/3ML
1 SOLUTION RESPIRATORY (INHALATION) ONCE
Status: COMPLETED | OUTPATIENT
Start: 2024-11-04 | End: 2024-11-04

## 2024-11-04 RX ORDER — ALBUTEROL SULFATE 90 UG/1
2 INHALANT RESPIRATORY (INHALATION) 4 TIMES DAILY PRN
Qty: 18 G | Refills: 0 | Status: SHIPPED | OUTPATIENT
Start: 2024-11-04

## 2024-11-04 RX ADMIN — IPRATROPIUM BROMIDE AND ALBUTEROL SULFATE 1 DOSE: 2.5; .5 SOLUTION RESPIRATORY (INHALATION) at 14:48

## 2024-11-04 ASSESSMENT — PAIN SCALES - GENERAL: PAINLEVEL_OUTOF10: 0

## 2024-11-04 ASSESSMENT — LIFESTYLE VARIABLES
HOW MANY STANDARD DRINKS CONTAINING ALCOHOL DO YOU HAVE ON A TYPICAL DAY: PATIENT DOES NOT DRINK
HOW OFTEN DO YOU HAVE A DRINK CONTAINING ALCOHOL: NEVER

## 2024-11-04 ASSESSMENT — PAIN - FUNCTIONAL ASSESSMENT: PAIN_FUNCTIONAL_ASSESSMENT: 0-10

## 2024-11-04 NOTE — ED PROVIDER NOTES
MD  [address] [facility] [address]  [phone]    Schedule an appointment as soon as possible for a visit         I, Karlo Sam, am the primary attending of record and contributed the majority of evaluation and treatment of emergent care for this encounter.     This chart was generated in part by using Dragon Dictation system and may contain errors related to that system including errors in grammar, punctuation, and spelling, as well as words and phrases that may be inappropriate. If there are any questions or concerns please feel free to contact the dictating provider for clarification.     Karlo Sam MD   Ac[facility] [place]       Karlo Sam MD  11/04/24 [phone]

## 2024-11-04 NOTE — PROGRESS NOTES
each 3    Cholecalciferol (VITAMIN D) 2000 UNITS CAPS capsule Take 1 capsule by mouth daily      aspirin 81 MG chewable tablet Take 1 tablet by mouth daily         ALLERGIES    Allergies   Allergen Reactions    Amlodipine      Swelling, pt denies allergy    Bactrim [Sulfamethoxazole-Trimethoprim]      KANE risk             Objective   PHYSICAL EXAM      A&o x 3. No confusion or slurred speech  Nonpale, skin warm and dry.  Gait wnl, ambulating wnl, no shaking or jerks.   RRR, pulse in 50s. No edema visualized  CTAB, normal effort      VITAL SIGNS:   Vitals:    11/04/24 1311 11/04/24 1330   BP: (!) 114/42 (!) 108/48   Pulse: 53 58   Temp: 97.1 °F (36.2 °C)    TempSrc: Infrared    SpO2: 96%                Cammie Allen PA-C

## 2024-11-04 NOTE — DISCHARGE INSTRUCTIONS
You were seen for an acute respiratory illness and low blood pressure    Your blood pressure was reassuring in the ER.  Labs  show stable chronic kidney disease  Rest.  Drink plenty of fluids.  Follow-up with your primary care doctor.  Return with any worsening difficulty breathing, weakness, dizziness, new concerns

## 2024-11-08 ENCOUNTER — CARE COORDINATION (OUTPATIENT)
Dept: CARE COORDINATION | Age: 79
End: 2024-11-08

## 2024-11-08 NOTE — CARE COORDINATION
Ambulatory Care Coordination Note     11/8/2024 2:24 PM     Patient outreach attempt by this ACM today to offer care management services. ACM was unable to reach the patient by telephone today; left voice message requesting a return phone call to this ACM. [id number]     ACM: Lourdes Dominguez, RN     Care Summary Note: E/D visit at [facility] on 11/4/24 for Acute Cough and Hypotension. PMH: HTN,CHF, DM II. Care Coordination Program identified at this time, ACM added to Tx team.    PCP/Specialist follow up:   Future Appointments         Provider Specialty Dept Phone    11/12/2024 2:30 PM Derek Levin MD Family Medicine [phone]    10/28/2025 11:00 AM Yvonne Hoffman MD Endocrinology [phone]            Follow Up:   Plan for next ACM outreach in approximately 1 week to complete:  - outreach attempt to offer care management services.      Lourdes CAMARILLO, RN  Respecting Choices® Advanced Steps ACP Facilitator  Ambulatory Care Manager  [facility]  [phone]

## 2024-11-11 RX ORDER — AMMONIUM LACTATE 12 G/100G
LOTION TOPICAL
Qty: 400 G | Refills: 5 | Status: SHIPPED | OUTPATIENT
Start: 2024-11-11

## 2024-11-12 ENCOUNTER — OFFICE VISIT (OUTPATIENT)
Dept: FAMILY MEDICINE CLINIC | Age: 79
End: 2024-11-12

## 2024-11-12 VITALS
HEART RATE: 69 BPM | HEIGHT: 70 IN | WEIGHT: 241 LBS | DIASTOLIC BLOOD PRESSURE: 48 MMHG | BODY MASS INDEX: 34.5 KG/M2 | OXYGEN SATURATION: 97 % | SYSTOLIC BLOOD PRESSURE: 132 MMHG

## 2024-11-12 DIAGNOSIS — E11.22 TYPE 2 DIABETES MELLITUS WITH DIABETIC CHRONIC KIDNEY DISEASE, UNSPECIFIED CKD STAGE, UNSPECIFIED WHETHER LONG TERM INSULIN USE (HCC): Primary | ICD-10-CM

## 2024-11-12 LAB — HBA1C MFR BLD: 5.7 %

## 2024-11-12 NOTE — PROGRESS NOTES
Romero Keene (:  1945) is a 79 y.o. male,Established patient, here for evaluation of the following chief complaint(s):  Diabetes         Assessment & Plan  Type 2 diabetes mellitus with diabetic chronic kidney disease, unspecified CKD stage, unspecified whether long term insulin use (HCC)   Chronic, at goal (stable), continue current treatment plan    Orders:    POCT glycosylated hemoglobin (Hb A1C)      Hemoglobin A1C   Date Value Ref Range Status   2024 5.7 % Final       No follow-ups on file.       Subjective   Romero Keene is a 79 y.o. male. Patient presents with:  Diabetes    79-year-old male presents for follow-up of diabetes mellitus.  Patient has been taking his medication regularly denies any significant issues with his medication.  Has been following a good diet.  Has been trying to exercise regularly.  Patient has had no increased thirst or increased urination recently.    The patients PMH, surgical history, family history, medications, allergies were all reviewed and updated as appropriate today.      Diabetes        Review of Systems       Objective   Physical Exam  Vitals and nursing note reviewed.   Constitutional:       Appearance: Normal appearance. He is well-developed.   HENT:      Head: Normocephalic and atraumatic.      Right Ear: External ear normal.      Left Ear: External ear normal.      Nose: Nose normal.   Eyes:      Conjunctiva/sclera: Conjunctivae normal.      Pupils: Pupils are equal, round, and reactive to light.   Cardiovascular:      Rate and Rhythm: Normal rate and regular rhythm.      Heart sounds: Normal heart sounds. No murmur heard.     No friction rub. No gallop.   Pulmonary:      Effort: Pulmonary effort is normal. No respiratory distress.      Breath sounds: Normal breath sounds. No wheezing.   Abdominal:      General: Bowel sounds are normal. There is no distension.      Palpations: Abdomen is soft.      Tenderness: There is no abdominal

## 2024-11-26 ENCOUNTER — CARE COORDINATION (OUTPATIENT)
Dept: CARE COORDINATION | Age: 79
End: 2024-11-26

## 2024-11-26 NOTE — CARE COORDINATION
Ambulatory Care Coordination Note     11/26/2024 12:44 PM     ACM outreach attempt by this ACM today to offer care management services. ACM was unable to reach the patient by telephone today;   left voice message requesting a return phone call to this ACM.     ACM: Lourdes Dominguez, RN     Care Summary Note: LVMX2, Identified, Holiday Packet sent in [employer]    PCP/Specialist follow up:   Future Appointments         Provider Specialty Dept Phone    3/12/2025 11:00 AM Derek Levin MD Family Medicine [phone]    10/28/2025 11:00 AM Yvonne Hoffman MD Endocrinology [phone]            Follow Up:   Plan for next ACM outreach in approximately 1 week to complete:  - outreach attempt to offer care management services.     Lourdes CAMARILLO, RN  Respecting Choices® Advanced Steps ACP Facilitator  Ambulatory Care Manager  [facility]  [phone]

## 2024-12-05 ENCOUNTER — CARE COORDINATION (OUTPATIENT)
Dept: CARE COORDINATION | Age: 79
End: 2024-12-05

## 2024-12-05 NOTE — CARE COORDINATION
Ambulatory Care Coordination Note     2024 5:12 PM     Patient Current Location:  [place]         ACM contacted the spouse/partner by telephone. Verified name and  with spouse/partner as identifiers. Provided introduction to self, and explanation of the ACM role.   Spouse/Partner declined care management services at this time.         PCP/Specialist follow up:   Future Appointments         Provider Specialty Dept Phone    3/12/2025 11:00 AM Derek Levin MD Family Medicine [phone]    10/28/2025 11:00 AM Yvonne Hoffman MD Endocrinology [phone]          Lourdes PARADAN, RN  Respecting Choices® Advanced Steps ACP Facilitator  Ambulatory Care Manager  [facility]  [phone]

## 2024-12-09 DIAGNOSIS — E22.1 HYPERPROLACTINEMIA (HCC): ICD-10-CM

## 2024-12-09 NOTE — TELEPHONE ENCOUNTER
Please sign testosterone script.     Medication:   Requested Prescriptions     Pending Prescriptions Disp Refills    testosterone (ANDROGEL; TESTIM) 50 MG/5GM (1%) GEL 1% gel [Pharmacy Med Name: TESTOSTERONE 50 MG/5 GRAM GEL] 150 g      Sig: APPLY ONE PACKAGE TOPICALLY DAILY         Last appt: 10/29/2024   Next appt: Visit date not found    Last Labs DM:   Lab Results   Component Value Date/Time    LABA1C 5.7 11/12/2024 02:57 PM     Last Lipid:   Lab Results   Component Value Date/Time    CHOL 132 07/09/2024 01:32 PM    TRIG 283 07/09/2024 01:32 PM    HDL 38 07/09/2024 01:32 PM     Last PSA:   Lab Results   Component Value Date/Time    PSA 3.80 08/01/2024 10:50 AM     Last Thyroid:   Lab Results   Component Value Date/Time    TSH 4.04 10/10/2024 10:59 AM    TSH 3.10 10/31/2022 11:10 AM    T4FREE 1.0 10/10/2024 10:59 AM         Component  Ref Range & Units 10/10/24 1059 11/6/23 1216 6/7/23 1139 11/8/22 1125 7/19/22 1011 6/4/21 0824 11/23/20 1452   Testosterone  193 - 740 ng/dL 234 560 R 209 Low  R 181 Low  R, CM 80 Low  R 125 Low  R 95 Low  R   Sex Hormone Binding  19 - 76 nmol/L 38 46 R 16 R  18 R 32 R 41 R, CM   Testosterone, Free  47.0 - 244.0 pg/mL 41.3 Low  96.8 R, CM 56.8 R, CM  19.9 Low  R, CM 23.6 Low  R, CM 15.2 Low  R, CM   Comment:  The concentration of free testosterone is derived from a mathematical

## 2024-12-10 RX ORDER — TESTOSTERONE GEL, 1% 10 MG/G
1 GEL TRANSDERMAL DAILY
Qty: 150 G | Refills: 2 | Status: SHIPPED | OUTPATIENT
Start: 2024-12-10 | End: 2025-03-10

## 2024-12-19 DIAGNOSIS — M10.9 GOUT, UNSPECIFIED CAUSE, UNSPECIFIED CHRONICITY, UNSPECIFIED SITE: ICD-10-CM

## 2024-12-19 RX ORDER — ATORVASTATIN CALCIUM 80 MG/1
80 TABLET, FILM COATED ORAL DAILY
Qty: 90 TABLET | Refills: 3 | Status: SHIPPED | OUTPATIENT
Start: 2024-12-19

## 2024-12-19 RX ORDER — TELMISARTAN 80 MG/1
80 TABLET ORAL DAILY
Qty: 90 TABLET | Refills: 3 | Status: SHIPPED | OUTPATIENT
Start: 2024-12-19

## 2024-12-19 RX ORDER — ALLOPURINOL 300 MG/1
300 TABLET ORAL DAILY
Qty: 90 TABLET | Refills: 2 | Status: SHIPPED | OUTPATIENT
Start: 2024-12-19

## 2024-12-19 NOTE — TELEPHONE ENCOUNTER
Last Office Visit  -  11/12/24  Next Office Visit  -  3/12/25    Last Filled  -  6/21/24  Last UDS -    Contract -

## 2024-12-19 NOTE — TELEPHONE ENCOUNTER
Last Office Visit  -  11/12/24  Next Office Visit  -  3/12/25    Last Filled  -    Last UDS -    Contract -

## 2025-01-17 ENCOUNTER — TELEPHONE (OUTPATIENT)
Dept: FAMILY MEDICINE CLINIC | Age: 80
End: 2025-01-17

## 2025-01-22 ENCOUNTER — TELEPHONE (OUTPATIENT)
Dept: ENDOCRINOLOGY | Age: 80
End: 2025-01-22

## 2025-01-22 DIAGNOSIS — E34.9 HYPOTESTOSTERONISM: Primary | ICD-10-CM

## 2025-01-22 NOTE — TELEPHONE ENCOUNTER
Pt states medication is to expensive asking if her can go back to the injections of testosterone states his wife use to give them to him every 2 weeks he just doesn't remember the dosage           testosterone (ANDROGEL; TESTIM) 50 MG/5GM (1%) GEL 1% gel     [facility] [id number] - [place], [place] - [address] [facility] [address] - P [phone] - F [phone] [address] [place] [address]  Phone: [phone]  Fax: [phone]

## 2025-01-23 RX ORDER — TESTOSTERONE CYPIONATE 200 MG/ML
100 INJECTION, SOLUTION INTRAMUSCULAR
Qty: 6 ML | Refills: 1 | Status: SHIPPED | OUTPATIENT
Start: 2025-01-23 | End: 2025-06-27

## 2025-01-23 NOTE — TELEPHONE ENCOUNTER
May switch back to testosterone injections, 0.5 cc every 2 weeks.  Prescription for vials was sent to pharmacy along with syringes to use to draw and inject.  Dose vials are single use.  He needs to draw only half the amount and discard the rest.  He needs to repeat CBC and testosterone in about 3 months, ideally 1 week after his injection.

## 2025-01-24 ENCOUNTER — TELEPHONE (OUTPATIENT)
Dept: ENDOCRINOLOGY | Age: 80
End: 2025-01-24

## 2025-01-24 DIAGNOSIS — E34.9 HYPOTESTOSTERONISM: Primary | ICD-10-CM

## 2025-01-24 NOTE — TELEPHONE ENCOUNTER
Call from pt stating that the script for testosterone cypionate (DEPOTESTOTERONE CYPIONATE) 200 MG/ML injection is also too expensive     Pt is wanting to know if he can come get the multi dose injection for Testosterone instead  He stated that his wife would administer the injections or he can come into the office every 2 weeks for a nurses visit to get the injections administered     Please advise   CB# [phone]

## 2025-01-24 NOTE — TELEPHONE ENCOUNTER
Lm for patient to check into [facility] for the cost of his medication and to see if that would be more affordable. [place]     $28.45  Retail price: $143.88  Get 1,000 points per fill  Show this coupon at the pharmacy.  BIN [id number]   [facility]   Group [id number]   Member ID [id number]

## 2025-01-27 ENCOUNTER — APPOINTMENT (OUTPATIENT)
Dept: GENERAL RADIOLOGY | Age: 80
End: 2025-01-27
Payer: MEDICARE

## 2025-01-27 ENCOUNTER — HOSPITAL ENCOUNTER (EMERGENCY)
Age: 80
Discharge: HOME | End: 2025-01-27
Attending: STUDENT IN AN ORGANIZED HEALTH CARE EDUCATION/TRAINING PROGRAM
Payer: MEDICARE

## 2025-01-27 VITALS
HEART RATE: 89 BPM | OXYGEN SATURATION: 98 % | TEMPERATURE: 99.5 F | SYSTOLIC BLOOD PRESSURE: 195 MMHG | RESPIRATION RATE: 16 BRPM | BODY MASS INDEX: 35.84 KG/M2 | DIASTOLIC BLOOD PRESSURE: 73 MMHG | WEIGHT: 249.8 LBS

## 2025-01-27 DIAGNOSIS — J18.9 COMMUNITY ACQUIRED PNEUMONIA, UNSPECIFIED LATERALITY: ICD-10-CM

## 2025-01-27 DIAGNOSIS — N18.9 CHRONIC KIDNEY DISEASE, UNSPECIFIED CKD STAGE: ICD-10-CM

## 2025-01-27 DIAGNOSIS — R70.0 ELEVATED ERYTHROCYTE SEDIMENTATION RATE: ICD-10-CM

## 2025-01-27 DIAGNOSIS — R79.82 ELEVATED C-REACTIVE PROTEIN (CRP): ICD-10-CM

## 2025-01-27 DIAGNOSIS — D69.6 THROMBOCYTOPENIA (HCC): ICD-10-CM

## 2025-01-27 DIAGNOSIS — L03.312 CELLULITIS OF BACK EXCEPT BUTTOCK: Primary | ICD-10-CM

## 2025-01-27 DIAGNOSIS — R53.83 OTHER FATIGUE: ICD-10-CM

## 2025-01-27 DIAGNOSIS — R05.1 ACUTE COUGH: ICD-10-CM

## 2025-01-27 LAB
ALBUMIN SERPL-MCNC: 4.3 G/DL (ref 3.4–5)
ALBUMIN/GLOB SERPL: 1.5 {RATIO} (ref 1.1–2.2)
ALP SERPL-CCNC: 110 U/L (ref 40–129)
ALT SERPL-CCNC: 15 U/L (ref 10–40)
ANION GAP SERPL CALCULATED.3IONS-SCNC: 13 MMOL/L (ref 3–16)
AST SERPL-CCNC: 19 U/L (ref 15–37)
BASOPHILS # BLD: 0 K/UL (ref 0–0.2)
BASOPHILS NFR BLD: 0.3 %
BILIRUB SERPL-MCNC: 0.8 MG/DL (ref 0–1)
BUN SERPL-MCNC: 31 MG/DL (ref 7–20)
CALCIUM SERPL-MCNC: 9.2 MG/DL (ref 8.3–10.6)
CHLORIDE SERPL-SCNC: 105 MMOL/L (ref 99–110)
CO2 SERPL-SCNC: 26 MMOL/L (ref 21–32)
CREAT SERPL-MCNC: 1.5 MG/DL (ref 0.8–1.3)
CRP SERPL-MCNC: 40.5 MG/L (ref 0–5.1)
DEPRECATED RDW RBC AUTO: 14.4 % (ref 12.4–15.4)
EOSINOPHIL # BLD: 0.1 K/UL (ref 0–0.6)
EOSINOPHIL NFR BLD: 1.1 %
ERYTHROCYTE [SEDIMENTATION RATE] IN BLOOD BY WESTERGREN METHOD: 23 MM/HR (ref 0–20)
FLUAV RNA RESP QL NAA+PROBE: NOT DETECTED
FLUBV RNA RESP QL NAA+PROBE: NOT DETECTED
GFR SERPLBLD CREATININE-BSD FMLA CKD-EPI: 47 ML/MIN/{1.73_M2}
GLUCOSE SERPL-MCNC: 140 MG/DL (ref 70–99)
HCT VFR BLD AUTO: 41 % (ref 40.5–52.5)
HGB BLD-MCNC: 13.6 G/DL (ref 13.5–17.5)
LACTATE BLDV-SCNC: 0.8 MMOL/L (ref 0.4–1.9)
LYMPHOCYTES # BLD: 1.1 K/UL (ref 1–5.1)
LYMPHOCYTES NFR BLD: 10.1 %
MCH RBC QN AUTO: 29.7 PG (ref 26–34)
MCHC RBC AUTO-ENTMCNC: 33.3 G/DL (ref 31–36)
MCV RBC AUTO: 89.1 FL (ref 80–100)
MONOCYTES # BLD: 1.1 K/UL (ref 0–1.3)
MONOCYTES NFR BLD: 10.2 %
NEUTROPHILS # BLD: 8.3 K/UL (ref 1.7–7.7)
NEUTROPHILS NFR BLD: 78.3 %
PLATELET # BLD AUTO: 123 K/UL (ref 135–450)
PMV BLD AUTO: 8.7 FL (ref 5–10.5)
POTASSIUM SERPL-SCNC: 3.8 MMOL/L (ref 3.5–5.1)
PROT SERPL-MCNC: 7.2 G/DL (ref 6.4–8.2)
RBC # BLD AUTO: 4.6 M/UL (ref 4.2–5.9)
SARS-COV-2 RNA RESP QL NAA+PROBE: NOT DETECTED
SODIUM SERPL-SCNC: 144 MMOL/L (ref 136–145)
WBC # BLD AUTO: 10.6 K/UL (ref 4–11)

## 2025-01-27 PROCEDURE — 2500000003 HC RX 250 WO HCPCS: Performed by: STUDENT IN AN ORGANIZED HEALTH CARE EDUCATION/TRAINING PROGRAM

## 2025-01-27 PROCEDURE — 83605 ASSAY OF LACTIC ACID: CPT

## 2025-01-27 PROCEDURE — 86140 C-REACTIVE PROTEIN: CPT

## 2025-01-27 PROCEDURE — 80053 COMPREHEN METABOLIC PANEL: CPT

## 2025-01-27 PROCEDURE — 6360000002 HC RX W HCPCS: Performed by: STUDENT IN AN ORGANIZED HEALTH CARE EDUCATION/TRAINING PROGRAM

## 2025-01-27 PROCEDURE — 85025 COMPLETE CBC W/AUTO DIFF WBC: CPT

## 2025-01-27 PROCEDURE — 85652 RBC SED RATE AUTOMATED: CPT

## 2025-01-27 PROCEDURE — 71045 X-RAY EXAM CHEST 1 VIEW: CPT

## 2025-01-27 PROCEDURE — 96374 THER/PROPH/DIAG INJ IV PUSH: CPT

## 2025-01-27 PROCEDURE — 99284 EMERGENCY DEPT VISIT MOD MDM: CPT

## 2025-01-27 PROCEDURE — 87636 SARSCOV2 & INF A&B AMP PRB: CPT

## 2025-01-27 RX ORDER — DOXYCYCLINE HYCLATE 100 MG
100 TABLET ORAL 2 TIMES DAILY
Qty: 14 TABLET | Refills: 0 | Status: SHIPPED | OUTPATIENT
Start: 2025-01-27 | End: 2025-02-03

## 2025-01-27 RX ADMIN — WATER 1000 MG: 1 INJECTION INTRAMUSCULAR; INTRAVENOUS; SUBCUTANEOUS at 22:25

## 2025-01-27 ASSESSMENT — PAIN SCALES - GENERAL: PAINLEVEL_OUTOF10: 5

## 2025-01-27 ASSESSMENT — PAIN - FUNCTIONAL ASSESSMENT: PAIN_FUNCTIONAL_ASSESSMENT: 0-10

## 2025-01-27 ASSESSMENT — PAIN DESCRIPTION - ORIENTATION: ORIENTATION: LOWER

## 2025-01-27 ASSESSMENT — PAIN DESCRIPTION - LOCATION: LOCATION: BACK

## 2025-01-27 ASSESSMENT — PAIN DESCRIPTION - DESCRIPTORS: DESCRIPTORS: ACHING

## 2025-01-27 ASSESSMENT — PAIN DESCRIPTION - PAIN TYPE: TYPE: ACUTE PAIN

## 2025-01-27 ASSESSMENT — ENCOUNTER SYMPTOMS
BACK PAIN: 1
COLOR CHANGE: 1

## 2025-01-28 NOTE — ED PROVIDER NOTES
Emergency Department Provider Note  Location: [facility] EMERGENCY DEPARTMENT  1/27/2025     Patient Identification  Romero Keene is a 79 y.o. male    Chief Complaint  Illness (Pt states for a couple of days has not been feel well. Pts wife states he has something like a burn on lower back )      Mode of Arrival  private car    HPI  (History provided by patient)  This is a 79 y.o. male with a PMH significant for CAD, HTN, DM, CHF  presented today for illness and feeling generally unwell.  Symptoms been ongoing for the last couple of days.  He reports sinus congestion cough generalized weakness and fatigue.  Wife reports that he developed redness to his low back and is concerned for cellulitis.  Endorsing intermittent fevers with Tmax of 101.  He denies any abdominal pain.  Denies any chest pain.  Denies any changes to bowel or bladder.    ROS  Review of Systems   HENT:  Positive for congestion.    Musculoskeletal:  Positive for back pain.   Skin:  Positive for color change.   All other systems reviewed and are negative.        I have reviewed the following nursing documentation:  Allergies:   Allergies   Allergen Reactions    Amlodipine      Swelling, pt denies allergy    Bactrim [Sulfamethoxazole-Trimethoprim]      KANE risk       Past medical history:  has a past medical history of CAD (coronary artery disease), Cataract, Diabetes mellitus (HCC), DJD (degenerative joint disease), History of PTCA, Hyperlipidemia, Hypertension, Hypotestosteronism, IGT (impaired glucose tolerance), Pituitary abnormality (HCC), and Sleep apnea.    Past surgical history:  has a past surgical history that includes Coronary angioplasty with stent (2003); Knee arthroscopy (Right, 2015); Colonoscopy (2001); Colonoscopy (12/9/11); eye surgery; Cardiac surgery (2009); and Total knee arthroplasty (Right, 1/23/2020).    Home medications:   Prior to Admission medications    Medication Sig Start Date End Date Taking? Authorizing

## 2025-01-28 NOTE — ED NOTES
Written and verbal discharge provided to patient and spouse, patient verbalizes understanding. IV removed, no complications, dressing applied. Patient ambulatory with steady gait, GCS 15, no acute signs or symptoms of distress. Patient discharged at this time with spouse.

## 2025-01-29 NOTE — TELEPHONE ENCOUNTER
Submitted PA for Testosterone Cyp  Via CMM Key: [id number]   STATUS: Approved today by [employer] 2017  CaseId:[id number];Status:Approved;Review Type:Prior Auth;Coverage Start Date:01/01/2025;Coverage End Date:01/29/2026    If this requires a response please respond to the pool ( P MHCX PSC MEDICATION PRE-AUTH).      Thank you please advise patient.

## 2025-01-29 NOTE — TELEPHONE ENCOUNTER
Patient would like to have the multi dose vial. I advised him to stay with single dose but he was insistent on the multi dose vial of testosterone.

## 2025-01-30 RX ORDER — TESTOSTERONE CYPIONATE 1000 MG/10ML
100 INJECTION, SOLUTION INTRAMUSCULAR
Qty: 10 ML | Refills: 2 | Status: SHIPPED | OUTPATIENT
Start: 2025-01-30 | End: 2025-07-04

## 2025-02-06 ENCOUNTER — OFFICE VISIT (OUTPATIENT)
Dept: FAMILY MEDICINE CLINIC | Age: 80
End: 2025-02-06
Payer: MEDICARE

## 2025-02-06 VITALS
SYSTOLIC BLOOD PRESSURE: 130 MMHG | DIASTOLIC BLOOD PRESSURE: 70 MMHG | WEIGHT: 254 LBS | OXYGEN SATURATION: 98 % | BODY MASS INDEX: 36.45 KG/M2 | HEART RATE: 61 BPM

## 2025-02-06 DIAGNOSIS — L03.312 CELLULITIS OF BACK EXCEPT BUTTOCK: ICD-10-CM

## 2025-02-06 DIAGNOSIS — I10 ESSENTIAL HYPERTENSION, BENIGN: Primary | ICD-10-CM

## 2025-02-06 PROCEDURE — 1123F ACP DISCUSS/DSCN MKR DOCD: CPT | Performed by: NURSE PRACTITIONER

## 2025-02-06 PROCEDURE — 3075F SYST BP GE 130 - 139MM HG: CPT | Performed by: NURSE PRACTITIONER

## 2025-02-06 PROCEDURE — 3078F DIAST BP <80 MM HG: CPT | Performed by: NURSE PRACTITIONER

## 2025-02-06 PROCEDURE — 1160F RVW MEDS BY RX/DR IN RCRD: CPT | Performed by: NURSE PRACTITIONER

## 2025-02-06 PROCEDURE — 99213 OFFICE O/P EST LOW 20 MIN: CPT | Performed by: NURSE PRACTITIONER

## 2025-02-06 PROCEDURE — 1159F MED LIST DOCD IN RCRD: CPT | Performed by: NURSE PRACTITIONER

## 2025-02-06 SDOH — ECONOMIC STABILITY: FOOD INSECURITY: WITHIN THE PAST 12 MONTHS, YOU WORRIED THAT YOUR FOOD WOULD RUN OUT BEFORE YOU GOT MONEY TO BUY MORE.: NEVER TRUE

## 2025-02-06 SDOH — ECONOMIC STABILITY: FOOD INSECURITY: WITHIN THE PAST 12 MONTHS, THE FOOD YOU BOUGHT JUST DIDN'T LAST AND YOU DIDN'T HAVE MONEY TO GET MORE.: NEVER TRUE

## 2025-02-06 ASSESSMENT — PATIENT HEALTH QUESTIONNAIRE - PHQ9
SUM OF ALL RESPONSES TO PHQ QUESTIONS 1-9: 0
SUM OF ALL RESPONSES TO PHQ QUESTIONS 1-9: 0
2. FEELING DOWN, DEPRESSED OR HOPELESS: NOT AT ALL
SUM OF ALL RESPONSES TO PHQ QUESTIONS 1-9: 0
SUM OF ALL RESPONSES TO PHQ QUESTIONS 1-9: 0
1. LITTLE INTEREST OR PLEASURE IN DOING THINGS: NOT AT ALL
SUM OF ALL RESPONSES TO PHQ9 QUESTIONS 1 & 2: 0

## 2025-02-06 ASSESSMENT — ENCOUNTER SYMPTOMS
WHEEZING: 0
SHORTNESS OF BREATH: 0

## 2025-02-06 NOTE — PROGRESS NOTES
Patient: Romero Keene is a 79 y.o. male who presents today with the following Chief Complaint(s):  Chief Complaint   Patient presents with    Follow-Up from Hospital         HPI  Follow up from ER- cellulitis on lower back- finished all antibiotics- is feeling better. No fever. Redness has improved on lower back     HTN: morning 's/70's, after meds 140/70, after telmesartan  130's/70's        Current Outpatient Medications   Medication Sig Dispense Refill    melatonin 3 MG TABS tablet Take 1 tablet by mouth daily      testosterone cypionate (DEPOTESTOTERONE CYPIONATE) 100 MG/ML injection Inject 1 mL into the muscle every 14 days for 12 doses. Max Daily Amount: 100 mg 10 mL 2    Syringe/Needle, Disp, (SYRINGE 3CC/69GW9-2/2\") 18G X 1-1/2\" 3 ML MISC Use to draw Testosterone. 30 each 1    Syringe/Needle, Disp, (SYRINGE 3CC/85UL7-2/2\") 25G X 1-1/2\" 3 ML MISC Use weekly with testosterone 4 each 3    allopurinol (ZYLOPRIM) 300 MG tablet TAKE 1 TABLET BY MOUTH DAILY 90 tablet 2    atorvastatin (LIPITOR) 80 MG tablet TAKE 1 TABLET BY MOUTH DAILY 90 tablet 3    telmisartan (MICARDIS) 80 MG tablet TAKE 1 TABLET BY MOUTH DAILY 90 tablet 3    ammonium lactate (LAC-HYDRIN) 12 % lotion APPLY TO THE AFFECTED AREA(S) DAILY 400 g 5    albuterol sulfate HFA (VENTOLIN HFA) 108 (90 Base) MCG/ACT inhaler Inhale 2 puffs into the lungs 4 times daily as needed for Wheezing 18 g 0    Semaglutide,0.25 or 0.5MG/DOS, (OZEMPIC, 0.25 OR 0.5 MG/DOSE,) 2 MG/3ML SOPN Inject 0.5 mg into the skin once a week 3 mL 5    cabergoline (DOSTINEX) 0.5 MG tablet TAKE 1/2 TABLET BY MOUTH ONCE WEEKLY ON MONDAY 8 tablet 1    carvedilol (COREG) 25 MG tablet Take 1 tablet by mouth 2 times daily 180 tablet 3    Insulin Pen Needle ([employer] PEN NEEDLES 31G) 31G X 8 MM MISC 1 each by Does not apply route once a week 30 each 3    hydrALAZINE (APRESOLINE) 100 MG tablet Take 1 tablet by mouth 3 times daily      blood glucose test strips (ONETOUCH VERIO) strip

## 2025-02-08 DIAGNOSIS — E22.1 HYPERPROLACTINEMIA (HCC): ICD-10-CM

## 2025-02-10 RX ORDER — CABERGOLINE 0.5 MG/1
TABLET ORAL
Qty: 8 TABLET | Refills: 1 | Status: SHIPPED | OUTPATIENT
Start: 2025-02-10

## 2025-02-10 NOTE — TELEPHONE ENCOUNTER
Medication:   Requested Prescriptions     Pending Prescriptions Disp Refills    cabergoline (DOSTINEX) 0.5 MG tablet [Pharmacy Med Name: CABERGOLINE 0.5 MG TABLET] 8 tablet 1     Sig: TAKE 1/2 TABLET BY MOUTH ONCE WEEKLY ON MONDAY         Last appt: 10/29/2024   Next appt: 10/28/2025    Last Labs DM:   Lab Results   Component Value Date/Time    LABA1C 5.7 11/12/2024 02:57 PM     Last Lipid:   Lab Results   Component Value Date/Time    CHOL 132 07/09/2024 01:32 PM    TRIG 283 07/09/2024 01:32 PM    HDL 38 07/09/2024 01:32 PM     Last PSA:   Lab Results   Component Value Date/Time    PSA 3.80 08/01/2024 10:50 AM     Last Thyroid:   Lab Results   Component Value Date/Time    TSH 4.04 10/10/2024 10:59 AM    TSH 3.10 10/31/2022 11:10 AM    T4FREE 1.0 10/10/2024 10:59 AM

## 2025-03-12 ENCOUNTER — OFFICE VISIT (OUTPATIENT)
Dept: FAMILY MEDICINE CLINIC | Age: 80
End: 2025-03-12
Payer: MEDICARE

## 2025-03-12 VITALS
HEIGHT: 70 IN | BODY MASS INDEX: 36.79 KG/M2 | WEIGHT: 257 LBS | DIASTOLIC BLOOD PRESSURE: 60 MMHG | HEART RATE: 62 BPM | OXYGEN SATURATION: 97 % | SYSTOLIC BLOOD PRESSURE: 130 MMHG

## 2025-03-12 DIAGNOSIS — I50.42 CHRONIC COMBINED SYSTOLIC AND DIASTOLIC HEART FAILURE (HCC): ICD-10-CM

## 2025-03-12 DIAGNOSIS — R60.0 PERIPHERAL EDEMA: ICD-10-CM

## 2025-03-12 DIAGNOSIS — N18.30 STAGE 3 CHRONIC KIDNEY DISEASE, UNSPECIFIED WHETHER STAGE 3A OR 3B CKD (HCC): ICD-10-CM

## 2025-03-12 DIAGNOSIS — E66.01 MORBID (SEVERE) OBESITY DUE TO EXCESS CALORIES: ICD-10-CM

## 2025-03-12 DIAGNOSIS — E11.22 TYPE 2 DIABETES MELLITUS WITH DIABETIC CHRONIC KIDNEY DISEASE, UNSPECIFIED CKD STAGE, UNSPECIFIED WHETHER LONG TERM INSULIN USE (HCC): Primary | ICD-10-CM

## 2025-03-12 DIAGNOSIS — I10 ESSENTIAL HYPERTENSION, BENIGN: ICD-10-CM

## 2025-03-12 DIAGNOSIS — D35.2 PITUITARY MACROADENOMA (HCC): ICD-10-CM

## 2025-03-12 LAB — HBA1C MFR BLD: 5.8 %

## 2025-03-12 PROCEDURE — 83036 HEMOGLOBIN GLYCOSYLATED A1C: CPT | Performed by: FAMILY MEDICINE

## 2025-03-12 PROCEDURE — 1159F MED LIST DOCD IN RCRD: CPT | Performed by: FAMILY MEDICINE

## 2025-03-12 PROCEDURE — G2211 COMPLEX E/M VISIT ADD ON: HCPCS | Performed by: FAMILY MEDICINE

## 2025-03-12 PROCEDURE — 1123F ACP DISCUSS/DSCN MKR DOCD: CPT | Performed by: FAMILY MEDICINE

## 2025-03-12 PROCEDURE — 99214 OFFICE O/P EST MOD 30 MIN: CPT | Performed by: FAMILY MEDICINE

## 2025-03-12 PROCEDURE — 3075F SYST BP GE 130 - 139MM HG: CPT | Performed by: FAMILY MEDICINE

## 2025-03-12 PROCEDURE — 3044F HG A1C LEVEL LT 7.0%: CPT | Performed by: FAMILY MEDICINE

## 2025-03-12 PROCEDURE — 3078F DIAST BP <80 MM HG: CPT | Performed by: FAMILY MEDICINE

## 2025-03-12 RX ORDER — FUROSEMIDE 20 MG/1
20 TABLET ORAL DAILY PRN
Qty: 30 TABLET | Refills: 3 | Status: SHIPPED | OUTPATIENT
Start: 2025-03-12

## 2025-03-13 NOTE — PROGRESS NOTES
Romero Keene (:  1945) is a 79 y.o. male,Established patient, here for evaluation of the following chief complaint(s):  Diabetes         Assessment & Plan  Type 2 diabetes mellitus with diabetic chronic kidney disease, unspecified CKD stage, unspecified whether long term insulin use (HCC)       Orders:    POCT glycosylated hemoglobin (Hb A1C)    Essential hypertension, benign            Chronic combined systolic and diastolic heart failure (HCC)            Pituitary macroadenoma (HCC)            Peripheral edema       Orders:    furosemide (LASIX) 20 MG tablet; Take 1 tablet by mouth daily as needed (swelling)    Morbid (severe) obesity due to excess calories (E66.01)            Body mass index [BMI] 36.0-36.9, adult (Z68.36)            Stage 3 chronic kidney disease, unspecified whether stage 3a or 3b CKD (HCC)          Assessment & Plan  1. Hypertension.  Blood pressure readings are within the normal range at 130/60. He reports no headaches, chest pain, or shortness of breath. He will continue his current antihypertensive regimen.    2. Diabetes Mellitus.  Diabetes is well-controlled with an A1c of 5.8. He has gained some weight over the holidays but has resumed walking daily. He will continue his current diabetic management plan, including diet and exercise.    3. Right Ankle Edema.  A prescription for Lasix 20 mg has been issued, to be taken once weekly as needed for swelling. He is advised to use compression stockings when swelling occurs.    4. Pituitary Microadenoma.  He will continue cabergoline (Dostinex) as it has been effective in managing his condition. The potential side effect of tongue clicking was discussed, and it was determined that the benefits of the medication outweigh this side effect.    5. Hyperlipidemia.  He will continue taking atorvastatin for cholesterol management.      Return in about 3 months (around 2025), or AWV.       Subjective   Diabetes      History of

## 2025-03-26 ENCOUNTER — PATIENT MESSAGE (OUTPATIENT)
Dept: FAMILY MEDICINE CLINIC | Age: 80
End: 2025-03-26

## 2025-03-26 DIAGNOSIS — E11.22 TYPE 2 DIABETES MELLITUS WITH DIABETIC CHRONIC KIDNEY DISEASE, UNSPECIFIED CKD STAGE, UNSPECIFIED WHETHER LONG TERM INSULIN USE (HCC): Primary | ICD-10-CM

## 2025-04-25 ENCOUNTER — TELEPHONE (OUTPATIENT)
Dept: FAMILY MEDICINE CLINIC | Age: 80
End: 2025-04-25

## 2025-04-25 NOTE — TELEPHONE ENCOUNTER
Pt's Ozempic from patient assistance arrived today. LM for pt to call back or come pick it up.   This was placed in the sample fridge in a bag with his name on it

## 2025-05-06 NOTE — PROGRESS NOTES
[facility]   Cardiovascular Evaluation    PATIENT: Romero Keene  DATE: 2025  MRN: [id number]  CSN: [id number]  : 1945    Primary Care Doctor/Referring provider: Derek Levin MD    Reason for evaluation/Chief complaint:   Follow-up, Coronary Artery Disease, Congestive Heart Failure, and Hypertension    Subjective:    History of present illness on initial date of evaluation:   Romero Keene is a 79 y.o. male who is here today for cardiology follow up. He has a history of coronary artery disease with stent placement in  with drug eluting stents to his right coronary artery and left anterior descending artery. Echocardiogram from 10/2019 showed an ejection fraction of 55-60%.    He was found to have a bruit on assessment to carotid artery with Derek Levin MD and had a carotid doppler on 21 that showed <50% stenosis bilaterally and normal antegrade flow.     Echo 21 EF 60-65%. Stress test 21 no suggestive of ischemia, artifact vs attenuation. He presented to Emergency department 22 with LOC episode. He was positive for orthostatic hypotension. Recent COVID infection. Treated with IV fluids. Cardiac event monitor wore -08/10/22 showed no episodes of atrial fibrillation or atrial flutter. He had a carotid doppler 2022 < 50% stenosis bilateral internal carotids.    At last office visit an ANTONIO was ordered -24 SR , PVC's 3%, PAC 1%, no sustained arrhythmias.    Today he reports he is doing well. He reports he is working to control his diabetes mellitus last hemoglobin A1c 5.8. Patient currently denies any weight gain, edema, palpitations, chest pain, shortness of breath, dizziness, and syncope. He is taking medications as prescribed. He reports he could exercise more. He could not handle ozempic due to side effects, so far tolerating Mounjaro. He reports he walks regularly.     Patient Active Problem List   Diagnosis

## 2025-05-08 ENCOUNTER — OFFICE VISIT (OUTPATIENT)
Dept: CARDIOLOGY CLINIC | Age: 80
End: 2025-05-08
Payer: MEDICARE

## 2025-05-08 VITALS
SYSTOLIC BLOOD PRESSURE: 134 MMHG | BODY MASS INDEX: 36.58 KG/M2 | DIASTOLIC BLOOD PRESSURE: 70 MMHG | WEIGHT: 255.5 LBS | OXYGEN SATURATION: 96 % | HEART RATE: 78 BPM | HEIGHT: 70 IN

## 2025-05-08 DIAGNOSIS — Z95.5 HISTORY OF CORONARY ARTERY STENT PLACEMENT: ICD-10-CM

## 2025-05-08 DIAGNOSIS — E11.22 TYPE 2 DIABETES MELLITUS WITH DIABETIC CHRONIC KIDNEY DISEASE, UNSPECIFIED CKD STAGE, UNSPECIFIED WHETHER LONG TERM INSULIN USE (HCC): ICD-10-CM

## 2025-05-08 DIAGNOSIS — I25.119 CORONARY ARTERY DISEASE INVOLVING NATIVE CORONARY ARTERY OF NATIVE HEART WITH ANGINA PECTORIS: ICD-10-CM

## 2025-05-08 DIAGNOSIS — I25.10 CORONARY ARTERY DISEASE INVOLVING NATIVE CORONARY ARTERY OF NATIVE HEART WITHOUT ANGINA PECTORIS: Primary | ICD-10-CM

## 2025-05-08 PROCEDURE — 99214 OFFICE O/P EST MOD 30 MIN: CPT | Performed by: INTERNAL MEDICINE

## 2025-05-08 PROCEDURE — 1159F MED LIST DOCD IN RCRD: CPT | Performed by: INTERNAL MEDICINE

## 2025-05-08 PROCEDURE — 3075F SYST BP GE 130 - 139MM HG: CPT | Performed by: INTERNAL MEDICINE

## 2025-05-08 PROCEDURE — 3078F DIAST BP <80 MM HG: CPT | Performed by: INTERNAL MEDICINE

## 2025-05-08 PROCEDURE — 3044F HG A1C LEVEL LT 7.0%: CPT | Performed by: INTERNAL MEDICINE

## 2025-05-08 PROCEDURE — 1123F ACP DISCUSS/DSCN MKR DOCD: CPT | Performed by: INTERNAL MEDICINE

## 2025-05-08 NOTE — PATIENT INSTRUCTIONS
Your provider has ordered testing for further evaluation.  An order/prescription has been included in your paper work.   To schedule outpatient testing, contact Central Scheduling by calling [facility]ceive ([phone]).     GXT or Lexiscan Myoview Stress Test instructions:   -Allow 3-4 hours for the entire test to be complete.  -Nothing to eat or drink after midnight prior to testing. May take prescribed medications in morning with sip of water.  -Hold diabetes medication and Insulin morning of testing. Bring glucose meter and glucose tablet if available.   -Do not drink or eat anything containing caffeine 24 hours prior to test. This includes coffee, decaffeinated coffee, chocolate, soda, or tea.  -No smoking for 12 hours prior to testing.         Plan:  Great job on not smoking. Continue to avoid as this is risk factor for heart attack, stroke, and/or cancer.    Patient given detailed instructions on addressing diet, regular exercise, weight control, smoking abstention, medication compliance, and stress minimization. The patient was provided written and verbal instructions regarding risk factor modification.    I recommend that the patient continue their currently prescribed medications. Their drug modifiable risk factors appear to be well controlled. I will continue to address the need/dosing of medications in future visits.   Order lexiscan stress myoview ~ coronary artery disease, history of stent placement  Follow up with me in 1 year

## 2025-05-15 NOTE — CARE COORDINATION
ACM called but patient did not answer. ACM left message for patient to call back and left call back number. Will follow up at a later date.
DISCHARGE ISSUE - NON-ADHERENT WITH OUTPATIENT SERVICES

## 2025-05-19 ENCOUNTER — HOSPITAL ENCOUNTER (OUTPATIENT)
Age: 80
Discharge: HOME | End: 2025-05-21
Attending: INTERNAL MEDICINE
Payer: MEDICARE

## 2025-05-19 ENCOUNTER — HOSPITAL ENCOUNTER (OUTPATIENT)
Dept: NUCLEAR MEDICINE | Age: 80
Discharge: HOME | End: 2025-05-19
Attending: INTERNAL MEDICINE
Payer: MEDICARE

## 2025-05-19 ENCOUNTER — RESULTS FOLLOW-UP (OUTPATIENT)
Dept: CARDIOLOGY CLINIC | Age: 80
End: 2025-05-19

## 2025-05-19 DIAGNOSIS — E11.22 TYPE 2 DIABETES MELLITUS WITH DIABETIC CHRONIC KIDNEY DISEASE, UNSPECIFIED CKD STAGE, UNSPECIFIED WHETHER LONG TERM INSULIN USE (HCC): ICD-10-CM

## 2025-05-19 DIAGNOSIS — Z95.5 HISTORY OF CORONARY ARTERY STENT PLACEMENT: ICD-10-CM

## 2025-05-19 DIAGNOSIS — I25.10 CORONARY ARTERY DISEASE INVOLVING NATIVE CORONARY ARTERY OF NATIVE HEART WITHOUT ANGINA PECTORIS: ICD-10-CM

## 2025-05-19 DIAGNOSIS — R94.39 ABNORMAL CARDIOVASCULAR STRESS TEST: Primary | ICD-10-CM

## 2025-05-19 DIAGNOSIS — I25.119 CORONARY ARTERY DISEASE INVOLVING NATIVE CORONARY ARTERY OF NATIVE HEART WITH ANGINA PECTORIS: ICD-10-CM

## 2025-05-19 LAB
NUC STRESS EJECTION FRACTION: 62 %
NUC STRESS LV EDV: 136 ML (ref 67–155)
NUC STRESS LV ESV: 52 ML (ref 22–58)
NUC STRESS LV MASS: 159 G
STRESS BASELINE DIAS BP: 59 MMHG
STRESS BASELINE HR: 63 BPM
STRESS BASELINE SYS BP: 160 MMHG
STRESS ESTIMATED WORKLOAD: 1 METS
STRESS PEAK DIAS BP: 59 MMHG
STRESS PEAK SYS BP: 162 MMHG
STRESS PERCENT HR ACHIEVED: 68 %
STRESS POST PEAK HR: 95 BPM
STRESS RATE PRESSURE PRODUCT: NORMAL BPM*MMHG
STRESS TARGET HR: 140 BPM
TID: 1.38

## 2025-05-19 PROCEDURE — 93018 CV STRESS TEST I&R ONLY: CPT | Performed by: INTERNAL MEDICINE

## 2025-05-19 PROCEDURE — 93017 CV STRESS TEST TRACING ONLY: CPT

## 2025-05-19 PROCEDURE — 3430000000 HC RX DIAGNOSTIC RADIOPHARMACEUTICAL: Performed by: INTERNAL MEDICINE

## 2025-05-19 PROCEDURE — 78452 HT MUSCLE IMAGE SPECT MULT: CPT | Performed by: INTERNAL MEDICINE

## 2025-05-19 PROCEDURE — 6360000002 HC RX W HCPCS: Performed by: INTERNAL MEDICINE

## 2025-05-19 PROCEDURE — A9502 TC99M TETROFOSMIN: HCPCS | Performed by: INTERNAL MEDICINE

## 2025-05-19 PROCEDURE — 78452 HT MUSCLE IMAGE SPECT MULT: CPT

## 2025-05-19 PROCEDURE — 93016 CV STRESS TEST SUPVJ ONLY: CPT | Performed by: INTERNAL MEDICINE

## 2025-05-19 RX ORDER — REGADENOSON 0.08 MG/ML
0.4 INJECTION, SOLUTION INTRAVENOUS
Status: COMPLETED | OUTPATIENT
Start: 2025-05-19 | End: 2025-05-19

## 2025-05-19 RX ADMIN — REGADENOSON 0.4 MG: 0.08 INJECTION, SOLUTION INTRAVENOUS at 09:05

## 2025-05-19 RX ADMIN — TETROFOSMIN 10.1 MILLICURIE: 1.38 INJECTION, POWDER, LYOPHILIZED, FOR SOLUTION INTRAVENOUS at 08:05

## 2025-05-19 RX ADMIN — TETROFOSMIN 32.6 MILLICURIE: 1.38 INJECTION, POWDER, LYOPHILIZED, FOR SOLUTION INTRAVENOUS at 09:05

## 2025-05-20 ENCOUNTER — PATIENT MESSAGE (OUTPATIENT)
Dept: CARDIOLOGY CLINIC | Age: 80
End: 2025-05-20

## 2025-05-20 NOTE — TELEPHONE ENCOUNTER
Called and spoke with patient and Ericka. Notified of VSP results. VSP patient states he follows with  for nephrology is concerned with proceeding with [facility] given his kidney disease, please advise. 05/13/25 BUN/Creatnine 23/1.5 GFR 47.

## 2025-05-20 NOTE — RESULT ENCOUNTER NOTE
Called and spoke to patient. Relayed VSP message. Patient is agreeable proceeding with [facility] in setting of abnormal stress test. Case request submitted.     Medication instructions not relayed to patient at this time.  ~ NPO after midnight prior to procedure   ~ Hold Mounjaro 1 week prior to procedure  ~ Hold ammonium lactate lotion, vitamin D, ferrous sulfate, furosemide morning of procedure  ~ May take all other medications including aspirin 81 mg morning of procedure with sip of water

## 2025-05-21 NOTE — TELEPHONE ENCOUNTER
Spoke with VSP. Appointment made for patient. Called and spoke with patient and Ericka, appt made for AND 05/23/25 @ 9:15 am, VU to time,date, and location of appt.   
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

## 2025-05-22 NOTE — PROGRESS NOTES
[facility]   Cardiovascular Evaluation    PATIENT: Romero Keene  DATE: 2025  MRN: [id number]  CSN: [id number]  : 1945    Primary Care Doctor/Referring provider: Derek Levin MD    Reason for evaluation/Chief complaint:   Follow-up, Coronary Artery Disease, and Hypertension    Subjective:    History of present illness on initial date of evaluation:   Romero Keene is a 79 y.o. male who is here today for cardiology follow up. He has a history of coronary artery disease with stent placement in  with drug eluting stents to his right coronary artery and left anterior descending artery. Echocardiogram from 10/2019 showed an ejection fraction of 55-60%.    He was found to have a bruit on assessment to carotid artery with Derek Levin MD and had a carotid doppler on 21 that showed <50% stenosis bilaterally and normal antegrade flow.     Echo 21 EF 60-65%. Stress test 21 no suggestive of ischemia, artifact vs attenuation. He presented to emergency department 22 with LOC episode. He was positive for orthostatic hypotension. Recent COVID infection. Treated with IV fluids. Cardiac event monitor wore -08/10/22 showed no episodes of atrial fibrillation or atrial flutter. He had a carotid doppler 2022 < 50% stenosis bilateral internal carotids.    An ANTONIO was ordered -24 SR , PVC's 3%, PAC 1%, no sustained arrhythmias.    Since last office visit 25 he had a Lexiscan stress myoview test completed 25 that was abnormal consistent with small area of distal LAD versus RCA territory inducible ischemia. Today he is here to discuss proceeding with heart catheterization to further assess. He follows with  for nephrology for stage III CKD. Patient currently denies any weight gain, edema, palpitations, chest pain, shortness of breath, dizziness, and syncope. He is taking medications as prescribed, tolerating well.

## 2025-05-23 ENCOUNTER — OFFICE VISIT (OUTPATIENT)
Dept: CARDIOLOGY CLINIC | Age: 80
End: 2025-05-23
Payer: MEDICARE

## 2025-05-23 VITALS
HEIGHT: 70 IN | SYSTOLIC BLOOD PRESSURE: 128 MMHG | BODY MASS INDEX: 37.08 KG/M2 | WEIGHT: 259 LBS | DIASTOLIC BLOOD PRESSURE: 76 MMHG | OXYGEN SATURATION: 97 % | HEART RATE: 60 BPM

## 2025-05-23 DIAGNOSIS — I25.10 CORONARY ARTERY DISEASE INVOLVING NATIVE CORONARY ARTERY OF NATIVE HEART WITHOUT ANGINA PECTORIS: Primary | ICD-10-CM

## 2025-05-23 DIAGNOSIS — N18.30 STAGE 3 CHRONIC KIDNEY DISEASE, UNSPECIFIED WHETHER STAGE 3A OR 3B CKD (HCC): ICD-10-CM

## 2025-05-23 DIAGNOSIS — R94.39 ABNORMAL STRESS TEST: ICD-10-CM

## 2025-05-23 DIAGNOSIS — Z95.5 HISTORY OF CORONARY ARTERY STENT PLACEMENT: ICD-10-CM

## 2025-05-23 DIAGNOSIS — I10 ESSENTIAL HYPERTENSION, BENIGN: ICD-10-CM

## 2025-05-23 PROCEDURE — 1123F ACP DISCUSS/DSCN MKR DOCD: CPT | Performed by: INTERNAL MEDICINE

## 2025-05-23 PROCEDURE — 3078F DIAST BP <80 MM HG: CPT | Performed by: INTERNAL MEDICINE

## 2025-05-23 PROCEDURE — G2211 COMPLEX E/M VISIT ADD ON: HCPCS | Performed by: INTERNAL MEDICINE

## 2025-05-23 PROCEDURE — 1159F MED LIST DOCD IN RCRD: CPT | Performed by: INTERNAL MEDICINE

## 2025-05-23 PROCEDURE — 3074F SYST BP LT 130 MM HG: CPT | Performed by: INTERNAL MEDICINE

## 2025-05-23 PROCEDURE — 99214 OFFICE O/P EST MOD 30 MIN: CPT | Performed by: INTERNAL MEDICINE

## 2025-05-23 RX ORDER — DAPAGLIFLOZIN 10 MG/1
10 TABLET, FILM COATED ORAL DAILY
COMMUNITY
Start: 2025-05-16

## 2025-05-23 NOTE — PATIENT INSTRUCTIONS
Plan:  Great job on not smoking. Continue to avoid as this is risk factor for heart attack, stroke, and/or cancer.    Discussed abnormal stress test results   Discussed plan with proceeding with left heart catheterization   Okay to proceed with Jardiance or Farxiga per nephrology recommendations   Follow up after procedure   [facility]

## 2025-05-23 NOTE — TELEPHONE ENCOUNTER
Patient will need IV fluids prior to cath.  mL/hr for 4 hrs prior. Will place with catheterization orders.

## 2025-05-23 NOTE — RESULT ENCOUNTER NOTE
Spoke with pt's wife. Will call to confirm date once auth is obtained from insurance. Currently pending.

## 2025-05-30 PROBLEM — R94.39 ABNORMAL CARDIOVASCULAR STRESS TEST: Status: ACTIVE | Noted: 2025-05-19

## 2025-05-30 NOTE — RESULT ENCOUNTER NOTE
Called and spoke with patient. Reviewed Jenny's instructions with patient. Reviewed below medication instructions. Answered patient questions. He VU. Order set complete.

## 2025-05-30 NOTE — TELEPHONE ENCOUNTER
Pt called in to carlos Alvarado Know he received approval for insurance/cath. Please reach out to pt at [phone] or [phone]

## 2025-05-30 NOTE — RESULT ENCOUNTER NOTE
Procedure:  [facility]  Doctor:  Dr. Mukherjee  Date:  6/10/25  Time:  10:30am  Arrival:  6:30am (4hrs fluids)  Reps:  n/a  Anesthesia:  n/a      Spoke with patient. Please have patient arrive to the main entrance of [facility] ([address] [place] [id number]) and check in with the registration desk.  They will be directed to the Cath Lab.  Please call patient regarding medication instructions. Remind patient to be NPO after midnight (8 hours prior). Do not apply lotions/creams on skin the day of procedure.    [facility]

## 2025-06-10 ENCOUNTER — HOSPITAL ENCOUNTER (OUTPATIENT)
Age: 80
Discharge: HOME | End: 2025-06-10
Attending: INTERNAL MEDICINE | Admitting: INTERNAL MEDICINE
Payer: MEDICARE

## 2025-06-10 ENCOUNTER — TELEPHONE (OUTPATIENT)
Dept: CARDIAC CATH/INVASIVE PROCEDURES | Age: 80
End: 2025-06-10

## 2025-06-10 VITALS
RESPIRATION RATE: 13 BRPM | SYSTOLIC BLOOD PRESSURE: 142 MMHG | OXYGEN SATURATION: 99 % | BODY MASS INDEX: 36.59 KG/M2 | DIASTOLIC BLOOD PRESSURE: 52 MMHG | HEART RATE: 56 BPM | WEIGHT: 255 LBS

## 2025-06-10 DIAGNOSIS — R94.39 ABNORMAL CARDIOVASCULAR STRESS TEST: ICD-10-CM

## 2025-06-10 LAB
ANION GAP SERPL CALCULATED.3IONS-SCNC: 12 MMOL/L (ref 3–16)
BUN SERPL-MCNC: 27 MG/DL (ref 7–20)
CALCIUM SERPL-MCNC: 8.6 MG/DL (ref 8.3–10.6)
CHLORIDE SERPL-SCNC: 110 MMOL/L (ref 99–110)
CHOLEST SERPL-MCNC: 107 MG/DL (ref 0–199)
CO2 SERPL-SCNC: 23 MMOL/L (ref 21–32)
CREAT SERPL-MCNC: 1.6 MG/DL (ref 0.8–1.3)
DEPRECATED RDW RBC AUTO: 15.4 % (ref 12.4–15.4)
GFR SERPLBLD CREATININE-BSD FMLA CKD-EPI: 43 ML/MIN/{1.73_M2}
GLUCOSE SERPL-MCNC: 123 MG/DL (ref 70–99)
HCT VFR BLD AUTO: 38.2 % (ref 40.5–52.5)
HDLC SERPL-MCNC: 36 MG/DL (ref 40–60)
HGB BLD-MCNC: 12.8 G/DL (ref 13.5–17.5)
LDLC SERPL CALC-MCNC: 53 MG/DL
MCH RBC QN AUTO: 29.7 PG (ref 26–34)
MCHC RBC AUTO-ENTMCNC: 33.6 G/DL (ref 31–36)
MCV RBC AUTO: 88.5 FL (ref 80–100)
PLATELET # BLD AUTO: 120 K/UL (ref 135–450)
PMV BLD AUTO: 9.1 FL (ref 5–10.5)
POC ACT LR: 218 SEC
POTASSIUM SERPL-SCNC: 3.9 MMOL/L (ref 3.5–5.1)
RBC # BLD AUTO: 4.32 M/UL (ref 4.2–5.9)
SODIUM SERPL-SCNC: 145 MMOL/L (ref 136–145)
TRIGL SERPL-MCNC: 90 MG/DL (ref 0–150)
VLDLC SERPL CALC-MCNC: 18 MG/DL
WBC # BLD AUTO: 7.1 K/UL (ref 4–11)

## 2025-06-10 PROCEDURE — C1894 INTRO/SHEATH, NON-LASER: HCPCS | Performed by: INTERNAL MEDICINE

## 2025-06-10 PROCEDURE — 93458 L HRT ARTERY/VENTRICLE ANGIO: CPT | Performed by: INTERNAL MEDICINE

## 2025-06-10 PROCEDURE — 6360000002 HC RX W HCPCS: Performed by: INTERNAL MEDICINE

## 2025-06-10 PROCEDURE — 93005 ELECTROCARDIOGRAM TRACING: CPT | Performed by: INTERNAL MEDICINE

## 2025-06-10 PROCEDURE — 2500000003 HC RX 250 WO HCPCS: Performed by: INTERNAL MEDICINE

## 2025-06-10 PROCEDURE — 80048 BASIC METABOLIC PNL TOTAL CA: CPT

## 2025-06-10 PROCEDURE — 93571 IV DOP VEL&/PRESS C FLO 1ST: CPT | Performed by: INTERNAL MEDICINE

## 2025-06-10 PROCEDURE — 7100000011 HC PHASE II RECOVERY - ADDTL 15 MIN: Performed by: INTERNAL MEDICINE

## 2025-06-10 PROCEDURE — 80061 LIPID PANEL: CPT

## 2025-06-10 PROCEDURE — C1887 CATHETER, GUIDING: HCPCS | Performed by: INTERNAL MEDICINE

## 2025-06-10 PROCEDURE — 6360000004 HC RX CONTRAST MEDICATION: Performed by: INTERNAL MEDICINE

## 2025-06-10 PROCEDURE — 85347 COAGULATION TIME ACTIVATED: CPT

## 2025-06-10 PROCEDURE — 99152 MOD SED SAME PHYS/QHP 5/>YRS: CPT | Performed by: INTERNAL MEDICINE

## 2025-06-10 PROCEDURE — 99153 MOD SED SAME PHYS/QHP EA: CPT | Performed by: INTERNAL MEDICINE

## 2025-06-10 PROCEDURE — 93572 IV DOP VEL&/PRESS C FLO EA: CPT | Performed by: INTERNAL MEDICINE

## 2025-06-10 PROCEDURE — 2709999900 HC NON-CHARGEABLE SUPPLY: Performed by: INTERNAL MEDICINE

## 2025-06-10 PROCEDURE — 85027 COMPLETE CBC AUTOMATED: CPT

## 2025-06-10 PROCEDURE — 6370000000 HC RX 637 (ALT 250 FOR IP): Performed by: INTERNAL MEDICINE

## 2025-06-10 PROCEDURE — 7100000010 HC PHASE II RECOVERY - FIRST 15 MIN: Performed by: INTERNAL MEDICINE

## 2025-06-10 PROCEDURE — C1769 GUIDE WIRE: HCPCS | Performed by: INTERNAL MEDICINE

## 2025-06-10 PROCEDURE — 2580000003 HC RX 258: Performed by: INTERNAL MEDICINE

## 2025-06-10 RX ORDER — ONDANSETRON 2 MG/ML
4 INJECTION INTRAMUSCULAR; INTRAVENOUS EVERY 6 HOURS PRN
Status: DISCONTINUED | OUTPATIENT
Start: 2025-06-10 | End: 2025-06-10 | Stop reason: HOSPADM

## 2025-06-10 RX ORDER — LORAZEPAM 0.5 MG/1
0.5 TABLET ORAL
Status: COMPLETED | OUTPATIENT
Start: 2025-06-10 | End: 2025-06-10

## 2025-06-10 RX ORDER — SODIUM CHLORIDE 9 MG/ML
INJECTION, SOLUTION INTRAVENOUS PRN
Status: DISCONTINUED | OUTPATIENT
Start: 2025-06-10 | End: 2025-06-10 | Stop reason: HOSPADM

## 2025-06-10 RX ORDER — HEPARIN SODIUM 1000 [USP'U]/ML
INJECTION, SOLUTION INTRAVENOUS; SUBCUTANEOUS PRN
Status: DISCONTINUED | OUTPATIENT
Start: 2025-06-10 | End: 2025-06-10 | Stop reason: HOSPADM

## 2025-06-10 RX ORDER — ADENOSINE 3 MG/ML
INJECTION, SOLUTION INTRAVENOUS CONTINUOUS PRN
Status: DISCONTINUED | OUTPATIENT
Start: 2025-06-10 | End: 2025-06-10 | Stop reason: HOSPADM

## 2025-06-10 RX ORDER — SODIUM CHLORIDE 0.9 % (FLUSH) 0.9 %
5-40 SYRINGE (ML) INJECTION PRN
Status: DISCONTINUED | OUTPATIENT
Start: 2025-06-10 | End: 2025-06-10 | Stop reason: HOSPADM

## 2025-06-10 RX ORDER — IOPAMIDOL 755 MG/ML
INJECTION, SOLUTION INTRAVASCULAR PRN
Status: DISCONTINUED | OUTPATIENT
Start: 2025-06-10 | End: 2025-06-10 | Stop reason: HOSPADM

## 2025-06-10 RX ORDER — SODIUM CHLORIDE 9 MG/ML
INJECTION, SOLUTION INTRAVENOUS CONTINUOUS
Status: ACTIVE | OUTPATIENT
Start: 2025-06-10 | End: 2025-06-10

## 2025-06-10 RX ORDER — ASPIRIN 325 MG
325 TABLET ORAL ONCE
Status: DISCONTINUED | OUTPATIENT
Start: 2025-06-10 | End: 2025-06-10 | Stop reason: HOSPADM

## 2025-06-10 RX ORDER — MIDAZOLAM HYDROCHLORIDE 1 MG/ML
INJECTION, SOLUTION INTRAMUSCULAR; INTRAVENOUS PRN
Status: DISCONTINUED | OUTPATIENT
Start: 2025-06-10 | End: 2025-06-10 | Stop reason: HOSPADM

## 2025-06-10 RX ORDER — SODIUM CHLORIDE 0.9 % (FLUSH) 0.9 %
5-40 SYRINGE (ML) INJECTION EVERY 12 HOURS SCHEDULED
Status: DISCONTINUED | OUTPATIENT
Start: 2025-06-10 | End: 2025-06-10 | Stop reason: HOSPADM

## 2025-06-10 RX ORDER — FENTANYL CITRATE 50 UG/ML
INJECTION, SOLUTION INTRAMUSCULAR; INTRAVENOUS PRN
Status: DISCONTINUED | OUTPATIENT
Start: 2025-06-10 | End: 2025-06-10 | Stop reason: HOSPADM

## 2025-06-10 RX ADMIN — SODIUM CHLORIDE: 0.9 INJECTION, SOLUTION INTRAVENOUS at 08:15

## 2025-06-10 RX ADMIN — LORAZEPAM 0.5 MG: 0.5 TABLET ORAL at 08:17

## 2025-06-10 NOTE — PROCEDURES
Post Cardiac Catheterization Note.  Full details available in procedure log    DATE: 6/10/2025  PATIENT: Romero Keene  MRN: [id number]    Procedure Performed:  CPT Code: 16771 US guidance for vascular access  ~NOTE: Ultrasound access was used to access the vessel using the modified seldinger technique after local infiltration of 1-2% lidocaine.   Ultrasound documented/visualized patency, pulsatility, and proper location for puncture. An anterior wall puncture was made in the right ulnar artery. It was determined safety to proceed with access.   Left heart catheterization  Selective coronary angiography  DFR of the LAD  FFR of the LAD  DFR of the CIRC  FFR of the CIRC    Procedure Findings:  Moderate to severe proximal/mid LAD and CIRC  Positive DFR/FFR of the LAD  Positive DFR or the CIRC, negative FFR of the CIRC  Normal left heart catheterization    Conclusion/Recommendations:  Multidisciplinary approach to discuss revascularization options and and timing.  Will recommend ongoing medical management with follow-up in the outpatient setting    Lisa Mukherjee  Interventional Cardiology  [facility]  Office [phone]     [0] : 2) Feeling down, depressed, or hopeless: Not at all (0) [PHQ-2 Negative - No further assessment needed] : PHQ-2 Negative - No further assessment needed [Time Spent: ___ Minutes] : I spent [unfilled] minutes performing a depression screening for this patient. [No Retinopathy] : No retinopathy [Patient reported mammogram was normal] : Patient reported mammogram was normal [Patient reported PAP Smear was normal] : Patient reported PAP Smear was normal [Patient reported colonoscopy was abnormal] : Patient reported colonoscopy was abnormal [EyeExamDate] : 10/2024 [MammogramDate] : 06/2024 [PapSmearDate] : 06/2024 [ColonoscopyDate] : 12/2021 [ColonoscopyComments] : RTO 5 years

## 2025-06-10 NOTE — H&P
[facility]   Cardiovascular Evaluation    PATIENT: Romero Keene  DATE: 6/10/2025  MRN: [id number]  CSN: [id number]  : 1945    Primary Care Doctor/Referring provider: Derek Levin MD    Reason for evaluation/Chief complaint:   No chief complaint on file.    Subjective:    History of present illness on initial date of evaluation:   Romero Keene is a 79 y.o. male who is here today for left heart catheterization. He has a history of coronary artery disease with stent placement in  with drug eluting stents to his right coronary artery and left anterior descending artery. Echocardiogram from 10/2019 showed an ejection fraction of 55-60%.    He was found to have a bruit on assessment to carotid artery with Derek Levin MD and had a carotid doppler on 21 that showed <50% stenosis bilaterally and normal antegrade flow.     Echo 21 EF 60-65%. Stress test 21 no suggestive of ischemia, artifact vs attenuation. He presented to emergency department 22 with LOC episode. He was positive for orthostatic hypotension. Recent COVID infection. Treated with IV fluids. Cardiac event monitor wore -08/10/22 showed no episodes of atrial fibrillation or atrial flutter. He had a carotid doppler 2022 < 50% stenosis bilateral internal carotids.    An ANTONIO was ordered -24 SR , PVC's 3%, PAC 1%, no sustained arrhythmias.    He had a Lexiscan stress myoview test completed 25 that was abnormal consistent with small area of distal LAD versus RCA territory inducible ischemia. At last office visit 25 he was here to discuss proceeding with heart catheterization to further assess. He follows with  for nephrology for stage III CKD. Patient denied any weight gain, edema, palpitations, chest pain, shortness of breath, dizziness, and syncope. He is taking medications as prescribed, tolerating well.     Patient Active Problem List

## 2025-06-10 NOTE — POST SEDATION
Patient:  Romero Keene   :   1945    A pre-sedation re-evaluation was performed immediately at the end of the procedure.  Procedure:  Cardiac cath  Medications: Procedural sedation with minimal conscious sedation  Complications: None  Estimated Blood Loss: none  Specimens: Were not obtained        Stew Medication and Procedural Reconciliation:  I agree that the documented medications and procedures performed are true.  The medications were given under my order.  The procedures were performed under my direct supervision.    An immediate re-assessment was completed prior to sedation, and it is determined to be safe to proceed.

## 2025-06-10 NOTE — TELEPHONE ENCOUNTER
Please assist with scheduling this [facility] the second week of August and  with Dr. Lisa Mukherjee per his request.  Thank you.

## 2025-06-10 NOTE — DISCHARGE INSTRUCTIONS
pressure, or a strange feeling in your back, neck or jaw, or upper belly or in one or both shoulders or arms.  Lightheadedness or sudden weakness.  A fast or irregular heartbeat.       After you call 911, the  may tell you to chew 1 adult-strength or 2 to 4                  low-dose aspirin. Wait for an ambulance. Do not try to drive yourself.  Call your doctor today if :  You have any trouble breathing.  Your chest pain gets worse.  You are dizzy or lightheaded, or you feel like you may faint.  You are not getting better as expected.  You are having new or different chest pain.

## 2025-06-10 NOTE — PROGRESS NOTES
Patient/family given discharge instructions. Patient/family verbalize understanding of discharge instructions, all questions addressed, copy given to patient/family. Pt transferred to vehicle via wheelchair to be discharged home with family. Office to call with follow up plan and they will watch in My Chart or call office after their cruise.

## 2025-06-11 LAB
EKG ATRIAL RATE: 55 BPM
EKG DIAGNOSIS: NORMAL
EKG P AXIS: 66 DEGREES
EKG P-R INTERVAL: 162 MS
EKG Q-T INTERVAL: 474 MS
EKG QRS DURATION: 160 MS
EKG QTC CALCULATION (BAZETT): 453 MS
EKG R AXIS: -49 DEGREES
EKG T AXIS: 17 DEGREES
EKG VENTRICULAR RATE: 55 BPM

## 2025-06-11 PROCEDURE — 93010 ELECTROCARDIOGRAM REPORT: CPT | Performed by: INTERNAL MEDICINE

## 2025-06-12 NOTE — TELEPHONE ENCOUNTER
Attempted to reach patient. No answer. Left VM please offer 08/20/25 at [facility] @ 9:45 am VSP follow up

## 2025-06-16 ENCOUNTER — HOSPITAL ENCOUNTER (INPATIENT)
Age: 80
LOS: 2 days | Discharge: HOME | DRG: 564 | End: 2025-06-19
Attending: EMERGENCY MEDICINE | Admitting: HOSPITALIST
Payer: MEDICARE

## 2025-06-16 ENCOUNTER — APPOINTMENT (OUTPATIENT)
Dept: GENERAL RADIOLOGY | Age: 80
DRG: 564 | End: 2025-06-16
Payer: MEDICARE

## 2025-06-16 ENCOUNTER — OFFICE VISIT (OUTPATIENT)
Age: 80
End: 2025-06-16

## 2025-06-16 VITALS
HEART RATE: 82 BPM | WEIGHT: 250 LBS | TEMPERATURE: 100.2 F | HEIGHT: 70 IN | BODY MASS INDEX: 35.79 KG/M2 | OXYGEN SATURATION: 99 % | SYSTOLIC BLOOD PRESSURE: 130 MMHG | DIASTOLIC BLOOD PRESSURE: 90 MMHG | RESPIRATION RATE: 16 BRPM

## 2025-06-16 DIAGNOSIS — J18.9 PNEUMONIA OF LOWER LOBE DUE TO INFECTIOUS ORGANISM, UNSPECIFIED LATERALITY: ICD-10-CM

## 2025-06-16 DIAGNOSIS — H61.031 CHONDRITIS OF AURICLE, RIGHT: ICD-10-CM

## 2025-06-16 DIAGNOSIS — H66.003 NON-RECURRENT ACUTE SUPPURATIVE OTITIS MEDIA OF BOTH EARS WITHOUT SPONTANEOUS RUPTURE OF TYMPANIC MEMBRANES: ICD-10-CM

## 2025-06-16 DIAGNOSIS — I10 PRIMARY HYPERTENSION: ICD-10-CM

## 2025-06-16 DIAGNOSIS — L03.811 CELLULITIS OF HEAD (ANY PART, EXCEPT FACE): Primary | ICD-10-CM

## 2025-06-16 DIAGNOSIS — H70.91 MASTOIDITIS OF RIGHT SIDE: Primary | ICD-10-CM

## 2025-06-16 DIAGNOSIS — N18.9 CHRONIC KIDNEY DISEASE, UNSPECIFIED CKD STAGE: ICD-10-CM

## 2025-06-16 LAB
ANION GAP SERPL CALCULATED.3IONS-SCNC: 15 MMOL/L (ref 3–16)
BASOPHILS # BLD: 0 K/UL (ref 0–0.2)
BASOPHILS NFR BLD: 0.2 %
BUN SERPL-MCNC: 30 MG/DL (ref 7–20)
CALCIUM SERPL-MCNC: 9 MG/DL (ref 8.3–10.6)
CHLORIDE SERPL-SCNC: 102 MMOL/L (ref 99–110)
CO2 SERPL-SCNC: 25 MMOL/L (ref 21–32)
CREAT SERPL-MCNC: 1.8 MG/DL (ref 0.8–1.3)
DEPRECATED RDW RBC AUTO: 15.8 % (ref 12.4–15.4)
EOSINOPHIL # BLD: 0.2 K/UL (ref 0–0.6)
EOSINOPHIL NFR BLD: 1.4 %
GFR SERPLBLD CREATININE-BSD FMLA CKD-EPI: 37 ML/MIN/{1.73_M2}
GLUCOSE SERPL-MCNC: 181 MG/DL (ref 70–99)
HCT VFR BLD AUTO: 41.4 % (ref 40.5–52.5)
HGB BLD-MCNC: 13.9 G/DL (ref 13.5–17.5)
LYMPHOCYTES # BLD: 0.8 K/UL (ref 1–5.1)
LYMPHOCYTES NFR BLD: 5.8 %
MCH RBC QN AUTO: 29.6 PG (ref 26–34)
MCHC RBC AUTO-ENTMCNC: 33.5 G/DL (ref 31–36)
MCV RBC AUTO: 88.2 FL (ref 80–100)
MONOCYTES # BLD: 0.9 K/UL (ref 0–1.3)
MONOCYTES NFR BLD: 6.4 %
NEUTROPHILS # BLD: 11.5 K/UL (ref 1.7–7.7)
NEUTROPHILS NFR BLD: 86.2 %
PLATELET # BLD AUTO: 119 K/UL (ref 135–450)
PMV BLD AUTO: 9.1 FL (ref 5–10.5)
POTASSIUM SERPL-SCNC: 3.9 MMOL/L (ref 3.5–5.1)
RBC # BLD AUTO: 4.7 M/UL (ref 4.2–5.9)
SODIUM SERPL-SCNC: 142 MMOL/L (ref 136–145)
WBC # BLD AUTO: 13.4 K/UL (ref 4–11)

## 2025-06-16 PROCEDURE — 2580000003 HC RX 258: Performed by: EMERGENCY MEDICINE

## 2025-06-16 PROCEDURE — 2500000003 HC RX 250 WO HCPCS: Performed by: EMERGENCY MEDICINE

## 2025-06-16 PROCEDURE — 80048 BASIC METABOLIC PNL TOTAL CA: CPT

## 2025-06-16 PROCEDURE — 85025 COMPLETE CBC W/AUTO DIFF WBC: CPT

## 2025-06-16 PROCEDURE — 83605 ASSAY OF LACTIC ACID: CPT

## 2025-06-16 PROCEDURE — 96365 THER/PROPH/DIAG IV INF INIT: CPT

## 2025-06-16 PROCEDURE — 99285 EMERGENCY DEPT VISIT HI MDM: CPT

## 2025-06-16 PROCEDURE — 96375 TX/PRO/DX INJ NEW DRUG ADDON: CPT

## 2025-06-16 PROCEDURE — 6360000002 HC RX W HCPCS: Performed by: EMERGENCY MEDICINE

## 2025-06-16 PROCEDURE — 71045 X-RAY EXAM CHEST 1 VIEW: CPT

## 2025-06-16 PROCEDURE — 87040 BLOOD CULTURE FOR BACTERIA: CPT

## 2025-06-16 RX ADMIN — WATER 1000 MG: 1 INJECTION INTRAMUSCULAR; INTRAVENOUS; SUBCUTANEOUS at 23:06

## 2025-06-16 RX ADMIN — VANCOMYCIN HYDROCHLORIDE 1000 MG: 1 INJECTION, POWDER, LYOPHILIZED, FOR SOLUTION INTRAVENOUS at 23:34

## 2025-06-16 ASSESSMENT — PAIN SCALES - GENERAL: PAINLEVEL_OUTOF10: 7

## 2025-06-16 ASSESSMENT — PAIN DESCRIPTION - LOCATION: LOCATION: FACE

## 2025-06-16 ASSESSMENT — PAIN - FUNCTIONAL ASSESSMENT: PAIN_FUNCTIONAL_ASSESSMENT: 0-10

## 2025-06-16 ASSESSMENT — PAIN DESCRIPTION - DESCRIPTORS: DESCRIPTORS: ACHING

## 2025-06-16 ASSESSMENT — PAIN DESCRIPTION - ORIENTATION: ORIENTATION: RIGHT

## 2025-06-16 NOTE — PROGRESS NOTES
Romero Keene (: 1945) is a 79 y.o. male, New patient, here for evaluation of the following chief complaint(s):  Ear Pain (Right )      Visit date not found    ASSESSMENT/PLAN:    ICD-10-CM    1. Mastoiditis of right side  H70.91 amoxicillin-clavulanate (AUGMENTIN) 875-125 MG per tablet     Amb Referral to Primary Care      2. Non-recurrent acute suppurative otitis media of both ears without spontaneous rupture of tympanic membranes  H66.003 amoxicillin-clavulanate (AUGMENTIN) 875-125 MG per tablet      3. Primary hypertension  I10 Amb Referral to Primary Care        79 year old male is seen in the urgent care for post auricular pain x 1 day. Denies pain, discharge, fever, HA. Pt wears bilateral hearing aids. On physical exam, the pt has a oral temp of 100.2 F. His bilateral ears are injected. No Bulging Tms. Right post auricular ear is warm to touch, erythematous, and there is a tender mastoid bone. Pt is prescribed Augmentin x 10 days for bilateral OM and mastoiditis. Pt is not displaying systemic sx at this time, therefore ED evaluation is not warranted. However, the pt is educated on going to the ED for IV abx for progressing and worsening sx.  A referral was placed for his PCP to follow-up regarding his mastoiditis and hypertension in clinic.    Discussed PCP follow up for persisting or worsening symptoms, or to return to the clinic if unable to obtain PCP follow up for worsening symptoms.    The patient tolerated their visit well. The patient and/or the family were informed of the results of any tests, a time was given to answer questions, a plan was proposed and they agreed with plan. Reviewed AVS with treatment instructions and answered questions - pt/family expresses understanding and agreement with the discussed treatment plan and AVS instructions.      SUBJECTIVE/OBJECTIVE:  Pt presents for post auricular pain and a bump x 1 day. Pt denies ear pain, discharge, fever, and HA. Pt wears

## 2025-06-16 NOTE — PATIENT INSTRUCTIONS
Take the full course of antibiotics. You are being treated for a mild case of mastoiditis and bilateral ear infections. If you are not feeling better after the full course of antibiotics, it is advised you follow up with your primary care doctor for further care management.   Can use warm compress behind the ear.

## 2025-06-17 ENCOUNTER — APPOINTMENT (OUTPATIENT)
Dept: CT IMAGING | Age: 80
DRG: 564 | End: 2025-06-17
Payer: MEDICARE

## 2025-06-17 PROBLEM — M94.8X9 CHONDRITIS: Status: ACTIVE | Noted: 2025-06-17

## 2025-06-17 LAB
ANION GAP SERPL CALCULATED.3IONS-SCNC: 11 MMOL/L (ref 3–16)
BUN SERPL-MCNC: 31 MG/DL (ref 7–20)
CALCIUM SERPL-MCNC: 8.5 MG/DL (ref 8.3–10.6)
CHLORIDE SERPL-SCNC: 105 MMOL/L (ref 99–110)
CO2 SERPL-SCNC: 25 MMOL/L (ref 21–32)
CREAT SERPL-MCNC: 1.6 MG/DL (ref 0.8–1.3)
GFR SERPLBLD CREATININE-BSD FMLA CKD-EPI: 43 ML/MIN/{1.73_M2}
GLUCOSE BLD-MCNC: 106 MG/DL (ref 70–99)
GLUCOSE BLD-MCNC: 117 MG/DL (ref 70–99)
GLUCOSE BLD-MCNC: 122 MG/DL (ref 70–99)
GLUCOSE BLD-MCNC: 146 MG/DL (ref 70–99)
GLUCOSE SERPL-MCNC: 153 MG/DL (ref 70–99)
LACTATE BLDV-SCNC: 1.2 MMOL/L (ref 0.4–1.9)
PERFORMED ON: ABNORMAL
POTASSIUM SERPL-SCNC: 3.6 MMOL/L (ref 3.5–5.1)
SODIUM SERPL-SCNC: 141 MMOL/L (ref 136–145)

## 2025-06-17 PROCEDURE — 6360000002 HC RX W HCPCS: Performed by: HOSPITALIST

## 2025-06-17 PROCEDURE — 1200000000 HC SEMI PRIVATE

## 2025-06-17 PROCEDURE — 70480 CT ORBIT/EAR/FOSSA W/O DYE: CPT

## 2025-06-17 PROCEDURE — 2500000003 HC RX 250 WO HCPCS: Performed by: INTERNAL MEDICINE

## 2025-06-17 PROCEDURE — 99222 1ST HOSP IP/OBS MODERATE 55: CPT | Performed by: INTERNAL MEDICINE

## 2025-06-17 PROCEDURE — 70490 CT SOFT TISSUE NECK W/O DYE: CPT

## 2025-06-17 PROCEDURE — 6370000000 HC RX 637 (ALT 250 FOR IP): Performed by: HOSPITALIST

## 2025-06-17 PROCEDURE — 96366 THER/PROPH/DIAG IV INF ADDON: CPT

## 2025-06-17 PROCEDURE — 36415 COLL VENOUS BLD VENIPUNCTURE: CPT

## 2025-06-17 PROCEDURE — 6360000002 HC RX W HCPCS: Performed by: INTERNAL MEDICINE

## 2025-06-17 PROCEDURE — 6370000000 HC RX 637 (ALT 250 FOR IP): Performed by: NURSE PRACTITIONER

## 2025-06-17 PROCEDURE — 80048 BASIC METABOLIC PNL TOTAL CA: CPT

## 2025-06-17 PROCEDURE — 2580000003 HC RX 258: Performed by: HOSPITALIST

## 2025-06-17 RX ORDER — ONDANSETRON 4 MG/1
4 TABLET, ORALLY DISINTEGRATING ORAL EVERY 8 HOURS PRN
Status: DISCONTINUED | OUTPATIENT
Start: 2025-06-17 | End: 2025-06-19 | Stop reason: HOSPADM

## 2025-06-17 RX ORDER — ALBUTEROL SULFATE 90 UG/1
2 INHALANT RESPIRATORY (INHALATION) 4 TIMES DAILY PRN
Status: DISCONTINUED | OUTPATIENT
Start: 2025-06-17 | End: 2025-06-19 | Stop reason: HOSPADM

## 2025-06-17 RX ORDER — LOSARTAN POTASSIUM 100 MG/1
100 TABLET ORAL DAILY
Status: DISCONTINUED | OUTPATIENT
Start: 2025-06-17 | End: 2025-06-19 | Stop reason: HOSPADM

## 2025-06-17 RX ORDER — VITAMIN B COMPLEX
2000 TABLET ORAL DAILY
Status: DISCONTINUED | OUTPATIENT
Start: 2025-06-17 | End: 2025-06-19 | Stop reason: HOSPADM

## 2025-06-17 RX ORDER — POTASSIUM CHLORIDE 7.45 MG/ML
10 INJECTION INTRAVENOUS PRN
Status: DISCONTINUED | OUTPATIENT
Start: 2025-06-17 | End: 2025-06-19 | Stop reason: HOSPADM

## 2025-06-17 RX ORDER — ATORVASTATIN CALCIUM 80 MG/1
80 TABLET, FILM COATED ORAL DAILY
Status: DISCONTINUED | OUTPATIENT
Start: 2025-06-17 | End: 2025-06-19 | Stop reason: HOSPADM

## 2025-06-17 RX ORDER — SODIUM CHLORIDE, SODIUM LACTATE, POTASSIUM CHLORIDE, CALCIUM CHLORIDE 600; 310; 30; 20 MG/100ML; MG/100ML; MG/100ML; MG/100ML
INJECTION, SOLUTION INTRAVENOUS CONTINUOUS
Status: ACTIVE | OUTPATIENT
Start: 2025-06-17 | End: 2025-06-18

## 2025-06-17 RX ORDER — SODIUM CHLORIDE 0.9 % (FLUSH) 0.9 %
5-40 SYRINGE (ML) INJECTION EVERY 12 HOURS SCHEDULED
Status: DISCONTINUED | OUTPATIENT
Start: 2025-06-17 | End: 2025-06-19 | Stop reason: HOSPADM

## 2025-06-17 RX ORDER — HYDRALAZINE HYDROCHLORIDE 50 MG/1
100 TABLET, FILM COATED ORAL 3 TIMES DAILY
Status: DISCONTINUED | OUTPATIENT
Start: 2025-06-17 | End: 2025-06-19 | Stop reason: HOSPADM

## 2025-06-17 RX ORDER — INSULIN LISPRO 100 [IU]/ML
0-8 INJECTION, SOLUTION INTRAVENOUS; SUBCUTANEOUS
Status: DISCONTINUED | OUTPATIENT
Start: 2025-06-17 | End: 2025-06-19 | Stop reason: HOSPADM

## 2025-06-17 RX ORDER — OXYCODONE AND ACETAMINOPHEN 5; 325 MG/1; MG/1
1 TABLET ORAL EVERY 4 HOURS PRN
Refills: 0 | Status: DISCONTINUED | OUTPATIENT
Start: 2025-06-17 | End: 2025-06-19 | Stop reason: HOSPADM

## 2025-06-17 RX ORDER — POTASSIUM CHLORIDE 1500 MG/1
40 TABLET, EXTENDED RELEASE ORAL PRN
Status: DISCONTINUED | OUTPATIENT
Start: 2025-06-17 | End: 2025-06-19 | Stop reason: HOSPADM

## 2025-06-17 RX ORDER — ONDANSETRON 2 MG/ML
4 INJECTION INTRAMUSCULAR; INTRAVENOUS EVERY 6 HOURS PRN
Status: DISCONTINUED | OUTPATIENT
Start: 2025-06-17 | End: 2025-06-19 | Stop reason: HOSPADM

## 2025-06-17 RX ORDER — ASPIRIN 81 MG/1
81 TABLET, CHEWABLE ORAL DAILY
Status: DISCONTINUED | OUTPATIENT
Start: 2025-06-17 | End: 2025-06-19 | Stop reason: HOSPADM

## 2025-06-17 RX ORDER — DEXTROSE MONOHYDRATE 100 MG/ML
INJECTION, SOLUTION INTRAVENOUS CONTINUOUS PRN
Status: DISCONTINUED | OUTPATIENT
Start: 2025-06-17 | End: 2025-06-19 | Stop reason: HOSPADM

## 2025-06-17 RX ORDER — ACETAMINOPHEN 650 MG/1
650 SUPPOSITORY RECTAL EVERY 6 HOURS PRN
Status: DISCONTINUED | OUTPATIENT
Start: 2025-06-17 | End: 2025-06-19 | Stop reason: HOSPADM

## 2025-06-17 RX ORDER — CARVEDILOL 25 MG/1
25 TABLET ORAL 2 TIMES DAILY
Status: DISCONTINUED | OUTPATIENT
Start: 2025-06-17 | End: 2025-06-19 | Stop reason: HOSPADM

## 2025-06-17 RX ORDER — ACETAMINOPHEN 325 MG/1
650 TABLET ORAL EVERY 6 HOURS PRN
Status: DISCONTINUED | OUTPATIENT
Start: 2025-06-17 | End: 2025-06-19 | Stop reason: HOSPADM

## 2025-06-17 RX ORDER — HEPARIN SODIUM 5000 [USP'U]/ML
5000 INJECTION, SOLUTION INTRAVENOUS; SUBCUTANEOUS EVERY 8 HOURS SCHEDULED
Status: DISCONTINUED | OUTPATIENT
Start: 2025-06-17 | End: 2025-06-19 | Stop reason: HOSPADM

## 2025-06-17 RX ORDER — MAGNESIUM SULFATE IN WATER 40 MG/ML
2000 INJECTION, SOLUTION INTRAVENOUS PRN
Status: DISCONTINUED | OUTPATIENT
Start: 2025-06-17 | End: 2025-06-19 | Stop reason: HOSPADM

## 2025-06-17 RX ORDER — FERROUS SULFATE 325(65) MG
325 TABLET ORAL
Status: DISCONTINUED | OUTPATIENT
Start: 2025-06-17 | End: 2025-06-19 | Stop reason: HOSPADM

## 2025-06-17 RX ORDER — LINEZOLID 2 MG/ML
600 INJECTION, SOLUTION INTRAVENOUS EVERY 12 HOURS
Status: DISCONTINUED | OUTPATIENT
Start: 2025-06-17 | End: 2025-06-19 | Stop reason: HOSPADM

## 2025-06-17 RX ORDER — MECOBALAMIN 5000 MCG
5 TABLET,DISINTEGRATING ORAL NIGHTLY
Status: DISCONTINUED | OUTPATIENT
Start: 2025-06-17 | End: 2025-06-19 | Stop reason: HOSPADM

## 2025-06-17 RX ORDER — FUROSEMIDE 20 MG/1
20 TABLET ORAL DAILY PRN
Status: DISCONTINUED | OUTPATIENT
Start: 2025-06-17 | End: 2025-06-19 | Stop reason: HOSPADM

## 2025-06-17 RX ORDER — SODIUM CHLORIDE 0.9 % (FLUSH) 0.9 %
5-40 SYRINGE (ML) INJECTION PRN
Status: DISCONTINUED | OUTPATIENT
Start: 2025-06-17 | End: 2025-06-19 | Stop reason: HOSPADM

## 2025-06-17 RX ORDER — POLYETHYLENE GLYCOL 3350 17 G/17G
17 POWDER, FOR SOLUTION ORAL DAILY PRN
Status: DISCONTINUED | OUTPATIENT
Start: 2025-06-17 | End: 2025-06-19 | Stop reason: HOSPADM

## 2025-06-17 RX ORDER — ALLOPURINOL 300 MG/1
150 TABLET ORAL DAILY
Status: DISCONTINUED | OUTPATIENT
Start: 2025-06-17 | End: 2025-06-19 | Stop reason: HOSPADM

## 2025-06-17 RX ORDER — GLUCAGON 1 MG/ML
1 KIT INJECTION PRN
Status: DISCONTINUED | OUTPATIENT
Start: 2025-06-17 | End: 2025-06-19 | Stop reason: HOSPADM

## 2025-06-17 RX ORDER — SODIUM CHLORIDE 9 MG/ML
INJECTION, SOLUTION INTRAVENOUS PRN
Status: DISCONTINUED | OUTPATIENT
Start: 2025-06-17 | End: 2025-06-19 | Stop reason: HOSPADM

## 2025-06-17 RX ORDER — CABERGOLINE 0.5 MG/1
0.25 TABLET ORAL WEEKLY
Status: DISCONTINUED | OUTPATIENT
Start: 2025-06-23 | End: 2025-06-19 | Stop reason: HOSPADM

## 2025-06-17 RX ADMIN — LINEZOLID 600 MG: 600 INJECTION, SOLUTION INTRAVENOUS at 19:40

## 2025-06-17 RX ADMIN — HEPARIN SODIUM 5000 UNITS: 5000 INJECTION INTRAVENOUS; SUBCUTANEOUS at 06:51

## 2025-06-17 RX ADMIN — WATER 2000 MG: 1 INJECTION INTRAMUSCULAR; INTRAVENOUS; SUBCUTANEOUS at 23:23

## 2025-06-17 RX ADMIN — LINEZOLID 600 MG: 600 INJECTION, SOLUTION INTRAVENOUS at 08:50

## 2025-06-17 RX ADMIN — ATORVASTATIN CALCIUM 80 MG: 80 TABLET, FILM COATED ORAL at 08:39

## 2025-06-17 RX ADMIN — MEROPENEM 1000 MG: 1 INJECTION INTRAVENOUS at 14:55

## 2025-06-17 RX ADMIN — SODIUM CHLORIDE: 0.9 INJECTION, SOLUTION INTRAVENOUS at 03:33

## 2025-06-17 RX ADMIN — Medication 2000 UNITS: at 10:01

## 2025-06-17 RX ADMIN — MEROPENEM 1000 MG: 1 INJECTION INTRAVENOUS at 03:33

## 2025-06-17 RX ADMIN — WATER 2000 MG: 1 INJECTION INTRAMUSCULAR; INTRAVENOUS; SUBCUTANEOUS at 15:52

## 2025-06-17 RX ADMIN — HEPARIN SODIUM 5000 UNITS: 5000 INJECTION INTRAVENOUS; SUBCUTANEOUS at 14:46

## 2025-06-17 RX ADMIN — SODIUM CHLORIDE, SODIUM LACTATE, POTASSIUM CHLORIDE, AND CALCIUM CHLORIDE: .6; .31; .03; .02 INJECTION, SOLUTION INTRAVENOUS at 16:55

## 2025-06-17 RX ADMIN — HYDRALAZINE HYDROCHLORIDE 100 MG: 50 TABLET ORAL at 19:31

## 2025-06-17 RX ADMIN — LOSARTAN POTASSIUM 100 MG: 100 TABLET, FILM COATED ORAL at 10:01

## 2025-06-17 RX ADMIN — FERROUS SULFATE TAB 325 MG (65 MG ELEMENTAL FE) 325 MG: 325 (65 FE) TAB at 10:01

## 2025-06-17 RX ADMIN — HEPARIN SODIUM 5000 UNITS: 5000 INJECTION INTRAVENOUS; SUBCUTANEOUS at 22:15

## 2025-06-17 RX ADMIN — ALLOPURINOL 150 MG: 300 TABLET ORAL at 10:01

## 2025-06-17 RX ADMIN — Medication 5 MG: at 22:15

## 2025-06-17 RX ADMIN — ASPIRIN 81 MG: 81 TABLET, CHEWABLE ORAL at 08:39

## 2025-06-17 RX ADMIN — CARVEDILOL 25 MG: 25 TABLET, FILM COATED ORAL at 08:39

## 2025-06-17 RX ADMIN — SODIUM CHLORIDE, SODIUM LACTATE, POTASSIUM CHLORIDE, AND CALCIUM CHLORIDE: .6; .31; .03; .02 INJECTION, SOLUTION INTRAVENOUS at 03:32

## 2025-06-17 RX ADMIN — CARVEDILOL 25 MG: 25 TABLET, FILM COATED ORAL at 19:31

## 2025-06-17 RX ADMIN — HYDRALAZINE HYDROCHLORIDE 100 MG: 50 TABLET ORAL at 08:39

## 2025-06-17 ASSESSMENT — PAIN DESCRIPTION - ORIENTATION: ORIENTATION: RIGHT

## 2025-06-17 ASSESSMENT — PAIN DESCRIPTION - DESCRIPTORS: DESCRIPTORS: ACHING

## 2025-06-17 ASSESSMENT — PAIN SCALES - GENERAL
PAINLEVEL_OUTOF10: 0
PAINLEVEL_OUTOF10: 2

## 2025-06-17 ASSESSMENT — PAIN DESCRIPTION - LOCATION: LOCATION: EAR

## 2025-06-17 NOTE — H&P
[facility] Medicine History & Physical    V 5.1    Date of Admission: 6/16/2025    Date of Service:  Pt seen/examined on 6/17/25    [x]Admitted to Inpatient with expected LOS greater than two midnights due to medical therapy.  []Placed in Observation status.    Chief Admission Complaint:  Chondritis right ear    Presenting Admission History:      79 y.o. male with PMHx of CAD, combined systolic and diastolic CHF, CKD stage 3, DM II on insulin, pituitary macroadenoma, HTN, HLD presents to the ED with worsening right ear and right periauricular area pain, tenderness and erythema. Initially had no fever today. He went to an urgent care and was given a prescription for augmentin for \"mastoiditis\". He started feeling worse thereafter and felt like he was having a fever so he came to the ED. Patient wears hearing aids and glasses denies any injuries or wounds recently. No new hearing changes reported   In the ED, he was hypertensive, with other normal VS. CXR revealed bibasilar airspace disease. Labs revealed Crt 1.8 and WBC 13.4. Noncontrast CT posterior fossa showed no clear evidence for mastoiditis. ED provider discussed with ENT on-call who will see the patient in the morning.    Assessment/Plan:      Chondritis right ear, periauricular area   - monitor on telemetry   - imaging reviewed   - unable to have contrasted CT due to KANE   - IV linezolid + meropenem   - FU blood cx   - pain control   - ENT consult   - ID consult  Bibasilar PNA   - CXR reviewed   - pt states he has had a \"dry cough\"   - cont IV abx   - nebs PRN  DM II   - med dose insulin scale   - A1C 5.8 3/25  KANE / CKD stage 3   - hold lasix, ARB, lardiance   - IVF   - monitor renal function  Combined systolic and diastolic CHF   - stable   - cont GDMT   - resume lasix, ARB, jardiance when renal fxn improves  Pituitary macroadenoma   - stable   - follows with endocrinology   - cont cabergoline    Labs/medications/imaging reviewed    Dispo:High risk to

## 2025-06-17 NOTE — PROGRESS NOTES
[facility] Medicine Progress Note  V 5.17      Date of Admission: 6/16/2025    Hospital Day: 2      Chief Admission Complaint:    Chief Complaint   Patient presents with    Fatigue     Pt states that he started to feel weak, fatigued, went to urgent care and was diagnosed with mastoiditis and given augmentin. States that he vomited a few times this afternoon. Family states he was disoriented but better now. 7/10 pain         Subjective:      Patient lying in bed. Family at bedside. He has no complaints except for some R ear pain.     Presenting Admission History:       This is an 79 y.o. male with PMHx of CAD, combined systolic and diastolic CHF, CKD stage 3, DM II on insulin, pituitary macroadenoma, HTN, HLD presents to the ED with worsening right ear and right periauricular area pain, tenderness and erythema. Initially had no fever today. He went to an urgent care and was given a prescription for augmentin for \"mastoiditis\". He started feeling worse thereafter and felt like he was having a fever so he came to the ED. Patient wears hearing aids and glasses denies any injuries or wounds recently. No new hearing changes reported   In the ED, he was hypertensive, with other normal VS. CXR revealed bibasilar airspace disease. Labs revealed Crt 1.8 and WBC 13.4. Noncontrast CT posterior fossa showed no clear evidence for mastoiditis. ED provider discussed with ENT on-call who will see the patient in the morning.       Assessment/Plan:      Chondritis R ear, periauricular area  ENT consult ORDERED and pending.   Infectious Disease consult ORDERED and pending.   - Continue Zyvox and Meropenem     Possible PNA - possibly Gram Positive Community Acquired Pneumonia.  Started on Zyvox and Meropenem on 6/16/2025.  - Continue telemetry monitoring  - Blood cx pending       DM2 - normally controlled on home antiGlycemics - insulin - held.  Follow FSBS on SSI medium regimen.  Last HbA1c 5.8% dated 3/12/25. Resume home regimen

## 2025-06-17 NOTE — CARE COORDINATION
Case Management Assessment  Initial Evaluation    Date/Time of Evaluation: 6/17/2025 9:27 AM  Assessment Completed by: Dorothy Yee RN    If patient is discharged prior to next notation, then this note serves as note for discharge by case management.    Patient Name: Romero Keene                   YOB: 1945  Diagnosis: Chondritis [M94.8X9]  Chondritis of auricle, right [H61.031]  Pneumonia of lower lobe due to infectious organism, unspecified laterality [J18.9]  Cellulitis of head (any part, except face) [L03.811]  Chronic kidney disease, unspecified CKD stage [N18.9]                   Date / Time: 6/16/2025 10:07 PM    Patient Admission Status: Inpatient   Readmission Risk (Low < 19, Mod (19-27), High > 27): Readmission Risk Score: 11.5    Current PCP: Derek Levin MD  PCP verified by CM? Yes (Derek Levin MD)    Chart Reviewed: Yes      History Provided by: Patient, Medical Record  Patient Orientation: Alert and Oriented    Patient Cognition: Alert    Hospitalization in the last 30 days (Readmission):  No    If yes, Readmission Assessment in CM Navigator will be completed.    Advance Directives:      Code Status: Full Code   Patient's Primary Decision Maker is: Patient Declined (Legal Next of Kin Remains as Decision Maker)    Primary Decision Maker: JassiEricka - Spouse - [phone]    Secondary Decision Maker: JassiLynn - Child - [phone]    Discharge Planning:    Patient lives with: Spouse/Significant Other Type of Home: House  Primary Care Giver: Self  Patient Support Systems include: Spouse/Significant Other, Children   Current Financial resources: Medicare  Current community resources: None  Current services prior to admission: None            Current DME:              Type of Home Care services:  None    ADLS  Prior functional level: Independent in ADLs/IADLs  Current functional level: Independent in ADLs/IADLs    PT AM-PAC:   /24  OT AM-PAC:   /24    Family can

## 2025-06-17 NOTE — PROGRESS NOTES
..4 Eyes Skin Assessment     The patient is being assess for  Admission    I agree that 2 RN's have performed a thorough Head to Toe Skin Assessment on the patient. ALL assessment sites listed below have been assessed.       Areas assessed by both nurses:   [x]   Head, Face, and Ears   [x]   Shoulders, Back, and Chest  [x]   Arms, Elbows, and Hands   [x]   Coccyx, Sacrum, and IschIum  [x]   Legs, Feet, and Heels        Does the Patient have Skin Breakdown?  No         Campbell Prevention initiated:  Yes   Wound Care Orders initiated:  No      [facility] nurse consulted for Pressure Injury (Stage 3,4, Unstageable, DTI, NWPT, and Complex wounds), New and Established Ostomies:  No      Nurse 1 eSignature: Electronically signed by Phi Heck RN on 6/17/25 at 4:14 AM EDT    **SHARE this note so that the co-signing nurse is able to place an eSignature**    Nurse 2 eSignature: Electronically signed by Alejandro Christensen LPN on 6/17/25 at 4:54 AM EDT

## 2025-06-17 NOTE — PLAN OF CARE
Problem: Chronic Conditions and Co-morbidities  Goal: Patient's chronic conditions and co-morbidity symptoms are monitored and maintained or improved  6/17/2025 1329 by Jolly Gaytan RN  Outcome: Progressing  Flowsheets (Taken 6/17/2025 1329)  Care Plan - Patient's Chronic Conditions and Co-Morbidity Symptoms are Monitored and Maintained or Improved: Monitor and assess patient's chronic conditions and comorbid symptoms for stability, deterioration, or improvement  6/17/2025 0412 by Phi Heck RN  Outcome: Progressing     Problem: Discharge Planning  Goal: Discharge to home or other facility with appropriate resources  6/17/2025 1329 by Jolly Gaytan RN  Outcome: Progressing  6/17/2025 0412 by Phi Heck RN  Outcome: Progressing     Problem: Pain  Goal: Verbalizes/displays adequate comfort level or baseline comfort level  6/17/2025 1329 by Jolly Gaytan RN  Outcome: Progressing  Flowsheets (Taken 6/17/2025 1329)  Verbalizes/displays adequate comfort level or baseline comfort level:   Encourage patient to monitor pain and request assistance   Administer analgesics based on type and severity of pain and evaluate response   Implement non-pharmacological measures as appropriate and evaluate response  6/17/2025 0412 by Phi Heck RN  Outcome: Progressing     Problem: Safety - Adult  Goal: Free from fall injury  6/17/2025 1329 by Jolly Gaytan RN  Outcome: Progressing  Flowsheets (Taken 6/17/2025 1329)  Free From Fall Injury: Instruct family/caregiver on patient safety  6/17/2025 0412 by Phi Heck RN  Outcome: Progressing

## 2025-06-17 NOTE — PROGRESS NOTES
Patient admitted to room 535 from ED.  Patient oriented to room, call light, bed rails, phone, lights and bathroom.  Patient instructed about the schedule of the day including: vital sign frequency, lab draws, possible tests, frequency of MD and staff rounds, including RN/MD rounding together at bedside, daily weights, and I &O's.  Patient instructed about prescribed diet, how to use 8MENU, and television.   bed alarm in place, patient aware of placement and reason.   Telemetry box  in place, patient aware of placement and reason.  Bed locked, in lowest position, side rails up 2/4, call light within reach.  Will continue to monitor.

## 2025-06-17 NOTE — PROGRESS NOTES
06/17/25 0317   RT Protocol   History Pulmonary Disease 2   Respiratory pattern 0   Breath sounds 0   Cough 0   Indications for Bronchodilator Therapy On home bronchodilators   Bronchodilator Assessment Score 2

## 2025-06-17 NOTE — ED NOTES
Romero Keene is a 79 y.o. male admitted for  Principal Problem:    Chondritis  Resolved Problems:    * No resolved hospital problems. *  .   Patient Home via EMS transportation with   Chief Complaint   Patient presents with    Fatigue     Pt states that he started to feel weak, fatigued, went to urgent care and was diagnosed with mastoiditis and given augmentin. States that he vomited a few times this afternoon. Family states he was disoriented but better now. 7/10 pain   .  Patient is alert and Person, Place, Time, and Situation  Patient's baseline mobility: Baseline Mobility: Independent   Code Status: Full Code   Cardiac Rhythm:       Is patient on baseline Oxygen: no how many Liters:   Abnormal Assessment Findings:     Isolation: None      NIH Score:    C-SSRS: Risk of Suicide: No Risk  Bedside swallow:        Active LDA's:   Peripheral IV 06/16/25 Left Antecubital (Active)     Patient admitted with a beverly:  If the beverly is chronic was it exchanged:  Reason for beverly:   Patient admitted with Central Line:  . PICC line placement confirmed: YES OR NO:[id number]}   Reason for Central line:   Was central line Inserted from an outside facility:        Family/Caregiver Present yes Any Concerns: no   Restraints no  Sitter no         Vitals:      Vitals:    06/16/25 2216 06/16/25 2217 06/16/25 2319 06/17/25 0106   BP:  (!) 151/69 (!) 151/60 (!) 144/52   Pulse: 93  94 82   Resp: 20 25 21   Temp: 98.3 °F (36.8 °C)      TempSrc: Oral      SpO2: 92%  93% 91%   Weight: 113.9 kg (251 lb)      Height: 1.778 m (5' 10\")          Last documented pain score (0-10 scale) Pain Level: 7  Pain medication administered Yes- see MAR.    Pertinent or High Risk Medications/Drips: No.    Pending Blood Product Administration: no    Abnormal labs:   Abnormal Labs Reviewed   CBC WITH AUTO DIFFERENTIAL - Abnormal; Notable for the following components:       Result Value    WBC 13.4 (*)     RDW 15.8 (*)     Platelets 119 (*)     Neutrophils

## 2025-06-17 NOTE — ED PROVIDER NOTES
medications:   Prior to Admission medications    Medication Sig Start Date End Date Taking? Authorizing Provider   amoxicillin-clavulanate (AUGMENTIN) 875-125 MG per tablet Take 1 tablet by mouth 2 times daily for 10 days 6/16/25 6/26/25  Kita Khan PA-C   dapagliflozin (FARXIGA) 10 MG tablet Take 1 tablet by mouth daily 5/16/25   Gilberto Mathias MD   Tirzepatide (MOUNJARO) 7.5 MG/0.5ML SOAJ pen Inject 7.5 mg into the skin every 7 days 5/20/25   Derek Levin MD   furosemide (LASIX) 20 MG tablet Take 1 tablet by mouth daily as needed (swelling) 3/12/25   Derek Levin MD   cabergoline (DOSTINEX) 0.5 MG tablet TAKE 1/2 TABLET BY MOUTH ONCE WEEKLY ON MONDAY 2/10/25   Yvonne Hoffman MD   melatonin 5 MG TABS tablet Take 1 tablet by mouth daily    ProviderGilberto MD   testosterone cypionate (DEPOTESTOTERONE CYPIONATE) 100 MG/ML injection Inject 1 mL into the muscle every 14 days for 12 doses. Max Daily Amount: 100 mg 1/30/25 7/4/25  Yvonne Hoffman MD   Syringe/Needle, Disp, (SYRINGE 3CC/58OH1-4/2\") 18G X 1-1/2\" 3 ML MISC Use to draw Testosterone. 1/23/25   Yvonne Hoffman MD   Syringe/Needle, Disp, (SYRINGE 3CC/65IV1-8/2\") 25G X 1-1/2\" 3 ML MISC Use weekly with testosterone 1/23/25   Yvonne Hoffman MD   allopurinol (ZYLOPRIM) 300 MG tablet TAKE 1 TABLET BY MOUTH DAILY  Patient taking differently: Take 0.5 tablets by mouth daily 12/19/24   Derek Levin MD   atorvastatin (LIPITOR) 80 MG tablet TAKE 1 TABLET BY MOUTH DAILY 12/19/24   Derek Levin MD   telmisartan (MICARDIS) 80 MG tablet TAKE 1 TABLET BY MOUTH DAILY 12/19/24   Derek Levin MD   ammonium lactate (LAC-HYDRIN) 12 % lotion APPLY TO THE AFFECTED AREA(S) DAILY 11/11/24   Derek Levin MD   albuterol sulfate HFA (VENTOLIN HFA) 108 (90 Base) MCG/ACT inhaler Inhale 2 puffs into the lungs 4 times daily as needed for Wheezing 11/4/24   Karlo Sam MD   carvedilol (COREG) 25 MG tablet Take 1 tablet by mouth 2 times daily

## 2025-06-18 DIAGNOSIS — I10 ESSENTIAL HYPERTENSION, BENIGN: ICD-10-CM

## 2025-06-18 DIAGNOSIS — E11.22 TYPE 2 DIABETES MELLITUS WITH DIABETIC CHRONIC KIDNEY DISEASE, UNSPECIFIED CKD STAGE, UNSPECIFIED WHETHER LONG TERM INSULIN USE (HCC): ICD-10-CM

## 2025-06-18 LAB
ANION GAP SERPL CALCULATED.3IONS-SCNC: 11 MMOL/L (ref 3–16)
BASOPHILS # BLD: 0.1 K/UL (ref 0–0.2)
BASOPHILS NFR BLD: 0.8 %
BUN SERPL-MCNC: 27 MG/DL (ref 7–20)
CALCIUM SERPL-MCNC: 8.1 MG/DL (ref 8.3–10.6)
CHLORIDE SERPL-SCNC: 108 MMOL/L (ref 99–110)
CO2 SERPL-SCNC: 24 MMOL/L (ref 21–32)
CREAT SERPL-MCNC: 1.5 MG/DL (ref 0.8–1.3)
DEPRECATED RDW RBC AUTO: 15.7 % (ref 12.4–15.4)
EOSINOPHIL # BLD: 0.2 K/UL (ref 0–0.6)
EOSINOPHIL NFR BLD: 3.5 %
GFR SERPLBLD CREATININE-BSD FMLA CKD-EPI: 47 ML/MIN/{1.73_M2}
GLUCOSE BLD-MCNC: 108 MG/DL (ref 70–99)
GLUCOSE BLD-MCNC: 119 MG/DL (ref 70–99)
GLUCOSE BLD-MCNC: 160 MG/DL (ref 70–99)
GLUCOSE BLD-MCNC: 99 MG/DL (ref 70–99)
GLUCOSE SERPL-MCNC: 111 MG/DL (ref 70–99)
HCT VFR BLD AUTO: 36.5 % (ref 40.5–52.5)
HGB BLD-MCNC: 12.3 G/DL (ref 13.5–17.5)
LYMPHOCYTES # BLD: 1.2 K/UL (ref 1–5.1)
LYMPHOCYTES NFR BLD: 18 %
MCH RBC QN AUTO: 29.9 PG (ref 26–34)
MCHC RBC AUTO-ENTMCNC: 33.7 G/DL (ref 31–36)
MCV RBC AUTO: 88.6 FL (ref 80–100)
MONOCYTES # BLD: 0.7 K/UL (ref 0–1.3)
MONOCYTES NFR BLD: 10.6 %
NEUTROPHILS # BLD: 4.4 K/UL (ref 1.7–7.7)
NEUTROPHILS NFR BLD: 67.1 %
PERFORMED ON: ABNORMAL
PERFORMED ON: NORMAL
PLATELET # BLD AUTO: 99 K/UL (ref 135–450)
PMV BLD AUTO: 9 FL (ref 5–10.5)
POTASSIUM SERPL-SCNC: 3.7 MMOL/L (ref 3.5–5.1)
RBC # BLD AUTO: 4.13 M/UL (ref 4.2–5.9)
SODIUM SERPL-SCNC: 143 MMOL/L (ref 136–145)
WBC # BLD AUTO: 6.5 K/UL (ref 4–11)

## 2025-06-18 PROCEDURE — 99231 SBSQ HOSP IP/OBS SF/LOW 25: CPT | Performed by: INTERNAL MEDICINE

## 2025-06-18 PROCEDURE — 1200000000 HC SEMI PRIVATE

## 2025-06-18 PROCEDURE — 6360000002 HC RX W HCPCS: Performed by: INTERNAL MEDICINE

## 2025-06-18 PROCEDURE — 36415 COLL VENOUS BLD VENIPUNCTURE: CPT

## 2025-06-18 PROCEDURE — 2500000003 HC RX 250 WO HCPCS: Performed by: INTERNAL MEDICINE

## 2025-06-18 PROCEDURE — 80048 BASIC METABOLIC PNL TOTAL CA: CPT

## 2025-06-18 PROCEDURE — 6360000002 HC RX W HCPCS: Performed by: NURSE PRACTITIONER

## 2025-06-18 PROCEDURE — 85025 COMPLETE CBC W/AUTO DIFF WBC: CPT

## 2025-06-18 PROCEDURE — 6370000000 HC RX 637 (ALT 250 FOR IP): Performed by: NURSE PRACTITIONER

## 2025-06-18 PROCEDURE — 99221 1ST HOSP IP/OBS SF/LOW 40: CPT | Performed by: STUDENT IN AN ORGANIZED HEALTH CARE EDUCATION/TRAINING PROGRAM

## 2025-06-18 PROCEDURE — 6360000002 HC RX W HCPCS: Performed by: HOSPITALIST

## 2025-06-18 PROCEDURE — 2500000003 HC RX 250 WO HCPCS: Performed by: HOSPITALIST

## 2025-06-18 PROCEDURE — 6370000000 HC RX 637 (ALT 250 FOR IP): Performed by: HOSPITALIST

## 2025-06-18 RX ORDER — TIRZEPATIDE 7.5 MG/.5ML
INJECTION, SOLUTION SUBCUTANEOUS
Qty: 2 ML | Refills: 0 | Status: SHIPPED | OUTPATIENT
Start: 2025-06-18

## 2025-06-18 RX ORDER — HYDRALAZINE HYDROCHLORIDE 20 MG/ML
10 INJECTION INTRAMUSCULAR; INTRAVENOUS EVERY 6 HOURS PRN
Status: DISCONTINUED | OUTPATIENT
Start: 2025-06-18 | End: 2025-06-19 | Stop reason: HOSPADM

## 2025-06-18 RX ORDER — CARVEDILOL 25 MG/1
25 TABLET ORAL 2 TIMES DAILY
Qty: 180 TABLET | Refills: 3 | Status: SHIPPED | OUTPATIENT
Start: 2025-06-18

## 2025-06-18 RX ADMIN — HYDRALAZINE HYDROCHLORIDE 10 MG: 20 INJECTION INTRAMUSCULAR; INTRAVENOUS at 22:46

## 2025-06-18 RX ADMIN — HEPARIN SODIUM 5000 UNITS: 5000 INJECTION INTRAVENOUS; SUBCUTANEOUS at 22:12

## 2025-06-18 RX ADMIN — WATER 2000 MG: 1 INJECTION INTRAMUSCULAR; INTRAVENOUS; SUBCUTANEOUS at 06:34

## 2025-06-18 RX ADMIN — ASPIRIN 81 MG: 81 TABLET, CHEWABLE ORAL at 08:02

## 2025-06-18 RX ADMIN — WATER 2000 MG: 1 INJECTION INTRAMUSCULAR; INTRAVENOUS; SUBCUTANEOUS at 15:20

## 2025-06-18 RX ADMIN — Medication 10 ML: at 22:47

## 2025-06-18 RX ADMIN — HYDRALAZINE HYDROCHLORIDE 100 MG: 50 TABLET ORAL at 20:34

## 2025-06-18 RX ADMIN — HEPARIN SODIUM 5000 UNITS: 5000 INJECTION INTRAVENOUS; SUBCUTANEOUS at 06:34

## 2025-06-18 RX ADMIN — CARVEDILOL 25 MG: 25 TABLET, FILM COATED ORAL at 08:02

## 2025-06-18 RX ADMIN — LINEZOLID 600 MG: 600 INJECTION, SOLUTION INTRAVENOUS at 08:09

## 2025-06-18 RX ADMIN — HYDRALAZINE HYDROCHLORIDE 100 MG: 50 TABLET ORAL at 14:36

## 2025-06-18 RX ADMIN — LINEZOLID 600 MG: 600 INJECTION, SOLUTION INTRAVENOUS at 19:44

## 2025-06-18 RX ADMIN — Medication 10 ML: at 23:24

## 2025-06-18 RX ADMIN — Medication 2000 UNITS: at 08:02

## 2025-06-18 RX ADMIN — CARVEDILOL 25 MG: 25 TABLET, FILM COATED ORAL at 20:34

## 2025-06-18 RX ADMIN — ALLOPURINOL 150 MG: 300 TABLET ORAL at 08:02

## 2025-06-18 RX ADMIN — FERROUS SULFATE TAB 325 MG (65 MG ELEMENTAL FE) 325 MG: 325 (65 FE) TAB at 08:02

## 2025-06-18 RX ADMIN — ATORVASTATIN CALCIUM 80 MG: 80 TABLET, FILM COATED ORAL at 08:02

## 2025-06-18 RX ADMIN — WATER 2000 MG: 1 INJECTION INTRAMUSCULAR; INTRAVENOUS; SUBCUTANEOUS at 23:18

## 2025-06-18 RX ADMIN — Medication 10 ML: at 08:09

## 2025-06-18 RX ADMIN — HYDRALAZINE HYDROCHLORIDE 100 MG: 50 TABLET ORAL at 08:02

## 2025-06-18 RX ADMIN — HEPARIN SODIUM 5000 UNITS: 5000 INJECTION INTRAVENOUS; SUBCUTANEOUS at 14:36

## 2025-06-18 RX ADMIN — Medication 10 ML: at 23:22

## 2025-06-18 RX ADMIN — LOSARTAN POTASSIUM 100 MG: 100 TABLET, FILM COATED ORAL at 08:02

## 2025-06-18 RX ADMIN — Medication 5 MG: at 23:30

## 2025-06-18 NOTE — CARE COORDINATION
Writer reviewed chart and spoke with NP, and RN couple more days of IV Abx, and plans to DC home with PO Abx.     Tracy Cardozo RN

## 2025-06-18 NOTE — PLAN OF CARE
Problem: Chronic Conditions and Co-morbidities  Goal: Patient's chronic conditions and co-morbidity symptoms are monitored and maintained or improved  6/18/2025 1029 by Yessica Nathan, RN  Outcome: Progressing  Flowsheets (Taken 6/18/2025 0800)  Care Plan - Patient's Chronic Conditions and Co-Morbidity Symptoms are Monitored and Maintained or Improved:   Monitor and assess patient's chronic conditions and comorbid symptoms for stability, deterioration, or improvement   Collaborate with multidisciplinary team to address chronic and comorbid conditions and prevent exacerbation or deterioration   Update acute care plan with appropriate goals if chronic or comorbid symptoms are exacerbated and prevent overall improvement and discharge  6/17/2025 2052 by Alan Cline RN  Outcome: Progressing     Problem: Discharge Planning  Goal: Discharge to home or other facility with appropriate resources  6/18/2025 1029 by Yessica Nathan, RN  Outcome: Progressing  Flowsheets (Taken 6/18/2025 0800)  Discharge to home or other facility with appropriate resources:   Identify barriers to discharge with patient and caregiver   Arrange for needed discharge resources and transportation as appropriate  6/17/2025 2052 by Alan Cline, RN  Outcome: Progressing     Problem: Pain  Goal: Verbalizes/displays adequate comfort level or baseline comfort level  6/18/2025 1029 by Yessica aNthan, RN  Outcome: Progressing  6/17/2025 2052 by Alan Cline, RN  Outcome: Progressing     Problem: Safety - Adult  Goal: Free from fall injury  6/18/2025 1029 by Yessica Nathan, RN  Outcome: Progressing  6/17/2025 2052 by Alan Cline, RN  Outcome: Progressing

## 2025-06-18 NOTE — PROGRESS NOTES
[facility] Medicine Progress Note  V 5.17      Date of Admission: 6/16/2025    Hospital Day: 3      Chief Admission Complaint:    Chief Complaint   Patient presents with    Fatigue     Pt states that he started to feel weak, fatigued, went to urgent care and was diagnosed with mastoiditis and given augmentin. States that he vomited a few times this afternoon. Family states he was disoriented but better now. 7/10 pain         Subjective:  EMR and notes reviewed pt seen and examined, l;helen in bed in NAD, reports feels like ear is improving. No fever shill , N/V. Taking PO w/o issue       Presenting Admission History:       This is an 79 y.o. male with PMHx of CAD, combined systolic and diastolic CHF, CKD stage 3, DM II on insulin, pituitary macroadenoma, HTN, HLD presents to the ED with worsening right ear and right periauricular area pain, tenderness and erythema. Initially had no fever today. He went to an urgent care and was given a prescription for augmentin for \"mastoiditis\". He started feeling worse thereafter and felt like he was having a fever so he came to the ED. Patient wears hearing aids and glasses denies any injuries or wounds recently. No new hearing changes reported   In the ED, he was hypertensive, with other normal VS. CXR revealed bibasilar airspace disease. Labs revealed Crt 1.8 and WBC 13.4. Noncontrast CT posterior fossa showed no clear evidence for mastoiditis. ED provider discussed with ENT on-call who will see the patient in the morning.       Assessment/Plan:      Chondritis R ear, periauricular area  ENT consult ORDERED and pending.   Infectious Disease \" Clinical picture here is most consistent with a non-purulent cellulitis   Resolution of inflammation of the cartilaginous pinna may be slower vs other sites    Change to cefazolin   Anticipate another 1-2 days IV abx with change to po at DC    Monitor for any complications including bacteremia \"   - 6/18 appears to be improving mild

## 2025-06-18 NOTE — PROGRESS NOTES
Infectious Disease Follow up Notes    CC :  facial cellulitis / chondritis      Antibiotics:  Cefazolin 2g q8  Linezolid 600 IV q12     Admit Date:   6/16/2025  Hospital Day: 3    Subjective:   He remains AF   Feeling ok   Tolerating abx well so far     Objective:     Patient Vitals for the past 8 hrs:   BP Temp Temp src Pulse Resp SpO2   06/18/25 1437 (!) 154/55 -- -- -- -- --   06/18/25 1125 (!) 139/50 98.2 °F (36.8 °C) Oral 57 17 95 %       EXAM:  Alert, oriented, NAD   Minimal lingering erythema R ear and surrounding skin, decreased edema   No LAD   MMM, no thrush  No acute rash exposed skin         LINE:   PIV in place         Scheduled Meds:   sodium chloride flush  5-40 mL IntraVENous 2 times per day    heparin (porcine)  5,000 Units SubCUTAneous 3 times per day    linezolid  600 mg IntraVENous Q12H    insulin lispro  0-8 Units SubCUTAneous 4x Daily AC & HS    aspirin  81 mg Oral Daily    atorvastatin  80 mg Oral Daily    [START ON 6/23/2025] cabergoline  0.25 mg Oral Weekly    carvedilol  25 mg Oral BID    hydrALAZINE  100 mg Oral TID    losartan  100 mg Oral Daily    melatonin  5 mg Oral Nightly    ferrous sulfate  325 mg Oral Daily with breakfast    Vitamin D  2,000 Units Oral Daily    allopurinol  150 mg Oral Daily    ceFAZolin (ANCEF) 2,000 mg in sterile water 20 mL IV syringe  2,000 mg IntraVENous Q8H       Continuous Infusions:   sodium chloride 10 mL/hr at 06/17/25 0333    dextrose            Data Review:    Lab Results   Component Value Date    WBC 6.5 06/18/2025    HGB 12.3 (L) 06/18/2025    HCT 36.5 (L) 06/18/2025    MCV 88.6 06/18/2025    PLT 99 (L) 06/18/2025     Lab Results   Component Value Date    CREATININE 1.5 (H) 06/18/2025    BUN 27 (H) 06/18/2025     06/18/2025    K 3.7 06/18/2025     06/18/2025    CO2 24 06/18/2025       Hepatic Function Panel:   Lab Results   Component Value Date/Time    ALKPHOS

## 2025-06-18 NOTE — CONSULTS
Consult PerfectServed/called to Infectious Disease on 06/17/25 at 6:59 AM Mabel Riggins  
Infectious Diseases   Consult Note      Reason for Consult:  chondritis    Requesting Physician:  Senthil       Date of Admission: 6/16/2025    Subjective:   CHIEF COMPLAINT:  none given       HPI:    Davonte Keene is an 79yoM with history of venous stasis and recurrent cellulitis for which he took ppx pcn for years in the past.  S/p R TKA, CKD, DM                 Urgent Care visit evening of 6/16 with acute pain post-R ear  He was noted to have swollen, erythematous R pinna with tender mastoid.  He was prescribed Augmentin for suspected mastoiditis.    Soon after that visit he vomited.  The ear seemed worse and he came to the ED for evaluation.  Low grade fever was present on arrival   WBC was 13   CT IAC and soft tissues of the neck were both reassuring.   He was admitted for evaluation and started on IV meropenem.  He also had 1 dose of vanc    He is AF today   The area may be less red per family but remains tender and swollen.  No acute hearing change.  ID is consulted for e/m.      Current abx:  Meropenem 1g q12        Past Surgical History:       Diagnosis Date    CAD (coronary artery disease)     Cataract     Diabetes mellitus (HCC)     DJD (degenerative joint disease)     History of PTCA     Dr. Isidro Mcleod    Hyperlipidemia     Hypertension     Hypotestosteronism     Dr. Ramirez, urology    IGT (impaired glucose tolerance)     Kidney failure     Pituitary abnormality     growth    Sleep apnea     cpap         Procedure Laterality Date    CARDIAC CATH PROCEDURE  6/10/2025    CARDIAC CATHETERIZATION      CARDIAC PROCEDURE N/A 6/10/2025    Left heart cath / coronary angiography performed by Lisa Mukherjee MD at [facility] CARDIAC CATH LAB    CARDIAC PROCEDURE N/A 6/10/2025    Fractional flow reserve (FFR) performed by Lisa Mukherjee MD at [facility] CARDIAC CATH LAB    CARDIAC SURGERY  2009    stents    COLONOSCOPY  2001    COLONOSCOPY  12/09/2011    diverticulosis    CORONARY ANGIOPLASTY WITH STENT PLACEMENT  
New consult placed with Otolaryngology HSRADDHA Valentine on 06/18/2025 @ 7:26AM  
Placed call to Otolaryngology @ [phone]  RE:  ? mastoiditis, unable to give CT contrast per MD Trinity Chávez MD called back @ [phone]  
right knee replacement    Status post total knee replacement, right    Morbid obesity with BMI of 40.0-44.9, adult (HCC)    Pituitary macroadenoma (HCC)    SOB (shortness of breath)    Hypotension    Former smoker    Chronic kidney disease (CKD), stage III (moderate) (HCC)    Right bundle branch block    Palpitations    Thrombocytopenia, unspecified    Body mass index [BMI] 36.0-36.9, adult (Z68.36)    History of coronary artery stent placement    Abnormal stress test    Abnormal cardiovascular stress test    Chondritis     Past Surgical History:   Procedure Laterality Date    CARDIAC CATH PROCEDURE  6/10/2025    CARDIAC CATHETERIZATION      CARDIAC PROCEDURE N/A 6/10/2025    Left heart cath / coronary angiography performed by Lisa Mukherjee MD at [facility] CARDIAC CATH LAB    CARDIAC PROCEDURE N/A 6/10/2025    Fractional flow reserve (FFR) performed by Lisa Mukherjee MD at [facility] CARDIAC CATH LAB    CARDIAC SURGERY  2009    stents    COLONOSCOPY  2001    COLONOSCOPY  2011    diverticulosis    CORONARY ANGIOPLASTY WITH STENT PLACEMENT  2003    EYE SURGERY      cataracts    KNEE ARTHROSCOPY Right 2015    TOTAL KNEE ARTHROPLASTY Right 2020    RIGHT TOTAL KNEE REPLACEMENT      LUTHER & NEPHEW performed by Zia Guillermo MD at [facility] OR     Family History   Problem Relation Age of Onset    Heart Disease Brother     High Blood Pressure Brother     Dementia Mother         alzheimers     Social History     Socioeconomic History    Marital status:      Spouse name: Not on file    Number of children: Not on file    Years of education: Not on file    Highest education level: Not on file   Occupational History    Not on file   Tobacco Use    Smoking status: Former     Current packs/day: 0.00     Average packs/day: 1 pack/day for 15.0 years (15.0 ttl pk-yrs)     Types: Cigarettes     Start date: 1967     Quit date: 1982     Years since quittin.5     Passive exposure: Past    Smokeless tobacco:

## 2025-06-19 VITALS
DIASTOLIC BLOOD PRESSURE: 52 MMHG | TEMPERATURE: 97.9 F | RESPIRATION RATE: 16 BRPM | HEART RATE: 56 BPM | BODY MASS INDEX: 35.93 KG/M2 | SYSTOLIC BLOOD PRESSURE: 130 MMHG | OXYGEN SATURATION: 97 % | HEIGHT: 70 IN | WEIGHT: 251 LBS

## 2025-06-19 PROBLEM — L03.211 FACIAL CELLULITIS: Status: ACTIVE | Noted: 2025-06-19

## 2025-06-19 LAB
ANION GAP SERPL CALCULATED.3IONS-SCNC: 9 MMOL/L (ref 3–16)
BASOPHILS # BLD: 0 K/UL (ref 0–0.2)
BASOPHILS NFR BLD: 0.3 %
BUN SERPL-MCNC: 22 MG/DL (ref 7–20)
CALCIUM SERPL-MCNC: 8.2 MG/DL (ref 8.3–10.6)
CHLORIDE SERPL-SCNC: 108 MMOL/L (ref 99–110)
CO2 SERPL-SCNC: 26 MMOL/L (ref 21–32)
CREAT SERPL-MCNC: 1.5 MG/DL (ref 0.8–1.3)
DEPRECATED RDW RBC AUTO: 15.5 % (ref 12.4–15.4)
EOSINOPHIL # BLD: 0.3 K/UL (ref 0–0.6)
EOSINOPHIL NFR BLD: 4.5 %
GFR SERPLBLD CREATININE-BSD FMLA CKD-EPI: 47 ML/MIN/{1.73_M2}
GLUCOSE BLD-MCNC: 109 MG/DL (ref 70–99)
GLUCOSE BLD-MCNC: 142 MG/DL (ref 70–99)
GLUCOSE SERPL-MCNC: 110 MG/DL (ref 70–99)
HCT VFR BLD AUTO: 36.8 % (ref 40.5–52.5)
HGB BLD-MCNC: 12.5 G/DL (ref 13.5–17.5)
LYMPHOCYTES # BLD: 1.5 K/UL (ref 1–5.1)
LYMPHOCYTES NFR BLD: 20.4 %
MCH RBC QN AUTO: 30.1 PG (ref 26–34)
MCHC RBC AUTO-ENTMCNC: 34 G/DL (ref 31–36)
MCV RBC AUTO: 88.7 FL (ref 80–100)
MONOCYTES # BLD: 0.7 K/UL (ref 0–1.3)
MONOCYTES NFR BLD: 10.3 %
NEUTROPHILS # BLD: 4.7 K/UL (ref 1.7–7.7)
NEUTROPHILS NFR BLD: 64.5 %
PERFORMED ON: ABNORMAL
PERFORMED ON: ABNORMAL
PLATELET # BLD AUTO: 107 K/UL (ref 135–450)
PMV BLD AUTO: 9 FL (ref 5–10.5)
POTASSIUM SERPL-SCNC: 3.7 MMOL/L (ref 3.5–5.1)
RBC # BLD AUTO: 4.15 M/UL (ref 4.2–5.9)
SODIUM SERPL-SCNC: 143 MMOL/L (ref 136–145)
WBC # BLD AUTO: 7.2 K/UL (ref 4–11)

## 2025-06-19 PROCEDURE — 36415 COLL VENOUS BLD VENIPUNCTURE: CPT

## 2025-06-19 PROCEDURE — 6360000002 HC RX W HCPCS: Performed by: HOSPITALIST

## 2025-06-19 PROCEDURE — 2500000003 HC RX 250 WO HCPCS: Performed by: INTERNAL MEDICINE

## 2025-06-19 PROCEDURE — 6370000000 HC RX 637 (ALT 250 FOR IP): Performed by: HOSPITALIST

## 2025-06-19 PROCEDURE — 6370000000 HC RX 637 (ALT 250 FOR IP): Performed by: NURSE PRACTITIONER

## 2025-06-19 PROCEDURE — 85025 COMPLETE CBC W/AUTO DIFF WBC: CPT

## 2025-06-19 PROCEDURE — 2500000003 HC RX 250 WO HCPCS: Performed by: HOSPITALIST

## 2025-06-19 PROCEDURE — 80048 BASIC METABOLIC PNL TOTAL CA: CPT

## 2025-06-19 PROCEDURE — 6360000002 HC RX W HCPCS: Performed by: INTERNAL MEDICINE

## 2025-06-19 RX ORDER — CEFADROXIL 1000 MG/1
1 TABLET ORAL 2 TIMES DAILY
Qty: 10 TABLET | Refills: 0 | Status: SHIPPED | OUTPATIENT
Start: 2025-06-19 | End: 2025-06-24

## 2025-06-19 RX ADMIN — HEPARIN SODIUM 5000 UNITS: 5000 INJECTION INTRAVENOUS; SUBCUTANEOUS at 06:25

## 2025-06-19 RX ADMIN — WATER 2000 MG: 1 INJECTION INTRAMUSCULAR; INTRAVENOUS; SUBCUTANEOUS at 06:25

## 2025-06-19 RX ADMIN — HYDRALAZINE HYDROCHLORIDE 100 MG: 50 TABLET ORAL at 09:51

## 2025-06-19 RX ADMIN — FERROUS SULFATE TAB 325 MG (65 MG ELEMENTAL FE) 325 MG: 325 (65 FE) TAB at 09:52

## 2025-06-19 RX ADMIN — LOSARTAN POTASSIUM 100 MG: 100 TABLET, FILM COATED ORAL at 09:51

## 2025-06-19 RX ADMIN — ATORVASTATIN CALCIUM 80 MG: 80 TABLET, FILM COATED ORAL at 09:52

## 2025-06-19 RX ADMIN — LINEZOLID 600 MG: 600 INJECTION, SOLUTION INTRAVENOUS at 09:57

## 2025-06-19 RX ADMIN — CARVEDILOL 25 MG: 25 TABLET, FILM COATED ORAL at 09:52

## 2025-06-19 RX ADMIN — Medication 10 ML: at 09:53

## 2025-06-19 RX ADMIN — Medication 10 ML: at 06:24

## 2025-06-19 RX ADMIN — Medication 2000 UNITS: at 09:51

## 2025-06-19 RX ADMIN — ASPIRIN 81 MG: 81 TABLET, CHEWABLE ORAL at 09:51

## 2025-06-19 RX ADMIN — ALLOPURINOL 150 MG: 300 TABLET ORAL at 09:51

## 2025-06-19 ASSESSMENT — PAIN SCALES - GENERAL: PAINLEVEL_OUTOF10: 0

## 2025-06-19 NOTE — PROGRESS NOTES
PIV removed per protocol. AVS reviewed with the patient and spouse. All questions answered. Patient stated he would walk out with his wife.

## 2025-06-19 NOTE — CARE COORDINATION
Patient is doing well, likely DC home with PO Abx per ID. Plans to return home with his spouse.     Tracy Cardozo RN

## 2025-06-19 NOTE — CARE COORDINATION
CASE MANAGEMENT DISCHARGE SUMMARY      Discharge to: Home- NN    Precertification completed: N/A  Hospital Exemption Notification (HENS) completed: N/A    IMM given: (date) 6/17/25    New Durable Medical Equipment ordered/agency: N/A    Transportation:    Family/car: Personal      Confirmed discharge plan with:     Patient: yes     Family:  yes         RN, name: Ana    Note: Discharging nurse to complete CASEY, reconcile AVS, and place final copy with patient's discharge packet. RN to ensure that written prescriptions for  Level II medications are sent with patient to the facility as per protocol.      Tracy Cardozo, RN

## 2025-06-19 NOTE — PLAN OF CARE
Problem: Chronic Conditions and Co-morbidities  Goal: Patient's chronic conditions and co-morbidity symptoms are monitored and maintained or improved  6/18/2025 2006 by Alan Cline RN  Outcome: Progressing  6/18/2025 1029 by Yessica Nathan, RN  Outcome: Progressing  Flowsheets (Taken 6/18/2025 0800)  Care Plan - Patient's Chronic Conditions and Co-Morbidity Symptoms are Monitored and Maintained or Improved:   Monitor and assess patient's chronic conditions and comorbid symptoms for stability, deterioration, or improvement   Collaborate with multidisciplinary team to address chronic and comorbid conditions and prevent exacerbation or deterioration   Update acute care plan with appropriate goals if chronic or comorbid symptoms are exacerbated and prevent overall improvement and discharge     Problem: Discharge Planning  Goal: Discharge to home or other facility with appropriate resources  6/18/2025 2006 by Alan Cline RN  Outcome: Progressing  6/18/2025 1029 by Yessica Nathan, RN  Outcome: Progressing  Flowsheets (Taken 6/18/2025 0800)  Discharge to home or other facility with appropriate resources:   Identify barriers to discharge with patient and caregiver   Arrange for needed discharge resources and transportation as appropriate     Problem: Pain  Goal: Verbalizes/displays adequate comfort level or baseline comfort level  6/18/2025 2006 by Alan Cline, RN  Outcome: Progressing  6/18/2025 1029 by Yessica Nathan, RN  Outcome: Progressing     Problem: Safety - Adult  Goal: Free from fall injury  6/18/2025 2006 by Alan Cline RN  Outcome: Progressing  6/18/2025 1029 by Yessica Nathan, RN  Outcome: Progressing

## 2025-06-19 NOTE — DISCHARGE SUMMARY
Hospital Medicine Discharge Summary    Patient: Romero Keene   : 1945     Hospital:  [facility]  Admit Date: 2025   Discharge Date:   25   Disposition:  Home   Code status:  Full  Condition at Discharge: Stable  Primary Care Provider: Derek Levin MD    Admitting Provider: Spike Ndiaye MD  Discharge Provider: Vandana Samson, APRN - CNP     Discharge Diagnoses:      Active Hospital Problems    Diagnosis     Facial cellulitis [L03.211]     Chondritis [M94.8X9]      Chief Admission Complaint:         Chief Complaint   Patient presents with    Fatigue       Pt states that he started to feel weak, fatigued, went to urgent care and was diagnosed with mastoiditis and given augmentin. States that he vomited a few times this afternoon. Family states he was disoriented but better now. 7/10 pain            Subjective:  EMR and notes reviewed pt seen and examined, sitting up in chair.  No fever shill , N/V. Taking PO w/o issue         Presenting Admission History:        This is an 79 y.o. male with PMHx of CAD, combined systolic and diastolic CHF, CKD stage 3, DM II on insulin, pituitary macroadenoma, HTN, HLD presents to the ED with worsening right ear and right periauricular area pain, tenderness and erythema. Initially had no fever today. He went to an urgent care and was given a prescription for augmentin for \"mastoiditis\". He started feeling worse thereafter and felt like he was having a fever so he came to the ED. Patient wears hearing aids and glasses denies any injuries or wounds recently. No new hearing changes reported   In the ED, he was hypertensive, with other normal VS. CXR revealed bibasilar airspace disease. Labs revealed Crt 1.8 and WBC 13.4. Noncontrast CT posterior fossa showed no clear evidence for mastoiditis. ED provider discussed with ENT on-call who will see the patient in the morning.        Assessment/Plan:       Chondritis R ear, periauricular

## 2025-06-19 NOTE — PLAN OF CARE
Problem: Chronic Conditions and Co-morbidities  Goal: Patient's chronic conditions and co-morbidity symptoms are monitored and maintained or improved  Outcome: Progressing     Problem: Discharge Planning  Goal: Discharge to home or other facility with appropriate resources  Outcome: Progressing     Problem: Pain  Goal: Verbalizes/displays adequate comfort level or baseline comfort level  Outcome: Progressing  Pt scoring pain on 0-10 scale. Pain medications given per MAR. Pt instructed to call out when pain level increasing. Call light within reach.      Problem: Safety - Adult  Goal: Free from fall injury  Outcome: Progressing  Bed in lowest position, wheels locked, 2/4 side rails up, nonskid footwear on. Bed/ chair check alarm in place, call light within reach. Pt instructed to call out when needing assistance. Pt stated understanding.

## 2025-06-20 ENCOUNTER — CARE COORDINATION (OUTPATIENT)
Dept: CARE COORDINATION | Age: 80
End: 2025-06-20

## 2025-06-20 DIAGNOSIS — M94.8X9 CHONDRITIS: Primary | ICD-10-CM

## 2025-06-20 PROCEDURE — 1111F DSCHRG MED/CURRENT MED MERGE: CPT | Performed by: FAMILY MEDICINE

## 2025-06-20 NOTE — TELEPHONE ENCOUNTER
VV is not scheduled with PCP so absolutely no way that this can be scheduled as a hospital followup.

## 2025-06-20 NOTE — CARE COORDINATION
Care Transitions Note    Initial Call - Call within 2 business days of discharge: Yes    Patient Current Location:  Home: [address] [place] [address]    Care Transition Nurse contacted the patient by telephone to perform post hospital discharge assessment, verified name and  as identifiers.  Provided introduction to self, and explanation of the Care Transition Nurse role.    Patient: Romero Keene    Patient : 1945   MRN: [id number]    Reason for Admission: Possible PNA, Right Ear mastoiditis vs cellulitis vs chondritis - abx x5 days, NN   Discharge Date: 25  RURS: Readmission Risk Score: 11.6      Last Discharge Facility       Date Complaint Diagnosis Description Type Department Provider    25 Fatigue Cellulitis of head (any part, except face) ... ED to Hosp-Admission (Discharged) (ADMITTED) Christian Monahan MD; Saira, ...            Was this an external facility discharge? No    Additional needs identified to be addressed with provider   No needs identified             Method of communication with provider: none.    Patients top risk factors for readmission: medical condition-.    Interventions to address risk factors:   Education: .    Care Summary Note: Davonte states he is doing well. States \"I'm back to normal.\" Cannot see ear but states wife said it looked better. Swelling and pain have subsided. States he is eating and drinking without concerns. Denies any coughing or concerns with breathing. Pt on his way to pharmacy to  abx. Reviewed AVS, meds and HFU.      Care Transition Nurse reviewed discharge instructions with patient. The patient was given an opportunity to ask questions; all questions answered at this time.. The patient verbalized understanding.   Were discharge instructions available to patient? Yes.   Reviewed appropriate site of care based on symptoms and resources available to patient including: PCP. The patient agrees to contact the

## 2025-06-21 LAB
BACTERIA BLD CULT ORG #2: NORMAL
BACTERIA BLD CULT: NORMAL

## 2025-06-25 ENCOUNTER — OFFICE VISIT (OUTPATIENT)
Dept: FAMILY MEDICINE CLINIC | Age: 80
End: 2025-06-25

## 2025-06-25 VITALS
HEART RATE: 63 BPM | DIASTOLIC BLOOD PRESSURE: 50 MMHG | OXYGEN SATURATION: 93 % | WEIGHT: 250 LBS | HEIGHT: 70 IN | BODY MASS INDEX: 35.79 KG/M2 | SYSTOLIC BLOOD PRESSURE: 134 MMHG

## 2025-06-25 DIAGNOSIS — Z09 HOSPITAL DISCHARGE FOLLOW-UP: ICD-10-CM

## 2025-06-25 DIAGNOSIS — N18.31 STAGE 3A CHRONIC KIDNEY DISEASE (HCC): ICD-10-CM

## 2025-06-25 DIAGNOSIS — L03.211 FACIAL CELLULITIS: ICD-10-CM

## 2025-06-25 DIAGNOSIS — R60.0 PERIPHERAL EDEMA: ICD-10-CM

## 2025-06-25 DIAGNOSIS — E11.22 TYPE 2 DIABETES MELLITUS WITH DIABETIC CHRONIC KIDNEY DISEASE, UNSPECIFIED CKD STAGE, UNSPECIFIED WHETHER LONG TERM INSULIN USE (HCC): Primary | ICD-10-CM

## 2025-06-25 DIAGNOSIS — I10 ESSENTIAL HYPERTENSION, BENIGN: ICD-10-CM

## 2025-06-25 DIAGNOSIS — G47.00 INSOMNIA, UNSPECIFIED TYPE: ICD-10-CM

## 2025-06-25 LAB — HBA1C MFR BLD: 5.8 %

## 2025-06-25 RX ORDER — TRAZODONE HYDROCHLORIDE 50 MG/1
50 TABLET ORAL NIGHTLY PRN
Qty: 30 TABLET | Refills: 5 | Status: SHIPPED | OUTPATIENT
Start: 2025-06-25

## 2025-06-25 NOTE — PROGRESS NOTES
discussing the diagnosis and importance of compliance with the treatment plan as well as documenting on the day of the visit.       Subjective:   Inpatient course: Discharge summary reviewed- see chart.    History of Present Illness  The patient presents for evaluation of cellulitis, ankle swelling, sleep maintenance issues, and diabetes.    He experienced a sudden onset of itching on his head, which he initially dismissed as a minor irritation or pimple. However, the area rapidly became inflamed and swollen within a few hours, prompting him to seek immediate medical attention at an urgent care facility. This led to a subsequent hospitalization from 06/16/2025 to 06/19/2025, during which he was treated with Ancef. He reports no residual symptoms from this incident.    He has been experiencing frequent ankle swelling, which he perceives as excessive. He has been managing this with Lasix 40 mg every other day, as prescribed by Dr. Shepherd, and monthly lab work. He also uses support socks at night but does not wear them during the day due to discomfort. He plans to resume wearing them at night during the winter season.    His sleep pattern is characterized by early bedtime around 10:30 PM, followed by awakening at 3:00 AM for urination, after which he struggles to return to sleep. He occasionally experiences racing thoughts upon waking. He has been using over-the-counter melatonin to aid sleep and is considering increasing the dose if it would be beneficial.    He expresses satisfaction with his current blood sugar control, maintained with Mounjaro 7.5 mg, and is contemplating an increase in dosage to 10 mg. He reports a robust appetite and is curious about the potential side effects of the increased dosage, particularly the risk of severe diarrhea, a side effect he previously experienced with Ozempic.    He had an angiogram and has to see Dr. Ni in August 2025 to discuss the findings. He had 2 blockages, each 60%

## 2025-06-26 ENCOUNTER — CARE COORDINATION (OUTPATIENT)
Dept: CASE MANAGEMENT | Age: 80
End: 2025-06-26

## 2025-06-26 NOTE — CARE COORDINATION
Care Transitions Note    Final Call     Reason for Admission: Possible PNA, Right Ear mastoiditis vs cellulitis vs chondritis - abx x5 days     Patient Current Location:  [facility] Care Coordinator contacted the patient by telephone. Verified name and  as identifiers.    Patient graduated from the Care Transitions program on .  Patient/family verbalizes confidence in the ability to self-manage at this time..      Advance Care Planning   The patient has the following advanced directives on file:  Advance Directives       Power of  Living Will ACP-Advance Directive ACP-Power of     Not on File Not on File Not on File Not on File            The patient has appointed the following active healthcare agents:    Primary Decision Maker: Ericka Keene - Spouse - [phone]    Secondary Decision Maker: Lynn Keene - Child - [phone]    Handoff:   Patient was not referred to the ACM team due to no additional needs identified.       Care Summary Note: Spoke with with Ericka who reported that patient was not available. She stated that patient is doing fantastic. Wife stated that patient's ear has healed and fatigue is resolve. She stated that patient is back 100%. Wife declined any further outreach. Wife instructed to continue to monitor s/s, reporting any that may present to MD immediately for early intervention.      Assessments:  Care Transitions Subsequent and Final Call    Subsequent and Final Calls  Do you have any ongoing symptoms?: No  Have your medications changed?: No  Do you have any questions related to your medications?: No  Do you currently have any active services?: No  Do you have any needs or concerns that I can assist you with?: No  Care Transitions Interventions  No Identified Needs  Other Interventions:              Upcoming Appointments:    Future Appointments         Provider Specialty Dept Phone    2025 9:45 AM Lisa Mukherjee MD Cardiology [phone]

## 2025-07-14 ENCOUNTER — TELEPHONE (OUTPATIENT)
Dept: FAMILY MEDICINE CLINIC | Age: 80
End: 2025-07-14

## 2025-07-14 DIAGNOSIS — E11.9 TYPE 2 DIABETES MELLITUS WITHOUT COMPLICATION, WITHOUT LONG-TERM CURRENT USE OF INSULIN (HCC): ICD-10-CM

## 2025-07-14 RX ORDER — BLOOD SUGAR DIAGNOSTIC
STRIP MISCELLANEOUS
Qty: 150 STRIP | Refills: 3 | Status: SHIPPED | OUTPATIENT
Start: 2025-07-14

## 2025-07-14 NOTE — TELEPHONE ENCOUNTER
Anthony 4530 tu re refill for patient on  blood glucose test strips (ONETOUCH VERIO) strip   Last visit 6/25/25  Future 10/1/25  Angel mae

## 2025-08-12 ENCOUNTER — TELEPHONE (OUTPATIENT)
Dept: FAMILY MEDICINE CLINIC | Age: 80
End: 2025-08-12

## 2025-08-20 ENCOUNTER — OFFICE VISIT (OUTPATIENT)
Dept: CARDIOLOGY CLINIC | Age: 80
End: 2025-08-20
Payer: MEDICARE

## 2025-08-20 VITALS
WEIGHT: 247 LBS | OXYGEN SATURATION: 96 % | DIASTOLIC BLOOD PRESSURE: 54 MMHG | SYSTOLIC BLOOD PRESSURE: 110 MMHG | HEIGHT: 70 IN | HEART RATE: 70 BPM | BODY MASS INDEX: 35.36 KG/M2

## 2025-08-20 DIAGNOSIS — I25.118 CORONARY ARTERY DISEASE OF NATIVE ARTERY OF NATIVE HEART WITH STABLE ANGINA PECTORIS: ICD-10-CM

## 2025-08-20 DIAGNOSIS — R06.02 SOB (SHORTNESS OF BREATH): Primary | ICD-10-CM

## 2025-08-20 PROCEDURE — 99214 OFFICE O/P EST MOD 30 MIN: CPT | Performed by: INTERNAL MEDICINE

## 2025-08-20 PROCEDURE — 1124F ACP DISCUSS-NO DSCNMKR DOCD: CPT | Performed by: INTERNAL MEDICINE

## 2025-08-20 PROCEDURE — 3074F SYST BP LT 130 MM HG: CPT | Performed by: INTERNAL MEDICINE

## 2025-08-20 PROCEDURE — 1159F MED LIST DOCD IN RCRD: CPT | Performed by: INTERNAL MEDICINE

## 2025-08-20 PROCEDURE — 3078F DIAST BP <80 MM HG: CPT | Performed by: INTERNAL MEDICINE

## 2025-08-29 ENCOUNTER — OFFICE VISIT (OUTPATIENT)
Dept: FAMILY MEDICINE CLINIC | Age: 80
End: 2025-08-29
Payer: MEDICARE

## 2025-08-29 VITALS
HEIGHT: 70 IN | OXYGEN SATURATION: 97 % | DIASTOLIC BLOOD PRESSURE: 50 MMHG | SYSTOLIC BLOOD PRESSURE: 120 MMHG | WEIGHT: 247 LBS | BODY MASS INDEX: 35.36 KG/M2 | HEART RATE: 80 BPM

## 2025-08-29 DIAGNOSIS — J06.9 VIRAL URI: Primary | ICD-10-CM

## 2025-08-29 DIAGNOSIS — R09.81 NASAL CONGESTION: ICD-10-CM

## 2025-08-29 LAB
INFLUENZA A ANTIGEN, POC: NEGATIVE
INFLUENZA B ANTIGEN, POC: NEGATIVE
LOT EXPIRE DATE: NORMAL
LOT KIT NUMBER: NORMAL
SARS-COV-2, POC: NORMAL
VALID INTERNAL CONTROL: NORMAL
VENDOR AND KIT NAME POC: NORMAL

## 2025-08-29 PROCEDURE — 3074F SYST BP LT 130 MM HG: CPT | Performed by: NURSE PRACTITIONER

## 2025-08-29 PROCEDURE — 87428 SARSCOV & INF VIR A&B AG IA: CPT | Performed by: NURSE PRACTITIONER

## 2025-08-29 PROCEDURE — 3078F DIAST BP <80 MM HG: CPT | Performed by: NURSE PRACTITIONER

## 2025-08-29 PROCEDURE — 1159F MED LIST DOCD IN RCRD: CPT | Performed by: NURSE PRACTITIONER

## 2025-08-29 PROCEDURE — 1124F ACP DISCUSS-NO DSCNMKR DOCD: CPT | Performed by: NURSE PRACTITIONER

## 2025-08-29 PROCEDURE — 99213 OFFICE O/P EST LOW 20 MIN: CPT | Performed by: NURSE PRACTITIONER

## 2025-08-30 ASSESSMENT — ENCOUNTER SYMPTOMS
SINUS PAIN: 0
SINUS PRESSURE: 1
SORE THROAT: 1
WHEEZING: 0
SHORTNESS OF BREATH: 0
COUGH: 1
GASTROINTESTINAL NEGATIVE: 1

## (undated) DEVICE — SMARTGOWN SURGICAL GOWN, LARGE: Brand: CONVERTORS

## (undated) DEVICE — DEVICE INFL W/HEM VLV TORQUE

## (undated) DEVICE — GLIDESHEATH SLENDER STAINLESS STEEL KIT: Brand: GLIDESHEATH SLENDER

## (undated) DEVICE — 3M™ COBAN™ SELF-ADHERENT WRAP, 1586S, STERILE, 6 IN X 5 YD (15 CM X 4,5 M), 12 ROLLS/CASE: Brand: 3M™ COBAN™

## (undated) DEVICE — SUTURE MCRYL + SZ 4-0 L18IN ABSRB UD L19MM PS-2 3/8 CIR MCP496G

## (undated) DEVICE — TR BAND RADIAL ARTERY COMPRESSION DEVICE: Brand: TR BAND

## (undated) DEVICE — SMARTGOWN BREATHABLE SURGICAL GOWN: Brand: CONVERTORS

## (undated) DEVICE — STERILE POLYISOPRENE POWDER-FREE SURGICAL GLOVES: Brand: PROTEXIS

## (undated) DEVICE — SUTURE SURGLON SZ 1 L18IN NONABSORBABLE BLK GS 21 L37MM 1 2 8886196272

## (undated) DEVICE — SOLUTION IRRIG 2000ML 0.9% SOD CHL USP UROMATIC PLAS CONT

## (undated) DEVICE — PENCIL SMK EVAC ALL IN 1 DSGN ENH VISIBILITY IMPROVED AIR

## (undated) DEVICE — BLADE SURG SAW SAG S STL AGG 905MM LEN 185MM W 127MM THCK

## (undated) DEVICE — TUBE IRRIG HNDPC HI FLO TP INTRPULS W/SUCTION TUBE

## (undated) DEVICE — Z CONVERTED USE 2271377 BANDAGE COMPR W6INXL5YD EC E REB

## (undated) DEVICE — GENESIS TROCHLEAR PIN 1/8 X 3: Brand: GENESIS

## (undated) DEVICE — GUIDEWIRE VASC L150CM DIA0.035IN FLX END L7CM J 3MM PTFE

## (undated) DEVICE — RIMMED SPEED PIN 45MM STERILE

## (undated) DEVICE — STERILE LATEX POWDER-FREE SURGICAL GLOVESWITH NITRILE COATING: Brand: PROTEXIS

## (undated) DEVICE — HOOD, PEEL-AWAY: Brand: FLYTE

## (undated) DEVICE — DRILL BIT 3.2MM (1/8'') X 128.0MM

## (undated) DEVICE — GUIDEWIRE VASC L260CM DIA0.035IN RAD 3MM J TIP L7CM PTFE

## (undated) DEVICE — CATH LAB PACK: Brand: MEDLINE INDUSTRIES, INC.

## (undated) DEVICE — CATHETER GUID 6FR L100CM DIA0.071IN NYL SHFT EBU3.5

## (undated) DEVICE — 3 BONE CEMENT MIXER: Brand: MIXEVAC

## (undated) DEVICE — HANDPIECE SET WITH HIGH FLOW TIP AND SUCTION TUBE: Brand: INTERPULSE

## (undated) DEVICE — PRESSURE GUIDEWIRE: Brand: COMET™ II

## (undated) DEVICE — RADIFOCUS OPTITORQUE ANGIOGRAPHIC CATHETER: Brand: OPTITORQUE

## (undated) DEVICE — SHIELD RAD ANGIO FEM ENTRY W/ ABSORBENT ORNG STRL RADPAD LTX

## (undated) DEVICE — NEEDLE HYPO 20GA L1.5IN YEL POLYPR HUB S STL REG BVL STR

## (undated) DEVICE — SUTURE VCRL + SZ 2-0 L18IN ABSRB UD CT1 L36MM 1/2 CIR VCP839D

## (undated) DEVICE — SOLUTION IV HEPARIN SODIUM SODIUM CHL 0.9% 500 ML INJ VIAFLX

## (undated) DEVICE — Device

## (undated) DEVICE — COVER,TABLE,HEAVY DUTY,50"X90",STRL: Brand: MEDLINE